# Patient Record
Sex: MALE | Race: WHITE | NOT HISPANIC OR LATINO | Employment: FULL TIME | URBAN - METROPOLITAN AREA
[De-identification: names, ages, dates, MRNs, and addresses within clinical notes are randomized per-mention and may not be internally consistent; named-entity substitution may affect disease eponyms.]

---

## 2017-05-16 ENCOUNTER — ALLSCRIPTS OFFICE VISIT (OUTPATIENT)
Dept: OTHER | Facility: OTHER | Age: 52
End: 2017-05-16

## 2017-05-16 ENCOUNTER — HOSPITAL ENCOUNTER (OUTPATIENT)
Dept: RADIOLOGY | Facility: HOSPITAL | Age: 52
Discharge: HOME | End: 2017-05-16
Attending: ORTHOPAEDIC SURGERY
Payer: COMMERCIAL

## 2017-05-16 DIAGNOSIS — M25.562 PAIN IN LEFT KNEE: ICD-10-CM

## 2017-05-16 DIAGNOSIS — M25.561 PAIN IN RIGHT KNEE: ICD-10-CM

## 2017-05-16 PROCEDURE — 73562 X-RAY EXAM OF KNEE 3: CPT

## 2017-06-27 ENCOUNTER — ALLSCRIPTS OFFICE VISIT (OUTPATIENT)
Dept: OTHER | Facility: OTHER | Age: 52
End: 2017-06-27

## 2017-07-01 ENCOUNTER — APPOINTMENT (OUTPATIENT)
Dept: LAB | Facility: CLINIC | Age: 52
End: 2017-07-01
Payer: COMMERCIAL

## 2017-07-01 ENCOUNTER — TRANSCRIBE ORDERS (OUTPATIENT)
Dept: LAB | Facility: CLINIC | Age: 52
End: 2017-07-01

## 2017-07-01 DIAGNOSIS — Z00.8 HEALTH EXAMINATION IN POPULATION SURVEY: Primary | ICD-10-CM

## 2017-07-01 DIAGNOSIS — Z00.8 HEALTH EXAMINATION IN POPULATION SURVEY: ICD-10-CM

## 2017-07-01 LAB
CHOLEST SERPL-MCNC: 212 MG/DL (ref 50–200)
EST. AVERAGE GLUCOSE BLD GHB EST-MCNC: 114 MG/DL
HBA1C MFR BLD: 5.6 % (ref 4.2–6.3)
HDLC SERPL-MCNC: 50 MG/DL (ref 40–60)
LDLC SERPL CALC-MCNC: 145 MG/DL (ref 0–100)
TRIGL SERPL-MCNC: 86 MG/DL

## 2017-07-01 PROCEDURE — 83036 HEMOGLOBIN GLYCOSYLATED A1C: CPT

## 2017-07-01 PROCEDURE — 80061 LIPID PANEL: CPT

## 2017-07-01 PROCEDURE — 36415 COLL VENOUS BLD VENIPUNCTURE: CPT

## 2017-08-28 ENCOUNTER — ALLSCRIPTS OFFICE VISIT (OUTPATIENT)
Dept: OTHER | Facility: OTHER | Age: 52
End: 2017-08-28

## 2017-08-28 DIAGNOSIS — Z12.5 ENCOUNTER FOR SCREENING FOR MALIGNANT NEOPLASM OF PROSTATE: ICD-10-CM

## 2017-08-31 ENCOUNTER — APPOINTMENT (OUTPATIENT)
Dept: LAB | Facility: CLINIC | Age: 52
End: 2017-08-31
Payer: COMMERCIAL

## 2017-08-31 ENCOUNTER — GENERIC CONVERSION - ENCOUNTER (OUTPATIENT)
Dept: OTHER | Facility: OTHER | Age: 52
End: 2017-08-31

## 2017-08-31 DIAGNOSIS — Z12.5 ENCOUNTER FOR SCREENING FOR MALIGNANT NEOPLASM OF PROSTATE: ICD-10-CM

## 2017-08-31 LAB — PSA SERPL-MCNC: 1.1 NG/ML (ref 0–4)

## 2017-08-31 PROCEDURE — 36415 COLL VENOUS BLD VENIPUNCTURE: CPT

## 2017-08-31 PROCEDURE — G0103 PSA SCREENING: HCPCS

## 2017-09-12 ENCOUNTER — ALLSCRIPTS OFFICE VISIT (OUTPATIENT)
Dept: OTHER | Facility: OTHER | Age: 52
End: 2017-09-12

## 2017-09-18 ENCOUNTER — GENERIC CONVERSION - ENCOUNTER (OUTPATIENT)
Dept: OTHER | Facility: OTHER | Age: 52
End: 2017-09-18

## 2017-09-26 ENCOUNTER — ALLSCRIPTS OFFICE VISIT (OUTPATIENT)
Dept: OTHER | Facility: OTHER | Age: 52
End: 2017-09-26

## 2017-10-24 ENCOUNTER — GENERIC CONVERSION - ENCOUNTER (OUTPATIENT)
Dept: OTHER | Facility: OTHER | Age: 52
End: 2017-10-24

## 2017-10-26 ENCOUNTER — ANESTHESIA (OUTPATIENT)
Dept: PERIOP | Facility: HOSPITAL | Age: 52
End: 2017-10-26
Payer: COMMERCIAL

## 2017-10-26 ENCOUNTER — HOSPITAL ENCOUNTER (OUTPATIENT)
Facility: HOSPITAL | Age: 52
Setting detail: OUTPATIENT SURGERY
Discharge: HOME | End: 2017-10-26
Attending: SURGERY | Admitting: SURGERY
Payer: COMMERCIAL

## 2017-10-26 ENCOUNTER — ANESTHESIA EVENT (OUTPATIENT)
Dept: PERIOP | Facility: HOSPITAL | Age: 52
End: 2017-10-26
Payer: COMMERCIAL

## 2017-10-26 VITALS
RESPIRATION RATE: 20 BRPM | TEMPERATURE: 98.3 F | BODY MASS INDEX: 28.44 KG/M2 | OXYGEN SATURATION: 98 % | SYSTOLIC BLOOD PRESSURE: 137 MMHG | WEIGHT: 210 LBS | HEIGHT: 72 IN | HEART RATE: 55 BPM | DIASTOLIC BLOOD PRESSURE: 79 MMHG

## 2017-10-26 PROCEDURE — C1781 MESH (IMPLANTABLE): HCPCS | Performed by: SURGERY

## 2017-10-26 DEVICE — BARD MESH
Type: IMPLANTABLE DEVICE | Site: INGUINAL | Status: FUNCTIONAL
Brand: BARD MESH

## 2017-10-26 RX ORDER — ONDANSETRON 2 MG/ML
4 INJECTION INTRAMUSCULAR; INTRAVENOUS ONCE AS NEEDED
Status: DISCONTINUED | OUTPATIENT
Start: 2017-10-26 | End: 2017-10-26 | Stop reason: HOSPADM

## 2017-10-26 RX ORDER — PROPOFOL 10 MG/ML
INJECTION, EMULSION INTRAVENOUS AS NEEDED
Status: DISCONTINUED | OUTPATIENT
Start: 2017-10-26 | End: 2017-10-26 | Stop reason: SURG

## 2017-10-26 RX ORDER — SODIUM CHLORIDE, SODIUM LACTATE, POTASSIUM CHLORIDE, CALCIUM CHLORIDE 600; 310; 30; 20 MG/100ML; MG/100ML; MG/100ML; MG/100ML
50 INJECTION, SOLUTION INTRAVENOUS CONTINUOUS
Status: DISCONTINUED | OUTPATIENT
Start: 2017-10-26 | End: 2017-10-26 | Stop reason: HOSPADM

## 2017-10-26 RX ORDER — FENTANYL CITRATE 50 UG/ML
INJECTION, SOLUTION INTRAMUSCULAR; INTRAVENOUS AS NEEDED
Status: DISCONTINUED | OUTPATIENT
Start: 2017-10-26 | End: 2017-10-26 | Stop reason: SURG

## 2017-10-26 RX ORDER — MIDAZOLAM HYDROCHLORIDE 1 MG/ML
INJECTION INTRAMUSCULAR; INTRAVENOUS AS NEEDED
Status: DISCONTINUED | OUTPATIENT
Start: 2017-10-26 | End: 2017-10-26 | Stop reason: SURG

## 2017-10-26 RX ORDER — ONDANSETRON 2 MG/ML
INJECTION INTRAMUSCULAR; INTRAVENOUS AS NEEDED
Status: DISCONTINUED | OUTPATIENT
Start: 2017-10-26 | End: 2017-10-26 | Stop reason: SURG

## 2017-10-26 RX ORDER — BUPIVACAINE HYDROCHLORIDE AND EPINEPHRINE 5; 5 MG/ML; UG/ML
INJECTION, SOLUTION PERINEURAL AS NEEDED
Status: DISCONTINUED | OUTPATIENT
Start: 2017-10-26 | End: 2017-10-26 | Stop reason: HOSPADM

## 2017-10-26 RX ORDER — OXYCODONE HYDROCHLORIDE AND ACETAMINOPHEN 5; 325 MG/1; MG/1
1 TABLET ORAL EVERY 4 HOURS PRN
Qty: 20 TABLET | Refills: 0
Start: 2017-10-26 | End: 2017-11-05

## 2017-10-26 RX ORDER — ONDANSETRON 2 MG/ML
4 INJECTION INTRAMUSCULAR; INTRAVENOUS EVERY 6 HOURS PRN
Status: DISCONTINUED | OUTPATIENT
Start: 2017-10-26 | End: 2017-10-26 | Stop reason: HOSPADM

## 2017-10-26 RX ORDER — LIDOCAINE HYDROCHLORIDE 10 MG/ML
INJECTION, SOLUTION INFILTRATION; PERINEURAL AS NEEDED
Status: DISCONTINUED | OUTPATIENT
Start: 2017-10-26 | End: 2017-10-26 | Stop reason: SURG

## 2017-10-26 RX ORDER — OXYCODONE HYDROCHLORIDE 5 MG/1
5 TABLET ORAL EVERY 4 HOURS PRN
Status: DISCONTINUED | OUTPATIENT
Start: 2017-10-26 | End: 2017-10-26 | Stop reason: HOSPADM

## 2017-10-26 RX ORDER — SODIUM CHLORIDE, SODIUM LACTATE, POTASSIUM CHLORIDE, CALCIUM CHLORIDE 600; 310; 30; 20 MG/100ML; MG/100ML; MG/100ML; MG/100ML
INJECTION, SOLUTION INTRAVENOUS CONTINUOUS PRN
Status: DISCONTINUED | OUTPATIENT
Start: 2017-10-26 | End: 2017-10-26 | Stop reason: SURG

## 2017-10-26 RX ORDER — ACETAMINOPHEN 325 MG/1
650 TABLET ORAL EVERY 6 HOURS PRN
Status: DISCONTINUED | OUTPATIENT
Start: 2017-10-26 | End: 2017-10-26 | Stop reason: HOSPADM

## 2017-10-26 RX ORDER — FENTANYL CITRATE/PF 50 MCG/ML
50 SYRINGE (ML) INJECTION
Status: DISCONTINUED | OUTPATIENT
Start: 2017-10-26 | End: 2017-10-26 | Stop reason: HOSPADM

## 2017-10-26 RX ADMIN — FENTANYL CITRATE 25 MCG: 50 INJECTION, SOLUTION INTRAMUSCULAR; INTRAVENOUS at 08:11

## 2017-10-26 RX ADMIN — LIDOCAINE HYDROCHLORIDE 100 MG: 10 INJECTION, SOLUTION INFILTRATION; PERINEURAL at 07:59

## 2017-10-26 RX ADMIN — OXYCODONE HYDROCHLORIDE 5 MG: 5 TABLET ORAL at 10:11

## 2017-10-26 RX ADMIN — PROPOFOL 200 MG: 10 INJECTION, EMULSION INTRAVENOUS at 07:59

## 2017-10-26 RX ADMIN — FENTANYL CITRATE 25 MCG: 50 INJECTION, SOLUTION INTRAMUSCULAR; INTRAVENOUS at 07:59

## 2017-10-26 RX ADMIN — FENTANYL CITRATE 50 MCG: 50 INJECTION INTRAMUSCULAR; INTRAVENOUS at 09:20

## 2017-10-26 RX ADMIN — FENTANYL CITRATE 50 MCG: 50 INJECTION INTRAMUSCULAR; INTRAVENOUS at 09:09

## 2017-10-26 RX ADMIN — MIDAZOLAM HYDROCHLORIDE 2 MG: 1 INJECTION, SOLUTION INTRAMUSCULAR; INTRAVENOUS at 07:57

## 2017-10-26 RX ADMIN — FENTANYL CITRATE 25 MCG: 50 INJECTION, SOLUTION INTRAMUSCULAR; INTRAVENOUS at 08:46

## 2017-10-26 RX ADMIN — ONDANSETRON 4 MG: 2 INJECTION INTRAMUSCULAR; INTRAVENOUS at 08:43

## 2017-10-26 RX ADMIN — SODIUM CHLORIDE, SODIUM LACTATE, POTASSIUM CHLORIDE, AND CALCIUM CHLORIDE: .6; .31; .03; .02 INJECTION, SOLUTION INTRAVENOUS at 07:57

## 2017-10-26 RX ADMIN — FENTANYL CITRATE 25 MCG: 50 INJECTION, SOLUTION INTRAMUSCULAR; INTRAVENOUS at 08:05

## 2017-10-26 RX ADMIN — FENTANYL CITRATE 50 MCG: 50 INJECTION INTRAMUSCULAR; INTRAVENOUS at 09:13

## 2017-10-26 NOTE — ANESTHESIA PREPROCEDURE EVALUATION
Review of Systems/Medical History  Patient summary reviewed  Chart reviewed      Cardiovascular  Exercise tolerance: good,  Hyperlipidemia,    Pulmonary       GI/Hepatic            Endo/Other     GYN       Hematology   Musculoskeletal       Neurology   Psychology           Physical Exam    Airway    Mallampati score: I  TM Distance: >3 FB  Neck ROM: full     Dental       Cardiovascular      Pulmonary      Other Findings        Anesthesia Plan  ASA Score- 1       Anesthesia Type- general with ASA Monitors  Additional Monitors:   Airway Plan: LMA  Induction- intravenous  Informed Consent- Anesthetic plan and risks discussed with patient

## 2017-10-26 NOTE — DISCHARGE INSTRUCTIONS
Inguinal Hernia Repair   WHAT YOU NEED TO KNOW:   An inguinal hernia repair may be done open or laparoscopically  Open means your healthcare provider will make 1 large incision and fix your hernia  Laparoscopically means he will make 2 to 3 small incisions and fix your hernia  DISCHARGE INSTRUCTIONS:   Call 911 for any of the following:   · You feel lightheaded, short of breath, and have chest pain  · You cough up blood  · You have trouble breathing  Seek care immediately if:   · Your arm or leg feels warm, tender, and painful  It may look swollen and red  · Blood soaks through your bandage  · Your abdomen or groin feels hard and looks bigger than usual     · Your bowel movements are black, bloody, or tarry-looking  Contact your healthcare provider if:   · You have a fever above 101°F     · You develop a skin rash, hives, or itching  · Your incision is swollen, red, or draining pus or fluid  · You have nausea, or you are vomiting  · You cannot have a bowel movement  · You have trouble urinating  · Your pain does not get better after you take pain medicine  · You have questions or concerns about your condition or care  Medicines: You may need any of the following:  · Prescription pain medicine may be given  Ask your healthcare provider how to take this medicine safely  Some prescription pain medicines contain acetaminophen  Do not take other medicines that contain acetaminophen without talking to your healthcare provider  Too much acetaminophen may cause liver damage  Prescription pain medicine may cause constipation  Ask your healthcare provider how to prevent or treat constipation  · Acetaminophen decreases pain and fever  It is available without a doctor's order  Ask how much to take and how often to take it  Follow directions   Read the labels of all other medicines you are using to see if they also contain acetaminophen, or ask your doctor or pharmacist  Acetaminophen can cause liver damage if not taken correctly  Do not use more than 4 grams (4,000 milligrams) total of acetaminophen in one day  · Take your medicine as directed  Contact your healthcare provider if you think your medicine is not helping or if you have side effects  Tell him or her if you are allergic to any medicine  Keep a list of the medicines, vitamins, and herbs you take  Include the amounts, and when and why you take them  Bring the list or the pill bottles to follow-up visits  Carry your medicine list with you in case of an emergency  Care for your wound as directed: You can shower in 48 hours  Remove your bandage before you shower  It is normal to see a small amount of blood under the bandage  Carefully wash around your wound  It is okay to let soap and water run over your wound  Do not scrub your wound  Gently pay your wound dry  If you have strips of medical tape over your incision, allow them to fall off on their own  It may take 7 to 10 days for them to fall off  Do not get in a bathtub, swimming pool, or hot tub until your healthcare provider says it is okay  Self-care:   · Eat a variety of healthy foods  Healthy foods include fruits, vegetables, whole-grain breads, low-fat dairy products, beans, lean meats, and fish  Healthy foods may help you heal faster  Ask if you need to be on a special diet  · Drink liquids as directed  Liquids may prevent constipation and straining during a bowel movement  This will help prevent pressure on your incision, and prevent another hernia  Ask how much liquid to drink each day and which liquids are best for you  · Apply ice on your incision for 15 to 20 minutes every hour or as directed  Use an ice pack, or put crushed ice in a plastic bag  Cover it with a towel  Ice helps prevent tissue damage and decreases swelling and pain  · Take deep breaths and cough 10 times each hour  This will decrease your risk for a lung infection   Take a deep breath and hold it for as long as you can  Let the air out and then cough strongly  Deep breaths help open your airway  You may be given an incentive spirometer to help you take deep breaths  Put the plastic piece in your mouth and take a slow, deep breath  Then let the air out and cough  Repeat these steps 10 times every hour  Press a pillow lightly against your incision when you cough  This may decrease pain or discomfort  · Do not smoke  Nicotine and other chemicals in cigarettes and cigars can prevent your wound from healing  It can also increase your risk for another inguinal hernia  Ask your healthcare provider for information if you currently smoke and need help to quit  E-cigarettes or smokeless tobacco still contain nicotine  Talk to your healthcare provider before you use these products  Driving: Do not drive for at least 1 week after surgery  Do not drive if you are taking prescription pain medication  Do not drive until it is comfortable to wear a seatbelt across your abdomen  Ask your healthcare provider when it is safe for you to drive  Activity: It is important to get out of bed and walk the day after your surgery  This will help prevent blood clots, move your bowels after surgery, and increase healing  Do not lift anything heavy until your healthcare provider says it is okay  This may put too much pressure on your incision and cause it to come apart  It may also increase your risk for another hernia  Do not play sports for 2 to 3 weeks  Ask your healthcare provider when you can return to work, school, and your normal activities     Follow up with your healthcare provider as directed: Write down your questions so you remember to ask them during your visits

## 2017-10-26 NOTE — OP NOTE
OPERATIVE REPORT  PATIENT NAME: Erik Abreu    :  1965  MRN: [id number]  Pt Location: [facility] OR ROOM 05    SURGERY DATE: 10/26/2017    Surgeon(s) and Role:     * Golden Forde MD - Primary     [facility] PROVIDERS [facility] Dav Poe MD - Assisting    Preop Diagnosis:  Right inguinal hernia [K40 90]    Post-Op Diagnosis Codes:     * Right inguinal hernia [K40 90]    Procedure(s) (LRB):  REPAIR HERNIA INGUINAL (Right)    Specimen(s):  * No specimens in log *    Estimated Blood Loss:   Minimal    Drains:       Anesthesia Type:   General    Operative Indications:  Right inguinal hernia [K40 90]      Operative Findings:  Right direct and indirect inguinal hernia  Walker repair    Complications:   None    Procedure and Technique:  Risks/ benefits and alternatives were again discussed with the patient in the preoperative holding area and also in the office  Informed consent was obtained by Dr Eliceo Melendrez  The patient was brought to the operating room and placed on the operating room table in the supine position  General anesthesia was induced and an LMA was placed  The right groin was prepped and draped in the usual sterile fashion using chloraprep  A time out was performed verigying the correct patient, procedure, site and equipment  A field block was produced by raising skin wheals along the proposed skin incision  An ilioinguinal nerve block was performed 1cm medial/inferior to the anterior superior iliac spine The skin incision was made with 15 blade and deepened through Prince's and Camper's fascia uwith electrocautery until the aponeurosis of the external oblique was encountered  This was cleaned and external ring was exposed  Hemostasis was achieved in the wound  The external oblique was then opened up with Bovie cautery on the cut setting  Flaps of external oblique were developed cephalad and inferiorly  The cord was identified   It was gently dissected free at the pubic tubercle and circled with a Penrose patent drain  Attention was directed to the anterior medial aspect of the cord where an indirect hernia sac was identified with the cord lipoma  The sac and the cord were carefully dissected free of the cord down to the level of the internal ring  The vas and testicular vessels were identified and protected from harm  The sac and cord lipoma were then reduced without any issue  Attention was then turned in the floor of the canal which to be grossly weakened without a well-defined defect or sac  The 1 x 4 inch polypropylene mesh was then cut to the appropriate size with an oval medial portion  Beginning at the pubic tubercle the mesh was sutured to the inguinal ligament inferiorly and the conjoint tendon superiorly using a continuous  running 2 0 Prolene suture  Care was taken to assure that the mesh was placed in a relaxed fashion to avoid excessive tension and that no neurovascular structures were caught in the repair  Laterally the tails of the mesh were crossed and the internal ring recreated allowing for the passage of the surgeon's 5th fingertip  Hemostasis was then again checked  The Penrose drain was removed  The external oblique aponeurosis was closed with a running 2 0 Vicryl taking care not to catch the ilioinguinal nerve in the suture line  Home services fascia was closed with interrupted 2 0 Vicryl  The skin was then closed with 4 O Monocryl in his sacrum  The testes were gently pulled down into its anatomic position in the scrotum  The wand was used twice prior to fascial closure and after skin closure, and reading was clear with no sponges detected    All sponge instrument and needle counts were correct at the end the procedure  Dr Leonard Weaver was present for the entire procedure  The patient was extubated and brought to the post anesthesia care unit stable condition          Patient Disposition:  PACU     SIGNATURE: Daysi Fagan MD  DATE: October 26, 2017  TIME: 8:58 AM

## 2017-11-16 ENCOUNTER — GENERIC CONVERSION - ENCOUNTER (OUTPATIENT)
Dept: OTHER | Facility: OTHER | Age: 52
End: 2017-11-16

## 2017-11-30 ENCOUNTER — GENERIC CONVERSION - ENCOUNTER (OUTPATIENT)
Dept: OTHER | Facility: OTHER | Age: 52
End: 2017-11-30

## 2017-12-12 ENCOUNTER — GENERIC CONVERSION - ENCOUNTER (OUTPATIENT)
Dept: OTHER | Facility: OTHER | Age: 52
End: 2017-12-12

## 2018-01-10 ENCOUNTER — GENERIC CONVERSION - ENCOUNTER (OUTPATIENT)
Dept: OTHER | Facility: OTHER | Age: 53
End: 2018-01-10

## 2018-01-10 NOTE — PROGRESS NOTES
Assessment    1  Acute frontal sinusitis, recurrence not specified (461 1) (J01 10)    Plan  Acute frontal sinusitis, recurrence not specified    · Amoxicillin-Pot Clavulanate 875-125 MG Oral Tablet (Augmentin); TAKE 1 TABLET  TWICE DAILY AFTER MEALS   · Medrol (Walter) 4 MG Oral Tablet (MethylPREDNISolone (Walter)); TAKE AS DIRECTED    Discussion/Summary    Patient with Dx: sinusitis treated with augmentin 875mg bid and medrol dose walter  Chief Complaint  patient presented here for cough, congestion, headache, and right ear pain for 3 days      History of Present Illness  HPI: Patient for evaluation of cough, congestion and ear pain for three days  Review of Systems    Constitutional: feeling poorly  ENT: earache and sore throat  Cardiovascular: no complaints of slow or fast heart rate, no chest pain, no palpitations, no leg claudication or lower extremity edema  Respiratory: cough  Gastrointestinal: no complaints of abdominal pain, no constipation, no nausea or vomiting, no diarrhea or bloody stools  Genitourinary: no complaints of dysuria or incontinence, no hesitancy, no nocturia, no genital lesion, no inadequacy of penile erection  Musculoskeletal: no complaints of arthralgia, no myalgia, no joint swelling or stiffness, no limb pain or swelling  Integumentary: no complaints of skin rash or lesion, no itching or dry skin, no skin wounds  Active Problems    1  Encounter for screening for malignant neoplasm of colon (V76 51) (Z12 11)   2  Erectile dysfunction of non-organic origin (302 72) (F52 21)   3  Hyperlipidemia (272 4) (E78 5)   4  Impaired fasting glucose (790 21) (R73 01)   5  Palpitations (785 1) (R00 2)   6  Special screening examination for neoplasm of prostate (V76 44) (Z12 5)    Family History    1  Family history of Diabetes Mellitus (V18 0)    2   Family history of Coronary Artery Disease (V17 49)    Social History    · Being A Social Drinker   · Current Every Day Smoker (305 1)   · Uses Safety Equipment - Seatbelts    Surgical History    1  History of Oral Surgery Tooth Extraction    Current Meds   1  Viagra 100 MG Oral Tablet; TAKE AS DIRECTED; Therapy: [id number] to 0495 72 29 09); Last Rx:[id number] Ordered    Allergies    1  No Known Drug Allergies    Vitals   Recorded: 32NVF9447 09:38AM   Temperature 97 6 F, Tympanic   Heart Rate 80   Pulse Quality Normal   Respiration 16   Respiration Quality Normal   Systolic 450, LUE, Sitting   Diastolic 82, LUE, Sitting   Height 5 ft 11 5 in   Weight 215 lb 2 oz   BMI Calculated 29 59   BSA Calculated 2 19   O2 Saturation 98     Physical Exam    Constitutional   General appearance: No acute distress, well appearing and well nourished  Eyes   Pupils and irises: Equal, round and reactive to light  Ears, Nose, Mouth, and Throat   External inspection of ears and nose: Abnormal   sinus congestion  Otoscopic examination: Abnormal   TMS retracted  Nasal mucosa, septum, and turbinates: Abnormal   turbinates swollen  Oropharynx: Abnormal   throat erythemic  Pulmonary   Auscultation of lungs: Clear to auscultation, equal breath sounds bilaterally, no wheezes, no rales, no rhonci  Cardiovascular   Auscultation of heart: Normal rate and rhythm, normal S1 and S2, without murmurs  Abdomen   Abdomen: Non-tender, no masses  Lymphatic   Palpation of lymph nodes in neck: No lymphadenopathy  Skin   Skin and subcutaneous tissue: Normal without rashes or lesions  Signatures   Electronically signed by :  RADHA Freeman ; Jan 26 2016 10:19AM EST                       (Author)

## 2018-01-11 NOTE — RESULT NOTES
Verified Results  (1) PSA (SCREEN) (Dx V76 44 Screen for Prostate Cancer) 18Vtn2718 08:37AM [place] Order Number: [id number]     Test Name Result Flag Reference   PROSTATE SPECIFIC ANTIGEN 1 1 ng/mL  0 0-4 0   American Urological Association Guidelines define biochemical recurrence of prostate cancer as a detectable or rising PSA value post-radical prostatectomy that is greater than or equal to 0 2 ng/mL with a second confirmatory level of greater than or equal to 0 2 ng/mL

## 2018-01-12 VITALS
BODY MASS INDEX: 28.17 KG/M2 | TEMPERATURE: 98 F | HEIGHT: 72 IN | DIASTOLIC BLOOD PRESSURE: 88 MMHG | SYSTOLIC BLOOD PRESSURE: 140 MMHG | WEIGHT: 208 LBS

## 2018-01-13 VITALS
DIASTOLIC BLOOD PRESSURE: 84 MMHG | HEIGHT: 72 IN | HEART RATE: 71 BPM | SYSTOLIC BLOOD PRESSURE: 143 MMHG | WEIGHT: 210.25 LBS | BODY MASS INDEX: 28.48 KG/M2

## 2018-01-13 VITALS
BODY MASS INDEX: 28.36 KG/M2 | RESPIRATION RATE: 16 BRPM | OXYGEN SATURATION: 98 % | DIASTOLIC BLOOD PRESSURE: 76 MMHG | WEIGHT: 209.38 LBS | SYSTOLIC BLOOD PRESSURE: 132 MMHG | TEMPERATURE: 97.1 F | HEART RATE: 70 BPM | HEIGHT: 72 IN

## 2018-01-14 VITALS
HEART RATE: 76 BPM | BODY MASS INDEX: 28.61 KG/M2 | HEIGHT: 72 IN | DIASTOLIC BLOOD PRESSURE: 84 MMHG | WEIGHT: 211.25 LBS | SYSTOLIC BLOOD PRESSURE: 139 MMHG

## 2018-01-15 VITALS
DIASTOLIC BLOOD PRESSURE: 83 MMHG | HEIGHT: 72 IN | WEIGHT: 215.13 LBS | BODY MASS INDEX: 29.14 KG/M2 | SYSTOLIC BLOOD PRESSURE: 144 MMHG | HEART RATE: 80 BPM

## 2018-01-18 NOTE — PROGRESS NOTES
Preliminary Nursing Report           Patient Information   Initial Encounter Entry Date:   2017 11:28 AM EST (Automated Transmission Automated Transmission)     Last Modified:   {Jeimy León}          Legal Name: [address] [place] [address] Number:      YOB: 1965      Age (years): 45      Gender: M      Body Mass Index (BMI): 29 kg/m2      Height: 71 in  Weight: 208 lbs (94 kgs)        Address:   [address] [place] [address]           Phone: [phone]   (consent to leave messages)      Email:      Ethnicity: Decline to State      Worship:      Marital Status:      Preferred Language: English      Race: Other Race              Patient Insurance Information      Primary Insurance InformationCarrier Name: {Primary  CarrierName}        Carrier Address:   {Primary  CarrierAddress}           Carrier Phone: {Primary  CarrierPhone}        Group Number: {Primary  GroupNumber}        Policy Number: {Primary  PolicyNumber}        Insured Name: {Primary  InsuredName}        Insured : {Primary  InsuredDOB}        Relationship to Insured: {Primary  RelationshiptoInsured}          Secondary Insurance InformationCarrier Name: {Secondary  CarrierName}        Carrier Address:   {Secondary  CarrierAddress}           Carrier Phone: {Secondary  CarrierPhone}        Group Number: {Secondary  GroupNumber}        Policy Number: {Secondary  PolicyNumber}        Insured Name: {Secondary  InsuredName}        Insured : {Secondary  InsuredDOB}        Relationship to Insured: {Secondary  RelationshiptoInsured}                Health Profile   Booking #:   Kaleb Harika #: [id number]           DOS: 10/26/2017    Surgery : REPAIR INGUINAL HERNIA      Add'l Procedures/Notes:      Surgery Risk: Minor       Precautions        Allergies   No Known Drug Allergies          Medications   No Reported Medications            Conditions   Encounter for screening for malignant neoplasm of colon     Erectile dysfunction of non-organic origin     Flu vaccine need     Hyperlipidemia     Need for pneumococcal vaccination     Need for Tdap vaccination     Right inguinal hernia     Special screening examination for neoplasm of prostate            Family History   None          Surgical History   None          Social History   Being A Social Drinker     Current Every Day Smoker     Former smoker     Uses Safety Equipment - Seatbelts                       Patient Instructions     Medical Procedure Risk  NPO Instructions   The day before surgery it is recommended to have a light dinner at your usual time and you are allowed a light snack  early in the evening  Do not eat anything heavy or eat a big meal after 7pm  Do not eat or drink anything after midnight prior to your surgery  If you are supposed to take any of your medications, do so with a sip of water  Failure to follow these instructions  can lead to an increased risk of lung complications and may result in a delay or cancellation of your procedure  If you have any questions, contact your institution for further instructions  No candy, no gum, no mints, no chewing tobacco        Current Every Day Smoker  Smoking Cessation   Smoking before and after surgery can lead to complications  Patients who quit smoking at least eight weeks before  surgery have complication rates almost as low as non-smokers  Smokers who can stop smoking 24 or 48 hours before surgery may also benefit from decreased amounts of nicotine and carbon monoxide in the body  For help quitting smoking, speak with your physician  or contact the [facility] Chayo Szymanski or American Lung Association  Please visit the following web address for assistance with quitting  [employer]ul[employer] be  [email or web address]  aspx  Testing Considerations     No recommendations for this classification  Consultations     No recommendations for this classification          Miscellaneous Questions      Question: Are you able to walk up a flight of stairs, walk up a hill or do heavy housework WITHOUT having chest pain or shortness of breath? Answer: YES             Allergies/Conditions/Medications Not Found      The following were not recognized by our system when generating the recommendations  Please consider if this would impact any preoperative protocols  Being A Social Drinker      Uses Safety Equipment - Seatbelts             Appointment Information      Date:    10/26/2017      Location:    [place]      Address:         Directions:                 Footnotes revision 14     Denotes a free-text entry  Legal Disclaimer: Any and all recommendations and services provided herein are designed to assist in the preoperative care of the patient  Nothing contained herein is designed to replace,  eliminate or alleviate the responsibility of the attending physician to supervise and determine the patients preoperative care and course of treatment  Failure to provide complete, accurate information may negatively impact the system s ability to recommend the proper preoperative protocol  THE ATTENDING PHYSICIAN IS RESPONSIBLE TO REVIEW THE SUGGESTED PREOPERATIVE PROTOCOLS/COURSE OF TREATMENT AND PRESCRIBE THE FINAL COURSE OF PREOPERATIVE TREATMENT IN CONSULTATION  WITH THE PATIENT  THE ePREOP SYSTEM AND ITS MATERIALS ARE PROVIDED AS IS WITHOUT WARRANTY OF ANY KIND, EXPRESS OR IMPLIED, INCLUDING, BUT NOT LIMITED TO, WARRANTIES OF PERFORMANCE OR MERCHANTABILITY OR FITNESS FOR  A PARTICULAR PURPOSE  PATIENT AND PHYSICIANS HEREBY AGREE THAT THEIR USE OF THE MATERIALS AND RESOURCES ACT AS A CONSENT TO RELEASE AND WAIVE ePREOP FROM ANY AND ALL CLAIMS OF WARRANTY, TORT OR CONTRACT LAW OF ANY KIND             Electronically signed by:Ha Lira MD  Sep 19 2017  2:47PM EST

## 2018-01-22 VITALS
BODY MASS INDEX: 28.46 KG/M2 | WEIGHT: 210.13 LBS | HEIGHT: 72 IN | SYSTOLIC BLOOD PRESSURE: 144 MMHG | HEART RATE: 66 BPM | DIASTOLIC BLOOD PRESSURE: 90 MMHG

## 2018-01-22 VITALS
BODY MASS INDEX: 28.44 KG/M2 | DIASTOLIC BLOOD PRESSURE: 84 MMHG | HEIGHT: 72 IN | WEIGHT: 210 LBS | SYSTOLIC BLOOD PRESSURE: 138 MMHG

## 2018-01-22 VITALS
DIASTOLIC BLOOD PRESSURE: 78 MMHG | SYSTOLIC BLOOD PRESSURE: 122 MMHG | WEIGHT: 211.38 LBS | BODY MASS INDEX: 28.63 KG/M2 | HEIGHT: 72 IN

## 2018-01-24 VITALS
WEIGHT: 212.13 LBS | HEIGHT: 72 IN | SYSTOLIC BLOOD PRESSURE: 133 MMHG | BODY MASS INDEX: 28.73 KG/M2 | DIASTOLIC BLOOD PRESSURE: 84 MMHG | HEART RATE: 82 BPM

## 2018-01-24 VITALS
DIASTOLIC BLOOD PRESSURE: 80 MMHG | HEART RATE: 95 BPM | WEIGHT: 211 LBS | TEMPERATURE: 98.7 F | HEIGHT: 72 IN | OXYGEN SATURATION: 97 % | RESPIRATION RATE: 16 BRPM | BODY MASS INDEX: 28.58 KG/M2 | SYSTOLIC BLOOD PRESSURE: 120 MMHG

## 2018-01-30 ENCOUNTER — OFFICE VISIT (OUTPATIENT)
Dept: OBGYN CLINIC | Facility: HOSPITAL | Age: 53
End: 2018-01-30
Payer: COMMERCIAL

## 2018-01-30 VITALS
HEART RATE: 80 BPM | BODY MASS INDEX: 29.66 KG/M2 | WEIGHT: 219 LBS | HEIGHT: 72 IN | SYSTOLIC BLOOD PRESSURE: 154 MMHG | DIASTOLIC BLOOD PRESSURE: 81 MMHG

## 2018-01-30 DIAGNOSIS — M17.12 PRIMARY OSTEOARTHRITIS OF LEFT KNEE: ICD-10-CM

## 2018-01-30 DIAGNOSIS — M17.11 PRIMARY OSTEOARTHRITIS OF RIGHT KNEE: Primary | ICD-10-CM

## 2018-01-30 PROCEDURE — 20610 DRAIN/INJ JOINT/BURSA W/O US: CPT | Performed by: ORTHOPAEDIC SURGERY

## 2018-01-30 PROCEDURE — 99213 OFFICE O/P EST LOW 20 MIN: CPT | Performed by: ORTHOPAEDIC SURGERY

## 2018-01-30 RX ORDER — BETAMETHASONE SODIUM PHOSPHATE AND BETAMETHASONE ACETATE 3; 3 MG/ML; MG/ML
12 INJECTION, SUSPENSION INTRA-ARTICULAR; INTRALESIONAL; INTRAMUSCULAR; SOFT TISSUE
Status: COMPLETED | OUTPATIENT
Start: 2018-01-30 | End: 2018-01-30

## 2018-01-30 RX ORDER — LIDOCAINE HYDROCHLORIDE 10 MG/ML
2 INJECTION, SOLUTION INFILTRATION; PERINEURAL
Status: COMPLETED | OUTPATIENT
Start: 2018-01-30 | End: 2018-01-30

## 2018-01-30 RX ORDER — BUPIVACAINE HYDROCHLORIDE 2.5 MG/ML
2 INJECTION, SOLUTION INFILTRATION; PERINEURAL
Status: COMPLETED | OUTPATIENT
Start: 2018-01-30 | End: 2018-01-30

## 2018-01-30 RX ORDER — AMOXICILLIN AND CLAVULANATE POTASSIUM 875; 125 MG/1; MG/1
1 TABLET, FILM COATED ORAL 2 TIMES DAILY
COMMUNITY
Start: 2018-01-10 | End: 2018-01-30 | Stop reason: ALTCHOICE

## 2018-01-30 RX ADMIN — BETAMETHASONE SODIUM PHOSPHATE AND BETAMETHASONE ACETATE 12 MG: 3; 3 INJECTION, SUSPENSION INTRA-ARTICULAR; INTRALESIONAL; INTRAMUSCULAR; SOFT TISSUE at 15:54

## 2018-01-30 RX ADMIN — BUPIVACAINE HYDROCHLORIDE 2 ML: 2.5 INJECTION, SOLUTION INFILTRATION; PERINEURAL at 15:54

## 2018-01-30 RX ADMIN — LIDOCAINE HYDROCHLORIDE 2 ML: 10 INJECTION, SOLUTION INFILTRATION; PERINEURAL at 15:53

## 2018-01-30 RX ADMIN — BUPIVACAINE HYDROCHLORIDE 2 ML: 2.5 INJECTION, SOLUTION INFILTRATION; PERINEURAL at 15:53

## 2018-01-30 RX ADMIN — LIDOCAINE HYDROCHLORIDE 2 ML: 10 INJECTION, SOLUTION INFILTRATION; PERINEURAL at 15:54

## 2018-01-30 RX ADMIN — BETAMETHASONE SODIUM PHOSPHATE AND BETAMETHASONE ACETATE 12 MG: 3; 3 INJECTION, SUSPENSION INTRA-ARTICULAR; INTRALESIONAL; INTRAMUSCULAR; SOFT TISSUE at 15:53

## 2018-01-30 NOTE — PROGRESS NOTES
45 y o male presents for follow-up  He continues to have pain in both knees  He has pain level both knee joints, the pain is made worse bearing weight, the pain worsens with increased activities  He desires not aggressive treatments is under alleviate pain as well as promote improved function    Review of Systems  Review of systems negative unless otherwise specified in HPI    Past Medical History  Past Medical History:   Diagnosis Date    Erectile dysfunction     Hyperlipidemia     Right inguinal hernia        Past Surgical History  Past Surgical History:   Procedure Laterality Date    KNEE ARTHROSCOPY      bilateral    MA REPAIR ING HERNIA,5+Y/O,REDUCIBL Right 10/26/2017    Procedure: REPAIR HERNIA INGUINAL;  Surgeon: Marie Soto MD;  Location: [facility];  Service: General       Current Medications  No current outpatient prescriptions on file prior to visit  No current facility-administered medications on file prior to visit  Recent Labs [facility])  @LABALLVALUEIP(HCT:3,HGB:3,PT,INR,WBC:3,ESR,CRP,GLUCOSE,HGBA1C)@      Physical exam  Examination finds gait pattern without antalgia  Breathing is not labored  Lumbar spine is nontender  Both hips move well  Knee the thighs atrophy  Both knees are in varus  There are no effusions bilaterally  There is crepitation flexion extension  There is bony enlargement medially bilaterally  There is no mid flexion valgus instability  There is crepitation flexion extension  Calf compartments are soft and supple    Toes are warm, sensate, mobile bilaterally    Imaging  X-rays of both knees were again reviewed shows modest arthritic change in both knees    Procedure  Injections of corticosteroid her indicated in performed to both knees today    Large joint arthrocentesis  Date/Time: 1/30/2018 3:53 PM  Consent given by: patient  Supporting Documentation  Indications: pain   Procedure Details  Location: knee - R knee  Preparation: Patient was prepped and draped in the usual sterile fashion  Needle size: 20 G  Approach: anterolateral  Medications administered: 2 mL bupivacaine 0 25 %; 2 mL lidocaine 1 %; 12 mg betamethasone acetate-betamethasone sodium phosphate 6 (3-3) mg/mL    Patient tolerance: patient tolerated the procedure well with no immediate complications  Dressing:  Sterile dressing applied  Large joint arthrocentesis  Date/Time: 1/30/2018 3:54 PM  Consent given by: patient  Supporting Documentation  Indications: pain   Procedure Details  Location: knee - L knee  Preparation: Patient was prepped and draped in the usual sterile fashion  Approach: anterolateral  Medications administered: 2 mL bupivacaine 0 25 %; 2 mL lidocaine 1 %; 12 mg betamethasone acetate-betamethasone sodium phosphate 6 (3-3) mg/mL    Patient tolerance: patient tolerated the procedure well with no immediate complications  Dressing:  Sterile dressing applied              Assessment/Plan:   45 y o male with bilateral knee arthritis that remained symptomatic  He wishes not aggressive treatments is under alleviate pain and promote improved function  Injections corticosteroid indicated  They are advised, except, administers outlined above    I would welcome the opportunity see this patient back in the office in 3 months time for follow-up

## 2018-05-01 ENCOUNTER — OFFICE VISIT (OUTPATIENT)
Dept: OBGYN CLINIC | Facility: HOSPITAL | Age: 53
End: 2018-05-01
Payer: COMMERCIAL

## 2018-05-01 VITALS
HEART RATE: 88 BPM | DIASTOLIC BLOOD PRESSURE: 92 MMHG | BODY MASS INDEX: 28.96 KG/M2 | SYSTOLIC BLOOD PRESSURE: 136 MMHG | HEIGHT: 72 IN | WEIGHT: 213.8 LBS

## 2018-05-01 DIAGNOSIS — G89.29 CHRONIC PAIN OF LEFT KNEE: Primary | ICD-10-CM

## 2018-05-01 DIAGNOSIS — M17.12 PRIMARY OSTEOARTHRITIS OF LEFT KNEE: ICD-10-CM

## 2018-05-01 DIAGNOSIS — G89.29 CHRONIC PAIN OF RIGHT KNEE: ICD-10-CM

## 2018-05-01 DIAGNOSIS — M17.11 PRIMARY OSTEOARTHRITIS OF RIGHT KNEE: ICD-10-CM

## 2018-05-01 DIAGNOSIS — M25.561 CHRONIC PAIN OF RIGHT KNEE: ICD-10-CM

## 2018-05-01 DIAGNOSIS — M25.562 CHRONIC PAIN OF LEFT KNEE: Primary | ICD-10-CM

## 2018-05-01 PROCEDURE — 99213 OFFICE O/P EST LOW 20 MIN: CPT | Performed by: ORTHOPAEDIC SURGERY

## 2018-05-01 PROCEDURE — 20610 DRAIN/INJ JOINT/BURSA W/O US: CPT | Performed by: ORTHOPAEDIC SURGERY

## 2018-05-01 RX ORDER — BUPIVACAINE HYDROCHLORIDE 2.5 MG/ML
2 INJECTION, SOLUTION INFILTRATION; PERINEURAL
Status: COMPLETED | OUTPATIENT
Start: 2018-05-01 | End: 2018-05-01

## 2018-05-01 RX ORDER — BETAMETHASONE SODIUM PHOSPHATE AND BETAMETHASONE ACETATE 3; 3 MG/ML; MG/ML
12 INJECTION, SUSPENSION INTRA-ARTICULAR; INTRALESIONAL; INTRAMUSCULAR; SOFT TISSUE
Status: COMPLETED | OUTPATIENT
Start: 2018-05-01 | End: 2018-05-01

## 2018-05-01 RX ORDER — LIDOCAINE HYDROCHLORIDE 10 MG/ML
2 INJECTION, SOLUTION INFILTRATION; PERINEURAL
Status: COMPLETED | OUTPATIENT
Start: 2018-05-01 | End: 2018-05-01

## 2018-05-01 RX ORDER — HYDROCODONE BITARTRATE AND ACETAMINOPHEN 5; 325 MG/1; MG/1
TABLET ORAL
COMMUNITY
Start: 2018-02-16 | End: 2018-05-01

## 2018-05-01 RX ADMIN — BETAMETHASONE SODIUM PHOSPHATE AND BETAMETHASONE ACETATE 12 MG: 3; 3 INJECTION, SUSPENSION INTRA-ARTICULAR; INTRALESIONAL; INTRAMUSCULAR; SOFT TISSUE at 11:05

## 2018-05-01 RX ADMIN — LIDOCAINE HYDROCHLORIDE 2 ML: 10 INJECTION, SOLUTION INFILTRATION; PERINEURAL at 11:04

## 2018-05-01 RX ADMIN — BUPIVACAINE HYDROCHLORIDE 2 ML: 2.5 INJECTION, SOLUTION INFILTRATION; PERINEURAL at 11:04

## 2018-05-01 RX ADMIN — BETAMETHASONE SODIUM PHOSPHATE AND BETAMETHASONE ACETATE 12 MG: 3; 3 INJECTION, SUSPENSION INTRA-ARTICULAR; INTRALESIONAL; INTRAMUSCULAR; SOFT TISSUE at 11:04

## 2018-05-01 RX ADMIN — LIDOCAINE HYDROCHLORIDE 2 ML: 10 INJECTION, SOLUTION INFILTRATION; PERINEURAL at 11:05

## 2018-05-01 RX ADMIN — BUPIVACAINE HYDROCHLORIDE 2 ML: 2.5 INJECTION, SOLUTION INFILTRATION; PERINEURAL at 11:05

## 2018-05-01 NOTE — PROGRESS NOTES
52 y o male returns today for re-evaluation of his B/L knees, known OA and pain (R>L)  He has been getting by with intermittent cortisone injections his last set was 1/30/18  He had visco previously without any appreciable relief  His knee pain is medially but has not yet impeded on his quality of life  He is contemplating TKA however would like to wait until Jan '19  Review of Systems  Review of systems negative unless otherwise specified in HPI    Past Medical History  Past Medical History:   Diagnosis Date    Erectile dysfunction     Hyperlipidemia     Right inguinal hernia        Past Surgical History  Past Surgical History:   Procedure Laterality Date    KNEE ARTHROSCOPY      bilateral    ME REPAIR ING HERNIA,5+Y/O,REDUCIBL Right 10/26/2017    Procedure: REPAIR HERNIA INGUINAL;  Surgeon: Stanford Hopson MD;  Location: [facility];  Service: General       Current Medications  No current outpatient prescriptions on file prior to visit  No current facility-administered medications on file prior to visit  Recent Labs (HCT,HGB,PT,INR,ESR,CRP,GLU,HgA1C)    0  Lab Value Date/Time   GLUCOSE 107 06/07/2016 0700   GLUCOSE 113 07/19/2014 0724   HGBA1C 5 6 07/01/2017 0724   HGBA1C 5 7 (H) 07/11/2015 0805         Physical exam  · General: Awake, Alert, Oriented  · Eyes: Pupils equal, round and reactive to light  · Heart: regular rate and rhythm  · Lungs: No audible wheezing  · Abdomen: soft    · B/L knees:  Varus alignment, bony enlargement medially  Mild swelling but no effusions, good ROM with pain at full flexion with moderate patellar crepitus  TTP medial joint line  · Mild laxity but no instability     · NVID      Imaging      Procedure  Large joint arthrocentesis  Date/Time: 5/1/2018 11:04 AM  Consent given by: patient  Site marked: site marked  Supporting Documentation  Indications: pain   Procedure Details  Location: knee - R knee  Preparation: Patient was prepped and draped in the usual sterile fashion  Needle size: 22 G  Ultrasound guidance: no  Approach: anterolateral  Medications administered: 2 mL bupivacaine 0 25 %; 2 mL lidocaine 1 %; 12 mg betamethasone acetate-betamethasone sodium phosphate 6 (3-3) mg/mL    Patient tolerance: patient tolerated the procedure well with no immediate complications  Dressing:  Sterile dressing applied  Large joint arthrocentesis  Date/Time: 5/1/2018 11:05 AM  Consent given by: patient  Site marked: site marked  Supporting Documentation  Indications: pain   Procedure Details  Location: knee - L knee  Preparation: Patient was prepped and draped in the usual sterile fashion  Needle size: 22 G  Ultrasound guidance: no  Approach: anteromedial  Medications administered: 2 mL bupivacaine 0 25 %; 2 mL lidocaine 1 %; 12 mg betamethasone acetate-betamethasone sodium phosphate 6 (3-3) mg/mL    Patient tolerance: patient tolerated the procedure well with no immediate complications  Dressing:  Sterile dressing applied          Assessment/Plan:   52 y  o male with known OA who has been getting by with intermittent cortisone injections which were performed today  ICE as indcated after today's injections  Activities as tolerated  Quality of life decision to pursue TKA surgery    WBAT  Re-check in 3 months, (injections should be performed mihaela-laterally)

## 2018-08-01 ENCOUNTER — OFFICE VISIT (OUTPATIENT)
Dept: OBGYN CLINIC | Facility: HOSPITAL | Age: 53
End: 2018-08-01
Payer: COMMERCIAL

## 2018-08-01 VITALS
SYSTOLIC BLOOD PRESSURE: 143 MMHG | BODY MASS INDEX: 28.85 KG/M2 | WEIGHT: 213 LBS | DIASTOLIC BLOOD PRESSURE: 89 MMHG | HEART RATE: 81 BPM | HEIGHT: 72 IN

## 2018-08-01 DIAGNOSIS — G89.29 CHRONIC PAIN OF LEFT KNEE: ICD-10-CM

## 2018-08-01 DIAGNOSIS — M25.562 CHRONIC PAIN OF LEFT KNEE: ICD-10-CM

## 2018-08-01 DIAGNOSIS — G89.29 CHRONIC PAIN OF RIGHT KNEE: ICD-10-CM

## 2018-08-01 DIAGNOSIS — M17.11 PRIMARY OSTEOARTHRITIS OF RIGHT KNEE: ICD-10-CM

## 2018-08-01 DIAGNOSIS — M25.561 CHRONIC PAIN OF RIGHT KNEE: ICD-10-CM

## 2018-08-01 DIAGNOSIS — M17.12 PRIMARY OSTEOARTHRITIS OF LEFT KNEE: Primary | ICD-10-CM

## 2018-08-01 PROCEDURE — 99213 OFFICE O/P EST LOW 20 MIN: CPT | Performed by: ORTHOPAEDIC SURGERY

## 2018-08-01 PROCEDURE — 20610 DRAIN/INJ JOINT/BURSA W/O US: CPT | Performed by: ORTHOPAEDIC SURGERY

## 2018-08-01 RX ORDER — LIDOCAINE HYDROCHLORIDE 10 MG/ML
2 INJECTION, SOLUTION INFILTRATION; PERINEURAL
Status: COMPLETED | OUTPATIENT
Start: 2018-08-01 | End: 2018-08-01

## 2018-08-01 RX ORDER — BUPIVACAINE HYDROCHLORIDE 2.5 MG/ML
2 INJECTION, SOLUTION INFILTRATION; PERINEURAL
Status: COMPLETED | OUTPATIENT
Start: 2018-08-01 | End: 2018-08-01

## 2018-08-01 RX ORDER — BETAMETHASONE SODIUM PHOSPHATE AND BETAMETHASONE ACETATE 3; 3 MG/ML; MG/ML
12 INJECTION, SUSPENSION INTRA-ARTICULAR; INTRALESIONAL; INTRAMUSCULAR; SOFT TISSUE
Status: COMPLETED | OUTPATIENT
Start: 2018-08-01 | End: 2018-08-01

## 2018-08-01 RX ADMIN — BUPIVACAINE HYDROCHLORIDE 2 ML: 2.5 INJECTION, SOLUTION INFILTRATION; PERINEURAL at 13:01

## 2018-08-01 RX ADMIN — LIDOCAINE HYDROCHLORIDE 2 ML: 10 INJECTION, SOLUTION INFILTRATION; PERINEURAL at 13:01

## 2018-08-01 RX ADMIN — BETAMETHASONE SODIUM PHOSPHATE AND BETAMETHASONE ACETATE 12 MG: 3; 3 INJECTION, SUSPENSION INTRA-ARTICULAR; INTRALESIONAL; INTRAMUSCULAR; SOFT TISSUE at 13:01

## 2018-08-01 NOTE — PROGRESS NOTES
Assessment:  1  Primary osteoarthritis of left knee     2  Primary osteoarthritis of right knee     3  Chronic pain of right knee     4  Chronic pain of left knee         Plan:  B/L knee OA, both knees injected with cortisone today  ICE as indicated after today's injections  WBAT  Activities as tolerated  Total knee candidate and may schedule for Everardo at his next office visit     To do next visit:  Return in about 3 months (around 11/1/2018) for re-check of both knees, repeat injections and may proceed with total knee surgery  scheduling       Scribe Attestation    I,:   Heath Serrano am acting as a scribe while in the presence of the attending physician :        I,:   Evalyn Collet, MD personally performed the services described in this documentation    as scribed in my presence :              Subjective:   Shelah Leyden is a 52 y o  male who presents for re-evaluation of his B/L knees, known OA  He has been getting by with intermittent cortisone injections, his last set being 3 months ago  He would like another set today and is contemplating B/L TKAs for Jan '18  Review of systems negative unless otherwise specified in HPI      Past Medical History:   Diagnosis Date    Erectile dysfunction     Hyperlipidemia     Right inguinal hernia        Past Surgical History:   Procedure Laterality Date    KNEE ARTHROSCOPY      bilateral    OR REPAIR ING HERNIA,5+Y/O,REDUCIBL Right 10/26/2017    Procedure: REPAIR HERNIA INGUINAL;  Surgeon: Deangelo Whitfield MD;  Location: [facility];  Service: General       Family History   Problem Relation Age of Onset    No Known Problems Mother     Diabetes Father     No Known Problems Sister     No Known Problems Brother        Social History     Occupational History    Not on file       Social History Main Topics    Smoking status: Current Some Day Smoker    Smokeless tobacco: Never Used    Alcohol use Yes      Comment: saturdays    Drug use: No    Sexual activity: Not on file       No current outpatient prescriptions on file  No Known Allergies         Vitals:    08/01/18 1249   BP: 143/89   Pulse: 81       Objective:          Physical Exam                    Right Knee Exam     Tenderness   The patient is experiencing tenderness in the medial joint line  Range of Motion   The patient has normal right knee ROM  Right knee flexion: with patellar crepitus  Tests   Deon:  Medial - negative Lateral - negative  Varus: negative  Valgus: negative    Other   Sensation: normal  Swelling: mild  Other tests: no effusion present    Comments:    Varus alignment with bony enlargement medially  NVID      Left Knee Exam     Tenderness   The patient is experiencing tenderness in the medial joint line  Range of Motion   The patient has normal left knee ROM  Left knee flexion: with patellar crepitus       Tests   Deon:  Medial - negative Lateral - negative  Varus: negative  Valgus: negative    Other   Sensation: normal  Swelling: mild  Effusion: no effusion present    Comments:    Varus alignment with bony enlargement medially  NVID            Diagnostics, reviewed and taken today if performed as documented:    None performed    Procedures, if performed today:  Large joint arthrocentesis  Date/Time: 8/1/2018 1:01 PM  Consent given by: patient  Site marked: site marked  Supporting Documentation  Indications: pain   Procedure Details  Location: knee - R knee  Preparation: Patient was prepped and draped in the usual sterile fashion  Needle size: 22 G  Ultrasound guidance: no  Medications administered: 2 mL bupivacaine 0 25 %; 2 mL lidocaine 1 %; 12 mg betamethasone acetate-betamethasone sodium phosphate 6 (3-3) mg/mL    Patient tolerance: patient tolerated the procedure well with no immediate complications  Dressing:  Sterile dressing applied  Large joint arthrocentesis  Date/Time: 8/1/2018 1:01 PM  Consent given by: patient  Site marked: site marked  Supporting Documentation  Indications: pain   Procedure Details  Location: knee - L knee  Preparation: Patient was prepped and draped in the usual sterile fashion  Needle size: 22 G  Ultrasound guidance: no  Medications administered: 2 mL bupivacaine 0 25 %; 2 mL lidocaine 1 %; 12 mg betamethasone acetate-betamethasone sodium phosphate 6 (3-3) mg/mL    Patient tolerance: patient tolerated the procedure well with no immediate complications  Dressing:  Sterile dressing applied

## 2018-08-18 ENCOUNTER — TRANSCRIBE ORDERS (OUTPATIENT)
Dept: LAB | Facility: CLINIC | Age: 53
End: 2018-08-18

## 2018-08-18 ENCOUNTER — APPOINTMENT (OUTPATIENT)
Dept: LAB | Facility: CLINIC | Age: 53
End: 2018-08-18

## 2018-08-18 DIAGNOSIS — Z00.8 HEALTH EXAMINATION IN POPULATION SURVEY: Primary | ICD-10-CM

## 2018-08-18 DIAGNOSIS — Z00.8 HEALTH EXAMINATION IN POPULATION SURVEY: ICD-10-CM

## 2018-08-18 LAB
CHOLEST SERPL-MCNC: 229 MG/DL (ref 50–200)
EST. AVERAGE GLUCOSE BLD GHB EST-MCNC: 114 MG/DL
HBA1C MFR BLD: 5.6 % (ref 4.2–6.3)
HDLC SERPL-MCNC: 58 MG/DL (ref 40–60)
LDLC SERPL CALC-MCNC: 154 MG/DL (ref 0–100)
NONHDLC SERPL-MCNC: 171 MG/DL
TRIGL SERPL-MCNC: 85 MG/DL

## 2018-08-18 PROCEDURE — 83036 HEMOGLOBIN GLYCOSYLATED A1C: CPT | Performed by: PREVENTIVE MEDICINE

## 2018-08-18 PROCEDURE — 36415 COLL VENOUS BLD VENIPUNCTURE: CPT | Performed by: PREVENTIVE MEDICINE

## 2018-08-18 PROCEDURE — 80061 LIPID PANEL: CPT

## 2018-10-02 ENCOUNTER — OFFICE VISIT (OUTPATIENT)
Dept: FAMILY MEDICINE CLINIC | Facility: CLINIC | Age: 53
End: 2018-10-02
Payer: COMMERCIAL

## 2018-10-02 VITALS
DIASTOLIC BLOOD PRESSURE: 78 MMHG | WEIGHT: 217 LBS | RESPIRATION RATE: 16 BRPM | TEMPERATURE: 98.1 F | HEART RATE: 75 BPM | OXYGEN SATURATION: 98 % | HEIGHT: 71 IN | BODY MASS INDEX: 30.38 KG/M2 | SYSTOLIC BLOOD PRESSURE: 130 MMHG

## 2018-10-02 DIAGNOSIS — B36.0 TINEA VERSICOLOR: ICD-10-CM

## 2018-10-02 DIAGNOSIS — Z12.5 SCREENING FOR PROSTATE CANCER: ICD-10-CM

## 2018-10-02 DIAGNOSIS — R53.83 FATIGUE, UNSPECIFIED TYPE: ICD-10-CM

## 2018-10-02 DIAGNOSIS — Z00.00 PHYSICAL EXAM: Primary | ICD-10-CM

## 2018-10-02 DIAGNOSIS — Z12.11 SCREEN FOR COLON CANCER: ICD-10-CM

## 2018-10-02 DIAGNOSIS — N52.9 ERECTILE DYSFUNCTION, UNSPECIFIED ERECTILE DYSFUNCTION TYPE: ICD-10-CM

## 2018-10-02 PROCEDURE — 99173 VISUAL ACUITY SCREEN: CPT | Performed by: INTERNAL MEDICINE

## 2018-10-02 PROCEDURE — 92551 PURE TONE HEARING TEST AIR: CPT | Performed by: INTERNAL MEDICINE

## 2018-10-02 PROCEDURE — 99396 PREV VISIT EST AGE 40-64: CPT | Performed by: INTERNAL MEDICINE

## 2018-10-02 RX ORDER — KETOCONAZOLE 20 MG/G
CREAM TOPICAL DAILY
Qty: 30 G | Refills: 2 | Status: SHIPPED | OUTPATIENT
Start: 2018-10-02 | End: 2018-10-02 | Stop reason: SDUPTHER

## 2018-10-02 RX ORDER — KETOCONAZOLE 20 MG/G
CREAM TOPICAL 2 TIMES DAILY
Qty: 30 G | Refills: 0 | Status: SHIPPED | OUTPATIENT
Start: 2018-10-02 | End: 2018-12-27

## 2018-10-02 RX ORDER — SILDENAFIL 100 MG/1
100 TABLET, FILM COATED ORAL DAILY PRN
Qty: 10 TABLET | Refills: 1 | Status: SHIPPED | OUTPATIENT
Start: 2018-10-02 | End: 2019-03-15

## 2018-10-15 ENCOUNTER — APPOINTMENT (OUTPATIENT)
Dept: LAB | Facility: CLINIC | Age: 53
End: 2018-10-15
Payer: COMMERCIAL

## 2018-10-15 ENCOUNTER — TRANSCRIBE ORDERS (OUTPATIENT)
Dept: LAB | Facility: CLINIC | Age: 53
End: 2018-10-15

## 2018-10-15 DIAGNOSIS — R53.83 FATIGUE, UNSPECIFIED TYPE: ICD-10-CM

## 2018-10-15 DIAGNOSIS — Z12.5 SCREENING FOR PROSTATE CANCER: ICD-10-CM

## 2018-10-15 LAB
ALBUMIN SERPL BCP-MCNC: 3.6 G/DL (ref 3.5–5)
ALP SERPL-CCNC: 129 U/L (ref 46–116)
ALT SERPL W P-5'-P-CCNC: 35 U/L (ref 12–78)
ANION GAP SERPL CALCULATED.3IONS-SCNC: 6 MMOL/L (ref 4–13)
AST SERPL W P-5'-P-CCNC: 21 U/L (ref 5–45)
BILIRUB SERPL-MCNC: 0.4 MG/DL (ref 0.2–1)
BUN SERPL-MCNC: 20 MG/DL (ref 5–25)
CALCIUM SERPL-MCNC: 8.5 MG/DL (ref 8.3–10.1)
CHLORIDE SERPL-SCNC: 107 MMOL/L (ref 100–108)
CO2 SERPL-SCNC: 26 MMOL/L (ref 21–32)
CREAT SERPL-MCNC: 1.07 MG/DL (ref 0.6–1.3)
ERYTHROCYTE [DISTWIDTH] IN BLOOD BY AUTOMATED COUNT: 12 % (ref 11.6–15.1)
GFR SERPL CREATININE-BSD FRML MDRD: 78 ML/MIN/1.73SQ M
GLUCOSE P FAST SERPL-MCNC: 109 MG/DL (ref 65–99)
HCT VFR BLD AUTO: 47.4 % (ref 36.5–49.3)
HGB BLD-MCNC: 16.3 G/DL (ref 12–17)
MCH RBC QN AUTO: 31.3 PG (ref 26.8–34.3)
MCHC RBC AUTO-ENTMCNC: 34.4 G/DL (ref 31.4–37.4)
MCV RBC AUTO: 91 FL (ref 82–98)
PLATELET # BLD AUTO: 203 THOUSANDS/UL (ref 149–390)
PMV BLD AUTO: 9.6 FL (ref 8.9–12.7)
POTASSIUM SERPL-SCNC: 4.2 MMOL/L (ref 3.5–5.3)
PROT SERPL-MCNC: 6.6 G/DL (ref 6.4–8.2)
RBC # BLD AUTO: 5.2 MILLION/UL (ref 3.88–5.62)
SODIUM SERPL-SCNC: 139 MMOL/L (ref 136–145)
WBC # BLD AUTO: 5.85 THOUSAND/UL (ref 4.31–10.16)

## 2018-10-15 PROCEDURE — 80053 COMPREHEN METABOLIC PANEL: CPT

## 2018-10-15 PROCEDURE — 85027 COMPLETE CBC AUTOMATED: CPT

## 2018-10-15 PROCEDURE — 84154 ASSAY OF PSA FREE: CPT

## 2018-10-15 PROCEDURE — 84153 ASSAY OF PSA TOTAL: CPT

## 2018-10-15 PROCEDURE — 36415 COLL VENOUS BLD VENIPUNCTURE: CPT

## 2018-10-16 LAB
PSA FREE MFR SERPL: 18.7 %
PSA FREE SERPL-MCNC: 0.28 NG/ML
PSA SERPL-MCNC: 1.5 NG/ML (ref 0–4)

## 2018-10-28 DIAGNOSIS — R79.89 ABNORMAL LFTS: Primary | ICD-10-CM

## 2018-10-29 ENCOUNTER — TELEPHONE (OUTPATIENT)
Dept: OBGYN CLINIC | Facility: CLINIC | Age: 53
End: 2018-10-29

## 2018-10-29 DIAGNOSIS — M17.11 PRIMARY OSTEOARTHRITIS OF RIGHT KNEE: ICD-10-CM

## 2018-10-29 DIAGNOSIS — M17.12 PRIMARY OSTEOARTHRITIS OF LEFT KNEE: Primary | ICD-10-CM

## 2018-10-29 NOTE — TELEPHONE ENCOUNTER
I have ordered the injections that she speaks of (Synvisc One) - hopefully we can obtained these injections quickly and the girls will contact him when we have them for an appointment

## 2018-10-29 NOTE — TELEPHONE ENCOUNTER
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

## 2018-11-02 ENCOUNTER — APPOINTMENT (OUTPATIENT)
Dept: LAB | Facility: CLINIC | Age: 53
End: 2018-11-02
Payer: COMMERCIAL

## 2018-11-02 DIAGNOSIS — R79.89 ABNORMAL LFTS: ICD-10-CM

## 2018-11-02 LAB
HBV SURFACE AB SER-ACNC: <3.1 MIU/ML
HBV SURFACE AG SER QL: NORMAL
HCV AB SER QL: NORMAL

## 2018-11-02 PROCEDURE — 86803 HEPATITIS C AB TEST: CPT

## 2018-11-02 PROCEDURE — 87340 HEPATITIS B SURFACE AG IA: CPT

## 2018-11-02 PROCEDURE — 86706 HEP B SURFACE ANTIBODY: CPT

## 2018-11-02 PROCEDURE — 36415 COLL VENOUS BLD VENIPUNCTURE: CPT

## 2018-11-05 ENCOUNTER — HOSPITAL ENCOUNTER (OUTPATIENT)
Dept: ULTRASOUND IMAGING | Facility: HOSPITAL | Age: 53
Discharge: HOME | End: 2018-11-05
Payer: COMMERCIAL

## 2018-11-05 DIAGNOSIS — R79.89 ABNORMAL LFTS: ICD-10-CM

## 2018-11-05 PROCEDURE — 76705 ECHO EXAM OF ABDOMEN: CPT

## 2018-11-13 ENCOUNTER — OFFICE VISIT (OUTPATIENT)
Dept: OBGYN CLINIC | Facility: HOSPITAL | Age: 53
End: 2018-11-13
Payer: COMMERCIAL

## 2018-11-13 VITALS
DIASTOLIC BLOOD PRESSURE: 88 MMHG | HEART RATE: 93 BPM | BODY MASS INDEX: 30.38 KG/M2 | HEIGHT: 71 IN | SYSTOLIC BLOOD PRESSURE: 146 MMHG | WEIGHT: 217 LBS

## 2018-11-13 DIAGNOSIS — Z47.1 AFTERCARE FOLLOWING BILATERAL KNEE JOINT REPLACEMENT SURGERY: ICD-10-CM

## 2018-11-13 DIAGNOSIS — M25.562 CHRONIC PAIN OF LEFT KNEE: ICD-10-CM

## 2018-11-13 DIAGNOSIS — Z96.653 AFTERCARE FOLLOWING BILATERAL KNEE JOINT REPLACEMENT SURGERY: ICD-10-CM

## 2018-11-13 DIAGNOSIS — M17.11 PRIMARY OSTEOARTHRITIS OF RIGHT KNEE: Primary | ICD-10-CM

## 2018-11-13 DIAGNOSIS — G89.29 CHRONIC PAIN OF LEFT KNEE: ICD-10-CM

## 2018-11-13 DIAGNOSIS — M25.561 CHRONIC PAIN OF RIGHT KNEE: ICD-10-CM

## 2018-11-13 DIAGNOSIS — G89.29 CHRONIC PAIN OF RIGHT KNEE: ICD-10-CM

## 2018-11-13 DIAGNOSIS — M17.12 PRIMARY OSTEOARTHRITIS OF LEFT KNEE: ICD-10-CM

## 2018-11-13 PROCEDURE — 20610 DRAIN/INJ JOINT/BURSA W/O US: CPT | Performed by: ORTHOPAEDIC SURGERY

## 2018-11-13 PROCEDURE — 99214 OFFICE O/P EST MOD 30 MIN: CPT | Performed by: ORTHOPAEDIC SURGERY

## 2018-11-13 RX ORDER — FOLIC ACID 1 MG/1
1 TABLET ORAL DAILY
Qty: 30 TABLET | Refills: 0 | Status: SHIPPED | OUTPATIENT
Start: 2018-11-13 | End: 2019-02-15

## 2018-11-13 RX ORDER — GABAPENTIN 300 MG/1
300 CAPSULE ORAL ONCE
Status: CANCELLED | OUTPATIENT
Start: 2018-11-13 | End: 2018-11-13

## 2018-11-13 RX ORDER — CHLORHEXIDINE GLUCONATE 0.12 MG/ML
15 RINSE ORAL 2 TIMES DAILY
Qty: 120 ML | Refills: 0 | Status: SHIPPED | OUTPATIENT
Start: 2018-11-13 | End: 2018-12-27

## 2018-11-13 RX ORDER — FERROUS SULFATE TAB EC 324 MG (65 MG FE EQUIVALENT) 324 (65 FE) MG
324 TABLET DELAYED RESPONSE ORAL
Qty: 60 TABLET | Refills: 0 | Status: SHIPPED | OUTPATIENT
Start: 2018-11-13 | End: 2019-02-15

## 2018-11-13 RX ORDER — ASCORBIC ACID 500 MG
500 TABLET ORAL 2 TIMES DAILY
Qty: 60 TABLET | Refills: 0 | Status: SHIPPED | OUTPATIENT
Start: 2018-11-13 | End: 2019-02-15

## 2018-11-13 RX ORDER — ACETAMINOPHEN 325 MG/1
975 TABLET ORAL ONCE
Status: CANCELLED | OUTPATIENT
Start: 2018-11-13 | End: 2018-11-13

## 2018-11-13 RX ORDER — HYDROCODONE BITARTRATE AND ACETAMINOPHEN 5; 325 MG/1; MG/1
TABLET ORAL
COMMUNITY
Start: 2018-11-08 | End: 2018-12-27

## 2018-11-13 NOTE — PROGRESS NOTES
Assessment:  1  Primary osteoarthritis of right knee  Large joint arthrocentesis    chlorhexidine (PERIDEX) 0 12 % solution    ferrous sulfate 324 (65 Fe) mg    folic acid (FOLVITE) 1 mg tablet    ascorbic acid (VITAMIN C) 500 mg tablet   2  Chronic pain of right knee  Large joint arthrocentesis    chlorhexidine (PERIDEX) 0 12 % solution    ferrous sulfate 324 (65 Fe) mg    folic acid (FOLVITE) 1 mg tablet    ascorbic acid (VITAMIN C) 500 mg tablet   3  Primary osteoarthritis of left knee  Large joint arthrocentesis    chlorhexidine (PERIDEX) 0 12 % solution    ferrous sulfate 324 (65 Fe) mg    folic acid (FOLVITE) 1 mg tablet    ascorbic acid (VITAMIN C) 500 mg tablet   4  Chronic pain of left knee  Large joint arthrocentesis    chlorhexidine (PERIDEX) 0 12 % solution    ferrous sulfate 324 (65 Fe) mg    folic acid (FOLVITE) 1 mg tablet    ascorbic acid (VITAMIN C) 500 mg tablet   5  Aftercare following bilateral knee joint replacement surgery  enoxaparin (LOVENOX) 40 mg/0 4 mL       Plan: Both knees injected with Synvisc-One visco injection today  ICE and post-injection protocol advised  Weight bearing and activities as tolerated   Patient was educated on maintaining a healthy lifestyle with proper weight management and physician recommended home exercise program/routine for regular fitness  Bilateral total knee arthroplasty procedures discussed, risks and benefits discussed, consents obtained  He will be scheduled for January 2019 for his bilateral total knee arthroplasty  To do next visit:  Return for post-op with x-rays  The above stated was discussed in layman's terms and the patient expressed understanding  All questions were answered to the patient's satisfaction         Scribe Attestation    I,:   Carline Aguilar am acting as a scribe while in the presence of the attending physician :        I,:   Jorge Hodges MD personally performed the services described in this documentation    as scribed in my presence :              Subjective:   Regulo Trevino is a 52 y o  male who presents for Synvisc-One injection to both knees for ongoing treatment of his known osteoarthritis  He had fairly decent relief of his cortisone injections performed greater than 3 months ago  He is interested in bilateral total knee arthroplasty for January  He has had various forms of non operative treatment in the line of oral medication, cortisone and viscosupplementation injections  He has maintain a physician recommended home exercise program for quadriceps strengthening hamstrings flexibility and range of motion  His knee symptoms do limit him on a daily basis and impede on his quality of life  Review of systems negative unless otherwise specified in HPI    Past Medical History:   Diagnosis Date    Bronchospasm     last assessed 6/14/2016    Depression     Disc degeneration, lumbar     last assessed 4/7/2016    Erectile dysfunction     Hyperlipidemia     IFG (impaired fasting glucose)     last assessed 7/30/2014    Palpitations     last assessed 4/4/2013    Primary osteoarthritis of knees, bilateral     last assessed 6/27/2017    Right inguinal hernia     last assessed 9/12/2017    Spondylosis of lumbar region without myelopathy or radiculopathy     last assessed 4/7/2016       Past Surgical History:   Procedure Laterality Date    KNEE ARTHROSCOPY      bilateral    FL REPAIR ING HERNIA,5+Y/O,REDUCIBL Right 10/26/2017    Procedure: REPAIR HERNIA INGUINAL;  Surgeon: Royal Jurado MD;  Location: [facility];  Service: General    TOOTH EXTRACTION         Family History   Problem Relation Age of Onset    No Known Problems Mother     Diabetes Father     No Known Problems Sister     Coronary artery disease Brother        Social History     Occupational History    Not on file       Social History Main Topics    Smoking status: Current Some Day Smoker    Smokeless tobacco: Never Used      Comment: per [employer]scripts 'former smoker - 1/2 ppd x 20 yrs quit 1 year ago'    Alcohol use Yes      Comment: saturdays    Drug use: No    Sexual activity: Not on file         Current Outpatient Prescriptions:     HYDROcodone-acetaminophen (NORCO) 5-325 mg per tablet, , Disp: , Rfl:     ketoconazole (NIZORAL) 2 % cream, Apply topically 2 (two) times a day Use 2-4 weeks, Disp: 30 g, Rfl: 0    sildenafil (VIAGRA) 100 mg tablet, Take 1 tablet (100 mg total) by mouth daily as needed for erectile dysfunction, Disp: 10 tablet, Rfl: 1    No Known Allergies         Vitals:    11/13/18 1338   BP: 146/88   Pulse: 93       Objective:          Physical Exam                    Right Knee Exam     Tenderness   The patient is experiencing tenderness in the medial joint line  Range of Motion   The patient has normal right knee ROM  Right knee flexion: with patellar crepitus  Tests   Deon:  Medial - negative Lateral - negative  Varus: negative  Valgus: negative    Other   Sensation: normal  Swelling: mild  Other tests: no effusion present    Comments:    Varus alignment with bony enlargement medially  NVID      Left Knee Exam     Tenderness   The patient is experiencing tenderness in the medial joint line  Range of Motion   The patient has normal left knee ROM  Left knee flexion: with patellar crepitus       Tests   Deon:  Medial - negative Lateral - negative  Varus: negative  Valgus: negative    Other   Sensation: normal  Swelling: mild  Effusion: no effusion present    Comments:    Varus alignment with bony enlargement medially  NVID            Diagnostics, reviewed and taken today if performed as documented:    None performed          Procedures, if performed today:    Large joint arthrocentesis  Date/Time: 11/13/2018 1:52 PM  Consent given by: patient  Site marked: site marked  Supporting Documentation  Indications: pain   Procedure Details  Location: knee - R knee  Preparation: Patient was prepped and draped in the usual sterile fashion  Needle size: 18 G  Ultrasound guidance: no  Medications administered: 48 mg hylan 48 MG/6ML    Patient tolerance: patient tolerated the procedure well with no immediate complications  Dressing:  Sterile dressing applied  Large joint arthrocentesis  Date/Time: 11/13/2018 1:52 PM  Consent given by: patient  Site marked: site marked  Supporting Documentation  Indications: pain   Procedure Details  Location: knee - L knee  Preparation: Patient was prepped and draped in the usual sterile fashion  Needle size: 22 G  Ultrasound guidance: no  Medications administered: 48 mg hylan 48 MG/6ML    Patient tolerance: patient tolerated the procedure well with no immediate complications  Dressing:  Sterile dressing applied            Portions of the record may have been created with voice recognition software   Occasional wrong word or "sound a like" substitutions may have occurred due to the inherent limitations of voice recognition software   Read the chart carefully and recognize, using context, where substitutions have occurred

## 2018-11-14 ENCOUNTER — PREP FOR PROCEDURE (OUTPATIENT)
Dept: OBGYN CLINIC | Facility: HOSPITAL | Age: 53
End: 2018-11-14

## 2018-11-14 DIAGNOSIS — M17.12 PRIMARY OSTEOARTHRITIS OF LEFT KNEE: ICD-10-CM

## 2018-11-14 DIAGNOSIS — R79.89 ABNORMAL LFTS: Primary | ICD-10-CM

## 2018-11-14 DIAGNOSIS — M17.11 PRIMARY OSTEOARTHRITIS OF RIGHT KNEE: Primary | ICD-10-CM

## 2018-12-04 ENCOUNTER — TELEPHONE (OUTPATIENT)
Dept: PREADMISSION TESTING | Facility: HOSPITAL | Age: 53
End: 2018-12-04

## 2018-12-10 ENCOUNTER — PREP FOR PROCEDURE (OUTPATIENT)
Dept: OBGYN CLINIC | Facility: HOSPITAL | Age: 53
End: 2018-12-10

## 2018-12-10 DIAGNOSIS — Z01.818 PRE-OP TESTING: Primary | ICD-10-CM

## 2018-12-17 ENCOUNTER — EVALUATION (OUTPATIENT)
Dept: PHYSICAL THERAPY | Facility: CLINIC | Age: 53
End: 2018-12-17
Payer: COMMERCIAL

## 2018-12-17 DIAGNOSIS — M17.11 PRIMARY OSTEOARTHRITIS OF RIGHT KNEE: Primary | ICD-10-CM

## 2018-12-17 DIAGNOSIS — M17.12 PRIMARY OSTEOARTHRITIS OF LEFT KNEE: ICD-10-CM

## 2018-12-17 PROCEDURE — G8981 BODY POS CURRENT STATUS: HCPCS | Performed by: PHYSICAL MEDICINE & REHABILITATION

## 2018-12-17 PROCEDURE — 97161 PT EVAL LOW COMPLEX 20 MIN: CPT | Performed by: PHYSICAL MEDICINE & REHABILITATION

## 2018-12-17 PROCEDURE — G8982 BODY POS GOAL STATUS: HCPCS | Performed by: PHYSICAL MEDICINE & REHABILITATION

## 2018-12-17 NOTE — PROGRESS NOTES
PT Evaluation     Today's date: 2018  Patient name: Nba Carlson  : 1965  MRN: [id number]  Referring provider: Ruba Christianson MD  Dx:   Encounter Diagnosis     ICD-10-CM    1  Primary osteoarthritis of right knee M17 11 Ambulatory referral to Physical Therapy   2  Primary osteoarthritis of left knee M17 12 Ambulatory referral to Physical Therapy                  Assessment  Assessment details: Patient presents with pain and functional limitations secondary to bilateral knee OA  Patient is scheduled for B TKA 19 and will return for post-op treatment to address deficits and maximize return to PLOF  Patient was provided with pre-op HEP and demonstrates good understanding of performance  Discussed home setup, typical mobility progression/expectations following therapy  Patient will f/u with any additional questions and return post-op for formal treatment  Thank you for the referral    Impairments: abnormal coordination, abnormal or restricted ROM, activity intolerance, impaired physical strength and pain with function  Understanding of Dx/Px/POC: good   Prognosis: good    Goals  ST  Patient will report 25% decrease in pain in 4 weeks  2  Patient will demonstrate 25% improvement in ROM in 4 weeks  3  Patient will demonstrate 1/2 grade improvement in strength in 4 weeks  LT  Patient will be able to perform IADLS without restriction or pain by discharge  2  Patient will be independent in HEP by discharge  3  Patient will be able to return to recreational/work duties without restriction or pain by discharge        Plan  Patient would benefit from: PT eval and skilled PT  Planned modality interventions: cryotherapy and thermotherapy: hydrocollator packs  Planned therapy interventions: IADL retraining, body mechanics training, flexibility, functional ROM exercises, home exercise program, neuromuscular re-education, manual therapy, postural training, strengthening, stretching, therapeutic activities, therapeutic exercise and joint mobilization  Frequency: 2x week  Duration in visits: 8  Duration in weeks: 4  Treatment plan discussed with: patient        Subjective Evaluation    History of Present Illness  Mechanism of injury: Patient presents for pre-op evaluation, B TKA scheduled for 1/21/18  Patient notes difficulty with all weightbearing activity, increased pain throughout the day  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  Patient works in [employer] and needs to return to climbing ladders, walking, squatting, etc  Patient also delivers oil  Patient denies extension of pain to hip or ankle  Patient notes more daily pain on R vs  L  Intermittent Advil use for pain  Previous injection with synvisc offered little relief  Long h/o injection therapy  Home setup: walk in shower with seat, 2nd floor setup, 2nd floor bathroom  Pain  Pain scale: Seated- It's not bad now"  Pain scale at highest: "it gets pretty bad"    Social Support  Steps to enter house: yes  Stairs in house: yes   Lives in: multiple-level home  Lives with: spouse    Employment status: working  Patient Goals  Patient goal: Decrease pain        Objective     Active Range of Motion   Left Knee   Flexion: 123 degrees   Extension: WFL    Right Knee   Flexion: 105 degrees   Extension: WFL    Passive Range of Motion   Left Knee   Flexion: 131 degrees     Right Knee   Flexion: 118 degrees     Strength/Myotome Testing     Additional Strength Details  R hip flexion, knee flexion 4+, otherwise 5/5 throughout without pain    General Comments     Knee Comments  R passive SLR 65, L 80 degrees   Limited secondary to HS tightness          Precautions:n/a    Daily Treatment Diary     Manual              B knee PROM                                                                     Exercise Diary              Bike             Gastroc strech HEP            HS stretch HEP            HR HEP Sit to stand             Standing march             Lateral stepping             Step ups             Step downs                                                                                                                                                                Modalities              CP prn

## 2018-12-26 ENCOUNTER — TELEPHONE (OUTPATIENT)
Dept: PREADMISSION TESTING | Facility: HOSPITAL | Age: 53
End: 2018-12-26

## 2018-12-27 ENCOUNTER — APPOINTMENT (OUTPATIENT)
Dept: LAB | Facility: HOSPITAL | Age: 53
End: 2018-12-27
Attending: ORTHOPAEDIC SURGERY
Payer: COMMERCIAL

## 2018-12-27 ENCOUNTER — HOSPITAL ENCOUNTER (OUTPATIENT)
Dept: RADIOLOGY | Facility: HOSPITAL | Age: 53
Discharge: HOME | End: 2018-12-27
Payer: COMMERCIAL

## 2018-12-27 DIAGNOSIS — M17.12 PRIMARY OSTEOARTHRITIS OF LEFT KNEE: ICD-10-CM

## 2018-12-27 DIAGNOSIS — M17.11 PRIMARY OSTEOARTHRITIS OF RIGHT KNEE: ICD-10-CM

## 2018-12-27 DIAGNOSIS — R79.89 ABNORMAL LFTS: ICD-10-CM

## 2018-12-27 DIAGNOSIS — Z01.818 PRE-OP TESTING: ICD-10-CM

## 2018-12-27 LAB
ABO GROUP BLD: NORMAL
ALBUMIN SERPL BCP-MCNC: 3.5 G/DL (ref 3.5–5)
ALP SERPL-CCNC: 133 U/L (ref 46–116)
ALT SERPL W P-5'-P-CCNC: 35 U/L (ref 12–78)
ANION GAP SERPL CALCULATED.3IONS-SCNC: 5 MMOL/L (ref 4–13)
APTT PPP: 26 SECONDS (ref 26–38)
AST SERPL W P-5'-P-CCNC: 21 U/L (ref 5–45)
BILIRUB DIRECT SERPL-MCNC: 0.1 MG/DL (ref 0–0.2)
BILIRUB SERPL-MCNC: 0.36 MG/DL (ref 0.2–1)
BLD GP AB SCN SERPL QL: NEGATIVE
BUN SERPL-MCNC: 11 MG/DL (ref 5–25)
CALCIUM SERPL-MCNC: 8.5 MG/DL (ref 8.3–10.1)
CHLORIDE SERPL-SCNC: 105 MMOL/L (ref 100–108)
CO2 SERPL-SCNC: 27 MMOL/L (ref 21–32)
CREAT SERPL-MCNC: 0.97 MG/DL (ref 0.6–1.3)
CRP SERPL QL: 10.5 MG/L
ERYTHROCYTE [DISTWIDTH] IN BLOOD BY AUTOMATED COUNT: 12.1 % (ref 11.6–15.1)
EST. AVERAGE GLUCOSE BLD GHB EST-MCNC: 117 MG/DL
FERRITIN SERPL-MCNC: 243 NG/ML (ref 8–388)
GFR SERPL CREATININE-BSD FRML MDRD: 88 ML/MIN/1.73SQ M
GLUCOSE P FAST SERPL-MCNC: 102 MG/DL (ref 65–99)
HBA1C MFR BLD: 5.7 % (ref 4.2–6.3)
HCT VFR BLD AUTO: 46.9 % (ref 36.5–49.3)
HGB BLD-MCNC: 15.7 G/DL (ref 12–17)
INR PPP: 0.9 (ref 0.86–1.17)
IRON SATN MFR SERPL: 52 %
IRON SERPL-MCNC: 138 UG/DL (ref 65–175)
MCH RBC QN AUTO: 31 PG (ref 26.8–34.3)
MCHC RBC AUTO-ENTMCNC: 33.5 G/DL (ref 31.4–37.4)
MCV RBC AUTO: 93 FL (ref 82–98)
PLATELET # BLD AUTO: 188 THOUSANDS/UL (ref 149–390)
PMV BLD AUTO: 9.6 FL (ref 8.9–12.7)
POTASSIUM SERPL-SCNC: 4.1 MMOL/L (ref 3.5–5.3)
PROT SERPL-MCNC: 7 G/DL (ref 6.4–8.2)
PROTHROMBIN TIME: 12.3 SECONDS (ref 11.8–14.2)
RBC # BLD AUTO: 5.07 MILLION/UL (ref 3.88–5.62)
RH BLD: POSITIVE
SODIUM SERPL-SCNC: 137 MMOL/L (ref 136–145)
SPECIMEN EXPIRATION DATE: NORMAL
TIBC SERPL-MCNC: 264 UG/DL (ref 250–450)
WBC # BLD AUTO: 6.15 THOUSAND/UL (ref 4.31–10.16)

## 2018-12-27 PROCEDURE — 86900 BLOOD TYPING SEROLOGIC ABO: CPT

## 2018-12-27 PROCEDURE — 86850 RBC ANTIBODY SCREEN: CPT

## 2018-12-27 PROCEDURE — 85610 PROTHROMBIN TIME: CPT

## 2018-12-27 PROCEDURE — 83550 IRON BINDING TEST: CPT

## 2018-12-27 PROCEDURE — 86901 BLOOD TYPING SEROLOGIC RH(D): CPT

## 2018-12-27 PROCEDURE — 82248 BILIRUBIN DIRECT: CPT

## 2018-12-27 PROCEDURE — 36415 COLL VENOUS BLD VENIPUNCTURE: CPT

## 2018-12-27 PROCEDURE — 71046 X-RAY EXAM CHEST 2 VIEWS: CPT

## 2018-12-27 PROCEDURE — 85027 COMPLETE CBC AUTOMATED: CPT

## 2018-12-27 PROCEDURE — 83036 HEMOGLOBIN GLYCOSYLATED A1C: CPT

## 2018-12-27 PROCEDURE — 86140 C-REACTIVE PROTEIN: CPT

## 2018-12-27 PROCEDURE — 83540 ASSAY OF IRON: CPT

## 2018-12-27 PROCEDURE — 80053 COMPREHEN METABOLIC PANEL: CPT

## 2018-12-27 PROCEDURE — 93005 ELECTROCARDIOGRAM TRACING: CPT

## 2018-12-27 PROCEDURE — 82728 ASSAY OF FERRITIN: CPT

## 2018-12-27 PROCEDURE — 85730 THROMBOPLASTIN TIME PARTIAL: CPT

## 2018-12-27 NOTE — PRE-PROCEDURE INSTRUCTIONS
Pre-Surgery Instructions:   Medication Instructions    sildenafil (VIAGRA) 100 mg tablet Instructed patient per Anesthesia Guidelines  Patient had PAT appt  Medication list reviewed & instructed  Pt stated he went to pharmacy but was unable to  folic acid, vit C, iron because 'they are all over the counter ' he stated pharmacy did not give him doses of any to p/u  I gave patient printed copy of all vitamins from [employer] with doses so he could  and begin taking  Instructed pt to take folic acid, vit C, iron from 12 28 18 until 1 20 19  As of 1 14 19 pt instructed on tylenol only  Am DOS no meds  Pt informed that lovenox script at pharmacy ready for p/u (called and confirmed)  Showering instructions and cloths given by office, reviewed at time of appt  Soap and IS given to pt and demonstrated  All instructions verbally understood by patient  No questions  Callback number given

## 2019-01-10 ENCOUNTER — OFFICE VISIT (OUTPATIENT)
Dept: FAMILY MEDICINE CLINIC | Facility: CLINIC | Age: 54
End: 2019-01-10
Payer: COMMERCIAL

## 2019-01-10 VITALS
TEMPERATURE: 97.4 F | HEART RATE: 75 BPM | SYSTOLIC BLOOD PRESSURE: 120 MMHG | RESPIRATION RATE: 16 BRPM | WEIGHT: 217 LBS | BODY MASS INDEX: 30.38 KG/M2 | DIASTOLIC BLOOD PRESSURE: 82 MMHG | HEIGHT: 71 IN | OXYGEN SATURATION: 98 %

## 2019-01-10 DIAGNOSIS — Z01.818 PRE-OP EXAM: Primary | ICD-10-CM

## 2019-01-10 PROBLEM — K40.90 RIGHT INGUINAL HERNIA: Status: RESOLVED | Noted: 2019-01-10 | Resolved: 2019-01-10

## 2019-01-10 PROCEDURE — 99242 OFF/OP CONSLTJ NEW/EST SF 20: CPT | Performed by: INTERNAL MEDICINE

## 2019-01-10 NOTE — PATIENT INSTRUCTIONS
Pt has no contraindication  Told pt do not take ginkoba, gingsing, aspirin, nsaid, vit e or fish oil 1 week before or  Reviewed his lab and cxr and okay  Await his ekg  He is cleared for surgery pending ekg    ADDENDUM;   REVIEWED EKG AND CLEARED FOR SURGERY

## 2019-01-10 NOTE — PROGRESS NOTES
Assessment/Plan:  Patient Instructions   Pt has no contraindication  Told pt do not take ginkoba, gingsing, aspirin, nsaid, vit e or fish oil 1 week before or  Reviewed his lab and cxr and okay  Await his ekg  He is cleared for surgery pending ekg         Diagnoses and all orders for this visit:    Pre-op exam  -     Cancel: POCT ECG          Subjective:      Patient ID: Melecio Child is a 52 y o  male  Denies htn, dm, arrythmia, mi within 6 months, or problem with gen anesthesia  Pt to have rt tkr - dr Genoveva Mccormick  1/21/19        The following portions of the patient's history were reviewed and updated as appropriate:   He  has a past medical history of Bronchospasm; Depression; Disc degeneration, lumbar; Erectile dysfunction; Hyperlipidemia; IFG (impaired fasting glucose); Palpitations; Primary osteoarthritis of knees, bilateral; Right inguinal hernia; and Spondylosis of lumbar region without myelopathy or radiculopathy  He   Patient Active Problem List    Diagnosis Date Noted    Chronic pain of left knee 05/01/2018    Chronic pain of right knee 05/01/2018    Primary osteoarthritis of right knee 01/30/2018    Primary osteoarthritis of left knee 01/30/2018     He  has a past surgical history that includes Knee arthroscopy; pr repair ing hernia,5+y/o,reducibl (Right, 10/26/2017); and Tooth extraction  His family history includes Coronary artery disease in his brother; Diabetes in his father; No Known Problems in his mother and sister  He  reports that he has quit smoking  He has never used smokeless tobacco  He reports that he drinks alcohol  He reports that he does not use drugs    Current Outpatient Prescriptions   Medication Sig Dispense Refill    ascorbic acid (VITAMIN C) 500 mg tablet Take 1 tablet (500 mg total) by mouth 2 (two) times a day 60 tablet 0    ferrous sulfate 324 (65 Fe) mg Take 1 tablet (324 mg total) by mouth 2 (two) times a day before meals 60 tablet 0    folic acid (FOLVITE) 1 mg tablet Take 1 tablet (1 mg total) by mouth daily 30 tablet 0    sildenafil (VIAGRA) 100 mg tablet Take 1 tablet (100 mg total) by mouth daily as needed for erectile dysfunction 10 tablet 1     No current facility-administered medications for this visit  Current Outpatient Prescriptions on File Prior to Visit   Medication Sig    ascorbic acid (VITAMIN C) 500 mg tablet Take 1 tablet (500 mg total) by mouth 2 (two) times a day    ferrous sulfate 324 (65 Fe) mg Take 1 tablet (324 mg total) by mouth 2 (two) times a day before meals    folic acid (FOLVITE) 1 mg tablet Take 1 tablet (1 mg total) by mouth daily    sildenafil (VIAGRA) 100 mg tablet Take 1 tablet (100 mg total) by mouth daily as needed for erectile dysfunction     No current facility-administered medications on file prior to visit  He has No Known Allergies       Review of Systems   Constitutional: Negative  HENT: Negative  Respiratory: Negative  Cardiovascular: Negative  Neurological: Negative for headaches  Objective:      /82 (BP Location: Right arm, Patient Position: Sitting, Cuff Size: Large)   Pulse 75   Temp (!) 97 4 °F (36 3 °C) (Tympanic)   Resp 16   Ht 5' 10 5" (1 791 m)   Wt 98 4 kg (217 lb)   SpO2 98%   BMI 30 70 kg/m²          Physical Exam   Constitutional: He appears well-developed and well-nourished  HENT:   Head: Normocephalic and atraumatic  Right Ear: External ear normal    Left Ear: External ear normal    Nose: Nose normal    Mouth/Throat: Oropharynx is clear and moist    Neck: Normal range of motion  Neck supple  Cardiovascular: Normal rate, regular rhythm and normal heart sounds  Exam reveals no gallop and no friction rub  No murmur heard  Pulmonary/Chest: Effort normal and breath sounds normal  No respiratory distress  He has no wheezes  He has no rales  He exhibits no tenderness

## 2019-01-11 LAB
ATRIAL RATE: 67 BPM
P AXIS: 52 DEGREES
PR INTERVAL: 142 MS
QRS AXIS: 20 DEGREES
QRSD INTERVAL: 88 MS
QT INTERVAL: 376 MS
QTC INTERVAL: 397 MS
T WAVE AXIS: 53 DEGREES
VENTRICULAR RATE: 67 BPM

## 2019-01-11 PROCEDURE — 93010 ELECTROCARDIOGRAM REPORT: CPT | Performed by: INTERNAL MEDICINE

## 2019-01-14 ENCOUNTER — TELEPHONE (OUTPATIENT)
Dept: OBGYN CLINIC | Facility: HOSPITAL | Age: 54
End: 2019-01-14

## 2019-01-14 NOTE — TELEPHONE ENCOUNTER
Preoperative Elective Admission Assessment       Living Situation:Pt reports living at home with wife, Zelda Mason who works day shift  Home Layout: Pt reports having 2 story home with walk in shower, seat available, denies railings                    Steps: 4 steps to enter the home and 14 to the second floor  First Floor Setup: Pt denies first floor availability    Post-op Caregiver: Pt reports henny barbour    Post-op Transport: pt reports henny barbour  Apts need to be at night for work schedule  Outpatient Physical Therapy Site: [facility]    DME: Pt denies MAK thomas, has a BSC     Patient's Current Level of Function: Pt reports independent daily ambulation and ADLS     Medication Management: Pt reports self managing medications by removing medications from the bottle                      Preferred Pharmacy: [facility]                    Blood Management Vitamins: Pt started on 22/71 taking folic acid, vitamin C and Iron                    Post-op anticoagulant: Pt reports filling them at [facility] and was picking up on 1/10    DC Plan: I educated patient on the many benefits of outpt PT(Including maintaining independence, additional resources at outpt site, better outcomes etc  )  Also educated on how home PT vs  outpt PT is determined (while inpt)  Pt verbalized understanding to plan to go home with outpatient PT after DC                      Barriers to DC identified preoperatively:     BMI: 30 70  At ortho office on 11/13     Caresense: Denied enrollment                    RAPT:9 on 1 10                    ACE/ARB Form:N/A                     HOOS/KOOS: 1/10    Patient Education: Pt educated on post op pain, early mobilization (POD0), indication/use of incentive spirometer (10x/hr while awake), and indication for/use of foot/leg pumps  pt encouraged to call me with questions, concerns or issues

## 2019-01-15 ENCOUNTER — OFFICE VISIT (OUTPATIENT)
Dept: OBGYN CLINIC | Facility: HOSPITAL | Age: 54
End: 2019-01-15
Payer: COMMERCIAL

## 2019-01-15 ENCOUNTER — PREP FOR PROCEDURE (OUTPATIENT)
Dept: OBGYN CLINIC | Facility: HOSPITAL | Age: 54
End: 2019-01-15

## 2019-01-15 VITALS
DIASTOLIC BLOOD PRESSURE: 78 MMHG | HEART RATE: 90 BPM | BODY MASS INDEX: 31.07 KG/M2 | SYSTOLIC BLOOD PRESSURE: 122 MMHG | HEIGHT: 70 IN | WEIGHT: 217 LBS

## 2019-01-15 DIAGNOSIS — G89.29 CHRONIC PAIN OF LEFT KNEE: ICD-10-CM

## 2019-01-15 DIAGNOSIS — M17.12 PRIMARY OSTEOARTHRITIS OF LEFT KNEE: ICD-10-CM

## 2019-01-15 DIAGNOSIS — M25.562 CHRONIC PAIN OF LEFT KNEE: ICD-10-CM

## 2019-01-15 DIAGNOSIS — M25.561 CHRONIC PAIN OF RIGHT KNEE: ICD-10-CM

## 2019-01-15 DIAGNOSIS — M17.11 PRIMARY OSTEOARTHRITIS OF RIGHT KNEE: Primary | ICD-10-CM

## 2019-01-15 DIAGNOSIS — G89.29 CHRONIC PAIN OF RIGHT KNEE: ICD-10-CM

## 2019-01-15 PROCEDURE — 99213 OFFICE O/P EST LOW 20 MIN: CPT | Performed by: ORTHOPAEDIC SURGERY

## 2019-01-15 NOTE — PROGRESS NOTES
Assessment:  1  Primary osteoarthritis of right knee     2  Chronic pain of right knee     3  Primary osteoarthritis of left knee     4  Chronic pain of left knee         Plan:  The patient returns for right > left knee pain  He is s/p bilateral Hyalgan injections, 11/13/18 with limited benefit  The standing increases his pain and can effect his daily activities  He finds benefit from sitting yet has stiffness upon getting up  He had bilateral varus alignment and medial joint line tenderness  He will be scheduled for a right TKA  He would like to discuss plans for left TKA in future  He has short-term disability forms which are signed today  The patient should return after surgery  To do next visit:  Return for after Right TKA  The above stated was discussed in layman's terms and the patient expressed understanding  All questions were answered to the patient's satisfaction  Scribe Attestation    I,:   Keeley Zarco am acting as a scribe while in the presence of the attending physician :        I,:   Jacqueline Dominguez MD personally performed the services described in this documentation    as scribed in my presence :              Subjective:   Shant Carrero is a 52 y o  male who presents for bilateral knee pain  He is s/p bilateral Hyalgan injections with limited benefit, 11/13/18  Today he complains of right > left generalized knee pain  He rates his bilateral knee pain at 3/10 and 10/10 at its worse  Standing and weight bearing activity aggravates  Sitting and rest alleviates  He has get up symptoms  He has had bilateral knee steroid injections 8/1/18 with some benefit        Review of systems negative unless otherwise specified in HPI    Past Medical History:   Diagnosis Date    Bronchospasm     last assessed 6/14/2016    Depression     Disc degeneration, lumbar     last assessed 4/7/2016    Erectile dysfunction     Hyperlipidemia     IFG (impaired fasting glucose)     last assessed 7/30/2014    Palpitations     last assessed 4/4/2013    Primary osteoarthritis of knees, bilateral     last assessed 6/27/2017    Right inguinal hernia     last assessed 9/12/2017    Spondylosis of lumbar region without myelopathy or radiculopathy     last assessed 4/7/2016       Past Surgical History:   Procedure Laterality Date    KNEE ARTHROSCOPY      bilateral    MA REPAIR ING HERNIA,5+Y/O,REDUCIBL Right 10/26/2017    Procedure: REPAIR HERNIA INGUINAL;  Surgeon: Brandon Camargo MD;  [facility];  Service: General    TOOTH EXTRACTION         Family History   Problem Relation Age of Onset    No Known Problems Mother     Diabetes Father     No Known Problems Sister     Coronary artery disease Brother        Social History     Occupational History    Not on file       Social History Main Topics    Smoking status: Former Smoker    Smokeless tobacco: Never Used      Comment: quit 11/2018    Alcohol use Yes      Comment: weekends     Drug use: No    Sexual activity: Not on file         Current Outpatient Prescriptions:     ascorbic acid (VITAMIN C) 500 mg tablet, Take 1 tablet (500 mg total) by mouth 2 (two) times a day, Disp: 60 tablet, Rfl: 0    enoxaparin (LOVENOX) 40 mg/0 4 mL, , Disp: , Rfl:     ferrous sulfate 324 (65 Fe) mg, Take 1 tablet (324 mg total) by mouth 2 (two) times a day before meals, Disp: 60 tablet, Rfl: 0    folic acid (FOLVITE) 1 mg tablet, Take 1 tablet (1 mg total) by mouth daily, Disp: 30 tablet, Rfl: 0    sildenafil (VIAGRA) 100 mg tablet, Take 1 tablet (100 mg total) by mouth daily as needed for erectile dysfunction, Disp: 10 tablet, Rfl: 1    No Known Allergies         Vitals:    01/15/19 0850   BP: 122/78   Pulse: 90       Objective:  Physical exam  · General: Awake, Alert, Oriented  · Eyes: Pupils equal, round and reactive to light  · Heart: regular rate and rhythm  · Lungs: No audible wheezing  · Abdomen: soft                    Ortho Exam   Right knee:    TTP medial JL  Normal ROM with crepitus  Varus alignment    Left knee:  TTP medial JL  Normal ROM with crepitus  Varus alignment    NVID    Diagnostics, reviewed and taken today if performed as documented:    None performed      The attending physician has personally reviewed the pertinent films in PACS and interpretation is as follows:      Procedures, if performed today:    Procedures    None performed      Portions of the record may have been created with voice recognition software   Occasional wrong word or "sound a like" substitutions may have occurred due to the inherent limitations of voice recognition software   Read the chart carefully and recognize, using context, where substitutions have occurred

## 2019-01-20 ENCOUNTER — ANESTHESIA EVENT (OUTPATIENT)
Dept: PERIOP | Facility: HOSPITAL | Age: 54
DRG: 470 | End: 2019-01-20
Payer: COMMERCIAL

## 2019-01-21 ENCOUNTER — HOSPITAL ENCOUNTER (INPATIENT)
Facility: HOSPITAL | Age: 54
LOS: 2 days | Discharge: HOME | DRG: 470 | End: 2019-01-23
Attending: ORTHOPAEDIC SURGERY | Admitting: ORTHOPAEDIC SURGERY
Payer: COMMERCIAL

## 2019-01-21 ENCOUNTER — ANESTHESIA (OUTPATIENT)
Dept: PERIOP | Facility: HOSPITAL | Age: 54
DRG: 470 | End: 2019-01-21
Payer: COMMERCIAL

## 2019-01-21 DIAGNOSIS — Z96.651 STATUS POST RIGHT KNEE REPLACEMENT: Primary | ICD-10-CM

## 2019-01-21 DIAGNOSIS — M17.11 PRIMARY OSTEOARTHRITIS OF RIGHT KNEE: ICD-10-CM

## 2019-01-21 LAB
ABO GROUP BLD: NORMAL
BLD GP AB SCN SERPL QL: NEGATIVE
RH BLD: POSITIVE
SPECIMEN EXPIRATION DATE: NORMAL

## 2019-01-21 PROCEDURE — C1713 ANCHOR/SCREW BN/BN,TIS/BN: HCPCS | Performed by: ORTHOPAEDIC SURGERY

## 2019-01-21 PROCEDURE — C1776 JOINT DEVICE (IMPLANTABLE): HCPCS | Performed by: ORTHOPAEDIC SURGERY

## 2019-01-21 PROCEDURE — G8978 MOBILITY CURRENT STATUS: HCPCS

## 2019-01-21 PROCEDURE — 0SRC0J9 REPLACEMENT OF RIGHT KNEE JOINT WITH SYNTHETIC SUBSTITUTE, CEMENTED, OPEN APPROACH: ICD-10-PCS | Performed by: ORTHOPAEDIC SURGERY

## 2019-01-21 PROCEDURE — 86901 BLOOD TYPING SEROLOGIC RH(D): CPT | Performed by: ORTHOPAEDIC SURGERY

## 2019-01-21 PROCEDURE — G8979 MOBILITY GOAL STATUS: HCPCS

## 2019-01-21 PROCEDURE — 3E0T3BZ INTRODUCTION OF ANESTHETIC AGENT INTO PERIPHERAL NERVES AND PLEXI, PERCUTANEOUS APPROACH: ICD-10-PCS | Performed by: ANESTHESIOLOGY

## 2019-01-21 PROCEDURE — 86850 RBC ANTIBODY SCREEN: CPT | Performed by: ORTHOPAEDIC SURGERY

## 2019-01-21 PROCEDURE — 27447 TOTAL KNEE ARTHROPLASTY: CPT | Performed by: ORTHOPAEDIC SURGERY

## 2019-01-21 PROCEDURE — 27447 TOTAL KNEE ARTHROPLASTY: CPT | Performed by: PHYSICIAN ASSISTANT

## 2019-01-21 PROCEDURE — 86900 BLOOD TYPING SEROLOGIC ABO: CPT | Performed by: ORTHOPAEDIC SURGERY

## 2019-01-21 PROCEDURE — 97163 PT EVAL HIGH COMPLEX 45 MIN: CPT

## 2019-01-21 PROCEDURE — 3E0R3BZ INTRODUCTION OF ANESTHETIC AGENT INTO SPINAL CANAL, PERCUTANEOUS APPROACH: ICD-10-PCS | Performed by: ANESTHESIOLOGY

## 2019-01-21 DEVICE — ATTUNE PATELLA MEDIALIZED DOME 41MM CEMENTED AOX
Type: IMPLANTABLE DEVICE | Site: KNEE | Status: FUNCTIONAL
Brand: ATTUNE

## 2019-01-21 DEVICE — ATTUNE KNEE SYSTEM TIBIAL BASE FIXED BEARING SIZE 8 CEMENTED
Type: IMPLANTABLE DEVICE | Site: KNEE | Status: FUNCTIONAL
Brand: ATTUNE

## 2019-01-21 DEVICE — ATTUNE KNEE SYSTEM FEMORAL POSTERIOR STABILIZED SIZE 8 RIGHT CEMENTED
Type: IMPLANTABLE DEVICE | Site: KNEE | Status: FUNCTIONAL
Brand: ATTUNE

## 2019-01-21 DEVICE — SMARTSET HV HIGH VISCOSITY BONE CEMENT 40G
Type: IMPLANTABLE DEVICE | Site: KNEE | Status: FUNCTIONAL
Brand: SMARTSET

## 2019-01-21 DEVICE — ATTUNE KNEE SYSTEM TIBIAL INSERT FIXED BEARING POSTERIOR STABILIZED 8 7MM AOX
Type: IMPLANTABLE DEVICE | Site: KNEE | Status: FUNCTIONAL
Brand: ATTUNE

## 2019-01-21 RX ORDER — HYDROMORPHONE HCL/PF 1 MG/ML
0.5 SYRINGE (ML) INJECTION
Status: DISCONTINUED | OUTPATIENT
Start: 2019-01-21 | End: 2019-01-21 | Stop reason: HOSPADM

## 2019-01-21 RX ORDER — DEXAMETHASONE SODIUM PHOSPHATE 4 MG/ML
INJECTION, SOLUTION INTRA-ARTICULAR; INTRALESIONAL; INTRAMUSCULAR; INTRAVENOUS; SOFT TISSUE AS NEEDED
Status: DISCONTINUED | OUTPATIENT
Start: 2019-01-21 | End: 2019-01-21 | Stop reason: SURG

## 2019-01-21 RX ORDER — OXYCODONE HYDROCHLORIDE 10 MG/1
10 TABLET ORAL EVERY 4 HOURS PRN
Status: DISCONTINUED | OUTPATIENT
Start: 2019-01-21 | End: 2019-01-23 | Stop reason: HOSPADM

## 2019-01-21 RX ORDER — DOCUSATE SODIUM 100 MG/1
100 CAPSULE, LIQUID FILLED ORAL 2 TIMES DAILY
Status: DISCONTINUED | OUTPATIENT
Start: 2019-01-21 | End: 2019-01-23 | Stop reason: HOSPADM

## 2019-01-21 RX ORDER — SODIUM CHLORIDE, SODIUM LACTATE, POTASSIUM CHLORIDE, CALCIUM CHLORIDE 600; 310; 30; 20 MG/100ML; MG/100ML; MG/100ML; MG/100ML
125 INJECTION, SOLUTION INTRAVENOUS CONTINUOUS
Status: DISCONTINUED | OUTPATIENT
Start: 2019-01-21 | End: 2019-01-23 | Stop reason: HOSPADM

## 2019-01-21 RX ORDER — ACETAMINOPHEN 325 MG/1
650 TABLET ORAL EVERY 4 HOURS PRN
Status: DISCONTINUED | OUTPATIENT
Start: 2019-01-21 | End: 2019-01-23 | Stop reason: HOSPADM

## 2019-01-21 RX ORDER — FENTANYL CITRATE 50 UG/ML
INJECTION, SOLUTION INTRAMUSCULAR; INTRAVENOUS AS NEEDED
Status: DISCONTINUED | OUTPATIENT
Start: 2019-01-21 | End: 2019-01-21 | Stop reason: SURG

## 2019-01-21 RX ORDER — FERROUS SULFATE 325(65) MG
325 TABLET ORAL
Status: DISCONTINUED | OUTPATIENT
Start: 2019-01-22 | End: 2019-01-23 | Stop reason: HOSPADM

## 2019-01-21 RX ORDER — GLYCOPYRROLATE 0.2 MG/ML
INJECTION INTRAMUSCULAR; INTRAVENOUS AS NEEDED
Status: DISCONTINUED | OUTPATIENT
Start: 2019-01-21 | End: 2019-01-21 | Stop reason: SURG

## 2019-01-21 RX ORDER — MAGNESIUM HYDROXIDE 1200 MG/15ML
LIQUID ORAL AS NEEDED
Status: DISCONTINUED | OUTPATIENT
Start: 2019-01-21 | End: 2019-01-21 | Stop reason: HOSPADM

## 2019-01-21 RX ORDER — CALCIUM CARBONATE 200(500)MG
1000 TABLET,CHEWABLE ORAL DAILY PRN
Status: DISCONTINUED | OUTPATIENT
Start: 2019-01-21 | End: 2019-01-23 | Stop reason: HOSPADM

## 2019-01-21 RX ORDER — CEFAZOLIN SODIUM 2 G/50ML
SOLUTION INTRAVENOUS AS NEEDED
Status: DISCONTINUED | OUTPATIENT
Start: 2019-01-21 | End: 2019-01-21 | Stop reason: SURG

## 2019-01-21 RX ORDER — TRAMADOL HYDROCHLORIDE 50 MG/1
50 TABLET ORAL EVERY 6 HOURS SCHEDULED
Status: DISCONTINUED | OUTPATIENT
Start: 2019-01-21 | End: 2019-01-23 | Stop reason: HOSPADM

## 2019-01-21 RX ORDER — HYDROMORPHONE HCL/PF 1 MG/ML
1 SYRINGE (ML) INJECTION EVERY 2 HOUR PRN
Status: DISCONTINUED | OUTPATIENT
Start: 2019-01-21 | End: 2019-01-22

## 2019-01-21 RX ORDER — ONDANSETRON 2 MG/ML
4 INJECTION INTRAMUSCULAR; INTRAVENOUS ONCE AS NEEDED
Status: DISCONTINUED | OUTPATIENT
Start: 2019-01-21 | End: 2019-01-21 | Stop reason: HOSPADM

## 2019-01-21 RX ORDER — SODIUM CHLORIDE, SODIUM LACTATE, POTASSIUM CHLORIDE, CALCIUM CHLORIDE 600; 310; 30; 20 MG/100ML; MG/100ML; MG/100ML; MG/100ML
125 INJECTION, SOLUTION INTRAVENOUS CONTINUOUS
Status: DISCONTINUED | OUTPATIENT
Start: 2019-01-21 | End: 2019-01-22 | Stop reason: SDUPTHER

## 2019-01-21 RX ORDER — CHLORHEXIDINE GLUCONATE 0.12 MG/ML
15 RINSE ORAL ONCE
Status: COMPLETED | OUTPATIENT
Start: 2019-01-21 | End: 2019-01-21

## 2019-01-21 RX ORDER — OXYCODONE HYDROCHLORIDE 5 MG/1
5 TABLET ORAL EVERY 4 HOURS PRN
Qty: 30 TABLET | Refills: 0 | Status: SHIPPED | OUTPATIENT
Start: 2019-01-21 | End: 2019-02-01 | Stop reason: SDUPTHER

## 2019-01-21 RX ORDER — FOLIC ACID 1 MG/1
1 TABLET ORAL DAILY
Status: DISCONTINUED | OUTPATIENT
Start: 2019-01-21 | End: 2019-01-23 | Stop reason: HOSPADM

## 2019-01-21 RX ORDER — PROMETHAZINE HYDROCHLORIDE 25 MG/ML
25 INJECTION, SOLUTION INTRAMUSCULAR; INTRAVENOUS ONCE AS NEEDED
Status: DISCONTINUED | OUTPATIENT
Start: 2019-01-21 | End: 2019-01-21 | Stop reason: HOSPADM

## 2019-01-21 RX ORDER — ONDANSETRON 2 MG/ML
INJECTION INTRAMUSCULAR; INTRAVENOUS AS NEEDED
Status: DISCONTINUED | OUTPATIENT
Start: 2019-01-21 | End: 2019-01-21 | Stop reason: SURG

## 2019-01-21 RX ORDER — PROPOFOL 10 MG/ML
INJECTION, EMULSION INTRAVENOUS AS NEEDED
Status: DISCONTINUED | OUTPATIENT
Start: 2019-01-21 | End: 2019-01-21 | Stop reason: SURG

## 2019-01-21 RX ORDER — ASCORBIC ACID 500 MG
500 TABLET ORAL DAILY
Status: DISCONTINUED | OUTPATIENT
Start: 2019-01-21 | End: 2019-01-23 | Stop reason: HOSPADM

## 2019-01-21 RX ORDER — BUPIVACAINE HYDROCHLORIDE 5 MG/ML
INJECTION, SOLUTION PERINEURAL AS NEEDED
Status: DISCONTINUED | OUTPATIENT
Start: 2019-01-21 | End: 2019-01-21 | Stop reason: SURG

## 2019-01-21 RX ORDER — OXYCODONE HYDROCHLORIDE 5 MG/1
5 TABLET ORAL EVERY 4 HOURS PRN
Status: DISCONTINUED | OUTPATIENT
Start: 2019-01-21 | End: 2019-01-23 | Stop reason: HOSPADM

## 2019-01-21 RX ORDER — SODIUM CHLORIDE 9 MG/ML
125 INJECTION, SOLUTION INTRAVENOUS CONTINUOUS
Status: DISCONTINUED | OUTPATIENT
Start: 2019-01-21 | End: 2019-01-22

## 2019-01-21 RX ORDER — TRAMADOL HYDROCHLORIDE 50 MG/1
50 TABLET ORAL EVERY 6 HOURS PRN
Qty: 30 TABLET | Refills: 0 | Status: SHIPPED | OUTPATIENT
Start: 2019-01-21 | End: 2019-02-15

## 2019-01-21 RX ADMIN — TRAMADOL HYDROCHLORIDE 50 MG: 50 TABLET, COATED ORAL at 18:02

## 2019-01-21 RX ADMIN — HYDROMORPHONE HYDROCHLORIDE 1 MG: 1 INJECTION, SOLUTION INTRAMUSCULAR; INTRAVENOUS; SUBCUTANEOUS at 18:08

## 2019-01-21 RX ADMIN — DOCUSATE SODIUM 100 MG: 100 CAPSULE, LIQUID FILLED ORAL at 18:02

## 2019-01-21 RX ADMIN — OXYCODONE HYDROCHLORIDE 10 MG: 10 TABLET ORAL at 16:41

## 2019-01-21 RX ADMIN — GLYCOPYRROLATE 0.1 MG: 0.2 INJECTION, SOLUTION INTRAMUSCULAR; INTRAVENOUS at 10:02

## 2019-01-21 RX ADMIN — FENTANYL CITRATE 50 MCG: 50 INJECTION, SOLUTION INTRAMUSCULAR; INTRAVENOUS at 10:36

## 2019-01-21 RX ADMIN — BUPIVACAINE HYDROCHLORIDE 2.8 ML: 5 INJECTION, SOLUTION PERINEURAL at 09:20

## 2019-01-21 RX ADMIN — CEFAZOLIN SODIUM 2000 MG: 2 SOLUTION INTRAVENOUS at 09:30

## 2019-01-21 RX ADMIN — OXYCODONE HYDROCHLORIDE 10 MG: 10 TABLET ORAL at 21:09

## 2019-01-21 RX ADMIN — SODIUM CHLORIDE, SODIUM LACTATE, POTASSIUM CHLORIDE, AND CALCIUM CHLORIDE: .6; .31; .03; .02 INJECTION, SOLUTION INTRAVENOUS at 09:15

## 2019-01-21 RX ADMIN — DEXAMETHASONE SODIUM PHOSPHATE 4 MG: 4 INJECTION, SOLUTION INTRAMUSCULAR; INTRAVENOUS at 09:38

## 2019-01-21 RX ADMIN — TRAMADOL HYDROCHLORIDE 50 MG: 50 TABLET, COATED ORAL at 14:11

## 2019-01-21 RX ADMIN — ONDANSETRON 4 MG: 2 INJECTION INTRAMUSCULAR; INTRAVENOUS at 10:15

## 2019-01-21 RX ADMIN — CEFAZOLIN SODIUM 1000 MG: 10 INJECTION, POWDER, FOR SOLUTION INTRAVENOUS at 18:17

## 2019-01-21 RX ADMIN — SODIUM CHLORIDE, SODIUM LACTATE, POTASSIUM CHLORIDE, AND CALCIUM CHLORIDE 125 ML/HR: .6; .31; .03; .02 INJECTION, SOLUTION INTRAVENOUS at 08:00

## 2019-01-21 RX ADMIN — SODIUM CHLORIDE, SODIUM LACTATE, POTASSIUM CHLORIDE, AND CALCIUM CHLORIDE 125 ML/HR: .6; .31; .03; .02 INJECTION, SOLUTION INTRAVENOUS at 13:26

## 2019-01-21 RX ADMIN — HYDROMORPHONE HYDROCHLORIDE 1 MG: 1 INJECTION, SOLUTION INTRAMUSCULAR; INTRAVENOUS; SUBCUTANEOUS at 22:10

## 2019-01-21 RX ADMIN — FENTANYL CITRATE 15 MCG: 50 INJECTION, SOLUTION INTRAMUSCULAR; INTRAVENOUS at 09:20

## 2019-01-21 RX ADMIN — PROPOFOL 200 MG: 10 INJECTION, EMULSION INTRAVENOUS at 09:38

## 2019-01-21 RX ADMIN — FENTANYL CITRATE 35 MCG: 50 INJECTION, SOLUTION INTRAMUSCULAR; INTRAVENOUS at 09:38

## 2019-01-21 RX ADMIN — LIDOCAINE HYDROCHLORIDE 100 MG: 20 INJECTION, SOLUTION INTRAVENOUS at 09:38

## 2019-01-21 RX ADMIN — CHLORHEXIDINE GLUCONATE 0.12% ORAL RINSE 15 ML: 1.2 LIQUID ORAL at 08:00

## 2019-01-21 NOTE — PHYSICAL THERAPY NOTE
Physical Therapy Evaluation     Patient's Name: Nba Carlson    Admitting Diagnosis  Primary osteoarthritis of right knee [M17 11]  Primary osteoarthritis of left knee [M17 12]    Problem List  Patient Active Problem List   Diagnosis    Primary osteoarthritis of right knee    Primary osteoarthritis of left knee    Chronic pain of left knee    Chronic pain of right knee       Past Medical History  Past Medical History:   Diagnosis Date    Bronchospasm     last assessed 6/14/2016    Depression     Disc degeneration, lumbar     last assessed 4/7/2016    Erectile dysfunction     Hyperlipidemia     IFG (impaired fasting glucose)     last assessed 7/30/2014    Palpitations     last assessed 4/4/2013    Primary osteoarthritis of knees, bilateral     last assessed 6/27/2017    Right inguinal hernia     last assessed 9/12/2017    Spondylosis of lumbar region without myelopathy or radiculopathy     last assessed 4/7/2016       Past Surgical History  Past Surgical History:   Procedure Laterality Date    KNEE ARTHROSCOPY      bilateral    OK REPAIR ING HERNIA,5+Y/O,REDUCIBL Right 10/26/2017    Procedure: REPAIR HERNIA INGUINAL;  Surgeon: Mj Farris MD;  [facility];  Service: General    TOOTH EXTRACTION        01/21/19 1510   Note Type   Note type Eval only   Pain Assessment   Pain Assessment No/denies pain   Pain Score No Pain   Home Living   Type of Home House  (4 TEMI)   Home Layout Two level;Bed/bath upstairs;Stairs to enter with rails   Home Equipment Walker;Cane  (Pt reports independence without AD PTA)   Prior Function   Level of [place] Independent with ADLs and functional mobility   Lives With Spouse   Receives Help From Family   ADL Assistance Independent   IADLs Independent   Falls in the last 6 months 0   Restrictions/Precautions   Weight Bearing Precautions Per Order Yes   RLE Weight Bearing Per Order WBAT   General   Family/Caregiver Present Yes   Cognition   Overall Cognitive Status WFL   Arousal/Participation Alert   Orientation Level Oriented X4   Memory Within functional limits   Following Commands Follows all commands and directions without difficulty   RLE Assessment   RLE Assessment (Grossly 2/5, limited by pain and impaired sensation)   LLE Assessment   LLE Assessment WFL   Light Touch   RLE Light Touch Impaired   LLE Light Touch Grossly intact   Bed Mobility   Supine to Sit 4  Minimal assistance   Additional items Assist x 1; Increased time required;Verbal cues   Transfers   Sit to Stand 3  Moderate assistance   Additional items Assist x 2; Increased time required;Verbal cues   Stand to Sit 3  Moderate assistance   Additional items Assist x 2; Increased time required;Verbal cues   Ambulation/Elevation   Gait pattern R Knee Lexx; Improper Weight shift; Antalgic;Narrow EDDIE; Decreased foot clearance; Short stride; Step to;Excessively slow   Gait Assistance 4  Minimal assist   Additional items Assist x 2;Verbal cues; Tactile cues   Assistive Device Rolling walker   Distance x5 steps to bedside chair   Balance   Static Sitting Fair +   Dynamic Sitting Fair +   Static Standing Fair   Dynamic Standing Fair -   Ambulatory Poor +   Endurance Deficit   Endurance Deficit Yes   Endurance Deficit Description Impaired sensation   Activity Tolerance   Activity Tolerance Patient limited by fatigue;Treatment limited secondary to medical complications (Comment)   Nurse Made Aware Yes Anselmo Alvarez   Assessment   Prognosis Good   Problem List Decreased strength;Decreased endurance;Decreased range of motion; Impaired balance;Decreased mobility; Decreased coordination;Orthopedic restrictions   Assessment Pt is a 51 yo male s/p R TKA on 1/21/19  PMH is significant for: OA B/L knees  Pt is supine at start of session and agreeable to therapy  Pt reports numbness in B/L LE  Pt transferred supine to sit with Bob  Pt reports lightheadedness EOB, BP stable   Pt transferred sit <> stand with modA x2 and RW, required cues for safety with RW  Pt took x5 steps to bedside chair with Bob x2 and RW  Pt is limited by impaired sensation in RLE, presenting with R knee buckle and requiring verbal and tactile cues for motor sequencing and appropriate weight shifting  Pt remained seated in bedside chair with all needs within reach at end of session, chair alarm in place  PT to recommend home with family support and OPPT pending increased ambulation and stair trial  PT to follow up   Barriers to Discharge Inaccessible home environment  (4 TEMI, 15 steps to bed/bath)   Goals   Patient Goals To go home   STG Expiration Date 01/31/19   Short Term Goal #1 In 10 sessions pt will: 1  Transfer supine <> sit independently  2  Transfer sit <> stand with modified independence and LRAD  3  Ambulate >40 ft with modified independence and LRAD  4  Perform LE ther-ex program  5  Navigate x15 stairs with supervision and LRAD  Treatment Day 0   Plan   Treatment/Interventions Functional transfer training;LE strengthening/ROM; Therapeutic exercise; Endurance training;Bed mobility;Gait training;Spoke to nursing;Family   PT Frequency 7x/wk; Twice a day   Recommendation   Recommendation Outpatient PT; Home with family support   Equipment Recommended (Pt has RW)   PT - OK to Discharge No  (Pending stair trial, increased ambulation)   Modified [place] Scale   Modified [place]da Scale 4   Barthel Index   Feeding 10   Bathing 0   Grooming Score 0   Dressing Score 5   Bladder Score 0   Bowels Score 10   Toilet Use Score 5   Transfers (Bed/Chair) Score 5   Mobility (Level Surface) Score 0   Stairs Score 0   Barthel Index Score 35         Iban Madrid, PT, DPT

## 2019-01-21 NOTE — PERIOPERATIVE NURSING NOTE
Spoke with Dr Joey Bill regarding the pts spinal  Pt able to move knees and is regaining sensation to the medial thigh  He stated that the patient can go to the floor

## 2019-01-21 NOTE — ANESTHESIA PROCEDURE NOTES
Peripheral Block    Patient location during procedure: pre-op  Start time: 1/21/2019 9:04 AM  Reason for block: at surgeon's request and post-op pain management  Staffing  Anesthesiologist: Silvio Loo  Performed: anesthesiologist   Preanesthetic Checklist  Completed: patient identified, site marked, surgical consent, pre-op evaluation, timeout performed, IV checked, risks and benefits discussed and monitors and equipment checked  Peripheral Block  Patient position: supine  Prep: ChloraPrep  Patient monitoring: cardiac monitor and frequent blood pressure checks  Block type: interscalene  Laterality: right  Injection technique: single-shot  Procedures: ultrasound guided  Ultrasound permanent image saved  Local infiltration: bupivacaine  Infiltration strength: 0 5 %  Dose: 17 mL  Needle  Needle type: pencil-tip   Needle gauge: 22 G  Needle length: 10 cm  Needle localization: ultrasound guidance  Needle insertion depth: 4 cm  Assessment  Injection assessment: incremental injection, local visualized surrounding nerve on ultrasound, negative aspiration for heme and no paresthesia on injection  Paresthesia pain: none  Heart rate change: no  Slow fractionated injection: yes  patient tolerated the procedure well with no immediate complications

## 2019-01-21 NOTE — RESTORATIVE TECHNICIAN NOTE
Restorative Specialist Mobility Note       Activity: Bedrest, Dangle, Stand at bedside, Turn, Ambulate in room, Chair     Assistive Device: Front wheel walker     Ambulation Response: Tolerated fairly well  Repositioned: Sitting, Up in chair              Anti-Embolism Device On: Bilateral, Foot pump             Assisted therapy during session  For all/additional information please see therapy note(s)        Keya Moody Restorative Technician, BS

## 2019-01-21 NOTE — PLAN OF CARE
Problem: PHYSICAL THERAPY ADULT  Goal: Performs mobility at highest level of function for planned discharge setting  See evaluation for individualized goals  Treatment/Interventions: Functional transfer training, LE strengthening/ROM, Therapeutic exercise, Endurance training, Bed mobility, Gait training, Spoke to nursing, Family  Equipment Recommended:  (Pt has RW)       See flowsheet documentation for full assessment, interventions and recommendations  Prognosis: Good  Problem List: Decreased strength, Decreased endurance, Decreased range of motion, Impaired balance, Decreased mobility, Decreased coordination, Orthopedic restrictions  Assessment: Pt is a 53 yo male s/p R TKA on 1/21/19  PMH is significant for: OA B/L knees  Pt is supine at start of session and agreeable to therapy  Pt reports numbness in B/L LE  Pt transferred supine to sit with oBb  Pt reports lightheadedness EOB, BP stable  Pt transferred sit <> stand with modA x2 and RW, required cues for safety with RW  Pt took x5 steps to bedside chair with Bob x2 and RW  Pt is limited by impaired sensation in RLE, presenting with R knee buckle and requiring verbal and tactile cues for motor sequencing and appropriate weight shifting  Pt remained seated in bedside chair with all needs within reach at end of session, chair alarm in place  PT to recommend home with family support and OPPT pending increased ambulation and stair trial  PT to follow up  Barriers to Discharge: Inaccessible home environment (4 TEMI, 15 steps to bed/bath)     Recommendation: Outpatient PT, Home with family support     PT - OK to Discharge: No (Pending stair trial, increased ambulation)    See flowsheet documentation for full assessment         Comments: Hannah Haro, PT, DPT

## 2019-01-21 NOTE — ANESTHESIA PROCEDURE NOTES
Spinal Block    Patient location during procedure: OR  Start time: 1/21/2019 9:20 AM  Reason for block: procedure for pain, at surgeon's request, post-op pain management and primary anesthetic  Staffing  Performed: resident/CRNA   Preanesthetic Checklist  Completed: patient identified, site marked, surgical consent, pre-op evaluation, timeout performed, IV checked, risks and benefits discussed and monitors and equipment checked  Spinal Block  Patient position: sitting  Prep: Betadine  Patient monitoring: heart rate, cardiac monitor, continuous pulse ox and frequent blood pressure checks  Approach: midline  Location: L4-5  Injection technique: single-shot  Needle  Needle type: pencil-tip   Needle gauge: 25 G  Needle length: 10 cm  Assessment  Sensory level: T10  Events: failed spinal  Injection Assessment:  negative aspiration for heme, no paresthesia on injection and positive aspiration for clear CSF    Post-procedure:  site cleaned

## 2019-01-21 NOTE — INTERVAL H&P NOTE
H&P reviewed  After examining the patient I find no changes in the patients condition since the H&P had been written      Preop for right total knee arthroplasty

## 2019-01-21 NOTE — UTILIZATION REVIEW
[address]  Utilization Review Department  Phone: [phone]; Fax [phone]  [email or web address]  ATTENTION: Please call with any questions or concerns to [phone]  and carefully listen to the prompts so that you are directed to the right person  Send all requests for admission clinical reviews, approved or denied determinations and any other requests to fax [phone]  All voicemails are confidential     Initial Clinical Review   ELECTIVE INPATIENT SURGERY  IP [id number] Javi ANN @ [address] VTT#[id number] 1/18/19 9:34AM    Age/Sex: 52 y o  male  Surgery Date: 1/21/19  Procedure: R TKA  CPT 83191  Anesthesia: epidural  Admission Orders: Date/Time/Statement: INPATIENT 1/21/19 @ 1054   Orders Placed This Encounter   Procedures    Inpatient Admission     Standing Status:   Standing     Number of Occurrences:   1     Order Specific Question:   Admitting Physician     Answer:   Alexander Murillo [[id number]]     Order Specific Question:   Level of Care     Answer:   Med Surg [[id number]]     Order Specific Question:   Estimated length of stay     Answer:   More than 2 Midnights     Order Specific Question:   Certification     Answer:   I certify that inpatient services are medically necessary for this patient for a duration of greater than two midnights  See H&P and MD Progress Notes for additional information about the patient's course of treatment  Vital Signs: /89   Pulse 76   Temp 97 7 °F (36 5 °C)   Resp 12   Ht 6' (1 829 m)   Wt 98 9 kg (218 lb)   SpO2 95%   BMI 29 57 kg/m²   Diet:        Diet Orders            Start     Ordered    01/21/19 1046  Diet Regular; Regular House  Diet effective now     Question Answer Comment   Diet Type Regular    Regular Regular House    RD to adjust diet per protocol?  Yes        01/21/19 1054        Mobility: ambulate q shift w/walker, RLE wt bearing restrictions, turn q2h    DVT Prophylaxis: scd's, ambulate, SQ lovenox daily    Pain Control:   Pain Medications             oxyCODONE (ROXICODONE) 5 mg immediate release tablet Take 1 tablet (5 mg total) by mouth every 4 (four) hours as needed for moderate pain for up to 30 doses Max Daily Amount: 30 mg    traMADol (ULTRAM) 50 mg tablet Take 1 tablet (50 mg total) by mouth every 6 (six) hours as needed for moderate pain for up to 60 doses        IV dilaudid prn

## 2019-01-21 NOTE — ANESTHESIA PREPROCEDURE EVALUATION
Review of Systems/Medical History          Cardiovascular  Hyperlipidemia,    Pulmonary       GI/Hepatic            Endo/Other     GYN       Hematology   Musculoskeletal       Neurology   Psychology           Physical Exam    Airway    Mallampati score: II         Dental   No notable dental hx     Cardiovascular      Pulmonary      Other Findings        Anesthesia Plan  ASA Score- 2     Anesthesia Type- spinal with ASA Monitors  Additional Monitors:   Airway Plan:     Comment: I, Dr Francisco Holguin, the attending physician, have personally seen and evaluated the patient prior to anesthetic care  I have reviewed the pre-anesthetic record, and other medical records if appropriate to the anesthetic care  If a CRNA is involved in the case, I have reviewed the CRNA assessment, if present, and agree  The patient is in a suitable condition to proceed with my formulated anesthetic plan        Plan Factors-    Induction-     Postoperative Plan-     Informed Consent- Anesthetic plan and risks discussed with patient  I personally reviewed this patient with the CRNA  Discussed and agreed on the Anesthesia Plan with the RENATE Carter

## 2019-01-21 NOTE — DISCHARGE INSTRUCTIONS
Discharge Instructions - Orthopedics  Jayleen Nicholas 52 y o  male MRN: [id number]  Unit/Bed#: PACU 08    Weight Bearing Status:                                           Weight Bearing as tolerated to the right lower extremity  DVT prophylaxis:  Complete course of Lovenox as directed    Pain:  Continue analgesics as directed    Showering Instructions:   Do not shower until 3 days    Dressing Instructions:   Original dressing stays intact until follow up appointment in the office  If dressing become wet, reapply a dry dressing  Driving Instructions:  No driving until cleared by Orthopaedic Surgery  PT/OT:  Continue PT/OT on outpatient basis as directed    Appt Instructions: If you do not have your appointment, please call the clinic at [phone]  Otherwise followup as scheduled below:  1 week follow up    Contact the office sooner if you experience any increased numbness/tingling in the extremities        Miscellaneous:  None

## 2019-01-21 NOTE — OP NOTE
OPERATIVE REPORT  PATIENT NAME: Breezy Cardoso    :  1965  MRN: [id number]  Pt Location: [facility] OR ROOM 15    SURGERY DATE: 2019    Surgeon(s) and Role:     * Paul Carranza MD - Primary     * Lore Burnett PA-C - Assisting    Preop Diagnosis:  Primary osteoarthritis of right knee [M17 11]  Primary osteoarthritis of left knee [M17 12]    Post-Op Diagnosis Codes:     * Primary osteoarthritis of right knee [M17 11]     * Primary osteoarthritis of left knee [M17 12]    Procedure(s) (LRB):  ARTHROPLASTY KNEE TOTAL (Right)    Specimen(s):  * No specimens in log *    Estimated Blood Loss:   100 mL    Drains:  Closed/Suction Drain Right;Lateral Knee Accordion 10 Fr  (Active)   Number of days: 0       Urethral Catheter Latex 16 Fr  (Active)   Number of days: 0       Anesthesia Type:   Epidural    Operative Indications:  Primary osteoarthritis of right knee [M17 11]  Primary osteoarthritis of left knee [M17 12]      Operative Findings:  depuy attune   Femur-8   Poly-7   Tibia-8  [id number]    Complications:   None    Procedure and Technique:  FOLLOWING THE INDUCTION OF ADEQUATE LEVEL OF GENERAL anesthesia, Sands catheters and sterilely introduced into this patient's bladder  Antibiotics were administered     The right thigh was then fitted with a thigh-high tourniquet  The right lower extremity then prepped draped sterilely  The right lower extremity was exsanguinated gravity, the tourniquet inflated to 300 mmHg  A midline knee incision was created the knee in flexion  Full-thickness flaps raised in order to access the extensor mechanism  A medial arthrotomy was created open the knee joint  Bony soft tissue releases were performed where necessary  The distal femoral cut was made 6° valgus  This is made of a long intramedullary faisal  The proximal tibia cut was made next  As this was a varus knee, 2 mm reference medially    Care was taken in order to protect the integrity medial collateral, lateral collateral, and posterior structures during these maneuvers  The distal femoral sizing guide was used, the appropriate form 1 cutting blocks impacted  The anterior, posterior, chamfer cuts then made  The box cut was then made for the posterior stabilized unit  With the trial components in, the knee was taken through range of motion, found to be capable full extension, good flexion, no mid flexion valgus instability  The patella was then resurfaced will utilize the manufacture's equipment, was found to be a size 41 mm button  The trial components removed, the knee was prepared for insertion of cemented components  The cemented tibia, cemented femur, trial poly, and cemented patella placed  Excess cement was removed, the knee was brought into extension  The cement was allowed to cure  The trial poly was taken out, the knee was packed off  The tourniquet was deflated, hemostasis was secured  The insert polyethylene was then snapped into position  The knee was taken through a final range of motion, found to be capable full extension, good flexion, no mid flexion valgus instability, excellent patellar tracking  Satisfied with the extent of surgery, the wounds then flushed with saline and closed  A Betadine soak was initiated  A drain was placed deep brought out via separate lateral stab incision  The arthrotomy was closed number Vicryl suture  The subcu tissue closed 2 Vicryl suture  The skin was closed staples  The drains constituted, sterile dressings applied  He was awakened from general anesthesia, taken recovery room stable condition with plans to include physical therapy weight-bearing to tolerance, he will require DVT prophylaxis with Lovenox  Please note, that as no qualified with the resident was available to assist, the assistance of Catalina Cornejo was instrumental in the safe execution of this patient's surgery    Started the patient position, patient prep drape, intraoperative assistance, wound closure, dressing application, patient transfer, all these were performed under my direct supervision   I was present for the entire procedure and A qualified resident physician was not available    Patient Disposition:  PACU     SIGNATURE: Tadeo Ribera MD  DATE: January 21, 2019  TIME: 10:31 AM

## 2019-01-21 NOTE — H&P (VIEW-ONLY)
Assessment:  1  Primary osteoarthritis of right knee     2  Chronic pain of right knee     3  Primary osteoarthritis of left knee     4  Chronic pain of left knee         Plan:  The patient returns for right > left knee pain  He is s/p bilateral Hyalgan injections, 11/13/18 with limited benefit  The standing increases his pain and can effect his daily activities  He finds benefit from sitting yet has stiffness upon getting up  He had bilateral varus alignment and medial joint line tenderness  He will be scheduled for a right TKA  He would like to discuss plans for left TKA in future  He has short-term disability forms which are signed today  The patient should return after surgery  To do next visit:  Return for after Right TKA  The above stated was discussed in layman's terms and the patient expressed understanding  All questions were answered to the patient's satisfaction  Scribe Attestation    I,:   Virgilio Lee am acting as a scribe while in the presence of the attending physician :        I,:   Vibha Velazco MD personally performed the services described in this documentation    as scribed in my presence :              Subjective:   Melecio Child is a 52 y o  male who presents for bilateral knee pain  He is s/p bilateral Hyalgan injections with limited benefit, 11/13/18  Today he complains of right > left generalized knee pain  He rates his bilateral knee pain at 3/10 and 10/10 at its worse  Standing and weight bearing activity aggravates  Sitting and rest alleviates  He has get up symptoms  He has had bilateral knee steroid injections 8/1/18 with some benefit        Review of systems negative unless otherwise specified in HPI    Past Medical History:   Diagnosis Date    Bronchospasm     last assessed 6/14/2016    Depression     Disc degeneration, lumbar     last assessed 4/7/2016    Erectile dysfunction     Hyperlipidemia     IFG (impaired fasting glucose)     last assessed 7/30/2014    Palpitations     last assessed 4/4/2013    Primary osteoarthritis of knees, bilateral     last assessed 6/27/2017    Right inguinal hernia     last assessed 9/12/2017    Spondylosis of lumbar region without myelopathy or radiculopathy     last assessed 4/7/2016       Past Surgical History:   Procedure Laterality Date    KNEE ARTHROSCOPY      bilateral    [place] REPAIR ING HERNIA,5+Y/O,REDUCIBL Right 10/26/2017    Procedure: REPAIR HERNIA INGUINAL;  Surgeon: Lorrie Decker MD;  [facility];  Service: General    TOOTH EXTRACTION         Family History   Problem Relation Age of Onset    No Known Problems Mother     Diabetes Father     No Known Problems Sister     Coronary artery disease Brother        Social History     Occupational History    Not on file       Social History Main Topics    Smoking status: Former Smoker    Smokeless tobacco: Never Used      Comment: quit 11/2018    Alcohol use Yes      Comment: weekends     Drug use: No    Sexual activity: Not on file         Current Outpatient Prescriptions:     ascorbic acid (VITAMIN C) 500 mg tablet, Take 1 tablet (500 mg total) by mouth 2 (two) times a day, Disp: 60 tablet, Rfl: 0    enoxaparin (LOVENOX) 40 mg/0 4 mL, , Disp: , Rfl:     ferrous sulfate 324 (65 Fe) mg, Take 1 tablet (324 mg total) by mouth 2 (two) times a day before meals, Disp: 60 tablet, Rfl: 0    folic acid (FOLVITE) 1 mg tablet, Take 1 tablet (1 mg total) by mouth daily, Disp: 30 tablet, Rfl: 0    sildenafil (VIAGRA) 100 mg tablet, Take 1 tablet (100 mg total) by mouth daily as needed for erectile dysfunction, Disp: 10 tablet, Rfl: 1    No Known Allergies         Vitals:    01/15/19 0850   BP: 122/78   Pulse: 90       Objective:  Physical exam  · General: Awake, Alert, Oriented  · Eyes: Pupils equal, round and reactive to light  · Heart: regular rate and rhythm  · Lungs: No audible wheezing  · Abdomen: soft                    Ortho Exam   Right knee:    TTP medial JL  Normal ROM with crepitus  Varus alignment    Left knee:  TTP medial JL  Normal ROM with crepitus  Varus alignment    NVID    Diagnostics, reviewed and taken today if performed as documented:    None performed      The attending physician has personally reviewed the pertinent films in PACS and interpretation is as follows:      Procedures, if performed today:    Procedures    None performed      Portions of the record may have been created with voice recognition software   Occasional wrong word or "sound a like" substitutions may have occurred due to the inherent limitations of voice recognition software   Read the chart carefully and recognize, using context, where substitutions have occurred

## 2019-01-22 LAB
ANION GAP SERPL CALCULATED.3IONS-SCNC: 7 MMOL/L (ref 4–13)
BUN SERPL-MCNC: 14 MG/DL (ref 5–25)
CALCIUM SERPL-MCNC: 7.5 MG/DL (ref 8.3–10.1)
CHLORIDE SERPL-SCNC: 105 MMOL/L (ref 100–108)
CO2 SERPL-SCNC: 25 MMOL/L (ref 21–32)
CREAT SERPL-MCNC: 0.93 MG/DL (ref 0.6–1.3)
ERYTHROCYTE [DISTWIDTH] IN BLOOD BY AUTOMATED COUNT: 12.3 % (ref 11.6–15.1)
GFR SERPL CREATININE-BSD FRML MDRD: 92 ML/MIN/1.73SQ M
GLUCOSE SERPL-MCNC: 134 MG/DL (ref 65–140)
HCT VFR BLD AUTO: 34.8 % (ref 36.5–49.3)
HGB BLD-MCNC: 11.5 G/DL (ref 12–17)
MCH RBC QN AUTO: 30.7 PG (ref 26.8–34.3)
MCHC RBC AUTO-ENTMCNC: 33 G/DL (ref 31.4–37.4)
MCV RBC AUTO: 93 FL (ref 82–98)
PLATELET # BLD AUTO: 190 THOUSANDS/UL (ref 149–390)
PMV BLD AUTO: 9.8 FL (ref 8.9–12.7)
POTASSIUM SERPL-SCNC: 3.8 MMOL/L (ref 3.5–5.3)
RBC # BLD AUTO: 3.75 MILLION/UL (ref 3.88–5.62)
SODIUM SERPL-SCNC: 137 MMOL/L (ref 136–145)
WBC # BLD AUTO: 9.43 THOUSAND/UL (ref 4.31–10.16)

## 2019-01-22 PROCEDURE — 85027 COMPLETE CBC AUTOMATED: CPT | Performed by: PHYSICIAN ASSISTANT

## 2019-01-22 PROCEDURE — G8989 SELF CARE D/C STATUS: HCPCS

## 2019-01-22 PROCEDURE — 97116 GAIT TRAINING THERAPY: CPT

## 2019-01-22 PROCEDURE — 80048 BASIC METABOLIC PNL TOTAL CA: CPT | Performed by: PHYSICIAN ASSISTANT

## 2019-01-22 PROCEDURE — G8988 SELF CARE GOAL STATUS: HCPCS

## 2019-01-22 PROCEDURE — G8987 SELF CARE CURRENT STATUS: HCPCS

## 2019-01-22 PROCEDURE — 97530 THERAPEUTIC ACTIVITIES: CPT

## 2019-01-22 PROCEDURE — 97166 OT EVAL MOD COMPLEX 45 MIN: CPT

## 2019-01-22 RX ADMIN — SODIUM CHLORIDE, SODIUM LACTATE, POTASSIUM CHLORIDE, AND CALCIUM CHLORIDE 125 ML/HR: .6; .31; .03; .02 INJECTION, SOLUTION INTRAVENOUS at 01:19

## 2019-01-22 RX ADMIN — FOLIC ACID 1 MG: 1 TABLET ORAL at 09:11

## 2019-01-22 RX ADMIN — OXYCODONE HYDROCHLORIDE 10 MG: 10 TABLET ORAL at 06:59

## 2019-01-22 RX ADMIN — OXYCODONE HYDROCHLORIDE 10 MG: 10 TABLET ORAL at 16:16

## 2019-01-22 RX ADMIN — DOCUSATE SODIUM 100 MG: 100 CAPSULE, LIQUID FILLED ORAL at 18:07

## 2019-01-22 RX ADMIN — MORPHINE SULFATE 2 MG: 2 INJECTION, SOLUTION INTRAMUSCULAR; INTRAVENOUS at 18:55

## 2019-01-22 RX ADMIN — HYDROMORPHONE HYDROCHLORIDE 1 MG: 1 INJECTION, SOLUTION INTRAMUSCULAR; INTRAVENOUS; SUBCUTANEOUS at 02:31

## 2019-01-22 RX ADMIN — TRAMADOL HYDROCHLORIDE 50 MG: 50 TABLET, COATED ORAL at 01:00

## 2019-01-22 RX ADMIN — OXYCODONE HYDROCHLORIDE 10 MG: 10 TABLET ORAL at 11:00

## 2019-01-22 RX ADMIN — DOCUSATE SODIUM 100 MG: 100 CAPSULE, LIQUID FILLED ORAL at 09:11

## 2019-01-22 RX ADMIN — TRAMADOL HYDROCHLORIDE 50 MG: 50 TABLET, COATED ORAL at 05:28

## 2019-01-22 RX ADMIN — ENOXAPARIN SODIUM 40 MG: 40 INJECTION SUBCUTANEOUS at 09:29

## 2019-01-22 RX ADMIN — ACETAMINOPHEN 650 MG: 325 TABLET ORAL at 15:08

## 2019-01-22 RX ADMIN — SODIUM CHLORIDE, SODIUM LACTATE, POTASSIUM CHLORIDE, AND CALCIUM CHLORIDE 500 ML: .6; .31; .03; .02 INJECTION, SOLUTION INTRAVENOUS at 09:07

## 2019-01-22 RX ADMIN — SODIUM CHLORIDE, SODIUM LACTATE, POTASSIUM CHLORIDE, AND CALCIUM CHLORIDE 125 ML/HR: .6; .31; .03; .02 INJECTION, SOLUTION INTRAVENOUS at 21:29

## 2019-01-22 RX ADMIN — SODIUM CHLORIDE, SODIUM LACTATE, POTASSIUM CHLORIDE, AND CALCIUM CHLORIDE 125 ML/HR: .6; .31; .03; .02 INJECTION, SOLUTION INTRAVENOUS at 12:14

## 2019-01-22 RX ADMIN — ACETAMINOPHEN 650 MG: 325 TABLET ORAL at 23:49

## 2019-01-22 RX ADMIN — OXYCODONE HYDROCHLORIDE 10 MG: 10 TABLET ORAL at 01:13

## 2019-01-22 RX ADMIN — OXYCODONE HYDROCHLORIDE AND ACETAMINOPHEN 500 MG: 500 TABLET ORAL at 09:11

## 2019-01-22 RX ADMIN — FERROUS SULFATE TAB 325 MG (65 MG ELEMENTAL FE) 325 MG: 325 (65 FE) TAB at 09:11

## 2019-01-22 RX ADMIN — CEFAZOLIN SODIUM 1000 MG: 10 INJECTION, POWDER, FOR SOLUTION INTRAVENOUS at 01:18

## 2019-01-22 RX ADMIN — TRAMADOL HYDROCHLORIDE 50 MG: 50 TABLET, COATED ORAL at 18:07

## 2019-01-22 RX ADMIN — TRAMADOL HYDROCHLORIDE 50 MG: 50 TABLET, COATED ORAL at 23:47

## 2019-01-22 NOTE — SOCIAL WORK
CM met with patient to complete a general SW assessment and discuss discharge needs  CM introduced herself, provided extension, and explained her role in the discharge planning  Patient resides in a two level house with his wife Lorena Maria [phone])  Patient identifies his wife and family as their primary support system  Patient is independent with ADLs and functional mobility  Patient reports having a RW, BSC, and crutches at home  Patient also reports to have a walk-in shower with a seat installed  Patient drives; reports his wife is available to transport at discharge  Patient uses [facility] in [place]; CM informed patient of their access to [address] at discharge  Patient prefers to use [facility] at discharge  Lovenox is filled  Patient denied history with VNA services; declined the need for services at discharge  PCP identified as Dr Octavia Wright  Patient does not have a POA and declined further information  No history of Mental health/ D&A reported  No additional needs reported at this time  CM to continue following  Patient/caregiver received discharge checklist   Content reviewed  Patient/caregiver encouraged to participate in discharge plan of care prior to discharge home  CM reviewed d/c planning process including the following: identifying help at home, patient preference for d/c planning needs, Discharge Lounge, [facility] Meds to Bed program, availability of treatment team to discuss questions or concerns patient and/or family may have regarding understanding medications and recognizing signs and symptoms once discharged  CM also encouraged patient to follow up with all recommended appointments after discharge  Patient advised of importance for patient and family to participate in managing patients medical well being

## 2019-01-22 NOTE — OCCUPATIONAL THERAPY NOTE
[address] Evaluation     Patient Name: Ameena Lofton  RLHDG'E Date: 1/22/2019  Problem List  Patient Active Problem List   Diagnosis    Primary osteoarthritis of right knee    Primary osteoarthritis of left knee    Chronic pain of left knee    Chronic pain of right knee     Past Medical History  Past Medical History:   Diagnosis Date    Bronchospasm     last assessed 6/14/2016    Depression     Disc degeneration, lumbar     last assessed 4/7/2016    Erectile dysfunction     Hyperlipidemia     IFG (impaired fasting glucose)     last assessed 7/30/2014    Palpitations     last assessed 4/4/2013    Primary osteoarthritis of knees, bilateral     last assessed 6/27/2017    Right inguinal hernia     last assessed 9/12/2017    Spondylosis of lumbar region without myelopathy or radiculopathy     last assessed 4/7/2016     Past Surgical History  Past Surgical History:   Procedure Laterality Date    KNEE ARTHROSCOPY      bilateral    [place] REPAIR ING HERNIA,5+Y/O,REDUCIBL Right 10/26/2017    Procedure: REPAIR HERNIA INGUINAL;  Surgeon: Reinier Aguirre MD;  [facility];  Service: General    [place] TOTAL KNEE ARTHROPLASTY Right 1/21/2019    Procedure: ARTHROPLASTY KNEE TOTAL;  Surgeon: Dinh Stanton MD;  [facility];  Service: Orthopedics    TOOTH EXTRACTION           01/22/19 1130   Note Type   Note type Eval only   Restrictions/Precautions   Weight Bearing Precautions Per Order Yes   RLE Weight Bearing Per Order WBAT   Other Precautions Fall Risk;Pain   Pain Assessment   Pain Assessment 0-10   Pain Score Worst Possible Pain   Pain Type Acute pain;Surgical pain   Pain Location Knee   Pain Orientation Right   Patient's Stated Pain Goal No pain   Hospital Pain Intervention(s) Repositioned;Cold applied; Ambulation/increased activity; Distraction; Emotional support   Response to Interventions IMPROVED    Home Living   Type of [address] [place] [address] Two level;Bed/bath upstairs   Bathroom Shower/Tub Tub/shower unit   Bathroom Toilet Standard   Bathroom Equipment Built-in shower seat;Commode   Home Equipment Walker;Cane   Additional Comments NO USE OF DME AT BASELINE    Prior Function   Level of [place] Independent with ADLs and functional mobility   Lives With [place] Help From Family   ADL Assistance Independent   IADLs Independent   Falls in the last 6 months 0   Vocational Full time employment   Lifestyle   [facility] PT REPORTS BEING I WITH ADLS/IADLS/DRIVING PTA  Reciprocal Relationships LIVES WITH SUPPORTIVE SPOUSE WHO IS CURRENTLY ON FAMILY MEDICAL LEAVE FOR HER FATHER WHO IS ON HOSPICE  SPOUSE CAN BE HOME TO ASSIST    Service to Others WORKS FULL TIME IN [facility] [employer] [facility]    Intrinsic Gratification ENJOYS WORKING    Psychosocial   Psychosocial (WDL) WDL   ADL   Eating Assistance 7  Independent   Grooming Assistance 7  Independent   UB Bathing Assistance 6  Modified Independent   LB Bathing Assistance 4  Minimal Assistance   UB Dressing Assistance 6  Modified independent   LB Dressing Assistance 4  Minimal Assistance   Toileting Assistance  5  Supervision/Setup   Functional Assistance 5  Supervision/Setup   Bed Mobility   Supine to Sit 5  Supervision   Additional items Assist x 1; Increased time required   Sit to Supine Unable to assess  (PT LEFT WITH PHYSICAL THERAPY )   Transfers   Sit to Stand 5  Supervision   Additional items Assist x 1; Increased time required;Verbal cues   Stand to Sit 5  Supervision   Additional items Assist x 1; Increased time required;Verbal cues   Functional Mobility   Functional Mobility 5  Supervision   Additional items Rolling walker   Balance   Static Sitting Good   Static Standing Fair +   Ambulatory Fair   Activity Tolerance   Activity Tolerance Patient tolerated treatment well   Medical Staff Made Aware SPOKE TO CM REGARDING D/C PLAN    Nurse Made Aware APPROPRIATE TO SEE    RUE Assessment   RUE Assessment WFL   LUE Assessment   LUE Assessment WFL   Hand Function   Gross Motor Coordination Functional   Fine Motor Coordination Functional   Sensation   Light Touch Partial deficits in the RLE   Cognition   Overall Cognitive Status [facility]   Arousal/Participation Alert; Cooperative   Attention Within functional limits   Orientation Level Oriented X4   Memory Within functional limits   Following Commands Follows all commands and directions without difficulty   Comments PT IS PLEASANT AND COOPERATIVE  MILDLY IMPUSLIVE AT TIMES, HOWEVER THIS IS BELIEVED TO BE 2' BEING FULLY INDEPENDENT AT BASELINE    Assessment   Assessment 53 YO Male SEEN FOR INITIAL OCCUPATIONAL THERAPY EVALUATION FOLLOWING ELECTIVE R TKA  PT IS WBAT ON RLE  PROBLEMS LIST INCLUDES  CHRONIC PAIN AND OA OF B/L KNEES WITH PLAN FOR FUTURE L TKA PER PT  PT IS FROM HOME WITH SPOUSE WHERE HE REPORTS BEING INDEPENDENT WITH ADLS/IADLS/DRIVING PTA  PT CURRENTLY REQUIRES OVERALL SUPERVISION-MIN A WITH ADLS AND SUPERVISION WITH TRANSFERS AND FUNCTIONAL MOBILITY WITH USE OF RW  PT IS EXPERIENCING EXPECTED LIMITATIONS 2' PAIN, FATIGUE, IMPAIRED BALANCE, MILDLY IMPULSIVE, ORTHOPEDIC RESTRICTIONS, DIZZINESS WITH CHANGE OF POSITIONING  and OVERALL LIMITED ACTIVITY TOLERANCE  PT EDUCATED ON SLOWING OF PACE, INCREASED FAMILY SUPPORT and CONTINUE PARTICIPATION IN SELF-CARE/MOBILITY  FROM AN OCCUPATIONAL THERAPY PERSPECTIVE, PT CAN RETURN HOME WITH INCREASED FAMILY SUPPORT WHEN MEDICALLY CLEARED  AGREE WITH PHYSICAL THERAPY RECOMMENDATION TO F/U WITH OUTPT PT UPON D/C  RECOMMEND USE OF SC AS NEEDED AND BSC/URINAL USE FOR 1ST FLOOR SET-UP AS NEEDED  ALL CURRENT QUESTIONS/CONCERNS ADDRESSED  NO ADDITIONAL ACUTE CARE OT NEEDS  D/C OT      Goals   Patient Goals TO RETURN HOME TODAY    Recommendation   OT Discharge Recommendation Home with family support  (+ F/U WITH OUTPT PT )   Equipment Recommended (REC USE OF SC, BSC VS URINAL FOR 1ST FLOOR AND RW )   OT - OK to Discharge Yes   Barthel Index   Feeding 10   Bathing 0   Grooming Score 5   Dressing Score 5   Bladder Score 10   Bowels Score 10   Toilet Use Score 10   Transfers (Bed/Chair) Score 10   Mobility (Level Surface) Score 10   Stairs Score 5   Barthel Index Score 75   Modified Fannin Scale   Modified Fannin Scale 3       Documentation completed by Parish Haro, MARTHA, OTR/L

## 2019-01-22 NOTE — PLAN OF CARE
Problem: PHYSICAL THERAPY ADULT  Goal: Performs mobility at highest level of function for planned discharge setting  See evaluation for individualized goals  Treatment/Interventions: Functional transfer training, LE strengthening/ROM, Therapeutic exercise, Endurance training, Bed mobility, Gait training, Spoke to nursing, Family  Equipment Recommended:  (Pt has RW)       See flowsheet documentation for full assessment, interventions and recommendations  Outcome: Progressing  Prognosis: Good  Problem List: Decreased strength, Decreased range of motion, Decreased endurance, Impaired balance, Decreased mobility, Pain, Orthopedic restrictions  Assessment: Pt seen for physical therapy session focused on gait training and therapeutic activity  Pt is supine at start of session and agreeable to therapy  Pt transferred supine to sit with supervision and use bed rails  Pt transferred sit <> stand with supervision and RW  Pt ambulated 50 ft x2 with supervision and RW, required x1 sitting rest break  Pt presents with overall slowed, antalgic gait with narrow EDDIE  Pt additionally presents with decreased knee extension requiring frequent cues for proper heel strike  Pt navigated x6 stairs with SPC on the right, Bob and verbal cues for safety  Pt remained seated in bedside chair with all needs within reach, reporting new onset of dizziness  BP assessed, stable, RN made aware  PT to recommend an additional session of therapy this PM for improved ambulation endurance and improved balance with stair negotiation  Pt is safe to Dpending x1 additional therapy session this PM  /C home with OPPT services   Barriers to Discharge: Inaccessible home environment (4 TEMI, 15 steps to bed/bath)     Recommendation: Outpatient PT     PT - OK to Discharge: No (improved ambulation endurance, additional stair trial)    See flowsheet documentation for full assessment         Comments: Iman Quesada, PT, DPT

## 2019-01-22 NOTE — PLAN OF CARE
Problem: DISCHARGE PLANNING - CARE MANAGEMENT  Goal: Discharge to post-acute care or home with appropriate resources  INTERVENTIONS:  - Conduct assessment to determine patient/family and health care team treatment goals, and need for post-acute services based on payer coverage, community resources, and patient preferences, and barriers to discharge  - Address psychosocial, clinical, and financial barriers to discharge as identified in assessment in conjunction with the patient/family and health care team  - Arrange appropriate level of post-acute services according to patients   needs and preference and payer coverage in collaboration with the physician and health care team  - Communicate with and update the patient/family, physician, and health care team regarding progress on the discharge plan  - Arrange appropriate transportation to post-acute venues  Outcome: Completed Date Met: 01/22/19  Patient to be discharged with appropriate services when medically cleared by MD MERCADO to follow as needed

## 2019-01-22 NOTE — PROGRESS NOTES
Patients blood pressure manually was 99/59 right arm and 100/60 in the left arm per hypotension protocol looked as though order has been completed so I called and spoke to Monroe Jose with ortho and per him I am to give a 500 bolus of LR now and recheck vitals  Also patient complaining of pain 10/10 and is due for IV breakthrough pain medication, per Monroe Jose do not give at this time with pressures low  Will call back for further direction will continue to monitor

## 2019-01-22 NOTE — PLAN OF CARE
Problem: PHYSICAL THERAPY ADULT  Goal: Performs mobility at highest level of function for planned discharge setting  See evaluation for individualized goals  Treatment/Interventions: Functional transfer training, LE strengthening/ROM, Therapeutic exercise, Endurance training, Bed mobility, Gait training, Spoke to nursing, Family  Equipment Recommended:  (Pt has RW)       See flowsheet documentation for full assessment, interventions and recommendations  Outcome: Progressing  Prognosis: Good  Problem List: Decreased strength, Decreased range of motion, Decreased endurance, Impaired balance, Decreased mobility, Pain, Orthopedic restrictions, Decreased safety awareness  Assessment: Pt demonstrated improved functional mobiilty this session, performing all transfers with decreased assist and ambulating slightly farther distances without assist or complaints of excessively increased pain  Pt required increased instructions for maintaining safe pace during ambulation and on steps to improve safety awareness  Pt verbalized & demonstrated understanding with good carryover, but will benefit from continued education in future sessions  Pt has demonstrated sufficient progress to ensure safe return home with family support and outpatient PT to further progress mobility, strength, balance, and activity tolerance  Barriers to Discharge: None     Recommendation: Home with family support, Outpatient PT     PT - OK to Discharge: Yes    See flowsheet documentation for full assessment

## 2019-01-22 NOTE — PHYSICAL THERAPY NOTE
Physical Therapy Progress Note     01/22/19 1721   Pain Assessment   Pain Assessment 0-10   Pain Score 8   Pain Location Knee   Pain Orientation Left   Patient's Stated Pain Goal No pain   Hospital Pain Intervention(s) Medication (See MAR); Repositioned;Cold applied; Ambulation/increased activity; Elevated; Rest   Restrictions/Precautions   RLE Weight Bearing Per Order WBAT   Other Precautions Pain; Fall Risk;Multiple lines;Telemetry;WBS   Subjective   Subjective Pt encountered supine in bed  Initially reported pain 10/10 while nursing administering meds, but reported 8/10 pain at end of session  Also reports "fuzziness" due to medication & slight dizziness with standing, receding quickly  Transfers   Sit to Stand 5  Supervision   Additional items Assist x 1; Impulsive; Increased time required   Stand to Sit 5  Supervision   Additional items Assist x 1; Armrests; Increased time required;Verbal cues   Ambulation/Elevation   Gait pattern Excessively slow; Step to;Short stride; Improper Weight shift; Antalgic;Decreased foot clearance;Decreased R stance   Gait Assistance 4  Minimal assist   Additional items Assist x 1   Assistive Device Rolling walker   Distance 110', 70'   Stair Management Assistance 5  Supervision   Additional items Assist x 1   Stair Management Technique One rail L;Step to pattern; Foreward; With cane   Number of Stairs 5  (trainers)   Balance   Static Sitting Good   Static Standing Fair +   Ambulatory Fair   Endurance Deficit   Endurance Deficit Yes   Endurance Deficit Description pain, fatigue   Activity Tolerance   Activity Tolerance Patient tolerated treatment well;Patient limited by pain   Assessment   Prognosis Good   Problem List Decreased strength;Decreased range of motion;Decreased endurance; Impaired balance;Decreased mobility;Pain;Orthopedic restrictions;Decreased safety awareness   Assessment Pt demonstrated improved functional mobiilty this session, performing all transfers with decreased assist and ambulating slightly farther distances without assist or complaints of excessively increased pain  Pt required increased instructions for maintaining safe pace during ambulation and on steps to improve safety awareness  Pt verbalized & demonstrated understanding with good carryover, but will benefit from continued education in future sessions  Pt has demonstrated sufficient progress to ensure safe return home with family support and outpatient PT to further progress mobility, strength, balance, and activity tolerance  Barriers to Discharge None   Goals   Patient Goals to go home tomorrow   STG Expiration Date 01/31/19   Short Term Goal #1 as per PT assessment; In 10 sessions pt will: 1  Transfer supine <> sit independently  2  Transfer sit <> stand with modified independence and LRAD  3  Ambulate >40 ft with modified independence and LRAD  4  Perform LE ther-ex program  5  Navigate x15 stairs with supervision and LRAD  Treatment Day 2   Plan   Treatment/Interventions Functional transfer training;LE strengthening/ROM; Elevations; Therapeutic exercise; Endurance training;Patient/family training;Equipment eval/education; Bed mobility;Gait training   Progress Progressing toward goals   PT Frequency 7x/wk; Twice a day   Recommendation   Recommendation Home with family support; Outpatient PT   Equipment Recommended Walker;Cane  (pt sized for & given SPC during session, has RW at home)   PT - OK to Discharge Yes     Katalina Menezes, PTA

## 2019-01-22 NOTE — PHYSICAL THERAPY NOTE
PHYSICAL THERAPY NOTE          Patient Name: Luis Murillo  SUQAD'R Date: 1/22/2019 01/22/19 1149   Pain Assessment   Pain Assessment 0-10   Pain Score Worst Possible Pain   Pain Type Surgical pain   Pain Location Knee   Pain Orientation Right   Patient's Stated Pain Goal No pain   Hospital Pain Intervention(s) Repositioned; Ambulation/increased activity   Response to Interventions Tolerated session   Restrictions/Precautions   Weight Bearing Precautions Per Order Yes   RLE Weight Bearing Per Order WBAT   Other Precautions Telemetry;Multiple lines; Fall Risk;Pain   General   Chart Reviewed Yes   Response to Previous Treatment Patient with no complaints from previous session  Family/Caregiver Present Yes   Cognition   Overall Cognitive Status WFL   Arousal/Participation Alert   Attention Within functional limits   Orientation Level Oriented X4   Memory Within functional limits   Subjective   Subjective "I am ready to go home"   Bed Mobility   Supine to Sit 5  Supervision   Additional items Bedrails   Transfers   Sit to Stand 5  Supervision   Additional items Verbal cues; Assist x 1   Stand to Sit 5  Supervision   Additional items Verbal cues; Assist x 1   Ambulation/Elevation   Gait pattern R Knee Lexx; Improper Weight shift;Decreased R stance;Narrow EDDIE; Antalgic; Forward Flexion; Short stride; Step to;Excessively slow   Gait Assistance 5  Supervision   Additional items Assist x 1   Assistive Device Rolling walker   Distance 50 ft x2  (x1 standing rest break)   Stair Management Assistance 4  Minimal assist   Additional items Assist x 1;Verbal cues; Tactile cues   Stair Management Technique With cane; One rail L  ([facility] on the right)   Number of Stairs 6   Balance   Static Sitting Good   Dynamic Sitting Fair +   Static Standing Fair +   Dynamic Standing Fair   Ambulatory Fair   Endurance Deficit   Endurance Deficit Yes   Endurance Deficit Description Pain   Activity Tolerance   Activity Tolerance Patient limited by pain Nurse Made Aware Yes   Assessment   Prognosis Good   Problem List Decreased strength;Decreased range of motion;Decreased endurance; Impaired balance;Decreased mobility;Pain;Orthopedic restrictions   Assessment Pt seen for physical therapy session focused on gait training and therapeutic activity  Pt is supine at start of session and agreeable to therapy  Pt transferred supine to sit with supervision and use bed rails  Pt transferred sit <> stand with supervision and RW  Pt ambulated 50 ft x2 with supervision and RW, required x1 sitting rest break  Pt presents with overall slowed, antalgic gait with narrow EDDIE  Pt additionally presents with decreased knee extension requiring frequent cues for proper heel strike  Pt navigated x6 stairs with SPC on the right, Bob and verbal cues for safety  Pt remained seated in bedside chair with all needs within reach, reporting new onset of dizziness  BP assessed, stable, RN made aware  PT to recommend an additional session of therapy this PM for improved ambulation endurance and improved balance with stair negotiation  Pt is safe to [address] x1 additional therapy session this PM  /C home with OPPT services    Goals   Patient Goals To go home   Treatment Day 1   Plan   Treatment/Interventions Functional transfer training;LE strengthening/ROM; Therapeutic exercise; Endurance training;Bed mobility;Gait training;Spoke to nursing;OT   Progress Progressing toward goals   PT Frequency 7x/wk; Twice a day   Recommendation   Recommendation Outpatient PT   Equipment Recommended Cane   PT - OK to Discharge No  (improved ambulation endurance, additional stair trial)     Jessica Carlson, PT, DPT

## 2019-01-22 NOTE — PROGRESS NOTES
Subjective: No acute events overnight  No acute distress  Pain well controlled  Expressed a desire to go home today if possible  Right lower extremity  Dressing c/d/i  Motor & sensation grossly intact  Limb warm and well perfused     Assessment: Post op from right total knee arthroplasty, POD1    Plan:  WBAT RLE  Pain control  DVT ppx  PT  Patient noted to have acute blood loss anemia postoperatively greater than 2 0 mg/dL from baseline  Patient will be resuscitated with IV fluids as needed    Dispo: Orthopaedically stable for DC when cleared by PT    Objective:  Lab Results   Component Value Date/Time    WBC 9 43 01/22/2019 05:47 AM    HGB 11 5 (L) 01/22/2019 05:47 AM       Vitals:    01/22/19 0355   BP:    Pulse: 85   Resp:    Temp:    SpO2: 95%

## 2019-01-23 VITALS
DIASTOLIC BLOOD PRESSURE: 82 MMHG | HEART RATE: 97 BPM | RESPIRATION RATE: 18 BRPM | WEIGHT: 218 LBS | HEIGHT: 72 IN | TEMPERATURE: 98.4 F | BODY MASS INDEX: 29.53 KG/M2 | OXYGEN SATURATION: 92 % | SYSTOLIC BLOOD PRESSURE: 140 MMHG

## 2019-01-23 PROBLEM — Z96.651 STATUS POST RIGHT KNEE REPLACEMENT: Status: ACTIVE | Noted: 2019-01-23

## 2019-01-23 LAB
ANION GAP SERPL CALCULATED.3IONS-SCNC: 8 MMOL/L (ref 4–13)
BUN SERPL-MCNC: 11 MG/DL (ref 5–25)
CALCIUM SERPL-MCNC: 7.8 MG/DL (ref 8.3–10.1)
CHLORIDE SERPL-SCNC: 104 MMOL/L (ref 100–108)
CO2 SERPL-SCNC: 25 MMOL/L (ref 21–32)
CREAT SERPL-MCNC: 0.76 MG/DL (ref 0.6–1.3)
GFR SERPL CREATININE-BSD FRML MDRD: 103 ML/MIN/1.73SQ M
GLUCOSE SERPL-MCNC: 102 MG/DL (ref 65–140)
POTASSIUM SERPL-SCNC: 3.6 MMOL/L (ref 3.5–5.3)
SODIUM SERPL-SCNC: 137 MMOL/L (ref 136–145)

## 2019-01-23 PROCEDURE — 80048 BASIC METABOLIC PNL TOTAL CA: CPT | Performed by: PHYSICIAN ASSISTANT

## 2019-01-23 PROCEDURE — 97110 THERAPEUTIC EXERCISES: CPT

## 2019-01-23 PROCEDURE — 97116 GAIT TRAINING THERAPY: CPT

## 2019-01-23 RX ORDER — ACETAMINOPHEN 325 MG/1
TABLET ORAL
Qty: 30 TABLET | Refills: 0 | Status: SHIPPED | OUTPATIENT
Start: 2019-01-23 | End: 2019-03-15

## 2019-01-23 RX ORDER — DOCUSATE SODIUM 100 MG/1
100 CAPSULE, LIQUID FILLED ORAL 2 TIMES DAILY
Qty: 10 CAPSULE | Refills: 0 | Status: SHIPPED | OUTPATIENT
Start: 2019-01-23 | End: 2019-02-15

## 2019-01-23 RX ADMIN — ENOXAPARIN SODIUM 40 MG: 40 INJECTION SUBCUTANEOUS at 08:21

## 2019-01-23 RX ADMIN — SODIUM CHLORIDE, SODIUM LACTATE, POTASSIUM CHLORIDE, AND CALCIUM CHLORIDE 125 ML/HR: .6; .31; .03; .02 INJECTION, SOLUTION INTRAVENOUS at 04:53

## 2019-01-23 RX ADMIN — OXYCODONE HYDROCHLORIDE AND ACETAMINOPHEN 500 MG: 500 TABLET ORAL at 08:21

## 2019-01-23 RX ADMIN — FOLIC ACID 1 MG: 1 TABLET ORAL at 08:21

## 2019-01-23 RX ADMIN — FERROUS SULFATE TAB 325 MG (65 MG ELEMENTAL FE) 325 MG: 325 (65 FE) TAB at 08:21

## 2019-01-23 RX ADMIN — TRAMADOL HYDROCHLORIDE 50 MG: 50 TABLET, COATED ORAL at 05:45

## 2019-01-23 RX ADMIN — OXYCODONE HYDROCHLORIDE 5 MG: 5 TABLET ORAL at 05:50

## 2019-01-23 RX ADMIN — DOCUSATE SODIUM 100 MG: 100 CAPSULE, LIQUID FILLED ORAL at 08:21

## 2019-01-23 RX ADMIN — TRAMADOL HYDROCHLORIDE 50 MG: 50 TABLET, COATED ORAL at 11:19

## 2019-01-23 NOTE — PROGRESS NOTES
Progress Note  Radha Quale 52 y o  male MRN: [id number]  Unit/Bed#: CW3 346-01          ASSESSMENT: 53M pod2 R TKA      PLAN:   -WBAT RLE  -DVTppx  -Pain Mgmt  -PT/OT  -Dispo: Ok for discharge from ortho perspective  -Patient noted to have acute blood loss anemia due to a drop in Hbg of > 2 0g from preop levels, will monitor vital signs and resuscitate with IV fluids as needed      PE:   RLE:  Dressings C/D/I, SILT s/sp/dp/s, toes x5 warm and pink, +FHL/EHL, +AnkleP-Dflexion       SUBJECTIVE: No acute events, no complaints    Data:     _*_*_*_*_*_*_*_*_*_*_*_*_*_*_*_*_*_*_*_*_*_*_*_*_*_*_*_*_*_*_*_*_*_*_*_*_*_*_*_*_*    Vitals:    01/23/19 0309   BP: 139/81   Pulse: 101   Resp: 18   Temp: 99 °F (37 2 °C)   SpO2: 92%         0  Lab Value Date/Time   HCT 34 8 (L) 01/22/2019 0547   HGB 11 5 (L) 01/22/2019 0547   INR 0 90 12/27/2018 0948   WBC 9 43 01/22/2019 0547   CRP 10 5 (H) 12/27/2018 0948         Current Facility-Administered Medications:  acetaminophen 650 mg Oral Q4H PRN Joe Bruce, PA-C    ascorbic acid 500 mg Oral Daily Joe Bruce, PA-C    calcium carbonate 1,000 mg Oral Daily PRN Joe Bruce, PA-C    docusate sodium 100 mg Oral BID Joe Bruce PA-C    enoxaparin 40 mg Subcutaneous Q24H [address] ARMEN Barrett-C    ferrous sulfate 325 mg Oral Daily With Breakfast Joe Bruce, PA-C    folic acid 1 mg Oral Daily Joe Bruce, PA-C    lactated ringers 125 mL/hr Intravenous Continuous ARMEN Barrett-C Last Rate: 125 mL/hr (01/23/19 0453)   morphine injection 2 mg Intravenous Q3H PRN Rosa Signs, MAURI    oxyCODONE 10 mg Oral Q4H PRN Joe Bruce PA-C    oxyCODONE 5 mg Oral Q4H PRN Joe Bruce PA-C    traMADol 50 mg Oral Q6H [address] MAURI Barrett MD

## 2019-01-23 NOTE — PROGRESS NOTES
Subjective: No acute events overnight  No acute distress  Pain well controlled    Right lower extremity  Dressing changed, incision c/d/i  Motor & sensation grossly intact  Limb warm and well-perfused    Assessment: Post op from right total knee arthroplasty, pod 2    Plan:  Weightbearing as tolerated right lower extremity  Follow-up a m  labs  Pain control  DVT ppx  PT  Patient noted to have acute blood loss anemia postoperatively greater than 2 0 mg/dL from baseline  Patient will be resuscitated with IV fluids as needed    Dispo:  Orthopedically stable for discharge once cleared by Physical therapy    Objective:  Lab Results   Component Value Date/Time    WBC 9 43 01/22/2019 05:47 AM    HGB 11 5 (L) 01/22/2019 05:47 AM       Vitals:    01/23/19 0309   BP: 139/81   Pulse: 101   Resp: 18   Temp: 99 °F (37 2 °C)   SpO2: 92%

## 2019-01-23 NOTE — PROGRESS NOTES
Patient lying in bed states he is always in pain and right now it is a 10/10  Patient given ordered scheduled dose of tramadol and tylenol for temp of 100 0  Ice packs applied to right knee  Ace wrap dry and intact, b/l foot pumps on , LR infusing at 125mL/hr  Patient satisfied with pain medicine given at this time  States he does not want to take the oxycodone anymore, he believes it makes him feel "fuzzy" in the head  Will continue to monitor closely

## 2019-01-23 NOTE — DISCHARGE SUMMARY
ORTHOPEDICS DISCHARGE SUMMARY  Sinan Tucker 52 y o  male MRN: [id number]  Unit/Bed#: CW3 346-01    Attending Physician: Gonzalo Neville    Admitting diagnosis: Primary osteoarthritis of right knee [M17 11]  Primary osteoarthritis of left knee [M17 12]    Discharge diagnosis: Primary osteoarthritis of right knee [M17 11]  Primary osteoarthritis of left knee [M17 12]    Date of admission: 1/21/2019    Date of discharge: 01/23/19         Procedure: R TKA    HPI:  This is a 52y o  year old male that presented to the office with signs and symptoms of right knee osteoarthritis  They tried and failed conservative treatment measures and wished to proceed with surgical intervention  The risks, benefits, and complications of the procedure were discussed with the patient and informed consent was obtained  Hospital Course: The patient was admitted to the hospital on 1/21/2019 and underwent an uncomplicated right total knee arthroplasty  They were transferred to the floor after a brief stay in the post-anesthesia care unit  Their pain was well managed with IV and oral pain medications  They began therapy on post operative day #1  Lovenox was also started for DVT prophylaxis post operative day #1  Hemovac drain was removed on POD1  His post op course was uneventful  On discharge date pt was cleared by PT and the medicine team and determined to be safe for discharge  Daily discussion was had with the patient, nursing staff, orthopaedic team, and family members if present  All questions were answered to the patients satisifaction  0  Lab Value Date/Time   HGB 11 5 (L) 01/22/2019 0547   HGB 15 7 12/27/2018 0948   HGB 16 3 10/15/2018 0828       Greater than 2 gram drop which qualifies for diagnosis of acute blood loss anemia  Vital signs remained stable and pt was resuscitated with IVF as needed     Discharge Instructions: The patient was discharged weight bearing as tolerated to the right lower extremity   Lovenox will be continued for 28 days  Continue PT/OT  Take pain medications as instructed  Discharge Medications: For the complete list of discharge medications, please refer to the patient's medication reconciliation

## 2019-01-23 NOTE — PHYSICAL THERAPY NOTE
Physical Therapy Tx Session:     01/23/19 9282   Pain Assessment   Pain Assessment 0-10   Pain Score 8   Pain Type Acute pain;Surgical pain   Pain Location Leg;Knee   Pain Orientation Right   Hospital Pain Intervention(s) Cold applied;Repositioned; Ambulation/increased activity; Elevated; Emotional support; Rest   Restrictions/Precautions   Weight Bearing Precautions Per Order Yes   RLE Weight Bearing Per Order WBAT   Other Precautions Pain;Telemetry;Multiple lines;WBS; Impulsive   General   Chart Reviewed Yes   Family/Caregiver Present No   Cognition   Overall Cognitive Status WFL   Arousal/Participation Cooperative;Arousable;Responsive; Alert   Attention Attends with cues to redirect   Orientation Level Oriented X4   Following Commands Follows one step commands with increased time or repetition  (2* impulsive at times,inc pain)   Subjective   Subjective pt supine in bed resting comfortably;pt willing and agreeable to work with PT and to participate in therapy intervention;"I will try, I'm really tired"   Bed Mobility   Supine to Sit 5  Supervision   Additional items Assist x 1;Bedrails;Verbal cues; Impulsive   Transfers   Sit to Stand 5  Supervision   Additional items Assist x 1; Impulsive;Verbal cues; Bedrails   Stand to Sit 4  Minimal assistance   Additional items Assist x 1; Armrests; Bedrails; Impulsive;Verbal cues  (for education,safety)   Ambulation/Elevation   Gait pattern Improper Weight shift; Antalgic;Narrow EDDIE; Forward Flexion;Decreased R stance; Inconsistent neva; Foward flexed; Short stride; Ataxia; Step to;Excessively slow  (dec WB RLE,inc R knee flexion during stance phase )   Gait Assistance 5  Supervision   Additional items Assist x 1;Verbal cues; Tactile cues   Assistive Device Rolling walker   Distance 100 feet with use of RW on tile and hardwood elana;verbal instruction for gait sequence and use of RW;inc R knee flexion during stance phase of gait and dec heel toe movement of RLE    Stair Management Assistance 3  Moderate assist   Additional items Assist x 1;Verbal cues  (3 steps use of SPC and HHA modAx1 for TEMI)   Stair Management Technique One rail L;Step to pattern; Foreward;Nonreciprocal  (15 steps with use of rail minAx1)   Number of Stairs (15 steps with use of L rail and 3 steps use of SPC and HHA)   Balance   Static Sitting Good  (at EOB)   Dynamic Sitting Poor +  (stand to sit for education and safety minAx1)   Static Standing Fair   Dynamic Standing Fair   Ambulatory (poor for stair training and fair for gait)   Endurance Deficit   Endurance Deficit Yes   Endurance Deficit Description inc pain,recent surgical procedure and fatigue   Activity Tolerance   Activity Tolerance Patient limited by fatigue;Patient limited by pain  (good)   Nurse Made Aware yes   Exercises   Quad Sets Supine;15 reps;AROM; Right  (hold for 3 seconds each rep)   Heelslides Supine;15 reps;AAROM; Right   Glute Sets Supine;15 reps;AROM; Bilateral  (hold for 3 seconds each rep)   Hip Flexion Supine;15 reps;AAROM; Right   Hip Abduction Supine;15 reps;AAROM; Right   Hip Adduction Supine;15 reps;AAROM; Right   Knee AROM Short Arc Quad Supine;15 reps;AAROM; Right   Ankle Pumps Supine;20 reps;Bilateral;AROM   Equipment Use   Comments verbal and physical instruction given to pt for stair training with Ax1 from family for TEMI   Assessment   Prognosis Good   Problem List Decreased strength;Decreased range of motion;Decreased endurance; Impaired balance;Decreased mobility; Decreased safety awareness;Decreased skin integrity;Orthopedic restrictions;Pain  (WBAT RLE)   Assessment Pt able to ambulate 100 feet with use of RW on tile and hardwood elana needing S level of A  During upright mobility,inc R knee flexion during stance phase of gait,needing minAx1 for verbal instruction for gait sequence and use of RW,dec heel toe movement of RLE during gait  Pt able to perform sit to stand transfers minAx1 and BM S level of A   Pt able to perform and complete BLE ther ex HEP supine in bed AAROM->AROM  pt fatigues quickly and reports "being tired"  pt able to perform 3 steps with use of SPC and HHA modAx1 and 15 steps with use of L rail minAx1  Verbal and physical instruction given to pt concerning A for TEMI from family and use of SPC, pt agreed and nods head "yes" in agreement  pt would cont to benefit from skilled inpt PT services to maximize functional independence  Barriers to Discharge Inaccessible home environment  (TEMI and 2 SH)   Goals   Patient Goals to go home and to get some rest   STG Expiration Date 01/31/19   Treatment Day 3   Plan   Treatment/Interventions Functional transfer training;LE strengthening/ROM; Elevations; Therapeutic exercise; Endurance training;Patient/family training;Equipment eval/education; Bed mobility;Gait training;Spoke to nursing   Progress Progressing toward goals   PT Frequency Twice a day;7x/wk   Recommendation   Recommendation Outpatient PT; Home with family support   Equipment Recommended Walker  (cont use of RW for mobility)

## 2019-01-24 ENCOUNTER — OFFICE VISIT (OUTPATIENT)
Dept: PHYSICAL THERAPY | Facility: CLINIC | Age: 54
End: 2019-01-24
Payer: COMMERCIAL

## 2019-01-24 ENCOUNTER — TRANSITIONAL CARE MANAGEMENT (OUTPATIENT)
Dept: FAMILY MEDICINE CLINIC | Facility: CLINIC | Age: 54
End: 2019-01-24

## 2019-01-24 ENCOUNTER — TELEPHONE (OUTPATIENT)
Dept: EMERGENCY DEPT | Facility: HOSPITAL | Age: 54
End: 2019-01-24

## 2019-01-24 ENCOUNTER — TELEPHONE (OUTPATIENT)
Dept: OBGYN CLINIC | Facility: HOSPITAL | Age: 54
End: 2019-01-24

## 2019-01-24 DIAGNOSIS — Z96.651 STATUS POST TOTAL RIGHT KNEE REPLACEMENT: Primary | ICD-10-CM

## 2019-01-24 DIAGNOSIS — M17.11 PRIMARY OSTEOARTHRITIS OF RIGHT KNEE: ICD-10-CM

## 2019-01-24 PROCEDURE — 97110 THERAPEUTIC EXERCISES: CPT

## 2019-01-24 PROCEDURE — 97164 PT RE-EVAL EST PLAN CARE: CPT

## 2019-01-24 PROCEDURE — 97140 MANUAL THERAPY 1/> REGIONS: CPT

## 2019-01-24 NOTE — PROGRESS NOTES
PT Re-Evaluation     Today's date: 2019  Patient name: Sujatha Fritz  : 1965  MRN: [id number]  Referring provider: Lizzie Horton MD  Dx:   Encounter Diagnosis     ICD-10-CM    1  Status post total right knee replacement Z96 651    2  Primary osteoarthritis of right knee M17 11        Start Time: 1630  Stop Time: 1715  Total time in clinic (min): 45 minutes    Assessment  Assessment details: Patient is a 51 yo male s/p R TKA on 19 2* knee OA  Patient presents with decreased R knee flexion and extension ROM, decreased strength, gait deviations, swelling and nausea symptoms  Patient amb with a RW with the following gait deviations: R knee flexion, decreased heel strike and decreased step length  Patient requires assistance for all ADLs and ascends/descends the stairs with a SPC  Patient is hypersensitive to movement of the knee  PT will address the noted impairments by performing strengthening, stretching, functional activities, balance, gait and stair training to allow the patient to return to his PLOF  PT recommended 3x/week for 6-8 weeks c a guarded prognosis 2* ROM post op  Patient presents with a low grade fever of 99 7 degrees and PT educated the patient to call the surgeon if the fever increases  Impairments: abnormal coordination, abnormal gait, abnormal or restricted ROM, abnormal movement, activity intolerance, impaired balance, impaired physical strength, lacks appropriate home exercise program, pain with function, safety issue, weight-bearing intolerance and poor posture     Symptom irritability: highUnderstanding of Dx/Px/POC: good   Prognosis: fair    Goals  ST  Patient will report 25% decrease in pain in 4 weeks  2  Patient will demonstrate 25% improvement in ROM in 4 weeks  3  Patient will demonstrate 1/2 grade improvement in strength in 4 weeks  LT  Patient will be able to perform IADLS without restriction or pain by discharge    2  Patient will be independent in HEP by discharge  3  Patient will be able to return to recreational/work duties without restriction or pain by discharge  Plan  Patient would benefit from: PT eval and skilled PT  Referral necessary: No  Planned modality interventions: cryotherapy and thermotherapy: hydrocollator packs  Planned therapy interventions: IADL retraining, body mechanics training, flexibility, functional ROM exercises, home exercise program, neuromuscular re-education, manual therapy, postural training, strengthening, stretching, therapeutic activities, therapeutic exercise and joint mobilization  Frequency: 2x week  Duration in visits: 16  Duration in weeks: 8  Plan of Care beginning date: 2019  Plan of Care expiration date: 3/21/2019  Treatment plan discussed with: patient, family and PTA        Subjective Evaluation    History of Present Illness  Date of surgery: 2019  Mechanism of injury: surgery  Mechanism of injury: Patient is s/p R TKA on 19 2* knee OA and pain levels  Patient notes no complications from surgery  Patient noted moderate nausea from the pain medications  Patient also noted he required assistance for all ADLs 2* motions and pain levels  Patient currently amb with a RW with moderate R knee flexion  Patient wants to return to work in March      Not a recurrent problem   Quality of life: good    Pain  Current pain ratin (Last pain medication: 3pm)  At best pain ratin  At worst pain rating: 10  Location: anterior R knee  Quality: dull ache (no numbness or tingling in the R LE)  Relieving factors: ice, rest and medications  Aggravating factors: standing, walking, stair climbing, sitting and lifting  Progression: no change    Social Support  Steps to enter house: yes (4 TEMI)  Stairs in house: yes (full flight)   Lives in: multiple-level home  Lives with: spouse    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[employer])  Hand dominance: right    Treatments  Discharged from (in last 30 days): inpatient hospitalization  Patient Goals  Patient goals for therapy: decreased pain, decreased edema, improved balance, increased motion, increased strength, independence with ADLs/IADLs, return to sport/leisure activities and return to work  Patient goal: "get back to work "        Objective     Observations     Right Knee   Positive for incision  Additional Observation Details  Patient's incision has minimal redness on anterior medial aspect  Tenderness     Additional Tenderness Details  Patient presents without tenderness to the R calf       Neurological Testing     Sensation     Knee   Left Knee   Intact: light touch    Right Knee   Intact: light touch     Active Range of Motion   Left Knee   Flexion: 123 degrees   Extension: WFL    Right Knee   Flexion: 65 degrees with pain  Extension: 15 (plus) degrees with pain    Passive Range of Motion   Left Knee   Flexion: 131 degrees     Right Knee   Flexion: 79 degrees with pain  Extension: 15 degrees with pain    Strength/Myotome Testing     Left Knee   Flexion: 4+  Extension: 4+  Quadriceps contraction: good    Right Knee   Flexion: 2-  Extension: 1  Quadriceps contraction: poor    Swelling     Left Knee Girth Measurement (cm)   Joint line: 39 cm  10 cm above joint line: 39 cm  10 cm below joint line: 35 5 cm    Right Knee Girth Measurement (cm)   Joint line: 44 cm  10 cm above joint line: 44 5 cm  10 cm below joint line: 38 5 cm  Left Ankle/Foot   Figure 8: 58 cm    Right Ankle/Foot   Figure 8: 62 cm    General Comments     Knee Comments  Temp: 99 7 degrees        Flowsheet Rows      Most Recent Value   PT/OT G-Codes   Current Score  18   Projected Score  60   FOTO information reviewed  Yes   Assessment Type  Re-evaluation   G code set  Mobility: Walking & Moving Around          Precautions:n/a    Daily Treatment Diary     Manual  1/24            R knee PROM MW            Patellar mobilizations MW                                                       Exercise Diary 1/24            Bike nv            Gastroc strech n            HS stretch nv            HR nv            Sit to stand nv            Standing march nv            Lateral stepping nv            Step ups nv            Step downs nv            Heel slides (supine and seated) x20 ea            Quad set X20, 5" hold            glute set X20, 5" hold                                                                                                                        Modalities  1/24            CP prn np

## 2019-01-24 NOTE — TELEPHONE ENCOUNTER
Call placed for post-op follow up, spoke with pt  Pt reports he is currently on his way to his outpatient PT appt  Pt rating his pain 5/10, reports he is alternating either tramadol or oxycodone taking one tablet of one medication every 6 hours  Pt confirms he is self administering his daily lovenox without issues  Pt denies bleeding from any source  Pt confirms he is ambulating with his walker and denies any falls  Pt denies any incisional redness/draiange/bleeding  LBM 1/23, pt reports he is taking one docusate caplet daily  Pt denies any concerning signs and symptoms to him  He denies any nausea/vomiting/CP/SOB/dizziness/calf pain/fevers  Pt denies having any questions, concerns, or issues at this time  Pt encouraged to call me with any questions, concerns or issues

## 2019-01-25 ENCOUNTER — TELEPHONE (OUTPATIENT)
Dept: PAIN MEDICINE | Facility: MEDICAL CENTER | Age: 54
End: 2019-01-25

## 2019-01-25 ENCOUNTER — OFFICE VISIT (OUTPATIENT)
Dept: PHYSICAL THERAPY | Facility: CLINIC | Age: 54
End: 2019-01-25
Payer: COMMERCIAL

## 2019-01-25 ENCOUNTER — OFFICE VISIT (OUTPATIENT)
Dept: OBGYN CLINIC | Facility: MEDICAL CENTER | Age: 54
End: 2019-01-25

## 2019-01-25 VITALS
BODY MASS INDEX: 29.53 KG/M2 | SYSTOLIC BLOOD PRESSURE: 131 MMHG | WEIGHT: 218 LBS | DIASTOLIC BLOOD PRESSURE: 86 MMHG | HEART RATE: 112 BPM | HEIGHT: 72 IN | TEMPERATURE: 99.9 F

## 2019-01-25 DIAGNOSIS — Z96.651 STATUS POST TOTAL RIGHT KNEE REPLACEMENT: ICD-10-CM

## 2019-01-25 DIAGNOSIS — M17.12 PRIMARY OSTEOARTHRITIS OF LEFT KNEE: ICD-10-CM

## 2019-01-25 DIAGNOSIS — Z96.651 STATUS POST RIGHT KNEE REPLACEMENT: Primary | ICD-10-CM

## 2019-01-25 DIAGNOSIS — M17.11 PRIMARY OSTEOARTHRITIS OF RIGHT KNEE: Primary | ICD-10-CM

## 2019-01-25 PROCEDURE — 97110 THERAPEUTIC EXERCISES: CPT

## 2019-01-25 PROCEDURE — 97140 MANUAL THERAPY 1/> REGIONS: CPT

## 2019-01-25 PROCEDURE — 97112 NEUROMUSCULAR REEDUCATION: CPT

## 2019-01-25 PROCEDURE — 99024 POSTOP FOLLOW-UP VISIT: CPT | Performed by: ORTHOPAEDIC SURGERY

## 2019-01-25 NOTE — PROGRESS NOTES
Assessment:  1  Status post right knee replacement  Ambulatory Referral to Home Health    CANCELED: Ambulatory Referral to [address] [place] [address] St:  4 days s/p right total knee arthroplasty  The amount of swelling he has is an appropriate amount and may also have a hemarthrosis contributing to some of his swelling as well  No gross signs of infection or DVT  Continue with the lovenox, can discontinue the iron  Attend formal therapy today with the plan to get home therapy next week  Script provided for neuromuscular stimulator to improve quadriceps activation within the home therapy request         To do next visit:  Return for re-check as scheduled 1/29/19, with post-op x-rays   The above stated was discussed in layman's terms and the patient expressed understanding  All questions were answered to the patient's satisfaction  Scribe Attestation    I,:   Bud Mccurdy am acting as a scribe while in the presence of the attending physician :        I,:   Virgilio Mercedes MD personally performed the services described in this documentation    as scribed in my presence :              Subjective:   Jazmín Arevalo is a 52 y o  male who presents 4 days status post right total knee arthroplasty and presents today with concerns of possible drainage from his incision  Presents using a rolling walker for ambulatory assistance  He attended physical therapy yesterday knee outpatient fashion  He has been administering his Lovenox injections as instructed  He has been alternating tramadol and oxycodone for pain relief purposes  His family also mentioned low grade fevers, today he had a 99 9  Denies a productive cough         Review of systems negative unless otherwise specified in HPI    Past Medical History:   Diagnosis Date    Bronchospasm     last assessed 6/14/2016    Depression     Disc degeneration, lumbar     last assessed 4/7/2016    Erectile dysfunction     Hyperlipidemia     IFG (impaired fasting glucose)     last assessed 7/30/2014    Palpitations     last assessed 4/4/2013    Primary osteoarthritis of knees, bilateral     last assessed 6/27/2017    Right inguinal hernia     last assessed 9/12/2017    Spondylosis of lumbar region without myelopathy or radiculopathy     last assessed 4/7/2016       Past Surgical History:   Procedure Laterality Date    KNEE ARTHROSCOPY      bilateral    ID REPAIR ING HERNIA,5+Y/O,REDUCIBL Right 10/26/2017    Procedure: REPAIR HERNIA INGUINAL;  Surgeon: Lauren Branch MD;  [facility]: [facility];  Service: General    ID TOTAL KNEE ARTHROPLASTY Right 1/21/2019    Procedure: ARTHROPLASTY KNEE TOTAL;  Surgeon: Isi Weiss MD;  [facility];  Service: Orthopedics    TOOTH EXTRACTION         Family History   Problem Relation Age of Onset    No Known Problems Mother     Diabetes Father     No Known Problems Sister     Coronary artery disease Brother        Social History     Occupational History    Not on file       Social History Main Topics    Smoking status: Former Smoker    Smokeless tobacco: Never Used      Comment: quit 11/2018    Alcohol use Yes      Comment: weekends     Drug use: No    Sexual activity: Not on file         Current Outpatient Prescriptions:     acetaminophen (TYLENOL) 325 mg tablet, Please take 650 mg Q6hrs PRN for pain, Disp: 30 tablet, Rfl: 0    ascorbic acid (VITAMIN C) 500 mg tablet, Take 1 tablet (500 mg total) by mouth 2 (two) times a day, Disp: 60 tablet, Rfl: 0    docusate sodium (COLACE) 100 mg capsule, Take 1 capsule (100 mg total) by mouth 2 (two) times a day, Disp: 10 capsule, Rfl: 0    enoxaparin (LOVENOX) 40 mg/0 4 mL, , Disp: , Rfl:     ferrous sulfate 324 (65 Fe) mg, Take 1 tablet (324 mg total) by mouth 2 (two) times a day before meals, Disp: 60 tablet, Rfl: 0    folic acid (FOLVITE) 1 mg tablet, Take 1 tablet (1 mg total) by mouth daily, Disp: 30 tablet, Rfl: 0    oxyCODONE (ROXICODONE) 5 mg immediate release tablet, Take 1 tablet (5 mg total) by mouth every 4 (four) hours as needed for moderate pain for up to 30 doses Max Daily Amount: 30 mg, Disp: 30 tablet, Rfl: 0    sildenafil (VIAGRA) 100 mg tablet, Take 1 tablet (100 mg total) by mouth daily as needed for erectile dysfunction, Disp: 10 tablet, Rfl: 1    traMADol (ULTRAM) 50 mg tablet, Take 1 tablet (50 mg total) by mouth every 6 (six) hours as needed for moderate pain for up to 60 doses, Disp: 30 tablet, Rfl: 0    No Known Allergies         Vitals:    01/25/19 1253   BP: 131/86   Pulse: (!) 112   Temp: 99 9 °F (37 7 °C)       Objective:                    Right Knee Exam     Other   Swelling: moderate  Other tests: effusion present    Comments:    Healing anterior incision with staples in place  Small area of erythema medially to the proximal portion of his incision surrounding a small blister/scab which is grayish in color  No discharge or drainage  Calf and thigh are soft and non-tender  Inability to perform a straight leg raise  Very minimal quadriceps activation  Moderate diffuse swelling distally into his foot  Diagnostics, reviewed and taken today if performed as documented:    None performed        Procedures, if performed today:    Procedures    None performed      Portions of the record may have been created with voice recognition software   Occasional wrong word or "sound a like" substitutions may have occurred due to the inherent limitations of voice recognition software   Read the chart carefully and recognize, using context, where substitutions have occurred

## 2019-01-25 NOTE — PROGRESS NOTES
Daily Note     Today's date: 2019  Patient name: Jazmín Arevalo  : 1965  MRN: [id number]  Referring provider: Caitlin Tsai MD  Dx:   Encounter Diagnosis     ICD-10-CM    1  Primary osteoarthritis of right knee M17 11    2  Status post total right knee replacement Z96 651    3  Primary osteoarthritis of left knee M17 12                   Subjective: Patient reports pain levels as "8/10"  He reports that he had an MD appointment today  Objective: See treatment diary below      Assessment: Tolerated treatment fair  Patient would benefit from continued PT  Patient's daughter accompanied patient to session today  Patient's daughter reports that they went to the ortho office today for a recheck of the knee for swelling, redness and drainage  Patient's daughter also reports pushing for home therapy, due to father needing assistance with getting in and out of the house  She noted that patient almost fell twice on the stairs, with no railing noted on the steps to the front door  Upon inspection of knee, patient's incision was red with a gray spot along the proximal line, with yellow pus along staples  PT spoke with patient's daughter about care and home therapy  Patient's daughter is pushing for home therapy at this time and reports that he will resume outpatient therapy after  Patient needed extensive cuing and encouragement throughout the session to perform exercises  He was limited in both flexion and extension, minimal improvement of range with manuals  Patient demonstrates significant knee flexion with ambulation, focused on heel strike  Patient noted pain in his right glute with quad sets  Plan: Continue per plan of care  Progress treatment as tolerated        Manual             R knee PROM MW RO, PTA            Patellar mobilizations MW                                                       Exercise Diary             Bike nv            Gastroc strech n 3x30 sec            HS stretch nv seated: 3x15 sec           HR nv 2x10            Sit to stand nv            Standing march nv            Lateral stepping nv            Step ups nv            Step downs nv            Heel slides (supine and seated) x20 ea 15x supine            Quad set X20, 5" hold 15x            glute set X20, 5" hold 10x            Gait training   Heel- toe with RO                                                                             Patient education   ROM, ambulation, sleeping without prop                            Modalities  1/24            CP prn np

## 2019-01-25 NOTE — TELEPHONE ENCOUNTER
Pt daughter is calling on behalf of pt stating that patient skin around his staples is grey and has puss coming out  She also stated that pt has a low grade fever and his joint is red and warm to touch  Pt had surgery on 1/21 with Dr Jesse Lucia         C/b# [phone]

## 2019-01-25 NOTE — TELEPHONE ENCOUNTER
Appt made for today at 1pm with Dr Porfirio Boone  Pictures sent to Yajaira Herrera for evaluation ahead of appt

## 2019-01-28 ENCOUNTER — OFFICE VISIT (OUTPATIENT)
Dept: PHYSICAL THERAPY | Facility: CLINIC | Age: 54
End: 2019-01-28
Payer: COMMERCIAL

## 2019-01-28 DIAGNOSIS — Z96.651 STATUS POST TOTAL RIGHT KNEE REPLACEMENT: ICD-10-CM

## 2019-01-28 DIAGNOSIS — M17.11 PRIMARY OSTEOARTHRITIS OF RIGHT KNEE: Primary | ICD-10-CM

## 2019-01-28 PROCEDURE — 97112 NEUROMUSCULAR REEDUCATION: CPT | Performed by: PHYSICAL THERAPIST

## 2019-01-28 PROCEDURE — 97140 MANUAL THERAPY 1/> REGIONS: CPT | Performed by: PHYSICAL THERAPIST

## 2019-01-28 PROCEDURE — 97110 THERAPEUTIC EXERCISES: CPT | Performed by: PHYSICAL THERAPIST

## 2019-01-29 ENCOUNTER — HOSPITAL ENCOUNTER (OUTPATIENT)
Dept: RADIOLOGY | Facility: HOSPITAL | Age: 54
Discharge: HOME | End: 2019-01-29
Attending: ORTHOPAEDIC SURGERY
Payer: COMMERCIAL

## 2019-01-29 ENCOUNTER — OFFICE VISIT (OUTPATIENT)
Dept: OBGYN CLINIC | Facility: HOSPITAL | Age: 54
End: 2019-01-29

## 2019-01-29 VITALS
DIASTOLIC BLOOD PRESSURE: 68 MMHG | BODY MASS INDEX: 29.53 KG/M2 | WEIGHT: 218.03 LBS | SYSTOLIC BLOOD PRESSURE: 107 MMHG | HEART RATE: 114 BPM | HEIGHT: 72 IN

## 2019-01-29 DIAGNOSIS — Z96.651 AFTERCARE FOLLOWING RIGHT KNEE JOINT REPLACEMENT SURGERY: Primary | ICD-10-CM

## 2019-01-29 DIAGNOSIS — Z47.1 AFTERCARE FOLLOWING RIGHT KNEE JOINT REPLACEMENT SURGERY: Primary | ICD-10-CM

## 2019-01-29 DIAGNOSIS — Z47.1 AFTERCARE FOLLOWING RIGHT KNEE JOINT REPLACEMENT SURGERY: ICD-10-CM

## 2019-01-29 DIAGNOSIS — Z96.651 AFTERCARE FOLLOWING RIGHT KNEE JOINT REPLACEMENT SURGERY: ICD-10-CM

## 2019-01-29 PROCEDURE — 73560 X-RAY EXAM OF KNEE 1 OR 2: CPT

## 2019-01-29 PROCEDURE — 99024 POSTOP FOLLOW-UP VISIT: CPT | Performed by: ORTHOPAEDIC SURGERY

## 2019-01-29 RX ORDER — NABUMETONE 750 MG/1
750 TABLET, FILM COATED ORAL 2 TIMES DAILY
Qty: 40 TABLET | Refills: 0 | Status: SHIPPED | OUTPATIENT
Start: 2019-01-29 | End: 2019-03-15

## 2019-01-29 NOTE — PROGRESS NOTES
Assessment:  1  Aftercare following right knee joint replacement surgery  XR knee 1 or 2 vw right    nabumetone (RELAFEN) 750 mg tablet     Patient Active Problem List   Diagnosis    Primary osteoarthritis of right knee    Primary osteoarthritis of left knee    Chronic pain of left knee    Chronic pain of right knee    Status post right knee replacement           Plan      Two proximal staples were removed  Hays Anger was instructed on signs and symptoms of infection  A prescription will be called in for Relafen 750 milligrams b i d  with food for added pain control  Continued physical therapy  Follow-up in 1 week for staple removal     Patient was seen, examined and plan formulated by Dr Jarrett Azul:     Patient ID:    Chief Complaint:Ralph Cordero Liner 52 y o  male      HPI    78-year-old male who is here for recheck his right knee  He is 1 week status post right TKA  Presents today with some incisional irritation  He is progressing in physical therapy  He is using tramadol, oxycodone, and Tylenol for pain  The following portions of the patient's history were reviewed and updated as appropriate: allergies, current medications, past family history, past social history, past surgical history and problem list     All organ systems normal    Social History     Social History    Marital status: /Civil Union     Spouse name: N/A    Number of children: N/A    Years of education: N/A     Occupational History    Not on file       Social History Main Topics    Smoking status: Former Smoker    Smokeless tobacco: Never Used      Comment: quit 11/2018    Alcohol use Yes      Comment: weekends     Drug use: No    Sexual activity: Not on file     Other Topics Concern    Not on file     Social History Narrative    Uses safety equipment - seatbelts         Past Medical History:   Diagnosis Date    Bronchospasm     last assessed 6/14/2016    Depression     Disc degeneration, lumbar last assessed 4/7/2016    Erectile dysfunction     Hyperlipidemia     IFG (impaired fasting glucose)     last assessed 7/30/2014    Palpitations     last assessed 4/4/2013    Primary osteoarthritis of knees, bilateral     last assessed 6/27/2017    Right inguinal hernia     last assessed 9/12/2017    Spondylosis of lumbar region without myelopathy or radiculopathy     last assessed 4/7/2016     Past Surgical History:   Procedure Laterality Date    KNEE ARTHROSCOPY      bilateral    CA REPAIR ING HERNIA,5+Y/O,REDUCIBL Right 10/26/2017    Procedure: REPAIR HERNIA INGUINAL;  Surgeon: Raheem Landon MD;  [facility];  Service: General    CA TOTAL KNEE ARTHROPLASTY Right 1/21/2019    Procedure: ARTHROPLASTY KNEE TOTAL;  Surgeon: Bong Solo MD;  [facility];  Service: Orthopedics    TOOTH EXTRACTION       No Known Allergies  Current Outpatient Prescriptions on File Prior to Visit   Medication Sig Dispense Refill    acetaminophen (TYLENOL) 325 mg tablet Please take 650 mg Q6hrs PRN for pain 30 tablet 0    ascorbic acid (VITAMIN C) 500 mg tablet Take 1 tablet (500 mg total) by mouth 2 (two) times a day 60 tablet 0    docusate sodium (COLACE) 100 mg capsule Take 1 capsule (100 mg total) by mouth 2 (two) times a day 10 capsule 0    enoxaparin (LOVENOX) 40 mg/0 4 mL       ferrous sulfate 324 (65 Fe) mg Take 1 tablet (324 mg total) by mouth 2 (two) times a day before meals 60 tablet 0    folic acid (FOLVITE) 1 mg tablet Take 1 tablet (1 mg total) by mouth daily 30 tablet 0    oxyCODONE (ROXICODONE) 5 mg immediate release tablet Take 1 tablet (5 mg total) by mouth every 4 (four) hours as needed for moderate pain for up to 30 doses Max Daily Amount: 30 mg 30 tablet 0    sildenafil (VIAGRA) 100 mg tablet Take 1 tablet (100 mg total) by mouth daily as needed for erectile dysfunction 10 tablet 1    traMADol (ULTRAM) 50 mg tablet Take 1 tablet (50 mg total) by mouth every 6 (six) hours as needed for moderate pain for up to 60 doses 30 tablet 0     No current facility-administered medications on file prior to visit  Objective:        Right Knee Exam     Comments: All expected postoperative swelling surrounding the right knee  The right calf is soft and not swollen  Incision is well healing  There is a small area of stable irritation to the proximal aspect of the incision  There is no drainage or erythema present  I have personally reviewed pertinent films in PACS and my interpretation is Right unstable total knee arthroplasty       Portions of the record may have been created with voice recognition software   Occasional wrong word or "sound a like" substitutions may have occurred due to the inherent limitations of voice recognition software   Read the chart carefully and recognize, using context, where substitutions have occurred

## 2019-01-29 NOTE — PROGRESS NOTES
Daily Note     Today's date: 2019  Patient name: Sinan Tucker  : 1965  MRN: [id number]  Referring provider: Noel Hammer MD  Dx:   Encounter Diagnosis     ICD-10-CM    1  Primary osteoarthritis of right knee M17 11    2  Status post total right knee replacement Z96 651        Start Time:   Stop Time:   Total time in clinic (min): 43 minutes    Subjective:  "I think the swelling has gone down a bit  I'm using my crutches to go up and down the steps  I stretch it all the time at home"      Objective: See treatment diary below      Assessment: Tolerated treatment well  Patient would benefit from continued PT  Patient educated on need for stretching and movement at home  Advised patient not to use a pillow underneath of his knee when he is resting or sleeping to not have a flexion contracture  Patient needs continued PROM  Plan: Progress treatment as tolerated            Manual            R knee PROM MW RO, PTA  RT, PT - extensive EXT and Flex          Patellar mobilizations MW                                                       Exercise Diary        Bike nv        Gastroc strech n 3x30 sec        HS stretch nv seated: 3x15 sec       HR nv 2x10        Sit to stand nv        Standing march nv        Lateral stepping nv        Step ups nv        Step downs nv        Heel slides (supine and seated) x20 ea 15x supine  x20 supine      TKE         Quad set X20, 5" hold 15x  15x      glute set X20, 5" hold 10x        TKE   [place] 10# x10      Gait training   Heel- toe with RO                                                     Patient education   ROM, ambulation, sleeping without prop                    Modalities              CP prn np

## 2019-01-30 ENCOUNTER — APPOINTMENT (OUTPATIENT)
Dept: PHYSICAL THERAPY | Facility: CLINIC | Age: 54
End: 2019-01-30
Payer: COMMERCIAL

## 2019-01-31 ENCOUNTER — OFFICE VISIT (OUTPATIENT)
Dept: PHYSICAL THERAPY | Facility: CLINIC | Age: 54
End: 2019-01-31
Payer: COMMERCIAL

## 2019-01-31 DIAGNOSIS — Z96.651 STATUS POST TOTAL RIGHT KNEE REPLACEMENT: ICD-10-CM

## 2019-01-31 DIAGNOSIS — M17.11 PRIMARY OSTEOARTHRITIS OF RIGHT KNEE: Primary | ICD-10-CM

## 2019-01-31 PROCEDURE — 97140 MANUAL THERAPY 1/> REGIONS: CPT | Performed by: PHYSICAL THERAPIST

## 2019-01-31 PROCEDURE — 97110 THERAPEUTIC EXERCISES: CPT | Performed by: PHYSICAL THERAPIST

## 2019-01-31 NOTE — PROGRESS NOTES
Daily Note     Today's date: 2019  Patient name: Kristen Walker  : 1965  MRN: [id number]  Referring provider: Lolita Tatum MD  Dx:   Encounter Diagnosis     ICD-10-CM    1  Primary osteoarthritis of right knee M17 11    2  Status post total right knee replacement Z96 651        Start Time: 1705  Stop Time: 1750  Total time in clinic (min): 45 minutes    Subjective:  "I feel OK  I think I'm getting better  I see the doctor again tomorrow"      Objective: See treatment diary below      Assessment: Tolerated treatment fair  Patient would benefit from continued PT  Patient seems to have the ability for more PROM but stops due to pain  Patient needs encouragement for more PROM  Patient with grey area at proximal site of incision  Patient with some complaints of lower leg and calf pain on the right, but no redness or warmth noted at treatment today  Continue to push patient as much as able  Plan: Progress treatment as tolerated        Manual           R knee PROM MW RO, PTA  RT, PT - extensive EXT and Flex RT, PT          Patellar mobilizations MW                                                       Exercise Diary       Bike nv        Gastroc strech n 3x30 sec        HS stretch nv seated: 3x15 sec       HR nv 2x10        Sit to stand nv        Standing march nv        Lateral stepping nv        Step ups nv        Step downs nv        Heel slides (supine and seated) x20 ea 15x supine  x20 supine x10 supine     Extensionator    1' x 3     TKE         Quad set X20, 5" hold 15x  15x 10x     glute set X20, 5" hold 10x        TKE   [place] 10# x10      Gait training   Heel- toe with RO                          ROM    Flex: 103  Ext:  -10                       Patient education   ROM, ambulation, sleeping without prop                    Modalities              CP prn np

## 2019-02-01 ENCOUNTER — TELEPHONE (OUTPATIENT)
Dept: OBGYN CLINIC | Facility: HOSPITAL | Age: 54
End: 2019-02-01

## 2019-02-01 ENCOUNTER — OFFICE VISIT (OUTPATIENT)
Dept: OBGYN CLINIC | Facility: MEDICAL CENTER | Age: 54
End: 2019-02-01

## 2019-02-01 ENCOUNTER — APPOINTMENT (OUTPATIENT)
Dept: PHYSICAL THERAPY | Facility: CLINIC | Age: 54
End: 2019-02-01
Payer: COMMERCIAL

## 2019-02-01 VITALS
HEART RATE: 109 BPM | DIASTOLIC BLOOD PRESSURE: 77 MMHG | HEIGHT: 72 IN | BODY MASS INDEX: 29.53 KG/M2 | WEIGHT: 218 LBS | SYSTOLIC BLOOD PRESSURE: 123 MMHG

## 2019-02-01 DIAGNOSIS — M17.11 PRIMARY OSTEOARTHRITIS OF RIGHT KNEE: ICD-10-CM

## 2019-02-01 DIAGNOSIS — Z96.651 STATUS POST RIGHT KNEE REPLACEMENT: ICD-10-CM

## 2019-02-01 DIAGNOSIS — Z96.651 AFTERCARE FOLLOWING RIGHT KNEE JOINT REPLACEMENT SURGERY: Primary | ICD-10-CM

## 2019-02-01 DIAGNOSIS — Z47.1 AFTERCARE FOLLOWING RIGHT KNEE JOINT REPLACEMENT SURGERY: Primary | ICD-10-CM

## 2019-02-01 PROCEDURE — 99024 POSTOP FOLLOW-UP VISIT: CPT | Performed by: ORTHOPAEDIC SURGERY

## 2019-02-01 RX ORDER — OXYCODONE HYDROCHLORIDE 5 MG/1
5 TABLET ORAL EVERY 4 HOURS PRN
Qty: 30 TABLET | Refills: 0 | Status: SHIPPED | OUTPATIENT
Start: 2019-02-01 | End: 2019-02-15

## 2019-02-01 NOTE — TELEPHONE ENCOUNTER
Dr Veronica Amaya - patient R knee Replacement 1/21    Wife Alli Allison sent pictures and email intraoffice to me  I have forwarded them to you both in your email  Patient has appt for today at 3pm   Let me know if I can help with something further

## 2019-02-01 NOTE — PROGRESS NOTES
52 y o male I did note 2 weeks following right total knee replacement, this patient presents for follow-up  He describes pain in the evening that requires oxycodone administration, during the day he takes Motrin    He continues to make gains in physical therapy for restore motion strength to his knee    Review of Systems  Review of systems negative unless otherwise specified in HPI    Past Medical History  Past Medical History:   Diagnosis Date    Bronchospasm     last assessed 6/14/2016    Depression     Disc degeneration, lumbar     last assessed 4/7/2016    Erectile dysfunction     Hyperlipidemia     IFG (impaired fasting glucose)     last assessed 7/30/2014    Palpitations     last assessed 4/4/2013    Primary osteoarthritis of knees, bilateral     last assessed 6/27/2017    Right inguinal hernia     last assessed 9/12/2017    Spondylosis of lumbar region without myelopathy or radiculopathy     last assessed 4/7/2016       Past Surgical History  Past Surgical History:   Procedure Laterality Date    KNEE ARTHROSCOPY      bilateral    MO REPAIR ING HERNIA,5+Y/O,REDUCIBL Right 10/26/2017    Procedure: REPAIR HERNIA INGUINAL;  Surgeon: Gigi Morataya MD;  [facility]: [facility];  Service: General    MO TOTAL KNEE ARTHROPLASTY Right 1/21/2019    Procedure: ARTHROPLASTY KNEE TOTAL;  Surgeon: Eris Campos MD;  [facility];  Service: Orthopedics    TOOTH EXTRACTION         Current Medications  Current Outpatient Prescriptions on File Prior to Visit   Medication Sig Dispense Refill    acetaminophen (TYLENOL) 325 mg tablet Please take 650 mg Q6hrs PRN for pain 30 tablet 0    ascorbic acid (VITAMIN C) 500 mg tablet Take 1 tablet (500 mg total) by mouth 2 (two) times a day 60 tablet 0    docusate sodium (COLACE) 100 mg capsule Take 1 capsule (100 mg total) by mouth 2 (two) times a day 10 capsule 0    enoxaparin (LOVENOX) 40 mg/0 4 mL       ferrous sulfate 324 (65 Fe) mg Take 1 tablet (324 mg total) by mouth 2 (two) times a day before meals 60 tablet 0    folic acid (FOLVITE) 1 mg tablet Take 1 tablet (1 mg total) by mouth daily 30 tablet 0    nabumetone (RELAFEN) 750 mg tablet Take 1 tablet (750 mg total) by mouth 2 (two) times a day 40 tablet 0    oxyCODONE (ROXICODONE) 5 mg immediate release tablet Take 1 tablet (5 mg total) by mouth every 4 (four) hours as needed for moderate pain for up to 30 doses Max Daily Amount: 30 mg 30 tablet 0    sildenafil (VIAGRA) 100 mg tablet Take 1 tablet (100 mg total) by mouth daily as needed for erectile dysfunction 10 tablet 1    traMADol (ULTRAM) 50 mg tablet Take 1 tablet (50 mg total) by mouth every 6 (six) hours as needed for moderate pain for up to 60 doses 30 tablet 0     No current facility-administered medications on file prior to visit  Recent Labs [facility])    0  Lab Value Date/Time   HCT 34 8 (L) 01/22/2019 0547   HGB 11 5 (L) 01/22/2019 0547   WBC 9 43 01/22/2019 0547   INR 0 90 12/27/2018 0948   CRP 10 5 (H) 12/27/2018 0948   GLUCOSE 113 07/19/2014 0724   HGBA1C 5 7 12/27/2018 0948   HGBA1C 5 7 (H) 07/11/2015 0805         Physical exam  · General: Awake, Alert, Oriented  · Eyes: Pupils equal, round and reactive to light  · Heart: regular rate and rhythm  · Lungs: No audible wheezing  · Abdomen: soft  Examination finds a healed anterior right knee incision without surrounding erythema  There is a small superomedial skin sore, without drainage  There is no tenderness the right calf  This patient's knee can flex to 90° and beyond    Imaging  No new x-rays accompany him    Procedure  Staples removed, and Steri-Strips were applied today    Assessment/Plan:   52 y  o male 2 weeks following right total knee replacement who looks well  We will keep a watch on the skin lesion superomedially  He is given a prescription oxycodone for pain control    I would welcome the opportunity see back in 2 weeks time plan on repeat physical exam primarily for his skin wound

## 2019-02-01 NOTE — PROGRESS NOTES
ASSESSMENT/PLAN:    Assessment:   Status post right total knee arthroplasty on January 21st 2019    Plan:   Continue weight-bearing to tolerance and physical therapy for improved range of motion and strengthening  Stable removed today, Steri-Strips applied  Follow-up in 2 weeks for repeat wound check      _____________________________________________________  CHIEF COMPLAINT:  Chief Complaint   Patient presents with    Right Knee - Post-op         SUBJECTIVE:  Sravani Winchester is a 52y o  year old male who presents for follow up status post right total knee arthroplasty  His prior visit he was noted to have a little bit of blistering and skin irritation to the proximal aspect of the wound  He has been progressing well with physical therapy as achieved flexion to approximately 100° per therapist   Today presents with a well-healing incision, denies any major drainage, denies any systemic symptoms of feels well otherwise      PAST MEDICAL HISTORY:  Past Medical History:   Diagnosis Date    Bronchospasm     last assessed 6/14/2016    Depression     Disc degeneration, lumbar     last assessed 4/7/2016    Erectile dysfunction     Hyperlipidemia     IFG (impaired fasting glucose)     last assessed 7/30/2014    Palpitations     last assessed 4/4/2013    Primary osteoarthritis of knees, bilateral     last assessed 6/27/2017    Right inguinal hernia     last assessed 9/12/2017    Spondylosis of lumbar region without myelopathy or radiculopathy     last assessed 4/7/2016       PAST SURGICAL HISTORY:  Past Surgical History:   Procedure Laterality Date    KNEE ARTHROSCOPY      bilateral    AL REPAIR ING HERNIA,5+Y/O,REDUCIBL Right 10/26/2017    Procedure: REPAIR HERNIA INGUINAL;  Surgeon: Geoff Lopez MD;  [facility];  Service: General    AL TOTAL KNEE ARTHROPLASTY Right 1/21/2019    Procedure: ARTHROPLASTY KNEE TOTAL;  Surgeon: Jorge Hodges MD;  [facility];  Service: Orthopedics    TOOTH EXTRACTION         FAMILY HISTORY:  Family History   Problem Relation Age of Onset    No Known Problems Mother     Diabetes Father     No Known Problems Sister     Coronary artery disease Brother        SOCIAL HISTORY:  Social History   Substance Use Topics    Smoking status: Former Smoker    Smokeless tobacco: Never Used      Comment: quit 11/2018    Alcohol use Yes      Comment: weekends        MEDICATIONS:    Current Outpatient Prescriptions:     acetaminophen (TYLENOL) 325 mg tablet, Please take 650 mg Q6hrs PRN for pain, Disp: 30 tablet, Rfl: 0    ascorbic acid (VITAMIN C) 500 mg tablet, Take 1 tablet (500 mg total) by mouth 2 (two) times a day, Disp: 60 tablet, Rfl: 0    docusate sodium (COLACE) 100 mg capsule, Take 1 capsule (100 mg total) by mouth 2 (two) times a day, Disp: 10 capsule, Rfl: 0    enoxaparin (LOVENOX) 40 mg/0 4 mL, , Disp: , Rfl:     ferrous sulfate 324 (65 Fe) mg, Take 1 tablet (324 mg total) by mouth 2 (two) times a day before meals, Disp: 60 tablet, Rfl: 0    folic acid (FOLVITE) 1 mg tablet, Take 1 tablet (1 mg total) by mouth daily, Disp: 30 tablet, Rfl: 0    nabumetone (RELAFEN) 750 mg tablet, Take 1 tablet (750 mg total) by mouth 2 (two) times a day, Disp: 40 tablet, Rfl: 0    oxyCODONE (ROXICODONE) 5 mg immediate release tablet, Take 1 tablet (5 mg total) by mouth every 4 (four) hours as needed for moderate pain for up to 30 doses Max Daily Amount: 30 mg, Disp: 30 tablet, Rfl: 0    sildenafil (VIAGRA) 100 mg tablet, Take 1 tablet (100 mg total) by mouth daily as needed for erectile dysfunction, Disp: 10 tablet, Rfl: 1    traMADol (ULTRAM) 50 mg tablet, Take 1 tablet (50 mg total) by mouth every 6 (six) hours as needed for moderate pain for up to 60 doses, Disp: 30 tablet, Rfl: 0    ALLERGIES:  No Known Allergies    REVIEW OF SYSTEMS:  Pertinent items are noted in HPI  A comprehensive review of systems was negative      LABS:  HgA1c:   Lab Results   Component Value Date HGBA1C 5 7 12/27/2018     BMP:   Lab Results   Component Value Date    GLUCOSE 113 07/19/2014    CALCIUM 7 8 (L) 01/23/2019     07/19/2014    K 3 6 01/23/2019    CO2 25 01/23/2019     01/23/2019    BUN 11 01/23/2019    CREATININE 0 76 01/23/2019           _____________________________________________________  PHYSICAL EXAMINATION:  General: well developed and well nourished, alert, oriented times 3 and appears comfortable  Psychiatric: Normal  HEENT: Trachea Midline, No torticollis  Cardiovascular: No discernable arrhythmia  Pulmonary: No wheezing or stridor  Skin:  See musculoskeletal exam    MUSCULOSKELETAL EXAMINATION:  Right knee:  -well-healing incision, skin edges approximated with staples in place  -no evidence of any drainage, no evidence of erythema or major should irritation  -area of staple removed from prior visit with tiny area of eschar, no fluctuance or expressible drainage  -flexion to approximately 90° on exam both passive and active  -stable to varus and valgus stress  -limb warm well perfused  _____________________________________________________  STUDIES REVIEWED:  No Studies to review      PROCEDURES PERFORMED:  Procedures

## 2019-02-01 NOTE — TELEPHONE ENCOUNTER
Dr Darrius Rosa will address all concerns at today's appt  Wife given this message and appreciates Dr Perla Vela pictures ahead

## 2019-02-04 ENCOUNTER — OFFICE VISIT (OUTPATIENT)
Dept: PHYSICAL THERAPY | Facility: CLINIC | Age: 54
End: 2019-02-04
Payer: COMMERCIAL

## 2019-02-04 DIAGNOSIS — Z96.651 STATUS POST TOTAL RIGHT KNEE REPLACEMENT: ICD-10-CM

## 2019-02-04 DIAGNOSIS — M17.11 PRIMARY OSTEOARTHRITIS OF RIGHT KNEE: Primary | ICD-10-CM

## 2019-02-04 DIAGNOSIS — M17.12 PRIMARY OSTEOARTHRITIS OF LEFT KNEE: ICD-10-CM

## 2019-02-04 PROCEDURE — 97112 NEUROMUSCULAR REEDUCATION: CPT

## 2019-02-04 PROCEDURE — 97140 MANUAL THERAPY 1/> REGIONS: CPT

## 2019-02-04 PROCEDURE — 97110 THERAPEUTIC EXERCISES: CPT

## 2019-02-04 NOTE — PROGRESS NOTES
Daily Note     Today's date: 2019  Patient name: Bonnie Boeck  : 1965  MRN: [id number]  Referring provider: Roxana Rangel MD  Dx:   Encounter Diagnosis     ICD-10-CM    1  Primary osteoarthritis of right knee M17 11    2  Status post total right knee replacement Z96 651    3  Primary osteoarthritis of left knee M17 12                   Subjective: Patient reports, "I feel better  They took my staples out and it feels better  I can take a couple steps without the crutches too  The pain at my calf is gone too"  Pain rated as "3/10"  Objective: See treatment diary below      Assessment: Tolerated treatment well  Patient exhibited good technique with therapeutic exercises  Patient was able to perform all given exercises this visit  He demonstrates improved knee ROM this visit, but is limited by pain  Emphasized proper gait mechanics and patient was able to ambulate with use of 1 crutch  Patient continues to have gray spot at proximal incision  Continue to progress as able  Plan: Continue per plan of care         Diagnosis: Right TKR    Precautions:    Manual Therapy     R knee PROM  RO, PTA  RT, PT - extensive EXT and Flex RT, PT  RO, PTA    Patellar mobilizations                                Exercise Diary         Bike         Gastroc Stretch   3x30 sec      HS Stretch   seated: 3x15 sec      Heel Raises  2x10       Heel Slides  15x supine  20x supine 10x supine  15x    Extensionater     1 min x3    TKE   [place] 10# x10      Quad set   15x  15x  10x  15x    Glut Set   10x       Gait training   Heel Toe with RO , PTA    Heel toe, 1 crutch   Weight shifts     Lateral: 20x   Fwd/bkwd: 20x                                                            ROM     Flex: 103  Ext: -10 PROM:   Flex: 106 deg   Ext: lack 6 deg     AROM:   Flex: 100 deg   Ext: lack 8 deg    Patient education  ROM, ambulation, sleeping without prop      Modalities        CP PRN

## 2019-02-06 ENCOUNTER — OFFICE VISIT (OUTPATIENT)
Dept: PHYSICAL THERAPY | Facility: CLINIC | Age: 54
End: 2019-02-06
Payer: COMMERCIAL

## 2019-02-06 DIAGNOSIS — Z96.651 STATUS POST TOTAL RIGHT KNEE REPLACEMENT: ICD-10-CM

## 2019-02-06 DIAGNOSIS — M17.11 PRIMARY OSTEOARTHRITIS OF RIGHT KNEE: Primary | ICD-10-CM

## 2019-02-06 PROCEDURE — 97110 THERAPEUTIC EXERCISES: CPT | Performed by: PHYSICAL THERAPIST

## 2019-02-06 PROCEDURE — 97140 MANUAL THERAPY 1/> REGIONS: CPT | Performed by: PHYSICAL THERAPIST

## 2019-02-06 PROCEDURE — 97112 NEUROMUSCULAR REEDUCATION: CPT | Performed by: PHYSICAL THERAPIST

## 2019-02-06 NOTE — PROGRESS NOTES
Daily Note     Today's date: 2019  Patient name: Ameena Lofton  : 1965  MRN: [id number]  Referring provider: Karyle Lemon, MD  Dx:   Encounter Diagnosis     ICD-10-CM    1  Primary osteoarthritis of right knee M17 11    2  Status post total right knee replacement Z96 651        Start Time: 1630  Stop Time: 171  Total time in clinic (min): 47 minutes    Subjective:  "I didn't take pain medicine last night but I had to take it in the morning  Sometimes I feel like I have pain, but other times I think about it and I don't"      Objective: See treatment diary below      Assessment: Tolerated treatment fair  Patient would benefit from continued PT  Patient does not tolerate pain well  Patient readily wants to discontinue stretching once pain begins to increase  Patient educated on need to sustain stretches to allow for further PROM for eventual full AROM  Need to continue to progress PROM as able  Plan: Progress treatment as tolerated        Diagnosis: Right TKR    Precautions:    Manual Therapy    R knee PROM RT, PT  RT, PT - extensive EXT and Flex RT, PT  RO, PTA    Patellar mobilizations                                Exercise Diary         Bike         Gastroc Stretch  10" x 15       HS Stretch  10" x 10       Heel Raises        Heel Slides 10x  20x supine 10x supine  15x    Extensionater     1 min x3    TKE         Quad set  10x  15x  10x  15x    Glut Set         Gait training      Heel toe, 1 crutch   Weight shifts     Lateral: 20x   Fwd/bkwd: 20x                                                            ROM  PROM:    Flex:  110 deg  EXT:  Lack 4-5 deg   Flex: 103  Ext: -10 PROM:   Flex: 106 deg   Ext: lack 6 deg     AROM:   Flex: 100 deg   Ext: lack 8 deg    Patient education        Modalities        CP PRN

## 2019-02-08 ENCOUNTER — OFFICE VISIT (OUTPATIENT)
Dept: PHYSICAL THERAPY | Facility: CLINIC | Age: 54
End: 2019-02-08
Payer: COMMERCIAL

## 2019-02-08 DIAGNOSIS — Z96.651 STATUS POST TOTAL RIGHT KNEE REPLACEMENT: ICD-10-CM

## 2019-02-08 DIAGNOSIS — M17.12 PRIMARY OSTEOARTHRITIS OF LEFT KNEE: ICD-10-CM

## 2019-02-08 DIAGNOSIS — M17.11 PRIMARY OSTEOARTHRITIS OF RIGHT KNEE: Primary | ICD-10-CM

## 2019-02-08 PROCEDURE — 97140 MANUAL THERAPY 1/> REGIONS: CPT | Performed by: PHYSICAL THERAPIST

## 2019-02-08 PROCEDURE — 97110 THERAPEUTIC EXERCISES: CPT | Performed by: PHYSICAL THERAPIST

## 2019-02-08 NOTE — PROGRESS NOTES
Daily Note     Today's date: 2019  Patient name: Abby Ricci  : 1965  MRN: [id number]  Referring provider: Nasrin Terrell MD  Dx:   Encounter Diagnosis     ICD-10-CM    1  Primary osteoarthritis of right knee M17 11    2  Status post total right knee replacement Z96 651    3  Primary osteoarthritis of left knee M17 12        Start Time: 1525  Stop Time: 1602  Total time in clinic (min): 37 minutes    Subjective:  "I feel like my knee clicks when I kick it out, but that's been happening since the beginning  I couldn't sleep last night I had a lot of pain in the back of my knee"      Objective: See treatment diary below      Assessment: Tolerated treatment fair  Patient would benefit from continued PT  Patient still with difficulties with knee extension PROM as pain is not handled well  Emphasis continues to be on knee PROM especially with extension  Patient educated on need for continued extension stretching at home to avoid contracture  Plan: Progress treatment as tolerated        Diagnosis: Right TKR    Precautions:    Manual Therapy    R knee PROM RT, PT RT, PT  RT, PT  RO, PTA    Patellar mobilizations                                Exercise Diary         Bike   5'      Gastroc Stretch  10" x 15       HS Stretch  10" x 10       Heel Raises        Heel Slides 10x 10x  10x supine  15x    Extensionater     1 min x3    TKE         Quad set  10x 10x  10x  15x    Glut Set         Gait training      Heel toe, 1 crutch   Weight shifts     Lateral: 20x   Fwd/bkwd: 20x                                                            ROM  PROM:    Flex:  110 deg  EXT:  Lack 4-5 deg PROM:    Flex:  111 deg  EXT: Lack 5-6 degrees  Flex: 103  Ext: -10 PROM:   Flex: 106 deg   Ext: lack 6 deg     AROM:   Flex: 100 deg   Ext: lack 8 deg    Patient education        Modalities        CP PRN

## 2019-02-11 ENCOUNTER — OFFICE VISIT (OUTPATIENT)
Dept: PHYSICAL THERAPY | Facility: CLINIC | Age: 54
End: 2019-02-11
Payer: COMMERCIAL

## 2019-02-11 DIAGNOSIS — M17.11 PRIMARY OSTEOARTHRITIS OF RIGHT KNEE: Primary | ICD-10-CM

## 2019-02-11 DIAGNOSIS — Z96.651 STATUS POST TOTAL RIGHT KNEE REPLACEMENT: ICD-10-CM

## 2019-02-11 PROCEDURE — 97140 MANUAL THERAPY 1/> REGIONS: CPT | Performed by: PHYSICAL THERAPIST

## 2019-02-11 NOTE — PROGRESS NOTES
Daily Note     Today's date: 2019  Patient name: Abby Ricci  : 1965  MRN: [id number]  Referring provider: Nasrin Terrell MD  Dx:   Encounter Diagnosis     ICD-10-CM    1  Primary osteoarthritis of right knee M17 11    2  Status post total right knee replacement Z96 651        Start Time: 1645  Stop Time: 1726  Total time in clinic (min): 41 minutes    Subjective:  "I'm walking with only one crutch now and I've really been walking in the house without anything"      Objective: See treatment diary below      Assessment: Tolerated treatment well  Patient would benefit from continued PT  Patient is weaning off of crutches  Still lacking knee extension PROM, flexion progressing with each visit  Initiated increased strengthening today with emphasis on quad control  Continue to progress  Plan: Progress treatment as tolerated          Diagnosis: Right TKR    Precautions:    Manual Therapy    R knee PROM RT, PT RT, PT RT, PT RT, PT  RO, PTA    Patellar mobilizations                                Exercise Diary         Bike   5' 5'     Gastroc Stretch  10" x 15       HS Stretch  10" x 10       Heel Raises        Heel Slides 10x 10x 10x 10x supine  15x    Extensionater     1 min x3    TKE         Quad set  10x 10x  10x  15x    Glut Set         Gait training      Heel toe, 1 crutch   Weight shifts     Lateral: 20x   Fwd/bkwd: 20x    Leg Press   70# 4x10     Step Ups   8" up and overs x 15                                             ROM  PROM:    Flex:  110 deg  EXT:  Lack 4-5 deg PROM:    Flex:  111 deg  EXT: Lack 5-6 degrees PROM:    Flex:  112  EXT:  Lack 6 degrees Flex: 103  Ext: -10 PROM:   Flex: 106 deg   Ext: lack 6 deg     AROM:   Flex: 100 deg   Ext: lack 8 deg    Patient education        Modalities        CP PRN

## 2019-02-13 ENCOUNTER — OFFICE VISIT (OUTPATIENT)
Dept: PHYSICAL THERAPY | Facility: CLINIC | Age: 54
End: 2019-02-13
Payer: COMMERCIAL

## 2019-02-13 DIAGNOSIS — Z96.651 STATUS POST TOTAL RIGHT KNEE REPLACEMENT: ICD-10-CM

## 2019-02-13 DIAGNOSIS — M17.11 PRIMARY OSTEOARTHRITIS OF RIGHT KNEE: Primary | ICD-10-CM

## 2019-02-13 DIAGNOSIS — M17.12 PRIMARY OSTEOARTHRITIS OF LEFT KNEE: ICD-10-CM

## 2019-02-13 PROCEDURE — 97110 THERAPEUTIC EXERCISES: CPT

## 2019-02-13 PROCEDURE — 97112 NEUROMUSCULAR REEDUCATION: CPT

## 2019-02-13 PROCEDURE — 97140 MANUAL THERAPY 1/> REGIONS: CPT

## 2019-02-13 NOTE — PROGRESS NOTES
Daily Note     Today's date: 2019  Patient name: Zaid Vivar  : 1965  MRN: [id number]  Referring provider: Lynda Ledesma MD  Dx:   Encounter Diagnosis     ICD-10-CM    1  Primary osteoarthritis of right knee M17 11    2  Status post total right knee replacement Z96 651    3  Primary osteoarthritis of left knee M17 12                   Subjective: Patient reports that he was sore following his last session  He reports that he continues to have difficulty with sleeping at night  He notes, " I vacuumed today and pushed a little snow around and it was okay"  Objective: See treatment diary below      Assessment: Tolerated treatment well  Patient would benefit from continued PT  Patient exhibits improved range of motion this visit, but continues to be limited by pain  He was able to progress with exercises this visit; muscle fatigue noted at the end of the session  Patient needed cuing for correct form with TE and encouragement to progress through program  Continue to progress and challenge as able  Plan: Continue per plan of care       Diagnosis: Right TKR    Precautions:    Manual Therapy     R knee PROM RT, PT RT, PT RT, PT RO, PTA     Patellar mobilizations                                Exercise Diary         Bike   5' 5' 5'     Gastroc Stretch  10" x 15       HS Stretch  10" x 10       Heel Raises        Heel Slides 10x 10x 10x 15x     Extensionater         TKE     [place]: 10#, 20x     Quad set  10x 10x      Mini squats     2x10     Lateral step downs      4in, 8x discomfort     Weight shifts        Leg Press   70# 4x10 70# 6x10    Step Ups   8" up and overs x 15                                             ROM  PROM:    Flex:  110 deg  EXT:  Lack 4-5 deg PROM:    Flex:  111 deg  EXT: Lack 5-6 degrees PROM:    Flex:  112  EXT:  Lack 6 degrees PROM:    Flex:  115  EXT:  Lack 3 degrees    Patient education        Modalities        CP PRN

## 2019-02-14 ENCOUNTER — EVALUATION (OUTPATIENT)
Dept: PHYSICAL THERAPY | Facility: CLINIC | Age: 54
End: 2019-02-14
Payer: COMMERCIAL

## 2019-02-14 DIAGNOSIS — M17.11 PRIMARY OSTEOARTHRITIS OF RIGHT KNEE: Primary | ICD-10-CM

## 2019-02-14 DIAGNOSIS — Z96.651 STATUS POST TOTAL RIGHT KNEE REPLACEMENT: ICD-10-CM

## 2019-02-14 PROCEDURE — 97110 THERAPEUTIC EXERCISES: CPT | Performed by: PHYSICAL THERAPIST

## 2019-02-14 PROCEDURE — 97140 MANUAL THERAPY 1/> REGIONS: CPT | Performed by: PHYSICAL THERAPIST

## 2019-02-14 PROCEDURE — 97112 NEUROMUSCULAR REEDUCATION: CPT | Performed by: PHYSICAL THERAPIST

## 2019-02-14 NOTE — PROGRESS NOTES
PT Re-Evaluation     Today's date: 2019  Patient name: Mac Barrera  : 1965  MRN: [id number]  Referring provider: Sabina Diaz MD  Dx:   Encounter Diagnosis     ICD-10-CM    1  Primary osteoarthritis of right knee M17 11    2  Status post total right knee replacement Z96 651        Start Time: 1447  Stop Time: 1530  Total time in clinic (min): 43 minutes    Assessment  Assessment details: Patient is a 53 yo male who has been consistent with attending and participating in skilled physical therapy  He has seen decreases in his pain as well as increases in his ambulaiton ability  The patient is gradually improving his flexion ROM  Patient is still limited with knee extension ROM  This deficit is limiting his overall gait and mobility  The patient must be consistent and motivated to further increase his extension ROM  The patient will continue to progress with further skilled physical therapy sessions  Impairments: abnormal coordination, abnormal gait, abnormal or restricted ROM, abnormal movement, activity intolerance, impaired balance, impaired physical strength, lacks appropriate home exercise program, pain with function, safety issue, weight-bearing intolerance and poor posture     Symptom irritability: highUnderstanding of Dx/Px/POC: good   Prognosis: fair    Goals  ST  Patient will report 25% decrease in pain in 4 weeks  - MET  2  Patient will demonstrate 25% improvement in ROM in 4 weeks  - PARTIALLY MET   3  Patient will demonstrate 1/2 grade improvement in strength in 4 weeks - PARTIALLY MET    LT  Patient will be able to perform IADLS without restriction or pain by discharge - PARTIALLY MET   2  Patient will be independent in HEP by discharge - PARTIALLY MET   3   Patient will be able to return to recreational/work duties without restriction or pain by discharge - PARTIALLY MET       Plan  Patient would benefit from: PT eval and skilled PT  Referral necessary: No  Planned modality interventions: cryotherapy and thermotherapy: hydrocollator packs  Planned therapy interventions: IADL retraining, body mechanics training, flexibility, functional ROM exercises, home exercise program, neuromuscular re-education, manual therapy, postural training, strengthening, stretching, therapeutic activities, therapeutic exercise and joint mobilization  Frequency: 2x week  Duration in weeks: 6  Treatment plan discussed with: patient, family and PTA        Subjective Evaluation    History of Present Illness  Date of surgery: 2019  Mechanism of injury: surgery  Mechanism of injury: Patient reports he has improved with his "walking, steps, and just getting around"  He still notes difficulty with range of motion and still not being "back to normal with walking"  He reports he has improved "50%" since starting physical therapy              Not a recurrent problem   Quality of life: good    Pain  Current pain ratin  At best pain ratin  At worst pain ratin  Location: anterior R knee  Quality: sharp ("it's a pain that's there and doesn't let go")  Aggravating factors: standing, walking, stair climbing, sitting and lifting  Progression: no change    Social Support  Steps to enter house: yes (4 TEMI)  Stairs in house: yes (full flight)   Lives in: multiple-level home  Lives with: spouse    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[employer])  Hand dominance: right    Treatments  Discharged from (in last 30 days): inpatient hospitalization  Patient Goals  Patient goals for therapy: decreased pain, decreased edema, improved balance, increased motion, increased strength, independence with ADLs/IADLs, return to sport/leisure activities and return to work  Patient goal: "get back to work "        Objective     Neurological Testing     Sensation     Knee   Left Knee   Intact: light touch    Right Knee   Intact: light touch     Active Range of Motion   Left Knee   Flexion: 123 degrees   Extension: WFL    Right Knee Flexion: 110 degrees with pain  Extension: 8 degrees with pain    Passive Range of Motion   Left Knee   Flexion: 131 degrees     Right Knee   Flexion: 112 degrees with pain  Extension: 8 degrees with pain    Additional Passive Range of Motion Details  Patient has been able to tolerate PROM flexion to 115 and Extension to 4  Patient limited by pain and needs encouragement to maintain low load long duration stretches for flexion and especially extension        Strength/Myotome Testing     Left Knee   Flexion: 4+  Extension: 4+  Quadriceps contraction: good    Right Knee   Flexion: 3+  Extension: 3+  Quadriceps contraction: fair      Flowsheet Rows      Most Recent Value   PT/OT G-Codes   Current Score  34   Projected Score  60   Assessment Type  Re-evaluation            Diagnosis: Right TKR    Precautions:    Manual Therapy 2/6 2/8 2/11 2/13 2/14   R knee PROM RT, PT RT, PT RT, PT RO, PTA  RT, PT   Patellar mobilizations        PROM     Hamstring / Gastroc S                   Exercise Diary         Bike   5' 5' 5'  5'   Gastroc Stretch  10" x 15       HS Stretch  10" x 10       Heel Raises        Heel Slides 10x 10x 10x 15x  15x   Extensionater         TKE     On [place]: 10#, 20x     Quad set  10x 10x      Mini squats     2x10     Lateral step downs      4in, 8x discomfort     Weight shifts        Leg Press   70# 4x10 70# 6x10    Step Ups   8" up and overs x 15             Review of FOTO / Re-Evaluation     RT, PT                           ROM  PROM:    Flex:  110 deg  EXT:  Lack 4-5 deg PROM:    Flex:  111 deg  EXT: Lack 5-6 degrees PROM:    Flex:  112  EXT:  Lack 6 degrees PROM:    Flex:  115  EXT:  Lack 3 degrees    Patient education        Modalities        CP PRN

## 2019-02-15 ENCOUNTER — OFFICE VISIT (OUTPATIENT)
Dept: OBGYN CLINIC | Facility: MEDICAL CENTER | Age: 54
End: 2019-02-15

## 2019-02-15 ENCOUNTER — APPOINTMENT (OUTPATIENT)
Dept: PHYSICAL THERAPY | Facility: CLINIC | Age: 54
End: 2019-02-15
Payer: COMMERCIAL

## 2019-02-15 VITALS
HEART RATE: 96 BPM | DIASTOLIC BLOOD PRESSURE: 83 MMHG | HEIGHT: 72 IN | SYSTOLIC BLOOD PRESSURE: 127 MMHG | WEIGHT: 218 LBS | BODY MASS INDEX: 29.53 KG/M2

## 2019-02-15 DIAGNOSIS — Z96.651 AFTERCARE FOLLOWING RIGHT KNEE JOINT REPLACEMENT SURGERY: Primary | ICD-10-CM

## 2019-02-15 DIAGNOSIS — Z47.1 AFTERCARE FOLLOWING RIGHT KNEE JOINT REPLACEMENT SURGERY: Primary | ICD-10-CM

## 2019-02-15 PROCEDURE — 99024 POSTOP FOLLOW-UP VISIT: CPT | Performed by: ORTHOPAEDIC SURGERY

## 2019-02-15 NOTE — PROGRESS NOTES
Assessment:  1  Aftercare following right knee joint replacement surgery         Plan:  - 3 5 weeks s/p right TKA, doing well  - We were watching a skin lesion on the knee superiormedially which is now well healed at this visit  - Continue with physical therapy  - Continue course of DVT prophylaxis to completion  - Tylenol and ibuprofen for pain control  - Follow up in 4 weeks     To do next visit:  Return in about 1 month (around 3/15/2019)  The above stated was discussed in layman's terms and the patient expressed understanding  All questions were answered to the patient's satisfaction  Subjective:   Vadim Chávez is a 52 y o  male who presents to clinic for post-op follow up 3 5 weeks s/p right TKA  He is doing well and progressing with therapy  Patient reports 2 days ago they were doing strengthening band exercises at PT which caused increased knee swelling  He is doing well today and states the swelling is improving and pain is well controlled with tylenol and ibuprofen  He denies any incisional drainage, redness, or fevers/chills           Review of systems negative unless otherwise specified in HPI    Past Medical History:   Diagnosis Date    Bronchospasm     last assessed 6/14/2016    Depression     Disc degeneration, lumbar     last assessed 4/7/2016    Erectile dysfunction     Hyperlipidemia     IFG (impaired fasting glucose)     last assessed 7/30/2014    Palpitations     last assessed 4/4/2013    Primary osteoarthritis of knees, bilateral     last assessed 6/27/2017    Right inguinal hernia     last assessed 9/12/2017    Spondylosis of lumbar region without myelopathy or radiculopathy     last assessed 4/7/2016       Past Surgical History:   Procedure Laterality Date    KNEE ARTHROSCOPY      bilateral    OK REPAIR ING HERNIA,5+Y/O,REDUCIBL Right 10/26/2017    Procedure: REPAIR HERNIA INGUINAL;  Surgeon: Lauren Branch MD;  Location: [facility];  Service: General    OK TOTAL KNEE ARTHROPLASTY Right 1/21/2019    Procedure: ARTHROPLASTY KNEE TOTAL;  Surgeon: Elizabeth Leos MD;  Location: [facility];  Service: Orthopedics    TOOTH EXTRACTION         Family History   Problem Relation Age of Onset    No Known Problems Mother     Diabetes Father     No Known Problems Sister     Coronary artery disease Brother        Social History     Occupational History    Not on file   Tobacco Use    Smoking status: Former Smoker    Smokeless tobacco: Never Used    Tobacco comment: quit 11/2018   Substance and Sexual Activity    Alcohol use: Yes     Comment: weekends     Drug use: No    Sexual activity: Not on file         Current Outpatient Medications:     acetaminophen (TYLENOL) 325 mg tablet, Please take 650 mg Q6hrs PRN for pain, Disp: 30 tablet, Rfl: 0    enoxaparin (LOVENOX) 40 mg/0 4 mL, , Disp: , Rfl:     nabumetone (RELAFEN) 750 mg tablet, Take 1 tablet (750 mg total) by mouth 2 (two) times a day, Disp: 40 tablet, Rfl: 0    sildenafil (VIAGRA) 100 mg tablet, Take 1 tablet (100 mg total) by mouth daily as needed for erectile dysfunction, Disp: 10 tablet, Rfl: 1    No Known Allergies         Vitals:    02/15/19 1540   BP: 127/83   Pulse: 96       Objective:                    Right Knee Exam     Range of Motion   Extension: 5   Right knee flexion: 115  Other   Right knee swelling: moderate joint effusion noted  Comments:  Minimal redness and warmth about the knee within expected limits at this point post-operatively  Incision is well healed without any drainage or surrounding erythema                Diagnostics, reviewed and taken today if performed as documented:    None performed        Procedures, if performed today:      None performed      Portions of the record may have been created with voice recognition software   Occasional wrong word or "sound a like" substitutions may have occurred due to the inherent limitations of voice recognition software   Read the chart carefully and recognize, using context, where substitutions have occurred

## 2019-02-18 ENCOUNTER — OFFICE VISIT (OUTPATIENT)
Dept: PHYSICAL THERAPY | Facility: CLINIC | Age: 54
End: 2019-02-18
Payer: COMMERCIAL

## 2019-02-18 DIAGNOSIS — Z96.651 STATUS POST TOTAL RIGHT KNEE REPLACEMENT: ICD-10-CM

## 2019-02-18 DIAGNOSIS — M17.11 PRIMARY OSTEOARTHRITIS OF RIGHT KNEE: Primary | ICD-10-CM

## 2019-02-18 PROCEDURE — 97110 THERAPEUTIC EXERCISES: CPT | Performed by: PHYSICAL THERAPIST

## 2019-02-18 PROCEDURE — 97140 MANUAL THERAPY 1/> REGIONS: CPT | Performed by: PHYSICAL THERAPIST

## 2019-02-18 PROCEDURE — 97112 NEUROMUSCULAR REEDUCATION: CPT | Performed by: PHYSICAL THERAPIST

## 2019-02-18 NOTE — PROGRESS NOTES
Daily Note     Today's date: 2019  Patient name: Paulie Irving  : 1965  MRN: [id number]  Referring provider: Kristi Crenshaw MD  Dx:   Encounter Diagnosis     ICD-10-CM    1  Primary osteoarthritis of right knee M17 11    2  Status post total right knee replacement Z96 651        Start Time:   Stop Time:   Total time in clinic (min): 44 minutes    Subjective:  "The doctor said it looks good  I don't have to see him for another month  I got cleared to drive too"      Objective: See treatment diary below      Assessment: Tolerated treatment well  Patient would benefit from continued PT  Patient continues to see increases with his flexion PROM, but is still limited with extension  Patient still not tolerating extension PROM stretching pain well  Increased PREs today with continued focus on further return to function  Continue to progress as able  Plan: Progress treatment as tolerated          Diagnosis: Right TKR    Precautions:    Manual Therapy    R knee PROM RT, PT RT, PT RT, PT RO, PTA  RT, PT   Patellar mobilizations        PROM     Hamstring / Gastroc S                   Exercise Diary         Bike   5' 5' 5'  5'   Gastroc Stretch         HS Stretch         Heel Raises        Heel Slides 10x 10x 10x 15x  15x   Extensionater         TKE     On [place]: 10#, 20x     Quad set  10x 10x      Mini squats     2x10     Lateral step downs      4in, 8x discomfort     Weight shifts        Leg Press   70# 4x10 70# 6x10    Step Ups   8" up and overs x 15     SLR 2 5# 2x10       SL Hip ABD 2 5# 2x10                       Review of FOTO / Re-Evaluation     RT, PT                           ROM  PROM:    Flex:  120 deg  EXT:  Lack 5 degrees PROM:    Flex:  111 deg  EXT: Lack 5-6 degrees PROM:    Flex:  112  EXT:  Lack 6 degrees PROM:    Flex:  115  EXT:  Lack 3 degrees    Patient education        Modalities        CP PRN

## 2019-02-20 ENCOUNTER — OFFICE VISIT (OUTPATIENT)
Dept: PHYSICAL THERAPY | Facility: CLINIC | Age: 54
End: 2019-02-20
Payer: COMMERCIAL

## 2019-02-20 DIAGNOSIS — Z96.651 STATUS POST TOTAL RIGHT KNEE REPLACEMENT: ICD-10-CM

## 2019-02-20 DIAGNOSIS — M17.11 PRIMARY OSTEOARTHRITIS OF RIGHT KNEE: Primary | ICD-10-CM

## 2019-02-20 PROCEDURE — 97140 MANUAL THERAPY 1/> REGIONS: CPT | Performed by: PHYSICAL THERAPIST

## 2019-02-20 PROCEDURE — 97112 NEUROMUSCULAR REEDUCATION: CPT | Performed by: PHYSICAL THERAPIST

## 2019-02-20 PROCEDURE — 97110 THERAPEUTIC EXERCISES: CPT | Performed by: PHYSICAL THERAPIST

## 2019-02-20 NOTE — PROGRESS NOTES
Daily Note     Today's date: 2019  Patient name: Gabriele Esposito  : 1965  MRN: [id number]  Referring provider: Todd Roman MD  Dx:   Encounter Diagnosis     ICD-10-CM    1  Primary osteoarthritis of right knee M17 11    2  Status post total right knee replacement Z96 651        Start Time: 844  Stop Time: 933  Total time in clinic (min): 49 minutes    Subjective:  "I couldn't sleep last night I had a lot of pain  The knee doesn't click when I'm walking on it"      Objective: See treatment diary below      Assessment: Tolerated treatment well  Patient would benefit from continued PT  Patient continues to see increase in knee flexion PROM  Extension is still stagnant at this point  We have continued to increase PREs as able  Continue to focus on knee extension and further increase in function  Plan: Progress treatment as tolerated          Diagnosis: Right TKR    Precautions:    Manual Therapy    R knee PROM RT, PT RT, PT  RO, PTA  RT, PT   Patellar mobilizations        PROM     Hamstring / Gastroc S                   Exercise Diary         Bike   5'  5'  5'   Gastroc Stretch         HS Stretch         Heel Raises        Heel Slides 10x   15x  15x   Extensionater         TKE     On [place]: 10#, 20x     Quad set  10x       Mini squats     2x10     Lateral step downs      4in, 8x discomfort     Weight shifts        Leg Press  110# 2x10 w/ eccentric lowering  70# 6x10    Step Ups        SLR 2 5# 2x10 3# 3x10      SL Hip ABD 2 5# 2x10 3# 3x10      ClamShells  GTB 3x10              Review of FOTO / Re-Evaluation     RT, PT                           ROM  PROM:    Flex:  120 deg  EXT:  Lack 5 degrees PROM   Flex:  123 degrees  PROM:    Flex:  115  EXT:  Lack 3 degrees    Patient education        Modalities        CP PRN

## 2019-02-21 ENCOUNTER — APPOINTMENT (OUTPATIENT)
Dept: PHYSICAL THERAPY | Facility: CLINIC | Age: 54
End: 2019-02-21
Payer: COMMERCIAL

## 2019-02-22 ENCOUNTER — OFFICE VISIT (OUTPATIENT)
Dept: PHYSICAL THERAPY | Facility: CLINIC | Age: 54
End: 2019-02-22
Payer: COMMERCIAL

## 2019-02-22 DIAGNOSIS — M17.11 PRIMARY OSTEOARTHRITIS OF RIGHT KNEE: Primary | ICD-10-CM

## 2019-02-22 DIAGNOSIS — Z96.651 STATUS POST TOTAL RIGHT KNEE REPLACEMENT: ICD-10-CM

## 2019-02-22 DIAGNOSIS — M17.12 PRIMARY OSTEOARTHRITIS OF LEFT KNEE: ICD-10-CM

## 2019-02-22 PROCEDURE — 97140 MANUAL THERAPY 1/> REGIONS: CPT | Performed by: PHYSICAL MEDICINE & REHABILITATION

## 2019-02-22 PROCEDURE — 97110 THERAPEUTIC EXERCISES: CPT | Performed by: PHYSICAL MEDICINE & REHABILITATION

## 2019-02-22 PROCEDURE — 97112 NEUROMUSCULAR REEDUCATION: CPT | Performed by: PHYSICAL MEDICINE & REHABILITATION

## 2019-02-22 NOTE — PROGRESS NOTES
Daily Note     Today's date: 2019  Patient name: Vadim Chávez  : 1965  MRN: [id number]  Referring provider: David Bliss MD  Dx:   Encounter Diagnosis     ICD-10-CM    1  Primary osteoarthritis of right knee M17 11    2  Status post total right knee replacement Z96 651    3  Primary osteoarthritis of left knee M17 12                   Subjective: Yakov Aragon " reported "I am still having a lot of pain at night "      Objective: See treatment diary below  Diagnosis: Right TKR    Precautions:    Manual Therapy 2019   R knee PROM RT, PT RT, PT JR   RT, PT   Patellar mobilizations   JR     PROM     Hamstring / Gastroc S                   Exercise Diary         Bike   5' 5'  5'   Gastroc Stretch         HS Stretch         Heel Raises        Heel Slides 10x  15x   15x   Extensionater         TKE          Quad set  10x       Mini squats          Lateral step downs           Weight shifts        Leg Press  110# 2x10 w/ eccentric lowering 120# 3x10 with eccentric lowering     Step Ups        SLR 2 5# 2x10 3# 3x10 4# 3x10     SL Hip ABD 2 5# 2x10 3# 3x10 4# 3x10     ClamShells  GTB 3x10 GTB 3x10             Review of FOTO / Re-Evaluation     RT, PT   TKE   GRN 3x10 5"                     ROM  PROM:    Flex:  120 deg  EXT:  Lack 5 degrees PROM   Flex:  123 degrees PROM   Flex: 121   Ext: 3 PROM:    Flex:  115  EXT:  Lack 3 degrees    Patient education        Modalities        CP PRN                             Assessment: Tolerated treatment well  Patient would benefit from continued PT  Jillian Henao was able to add TKE to his therapy program which shows progress towards his goals  He continues to report notable pain with extension based exercises  However, during today's session he did exhibit an increase in extension ROM  Plan: Continue per plan of care

## 2019-02-25 ENCOUNTER — OFFICE VISIT (OUTPATIENT)
Dept: PHYSICAL THERAPY | Facility: CLINIC | Age: 54
End: 2019-02-25
Payer: COMMERCIAL

## 2019-02-25 DIAGNOSIS — M17.12 PRIMARY OSTEOARTHRITIS OF LEFT KNEE: ICD-10-CM

## 2019-02-25 DIAGNOSIS — Z96.651 STATUS POST TOTAL RIGHT KNEE REPLACEMENT: ICD-10-CM

## 2019-02-25 DIAGNOSIS — M17.11 PRIMARY OSTEOARTHRITIS OF RIGHT KNEE: Primary | ICD-10-CM

## 2019-02-25 PROCEDURE — 97110 THERAPEUTIC EXERCISES: CPT

## 2019-02-25 PROCEDURE — 97112 NEUROMUSCULAR REEDUCATION: CPT

## 2019-02-25 PROCEDURE — 97140 MANUAL THERAPY 1/> REGIONS: CPT

## 2019-02-25 NOTE — PROGRESS NOTES
Daily Note     Today's date: 2019  Patient name: Paulie Irving  : 1965  MRN: [id number]  Referring provider: Kristi Crenshaw MD  Dx:   Encounter Diagnosis     ICD-10-CM    1  Primary osteoarthritis of right knee M17 11    2  Status post total right knee replacement Z96 651    3  Primary osteoarthritis of left knee M17 12        Start Time: 1805  Stop Time: 1850  Total time in clinic (min): 45 minutes    Subjective: Patient reports that he continues to have "4/10" pain which occasionally wakes him at night  Patient has been ambulating without a cane  Objective: See treatment diary below  Diagnosis: Right TKR    Precautions:    Manual Therapy    R knee PROM RT, PT RT, PT JR  JM RT, PT   Patellar mobilizations   JR     PROM     Hamstring / Gastroc S                   Exercise Diary         Bike   5' 5' 5' 5'   Gastroc Stretch         HS Stretch         Heel Raises        Heel Slides 10x  15x   15x   Extensionater         TKE          Quad set  10x       Mini squats          Lateral step downs           Weight shifts        Leg Press  110# 2x10 w/ eccentric lowering 120# 3x10 with eccentric lowering 120# 3x10 with eccentric lowering    Step Ups        SLR 2 5# 2x10 3# 3x10 4# 3x10 4# 3x10    SL Hip ABD 2 5# 2x10 3# 3x10 4# 3x10 3# 3x10    ClamShells  GTB 3x10 GTB 3x10 GTB 3x10            Review of FOTO / Re-Evaluation     RT, PT   TKE   GRN 3x10 5" GRN 3x10 5"                    ROM  PROM:    Flex:  120 deg  EXT:  Lack 5 degrees PROM   Flex:  123 degrees PROM   Flex: 121   Ext: 3 PROM  Flex ~125°    Patient education        Modalities        CP PRN                           Assessment: Tolerated treatment well  Patient would benefit from continued PT  Patient demonstrated pain limiting flex and ext noted during PROM  Decreased S/L abd weight secondary to patient discomfort  Plan: Continue per plan of care

## 2019-02-27 ENCOUNTER — OFFICE VISIT (OUTPATIENT)
Dept: PHYSICAL THERAPY | Facility: CLINIC | Age: 54
End: 2019-02-27
Payer: COMMERCIAL

## 2019-02-27 DIAGNOSIS — M17.11 PRIMARY OSTEOARTHRITIS OF RIGHT KNEE: Primary | ICD-10-CM

## 2019-02-27 DIAGNOSIS — Z96.651 STATUS POST TOTAL RIGHT KNEE REPLACEMENT: ICD-10-CM

## 2019-02-27 DIAGNOSIS — M17.12 PRIMARY OSTEOARTHRITIS OF LEFT KNEE: ICD-10-CM

## 2019-02-27 PROCEDURE — 97112 NEUROMUSCULAR REEDUCATION: CPT | Performed by: PHYSICAL THERAPIST

## 2019-02-27 PROCEDURE — 97110 THERAPEUTIC EXERCISES: CPT | Performed by: PHYSICAL THERAPIST

## 2019-02-27 PROCEDURE — 97140 MANUAL THERAPY 1/> REGIONS: CPT | Performed by: PHYSICAL THERAPIST

## 2019-02-27 NOTE — PROGRESS NOTES
Daily Note     Today's date: 2019  Patient name: Jazmín Arevalo  : 1965  MRN: [id number]  Referring provider: Caitlin Tsai MD  Dx:   Encounter Diagnosis     ICD-10-CM    1  Primary osteoarthritis of right knee M17 11    2  Status post total right knee replacement Z96 651    3  Primary osteoarthritis of left knee M17 12        Start Time: 1626  Stop Time: 1715  Total time in clinic (min): 49 minutes    Subjective: "I worked a half day today  I want ot be able to walk out on the town, but my knee starts to hurt after a while"      Objective: See treatment diary below      Assessment: Tolerated treatment well  Patient would benefit from continued PT  Patient continues to do well with physical therapy  The patient has good knee flexion ROM but is still lacking several degrees of extension  Continue to progress strength and gait ability as able  Plan: Progress treatment as tolerated          Diagnosis: Right TKR    Precautions:    Manual Therapy     R knee PROM  RT, PT JR  JM RT, PT   Patellar mobilizations   JR     PROM                        Exercise Diary         Bike   5' 5' 5' 5'   Gastroc Stretch         HS Stretch         Heel Raises        Heel Slides   15x   20x   Extensionater         TKE          Quad set         Mini squats          Lateral step downs           Weight shifts        Leg Press  110# 2x10 w/ eccentric lowering 120# 3x10 with eccentric lowering 120# 3x10 with eccentric lowering 125# 3x10 with eccentric lowering   Step Ups        SLR  3# 3x10 4# 3x10 4# 3x10 4# 3x10   SL Hip ABD  3# 3x10 4# 3x10 3# 3x10 4# 3x10   ClamShells  GTB 3x10 GTB 3x10 GTB 3x10 BTB 3x10   Mini Squats     x10   Review of FOTO / Re-Evaluation        TKE   GRN 3x10 5" GRN 3x10 5"                    ROM   PROM   Flex:  123 degrees PROM   Flex: 121   Ext: 3 PROM  Flex ~125° PROM   Flex:  126  Ext:  4   Patient education        Modalities        CP PRN

## 2019-03-01 ENCOUNTER — OFFICE VISIT (OUTPATIENT)
Dept: PHYSICAL THERAPY | Facility: CLINIC | Age: 54
End: 2019-03-01
Payer: COMMERCIAL

## 2019-03-01 DIAGNOSIS — M17.11 PRIMARY OSTEOARTHRITIS OF RIGHT KNEE: Primary | ICD-10-CM

## 2019-03-01 DIAGNOSIS — Z96.651 STATUS POST TOTAL RIGHT KNEE REPLACEMENT: ICD-10-CM

## 2019-03-01 DIAGNOSIS — M17.12 PRIMARY OSTEOARTHRITIS OF LEFT KNEE: ICD-10-CM

## 2019-03-01 PROCEDURE — 97110 THERAPEUTIC EXERCISES: CPT

## 2019-03-01 PROCEDURE — 97112 NEUROMUSCULAR REEDUCATION: CPT

## 2019-03-01 PROCEDURE — 97140 MANUAL THERAPY 1/> REGIONS: CPT

## 2019-03-01 NOTE — PROGRESS NOTES
Daily Note     Today's date: 3/1/2019  Patient name: Luis Murillo  : 1965  MRN: [id number]  Referring provider: Brianna Emanuel MD  Dx:   Encounter Diagnosis     ICD-10-CM    1  Primary osteoarthritis of right knee M17 11    2  Status post total right knee replacement Z96 651    3  Primary osteoarthritis of left knee M17 12                   Subjective: Patient reports that, "I feel great today  Last night was not so great  I couldn't sleep and woke up every hour  I don't know what I did " Patient reports that he will be returning to work part time next week  Objective: See treatment diary below      Assessment: Tolerated treatment well  Patient exhibited good technique with therapeutic exercises and would benefit from continued PT  Patient performed all exercises without increased pain  He experienced increased "achiness" with manual extension PROM; but exhibited improved extension motion  Plan: Continue per plan of care       Diagnosis: Right TKR    Precautions:    Manual Therapy 3/1  2/22 2/25 2/27   R knee PROM RO, PTA   JR  JM RT, PT   Patellar mobilizations   JR     PROM                        Exercise Diary         Bike  5'  5' 5' 5'   Gastroc Stretch         HS Stretch         Heel Raises        Heel Slides 20x   15x   20x   Extensionater         TKE          Quad set         Mini squats          Lateral step downs           Weight shifts        Leg Press 140# 3x10 with eccentric lowering   120# 3x10 with eccentric lowering 120# 3x10 with eccentric lowering 125# 3x10 with eccentric lowering   Step Ups        SLR 4# 3x10  4# 3x10 4# 3x10 4# 3x10   SL Hip ABD 4# 3x10  4# 3x10 3# 3x10 4# 3x10   ClamShells BTB, 3x10   GTB 3x10 GTB 3x10 BTB 3x10   Mini Squats 10x     x10   Review of FOTO / Re-Evaluation        TKE   GRN 3x10 5" GRN 3x10 5"                    ROM  PROM:   Flex: 121 deg   Ext: 1 deg   PROM   Flex: 121   Ext: 3 PROM  Flex ~125° PROM   Flex:  126  Ext:  4   Patient education Modalities        CP PRN

## 2019-03-04 ENCOUNTER — OFFICE VISIT (OUTPATIENT)
Dept: PHYSICAL THERAPY | Facility: CLINIC | Age: 54
End: 2019-03-04
Payer: COMMERCIAL

## 2019-03-04 DIAGNOSIS — M17.11 PRIMARY OSTEOARTHRITIS OF RIGHT KNEE: Primary | ICD-10-CM

## 2019-03-04 DIAGNOSIS — Z96.651 STATUS POST TOTAL RIGHT KNEE REPLACEMENT: ICD-10-CM

## 2019-03-04 DIAGNOSIS — M17.12 PRIMARY OSTEOARTHRITIS OF LEFT KNEE: ICD-10-CM

## 2019-03-04 PROCEDURE — 97112 NEUROMUSCULAR REEDUCATION: CPT

## 2019-03-04 PROCEDURE — 97110 THERAPEUTIC EXERCISES: CPT

## 2019-03-04 PROCEDURE — 97140 MANUAL THERAPY 1/> REGIONS: CPT

## 2019-03-04 NOTE — PROGRESS NOTES
Daily Note     Today's date: 3/4/2019  Patient name: Kristen Walker  : 1965  MRN: [id number]  Referring provider: Lolita Tatum MD  Dx:   Encounter Diagnosis     ICD-10-CM    1  Primary osteoarthritis of right knee M17 11    2  Status post total right knee replacement Z96 651    3  Primary osteoarthritis of left knee M17 12                   Subjective: Patient reports that he had increased pain over the weekend, and that "it was constant"  He reports that he worked today, but did not have pain with work  Objective: See treatment diary below      Assessment: Tolerated treatment well  Patient exhibited good technique with therapeutic exercises  Decreased resistance with SLR secondary to increased soreness  Added functional tasks, minimal cuing needed for correct form  Patient continues to lack extension range of motion  Will continue to progress as able  Plan: Continue per plan of care       Diagnosis: Right TKR    Precautions:    Manual Therapy 3/1 3/4  2/25 2/27   R knee PROM RO, PTA  RT, PT    JM RT, PT   Patellar mobilizations        PROM                        Exercise Diary         Bike  5' 5 mins   5' 5'   Gastroc Stretch         HS Stretch         Heel Raises        Heel Slides 20x  20x     20x   Extensionater         TKE          [place] taps   5x       Lateral step downs    6 in, 20x        Weight shifts        Leg Press 140# 3x10 with eccentric lowering    120# 3x10 with eccentric lowering 125# 3x10 with eccentric lowering   Step Up and Overs   6 in, 10x       SLR 4# 3x10 3#, 3x10   4# 3x10 4# 3x10   SL Hip ABD 4# 3x10 3#, 3x10   3# 3x10 4# 3x10   ClamShells BTB, 3x10    GTB 3x10 BTB 3x10   Mini Squats 10x  10x    x10   Review of FOTO / Re-Evaluation        TKE    GRN 3x10 5"                    ROM  PROM:   Flex: 121 deg   Ext: 1 deg  PROM:   Flex: 121 deg   Ext: 4 deg   PROM  Flex ~125° PROM   Flex:  126  Ext:  4   Patient education        Modalities        CP PRN

## 2019-03-07 ENCOUNTER — OFFICE VISIT (OUTPATIENT)
Dept: PHYSICAL THERAPY | Facility: CLINIC | Age: 54
End: 2019-03-07
Payer: COMMERCIAL

## 2019-03-07 DIAGNOSIS — M17.12 PRIMARY OSTEOARTHRITIS OF LEFT KNEE: ICD-10-CM

## 2019-03-07 DIAGNOSIS — Z96.651 STATUS POST TOTAL RIGHT KNEE REPLACEMENT: ICD-10-CM

## 2019-03-07 DIAGNOSIS — M17.11 PRIMARY OSTEOARTHRITIS OF RIGHT KNEE: Primary | ICD-10-CM

## 2019-03-07 PROCEDURE — 97110 THERAPEUTIC EXERCISES: CPT

## 2019-03-07 PROCEDURE — 97140 MANUAL THERAPY 1/> REGIONS: CPT

## 2019-03-07 NOTE — PROGRESS NOTES
Daily Note     Today's date: 3/7/2019  Patient name: Aziza Barrera  : 1965  MRN: [id number]  Referring provider: Raiza Fox MD  Dx:   Encounter Diagnosis     ICD-10-CM    1  Primary osteoarthritis of right knee M17 11    2  Status post total right knee replacement Z96 651    3  Primary osteoarthritis of left knee M17 12                   Subjective: Patient reports that his knee pain is a "3/10'  He reports that he has been able to descend stairs reciprocally at home  Objective: See treatment diary below      Assessment: Tolerated treatment well  Patient exhibited good technique with therapeutic exercises  He demonstrates improved knee extension range of motion this visit  Held sidelying abduction as patient believes that this aggravates his pain  Will continue to challenge as able  Plan: Continue per plan of care         Diagnosis: Right TKR    Precautions:    Manual Therapy 3/1 3/4 3/7  2/27   R knee PROM RO, PTA  RT, PT  RO, PTA   RT, PT   Patellar mobilizations        PROM                        Exercise Diary         Bike  5' 5 mins  5 mins   5'   Gastroc Stretch         HS Stretch         Heel Raises        Heel Slides 20x  20x  20x    20x   Extensionater         TKE          Cone taps   5x  10x      Lateral step downs    6 in, 20x        Weight shifts        Leg Press 140# 3x10 with eccentric lowering   140#, 3x10   125# 3x10 with eccentric lowering   Step Up and Overs   6 in, 10x       SLR 4# 3x10 3#, 3x10  3#, 3x10   4# 3x10   SL Hip ABD 4# 3x10 3#, 3x10  Held   4# 3x10   ClamShells BTB, 3x10     BTB 3x10   Mini Squats 10x  10x  15x   x10   Review of FOTO / Re-Evaluation        TKE                        ROM  PROM:   Flex: 121 deg   Ext: 1 deg  PROM:   Flex: 121 deg   Ext: 4 deg  PROM:   Flex: 124 deg   Ext: 0 deg   PROM   Flex:  126  Ext:  4   Patient education        Modalities        CP PRN

## 2019-03-11 ENCOUNTER — OFFICE VISIT (OUTPATIENT)
Dept: PHYSICAL THERAPY | Facility: CLINIC | Age: 54
End: 2019-03-11
Payer: COMMERCIAL

## 2019-03-11 DIAGNOSIS — M17.12 PRIMARY OSTEOARTHRITIS OF LEFT KNEE: ICD-10-CM

## 2019-03-11 DIAGNOSIS — M17.11 PRIMARY OSTEOARTHRITIS OF RIGHT KNEE: Primary | ICD-10-CM

## 2019-03-11 DIAGNOSIS — Z96.651 STATUS POST TOTAL RIGHT KNEE REPLACEMENT: ICD-10-CM

## 2019-03-11 PROCEDURE — 97112 NEUROMUSCULAR REEDUCATION: CPT

## 2019-03-11 PROCEDURE — 97110 THERAPEUTIC EXERCISES: CPT

## 2019-03-11 NOTE — PROGRESS NOTES
Daily Note     Today's date: 3/11/2019  Patient name: Allison Laurent  : 1965  MRN: [id number]  Referring provider: Flores Laura MD  Dx:   Encounter Diagnosis     ICD-10-CM    1  Primary osteoarthritis of right knee M17 11    2  Status post total right knee replacement Z96 651    3  Primary osteoarthritis of left knee M17 12                   Subjective: Patient reports, "my knee hurts all the time, but its getting better"  Objective: See treatment diary below      Assessment: Tolerated treatment well  Patient exhibited good technique with therapeutic exercises and would benefit from continued PT  Patient was able to complete all given exercises today, despite knee discomfort  He exhibits improved knee extension motion today  Added lunges and functional work tasks with minimal discomfort  Will continue to progress and challenge patient as able  Plan: Continue per plan of care       Diagnosis: Right TKR    Precautions:    Manual Therapy 3/1 3/4 3/7 3/11    R knee PROM RO, PTA  RT, PT  RO, PTA   held     Patellar mobilizations        PROM                        Exercise Diary         Bike  5' 5 mins  5 mins  5 mins     Gastroc Stretch         HS Stretch         Heel Raises        Heel Slides 20x  20x  20x   20x     Extensionater         Lunges     10x ea    Cone taps   5x  10x  10x     Lateral step downs    6 in, 20x    defer     Weight shifts        Leg Press 140# 3x10 with eccentric lowering   140#, 3x10  145#, 6x10     Step Up and Overs   6 in, 10x   Defer     SLR 4# 3x10 3#, 3x10  3#, 3x10  3#, 3x10     SL Hip ABD 4# 3x10 3#, 3x10  Held      ClamShells BTB, 3x10        Mini Squats 10x  10x  15x  10x     Functional work tasks    Simulated hose pull with [place] 30-40#, 20x                     Review of FOTO / Re-Evaluation        ROM  PROM:   Flex: 121 deg   Ext: 1 deg  PROM:   Flex: 121 deg   Ext: 4 deg  PROM:   Flex: 124 deg   Ext: 0 deg  ROM:   AAROM  Flex: 124     Ext:   AROM: 2 deg  PROM: 0 deg       Patient education        Modalities        CP PRN

## 2019-03-14 ENCOUNTER — EVALUATION (OUTPATIENT)
Dept: PHYSICAL THERAPY | Facility: CLINIC | Age: 54
End: 2019-03-14
Payer: COMMERCIAL

## 2019-03-14 DIAGNOSIS — M17.11 PRIMARY OSTEOARTHRITIS OF RIGHT KNEE: Primary | ICD-10-CM

## 2019-03-14 DIAGNOSIS — Z96.651 STATUS POST TOTAL RIGHT KNEE REPLACEMENT: ICD-10-CM

## 2019-03-14 PROCEDURE — 97110 THERAPEUTIC EXERCISES: CPT | Performed by: PHYSICAL THERAPIST

## 2019-03-14 PROCEDURE — 97112 NEUROMUSCULAR REEDUCATION: CPT | Performed by: PHYSICAL THERAPIST

## 2019-03-14 NOTE — PROGRESS NOTES
PT Re-Evaluation     Today's date: 3/14/2019  Patient name: Jazmín Arevalo  : 1965  MRN: [id number]  Referring provider: Caitlin Tsai MD  Dx:   Encounter Diagnosis     ICD-10-CM    1  Primary osteoarthritis of right knee M17 11    2  Status post total right knee replacement Z96 651        Start Time: 1530  Stop Time: 1608  Total time in clinic (min): 38 minutes    Assessment  Assessment details: Patient is a 51 yo male who has been consistent with attending and participating in skilled physical therapy  The patient has seen decreases in his pain as well as significant increases in his knee PROM  The patient has been able to get back to work on a half day basis recently  He feels he is ready to get back to work full-time  The patient is able to achieve 0 degrees knee extension PROM  The patient is to see his ortho on 3/15/19  The patient will benefit from further skilled physical therapy sessions to further address his strength and to finalize his HEP  Impairments: abnormal gait, impaired physical strength, lacks appropriate home exercise program, pain with function and poor posture     Symptom irritability: highUnderstanding of Dx/Px/POC: good   Prognosis: fair    Goals  ST  Patient will report 25% decrease in pain in 4 weeks  - MET  2  Patient will demonstrate 25% improvement in ROM in 4 weeks  - MET   3  Patient will demonstrate 1/2 grade improvement in strength in 4 weeks - MET    LT  Patient will be able to perform IADLS without restriction or pain by discharge - PARTIALLY MET   2  Patient will be independent in HEP by discharge - PARTIALLY MET   3   Patient will be able to return to recreational/work duties without restriction or pain by discharge - PARTIALLY MET       Plan  Patient would benefit from: PT eval and skilled PT  Referral necessary: No  Planned modality interventions: cryotherapy and thermotherapy: hydrocollator packs  Planned therapy interventions: IADL retraining, body mechanics training, flexibility, functional ROM exercises, home exercise program, neuromuscular re-education, manual therapy, postural training, strengthening, stretching, therapeutic activities, therapeutic exercise and joint mobilization  Frequency: 2x week  Duration in weeks: 2  Treatment plan discussed with: patient        Subjective Evaluation    History of Present Illness  Date of surgery: 2019  Mechanism of injury: surgery  Mechanism of injury: Patient reports he has improved "100%" since starting physical therapy  He notes he has improved with walking, stairs, sitting down, standing, up and "everything"  He notes his most difficulty with prolonged standing over 5+ minutes              Not a recurrent problem   Quality of life: good    Pain  Current pain ratin  At best pain ratin  At worst pain ratin  Quality: sharp ("it's a pain that's there and grinding")  Aggravating factors: standing  Progression: no change    Social Support  Steps to enter house: yes (4 TEMI)  Stairs in house: yes (full flight)   Lives in: multiple-level home  Lives with: spouse    Employment status: working ([employer])  Hand dominance: right    Patient Goals  Patient goals for therapy: decreased pain, decreased edema, improved balance, increased motion, increased strength, independence with ADLs/IADLs, return to sport/leisure activities and return to work  Patient goal: "get back to work "        Objective     Neurological Testing     Sensation     Knee   Left Knee   Intact: light touch    Right Knee   Intact: light touch     Active Range of Motion     Right Knee   Flexion: 123 degrees   Extension: -1 degrees with pain    Passive Range of Motion   Left Knee   Flexion: 131 degrees     Right Knee   Flexion: 126 degrees   Extension: 8 degrees     Strength/Myotome Testing     Left Knee   Flexion: 4+  Extension: 4+  Quadriceps contraction: good    Right Knee   Flexion: 3+  Extension: 3+  Quadriceps contraction: fair      Flowsheet Rows      Most Recent Value   PT/OT G-Codes   Current Score  69   Projected Score  60   Assessment Type  Re-evaluation            Diagnosis: Right TKR    Precautions:    Manual Therapy 3/1 3/4 3/7 3/11 3/14   R knee PROM RO, PTA  RT, PT  RO, PTA   held     Patellar mobilizations        PROM                        Exercise Diary         Bike  5' 5 mins  5 mins  5 mins  5 mins   Gastroc Stretch         HS Stretch         Heel Raises        Heel Slides 20x  20x  20x   20x  20x   Extensionater         Lunges     10x ea    Cone taps   5x  10x  10x     Lateral step downs    6 in, 20x    defer     Weight shifts        Leg Press 140# 3x10 with eccentric lowering   140#, 3x10  145#, 6x10     Step Up and Overs   6 in, 10x   Defer     SLR 4# 3x10 3#, 3x10  3#, 3x10  3#, 3x10     SL Hip ABD 4# 3x10 3#, 3x10  Held      ClamShells BTB, 3x10        Mini Squats 10x  10x  15x  10x     Functional work tasks    Simulated hose pull with [place] 30-40#, 20x                     Review of FOTO / Re-Evaluation     RT, PT   ROM  PROM:   Flex: 121 deg   Ext: 1 deg  PROM:   Flex: 121 deg   Ext: 4 deg  PROM:   Flex: 124 deg   Ext: 0 deg  ROM:   AAROM  Flex: 124     Ext:   AROM: 2 deg  PROM: 0 deg    Flexion AROM 123 degrees    Extension AROM 1 degree  PROM 0 degrees   Patient education        Modalities        CP PRN

## 2019-03-15 ENCOUNTER — OFFICE VISIT (OUTPATIENT)
Dept: OBGYN CLINIC | Facility: MEDICAL CENTER | Age: 54
End: 2019-03-15
Payer: COMMERCIAL

## 2019-03-15 VITALS
SYSTOLIC BLOOD PRESSURE: 153 MMHG | BODY MASS INDEX: 29.53 KG/M2 | DIASTOLIC BLOOD PRESSURE: 82 MMHG | WEIGHT: 218 LBS | HEIGHT: 72 IN | HEART RATE: 94 BPM

## 2019-03-15 DIAGNOSIS — M17.11 PRIMARY OSTEOARTHRITIS OF RIGHT KNEE: ICD-10-CM

## 2019-03-15 DIAGNOSIS — G89.29 CHRONIC PAIN OF RIGHT KNEE: ICD-10-CM

## 2019-03-15 DIAGNOSIS — M17.12 PRIMARY OSTEOARTHRITIS OF LEFT KNEE: ICD-10-CM

## 2019-03-15 DIAGNOSIS — G89.29 CHRONIC PAIN OF LEFT KNEE: ICD-10-CM

## 2019-03-15 DIAGNOSIS — M25.562 CHRONIC PAIN OF LEFT KNEE: ICD-10-CM

## 2019-03-15 DIAGNOSIS — M25.561 CHRONIC PAIN OF RIGHT KNEE: ICD-10-CM

## 2019-03-15 DIAGNOSIS — Z96.651 STATUS POST RIGHT KNEE REPLACEMENT: Primary | ICD-10-CM

## 2019-03-15 PROCEDURE — 20610 DRAIN/INJ JOINT/BURSA W/O US: CPT | Performed by: ORTHOPAEDIC SURGERY

## 2019-03-15 PROCEDURE — 99212 OFFICE O/P EST SF 10 MIN: CPT | Performed by: ORTHOPAEDIC SURGERY

## 2019-03-15 RX ORDER — CEPHALEXIN 500 MG/1
500 CAPSULE ORAL
Qty: 40 CAPSULE | Refills: 0 | Status: SHIPPED | OUTPATIENT
Start: 2019-03-15 | End: 2019-03-16

## 2019-03-15 RX ORDER — LIDOCAINE HYDROCHLORIDE 10 MG/ML
2 INJECTION, SOLUTION INFILTRATION; PERINEURAL
Status: COMPLETED | OUTPATIENT
Start: 2019-03-15 | End: 2019-03-15

## 2019-03-15 RX ORDER — BETAMETHASONE SODIUM PHOSPHATE AND BETAMETHASONE ACETATE 3; 3 MG/ML; MG/ML
12 INJECTION, SUSPENSION INTRA-ARTICULAR; INTRALESIONAL; INTRAMUSCULAR; SOFT TISSUE
Status: COMPLETED | OUTPATIENT
Start: 2019-03-15 | End: 2019-03-15

## 2019-03-15 RX ADMIN — BETAMETHASONE SODIUM PHOSPHATE AND BETAMETHASONE ACETATE 12 MG: 3; 3 INJECTION, SUSPENSION INTRA-ARTICULAR; INTRALESIONAL; INTRAMUSCULAR; SOFT TISSUE at 14:38

## 2019-03-15 RX ADMIN — LIDOCAINE HYDROCHLORIDE 2 ML: 10 INJECTION, SOLUTION INFILTRATION; PERINEURAL at 14:38

## 2019-03-15 NOTE — LETTER
March 15, 2019     Patient: Basilio Hwang   YOB: 1965   Date of Visit: 3/15/2019       To Whom it May Concern:    Lizzy Lloydin is under my professional care  He was seen in my office on 3/15/2019  The patient should return to work, 3/18/19, with restrictions of no field work  If you have any questions or concerns, please don't hesitate to call           Sincerely,          Gayla Subramanian MD        CC: No Recipients

## 2019-03-15 NOTE — PROGRESS NOTES
Assessment:  No diagnosis found  Plan:  The patient presents 8 weeks status post right TKA  Today he has moderate right knee pain and left knee pain  His right knee has good range of potion with mild effusion  The left knee has varus alignment with medial joint line tenderness  He is provided with a left knee steroid injection  He is provided with antibiotics for prior to dentist procedures  He should continue physical therapy for the right knee  The patient can return to work with restrictions of no field work  He can return in 4 weeks  To do next visit:  No follow-ups on file  The above stated was discussed in layman's terms and the patient expressed understanding  All questions were answered to the patient's satisfaction  Scribe Attestation    I,:   Sharon Mcdowell am acting as a scribe while in the presence of the attending physician :        I,:   Dora Sarabia MD personally performed the services described in this documentation    as scribed in my presence :              Subjective:   Zaid Vivar is a 52 y o  male who presents 8 weeks status post right TKA, 1/21/19  Today he complains of constant generalized right knee pain  He rates his pain at 2/10 and 5/10 at its worse  Certain sleeping positions aggravate  Change of position alleviates  He will use advil at night with some benefit  He is currently in physical therapy with good ROM  He denies fever or chills  The patient also complains of generalized left knee pain  Most activity aggravates  Sitting alleviates          Review of systems negative unless otherwise specified in HPI    Past Medical History:   Diagnosis Date    Bronchospasm     last assessed 6/14/2016    Depression     Disc degeneration, lumbar     last assessed 4/7/2016    Erectile dysfunction     Hyperlipidemia     IFG (impaired fasting glucose)     last assessed 7/30/2014    Palpitations     last assessed 4/4/2013    Primary osteoarthritis of knees, bilateral     last assessed 6/27/2017    Right inguinal hernia     last assessed 9/12/2017    Spondylosis of lumbar region without myelopathy or radiculopathy     last assessed 4/7/2016       Past Surgical History:   Procedure Laterality Date    KNEE ARTHROSCOPY      bilateral    [place] REPAIR ING HERNIA,5+Y/O,REDUCIBL Right 10/26/2017    Procedure: REPAIR HERNIA INGUINAL;  Surgeon: Estephania Limon MD;  Location: [facility];  Service: General    [place] TOTAL KNEE ARTHROPLASTY Right 1/21/2019    Procedure: ARTHROPLASTY KNEE TOTAL;  Surgeon: Erik Al MD;  Location: [facility];  Service: Orthopedics    TOOTH EXTRACTION         Family History   Problem Relation Age of Onset    No Known Problems Mother     Diabetes Father     No Known Problems Sister     Coronary artery disease Brother        Social History     Occupational History    Not on file   Tobacco Use    Smoking status: Former Smoker    Smokeless tobacco: Never Used    Tobacco comment: quit 11/2018   Substance and Sexual Activity    Alcohol use: Yes     Comment: weekends     Drug use: No    Sexual activity: Not on file       No current outpatient medications on file      No Known Allergies         Vitals:    03/15/19 1400   BP: 153/82   Pulse: 94       Objective:  Physical exam  · General: Awake, Alert, Oriented  · Eyes: Pupils equal, round and reactive to light  · Heart: regular rate and rhythm  · Lungs: No audible wheezing  · Abdomen: soft                    Ortho Exam   Right knee:  Well healed anterior scar  ROM 0-120  Mild joint effusion  Appropriate warmth and redness  Extensor mechanism intact  Calf soft and supple    Left knee:  Tenderness over medial joint line  Normal ROM with crepitus  Varus alignment  Calf soft and supple    NVID      Diagnostics, reviewed and taken today if performed as documented:    None performed      The attending physician has personally reviewed the pertinent films in PACS and interpretation is as follows:      Procedures, if performed today:    Large joint arthrocentesis: L knee  Date/Time: 3/15/2019 2:38 PM  Consent given by: patient  Site marked: site marked  Supporting Documentation  Indications: pain   Procedure Details  Location: knee - L knee  Preparation: Patient was prepped and draped in the usual sterile fashion  Needle size: 22 G  Ultrasound guidance: no  Approach: anterolateral  Medications administered: 12 mg betamethasone acetate-betamethasone sodium phosphate 6 (3-3) mg/mL; 2 mL lidocaine 1 % (2 ml bupivacaine 0 5% injection)    Patient tolerance: patient tolerated the procedure well with no immediate complications  Dressing:  Sterile dressing applied            Portions of the record may have been created with voice recognition software   Occasional wrong word or "sound a like" substitutions may have occurred due to the inherent limitations of voice recognition software   Read the chart carefully and recognize, using context, where substitutions have occurred

## 2019-03-21 ENCOUNTER — OFFICE VISIT (OUTPATIENT)
Dept: PHYSICAL THERAPY | Facility: CLINIC | Age: 54
End: 2019-03-21
Payer: COMMERCIAL

## 2019-03-21 DIAGNOSIS — M17.12 PRIMARY OSTEOARTHRITIS OF LEFT KNEE: ICD-10-CM

## 2019-03-21 DIAGNOSIS — Z96.651 STATUS POST TOTAL RIGHT KNEE REPLACEMENT: ICD-10-CM

## 2019-03-21 DIAGNOSIS — M17.11 PRIMARY OSTEOARTHRITIS OF RIGHT KNEE: Primary | ICD-10-CM

## 2019-03-21 PROCEDURE — 97140 MANUAL THERAPY 1/> REGIONS: CPT

## 2019-03-21 PROCEDURE — 97112 NEUROMUSCULAR REEDUCATION: CPT

## 2019-03-21 PROCEDURE — 97110 THERAPEUTIC EXERCISES: CPT

## 2019-03-21 NOTE — PROGRESS NOTES
Daily Note     Today's date: 3/21/2019  Patient name: Jose Nicole  : 1965  MRN: [id number]  Referring provider: Kimberly Olea MD  Dx:   Encounter Diagnosis     ICD-10-CM    1  Primary osteoarthritis of right knee M17 11    2  Status post total right knee replacement Z96 651    3  Primary osteoarthritis of left knee M17 12               3:45 - 4:30 1:1 PTA     Subjective: Pt states he has no pain today  Pt states he started back to work and is feeling pretty good  Pt states he saw his surgeon  Friday and he is pleased with what he has been doing  Objective: See treatment diary below  Diagnosis: Right TKR    Precautions:    Manual Therapy 3/21 3/4 3/7 3/11 3/14   R knee PROM CF RT, PT  RO, PTA   held     Patellar mobilizations        PROM                        Exercise Diary         Bike  5' 5 mins  5 mins  5 mins  5 mins   Gastroc Stretch         HS Stretch         Heel Raises        Heel Slides -- 20x  20x   20x  20x   Extensionater         Lunges     10x ea    Cone taps   5x  10x  10x     Lateral step downs    6 in, 20x    defer     Weight shifts        Leg Press 145#  5 mins   140#, 3x10  145#, 6x10     Step Up and Overs   6 in, 10x   Defer     SLR 4# 3 x10  3#, 3x10  3#, 3x10  3#, 3x10     SL Hip ABD 4# 3 x10  3#, 3x10  Held      ClamShells        Mini Squats iso hold with 10 rows x 10 reps 10x  15x  10x     Functional work tasks    Simulated hose pull with [place] 30-40#, 20x     Star drill                 Review of FOTO / Re-Evaluation     RT, PT   ROM   PROM:   Flex: 121 deg   Ext: 4 deg  PROM:   Flex: 124 deg   Ext: 0 deg  ROM:   AAROM  Flex: 124     Ext:   AROM: 2 deg  PROM: 0 deg    Flexion AROM 123 degrees    Extension AROM 1 degree  PROM 0 degrees   Patient education        Modalities        CP PRN                             Assessment: Tolerated treatment well with no increase in pain    Pt  Was challenged with iso hold squats   Pt demonstrates good passive ROM but continues to be limited with active extension  Pt demonstrate 5% quad lag with SLR d/t quad weakness  Patient demonstrated fatigue post treatment and would benefit from continued PT      Plan: Continue per plan of care

## 2019-03-25 ENCOUNTER — OFFICE VISIT (OUTPATIENT)
Dept: PHYSICAL THERAPY | Facility: CLINIC | Age: 54
End: 2019-03-25
Payer: COMMERCIAL

## 2019-03-25 DIAGNOSIS — M17.12 PRIMARY OSTEOARTHRITIS OF LEFT KNEE: ICD-10-CM

## 2019-03-25 DIAGNOSIS — M17.11 PRIMARY OSTEOARTHRITIS OF RIGHT KNEE: Primary | ICD-10-CM

## 2019-03-25 DIAGNOSIS — Z96.651 STATUS POST TOTAL RIGHT KNEE REPLACEMENT: ICD-10-CM

## 2019-03-25 PROCEDURE — 97140 MANUAL THERAPY 1/> REGIONS: CPT

## 2019-03-25 PROCEDURE — 97110 THERAPEUTIC EXERCISES: CPT

## 2019-03-25 PROCEDURE — 97112 NEUROMUSCULAR REEDUCATION: CPT

## 2019-03-25 NOTE — PROGRESS NOTES
Daily Note     Today's date: 3/25/2019  Patient name: Luis Murillo  : 1965  MRN: [id number]  Referring provider: Brianna Emanuel MD  Dx:   Encounter Diagnosis     ICD-10-CM    1  Primary osteoarthritis of right knee M17 11    2  Status post total right knee replacement Z96 651    3  Primary osteoarthritis of left knee M17 12            5:45 - 6:23  1:1 PTA        Subjective: Pt states he was sore from the iso squats but not in his knee just in his legs, like he worked hard  Objective: See treatment diary below  Diagnosis: Right TKR    Precautions:    Manual Therapy 3/21 3/25 3/7 3/11 3/14   R knee PROM CF CF RO, PTA   held     Patellar mobilizations        PROM                        Exercise Diary         Bike  5' 5 mins  5 mins  5 mins  5 mins   Gastroc Stretch         HS Stretch         Heel Raises        Heel Slides -- -- 20x   20x  20x   Extensionater         Lunges     10x ea    Cone taps    10x  10x     Lateral step downs       defer     Weight shifts        Leg Press 145#  5 mins  145# 5 mins  140#, 3x10  145#, 6x10     Step Up and Overs     Defer     SLR 4# 3 x10  4# 3 x10  3#, 3x10  3#, 3x10     SL Hip ABD 4# 3 x10  4# 3 x10  Held      ClamShells        Mini Squats iso hold with 10 rows x 10 reps iso hold with 10 rows x 10 reps 15# 15x  10x     Functional work tasks    Simulated hose pull with [place] 30-40#, 20x     Star drill   5 x               Review of FOTO / Re-Evaluation     RT, PT   ROM     PROM:   Flex: 124 deg   Ext: 0 deg  ROM:   AAROM  Flex: 124     Ext:   AROM: 2 deg  PROM: 0 deg    Flexion AROM 123 degrees    Extension AROM 1 degree  PROM 0 degrees   Patient education        Modalities        CP PRN                             Assessment: Pt progressed through exercises well with no increase in pain  Pt demonstrates good star drill form as she was able to isolate quad control and stabilizing valgus/varus control  Pt demonstrates good iso squat form as he is gaining strength  Patient demonstrated fatigue post treatment and would benefit from continued PT      Plan: Continue per plan of care

## 2019-03-28 ENCOUNTER — OFFICE VISIT (OUTPATIENT)
Dept: PHYSICAL THERAPY | Facility: CLINIC | Age: 54
End: 2019-03-28
Payer: COMMERCIAL

## 2019-03-28 DIAGNOSIS — Z96.651 STATUS POST TOTAL RIGHT KNEE REPLACEMENT: ICD-10-CM

## 2019-03-28 DIAGNOSIS — M17.11 PRIMARY OSTEOARTHRITIS OF RIGHT KNEE: Primary | ICD-10-CM

## 2019-03-28 PROCEDURE — 97112 NEUROMUSCULAR REEDUCATION: CPT

## 2019-03-28 PROCEDURE — 97110 THERAPEUTIC EXERCISES: CPT

## 2019-03-28 NOTE — PROGRESS NOTES
Daily Note     Today's date: 3/28/2019  Patient name: Melecio Child  : 1965  MRN: [id number]  Referring provider: Cyn Morgan MD  Dx:   Encounter Diagnosis     ICD-10-CM    1  Primary osteoarthritis of right knee M17 11    2  Status post total right knee replacement Z96 651                   Subjective: Patient reports that he is feeling pretty good today  He denies pain and reports that he has returned to work  Objective: See treatment diary below      Assessment: Tolerated treatment well  Patient exhibited good technique with therapeutic exercises  Held manuals today secondary to gauging for discharge  Progressed patient with strengthening; muscle fatigue noted at end of session  He demonstrates improved knee range of motion  Continue to progress as able  Plan: Continue per plan of care  Potential discharge next visit      Diagnosis: Right TKR    Precautions:    Manual Therapy 3/21 3/25 3/28   3/14   R knee PROM CF CF Held      Patellar mobilizations        PROM                        Exercise Diary         Bike  5' 5 mins  5 mins   5 mins   Gastroc Stretch         HS Stretch         Heel Raises        Heel Slides -- --   20x   Extensionater         Lunges         Cone taps         Lateral step downs          Weight shifts        Leg Press 145#  5 mins  145# 5 mins  150#, 3x20   170#, 20x  200#, 20x   240#, 20x   300#, 10x       Step Up and Overs         SLR 4# 3 x10  4# 3 x10  4#, 3x10      SL Hip ABD 4# 3 x10  4# 3 x10  4#, 3x10      ClamShells        Mini Squats iso hold with 10 rows x 10 reps iso hold with 10 rows x 10 reps 15# Iso hold with 10 rows, 10x 15#     Functional work tasks        Star drill   5 x  5x              Review of FOTO / Re-Evaluation     RT, PT   ROM     Knee ROM: 0-126 deg  Flexion AROM 123 degrees    Extension AROM 1 degree  PROM 0 degrees   Patient education        Modalities        CP PRN

## 2019-04-04 ENCOUNTER — EVALUATION (OUTPATIENT)
Dept: PHYSICAL THERAPY | Facility: CLINIC | Age: 54
End: 2019-04-04
Payer: COMMERCIAL

## 2019-04-04 DIAGNOSIS — M17.11 PRIMARY OSTEOARTHRITIS OF RIGHT KNEE: Primary | ICD-10-CM

## 2019-04-04 DIAGNOSIS — Z96.651 STATUS POST TOTAL RIGHT KNEE REPLACEMENT: ICD-10-CM

## 2019-04-04 DIAGNOSIS — M17.12 PRIMARY OSTEOARTHRITIS OF LEFT KNEE: ICD-10-CM

## 2019-04-04 PROCEDURE — 97112 NEUROMUSCULAR REEDUCATION: CPT | Performed by: PHYSICAL THERAPIST

## 2019-04-04 PROCEDURE — 97110 THERAPEUTIC EXERCISES: CPT | Performed by: PHYSICAL THERAPIST

## 2019-04-07 ENCOUNTER — OFFICE VISIT (OUTPATIENT)
Dept: URGENT CARE | Facility: CLINIC | Age: 54
End: 2019-04-07
Payer: COMMERCIAL

## 2019-04-07 VITALS
SYSTOLIC BLOOD PRESSURE: 125 MMHG | DIASTOLIC BLOOD PRESSURE: 84 MMHG | HEART RATE: 104 BPM | WEIGHT: 217.2 LBS | HEIGHT: 72 IN | RESPIRATION RATE: 16 BRPM | OXYGEN SATURATION: 92 % | BODY MASS INDEX: 29.42 KG/M2 | TEMPERATURE: 98.1 F

## 2019-04-07 DIAGNOSIS — Z00.00 WELL ADULT HEALTH CHECK: Primary | ICD-10-CM

## 2019-04-07 PROCEDURE — S9088 SERVICES PROVIDED IN URGENT: HCPCS | Performed by: PHYSICIAN ASSISTANT

## 2019-04-07 PROCEDURE — 99213 OFFICE O/P EST LOW 20 MIN: CPT | Performed by: PHYSICIAN ASSISTANT

## 2019-04-07 RX ORDER — OSELTAMIVIR PHOSPHATE 75 MG/1
75 CAPSULE ORAL EVERY 12 HOURS SCHEDULED
Qty: 10 CAPSULE | Refills: 0 | Status: SHIPPED | OUTPATIENT
Start: 2019-04-07 | End: 2019-04-07

## 2019-04-07 RX ORDER — OSELTAMIVIR PHOSPHATE 75 MG/1
75 CAPSULE ORAL EVERY 12 HOURS SCHEDULED
Qty: 10 CAPSULE | Refills: 0 | Status: SHIPPED | OUTPATIENT
Start: 2019-04-07 | End: 2019-04-12

## 2019-04-19 ENCOUNTER — OFFICE VISIT (OUTPATIENT)
Dept: OBGYN CLINIC | Facility: MEDICAL CENTER | Age: 54
End: 2019-04-19

## 2019-04-19 ENCOUNTER — APPOINTMENT (OUTPATIENT)
Dept: RADIOLOGY | Facility: MEDICAL CENTER | Age: 54
End: 2019-04-19
Payer: COMMERCIAL

## 2019-04-19 VITALS
BODY MASS INDEX: 29.39 KG/M2 | SYSTOLIC BLOOD PRESSURE: 147 MMHG | HEIGHT: 72 IN | DIASTOLIC BLOOD PRESSURE: 100 MMHG | WEIGHT: 217 LBS | HEART RATE: 94 BPM

## 2019-04-19 DIAGNOSIS — Z96.651 STATUS POST RIGHT KNEE REPLACEMENT: Primary | ICD-10-CM

## 2019-04-19 DIAGNOSIS — Z96.651 STATUS POST RIGHT KNEE REPLACEMENT: ICD-10-CM

## 2019-04-19 PROCEDURE — 99024 POSTOP FOLLOW-UP VISIT: CPT | Performed by: ORTHOPAEDIC SURGERY

## 2019-04-19 PROCEDURE — 73560 X-RAY EXAM OF KNEE 1 OR 2: CPT

## 2019-05-28 ENCOUNTER — VBI (OUTPATIENT)
Dept: ADMINISTRATIVE | Facility: OTHER | Age: 54
End: 2019-05-28

## 2019-06-06 DIAGNOSIS — Z12.11 SCREEN FOR COLON CANCER: Primary | ICD-10-CM

## 2019-07-19 ENCOUNTER — OFFICE VISIT (OUTPATIENT)
Dept: OBGYN CLINIC | Facility: MEDICAL CENTER | Age: 54
End: 2019-07-19
Payer: COMMERCIAL

## 2019-07-19 ENCOUNTER — APPOINTMENT (OUTPATIENT)
Dept: RADIOLOGY | Facility: MEDICAL CENTER | Age: 54
End: 2019-07-19
Payer: COMMERCIAL

## 2019-07-19 VITALS
HEIGHT: 72 IN | RESPIRATION RATE: 18 BRPM | HEART RATE: 89 BPM | WEIGHT: 218.2 LBS | DIASTOLIC BLOOD PRESSURE: 76 MMHG | BODY MASS INDEX: 29.55 KG/M2 | SYSTOLIC BLOOD PRESSURE: 131 MMHG

## 2019-07-19 DIAGNOSIS — Z96.651 STATUS POST RIGHT KNEE REPLACEMENT: ICD-10-CM

## 2019-07-19 DIAGNOSIS — M25.562 CHRONIC PAIN OF LEFT KNEE: ICD-10-CM

## 2019-07-19 DIAGNOSIS — M25.561 CHRONIC PAIN OF RIGHT KNEE: ICD-10-CM

## 2019-07-19 DIAGNOSIS — Z96.651 STATUS POST RIGHT KNEE REPLACEMENT: Primary | ICD-10-CM

## 2019-07-19 DIAGNOSIS — G89.29 CHRONIC PAIN OF RIGHT KNEE: ICD-10-CM

## 2019-07-19 DIAGNOSIS — G89.29 CHRONIC PAIN OF LEFT KNEE: ICD-10-CM

## 2019-07-19 PROCEDURE — 73560 X-RAY EXAM OF KNEE 1 OR 2: CPT

## 2019-07-19 PROCEDURE — 99213 OFFICE O/P EST LOW 20 MIN: CPT | Performed by: ORTHOPAEDIC SURGERY

## 2019-07-19 NOTE — PROGRESS NOTES
Assessment:  1  Status post right knee replacement  XR knee 1 or 2 vw right       Plan:  The patient is doing well  The patient is counseled on prophylactic antibiotic use prior to dental visits  He should follow up in 6 months  He was given work restrictions of no kneeling or crawling  To do next visit:  No follow-ups on file  The above stated was discussed in layman's terms and the patient expressed understanding  All questions were answered to the patient's satisfaction  Scribe Attestation    I,:   Hazel Kelly am acting as a scribe while in the presence of the attending physician :        I,:   Brian Dias MD personally performed the services described in this documentation    as scribed in my presence :              Subjective:   Gui Winchesetr is a 62 y o  male who presents 6 months s/p right TKA, 1/21/19  He is pleased the outcome of his surgery  Today he has no pain complaints  He is active without repercussion          Review of systems negative unless otherwise specified in HPI    Past Medical History:   Diagnosis Date    Bronchospasm     last assessed 6/14/2016    Depression     Disc degeneration, lumbar     last assessed 4/7/2016    Erectile dysfunction     Hyperlipidemia     IFG (impaired fasting glucose)     last assessed 7/30/2014    Palpitations     last assessed 4/4/2013    Primary osteoarthritis of knees, bilateral     last assessed 6/27/2017    Right inguinal hernia     last assessed 9/12/2017    Spondylosis of lumbar region without myelopathy or radiculopathy     last assessed 4/7/2016       Past Surgical History:   Procedure Laterality Date    KNEE ARTHROSCOPY      bilateral    ND REPAIR ING HERNIA,5+Y/O,REDUCIBL Right 10/26/2017    Procedure: REPAIR HERNIA INGUINAL;  Surgeon: Roxi Alston MD;  [facility]: [facility];  Service: General    ND TOTAL KNEE ARTHROPLASTY Right 1/21/2019    Procedure: ARTHROPLASTY KNEE TOTAL;  Surgeon: Brian Dias MD; Location:  MAIN OR;  Service: Orthopedics    TOOTH EXTRACTION         Family History   Problem Relation Age of Onset    No Known Problems Mother     Diabetes Father     No Known Problems Sister     Coronary artery disease Brother        Social History     Occupational History    Not on file   Tobacco Use    Smoking status: Former Smoker    Smokeless tobacco: Never Used    Tobacco comment: quit 11/2018   Substance and Sexual Activity    Alcohol use: Yes     Frequency: 2-3 times a week     Drinks per session: 1 or 2     Comment: weekends     Drug use: No    Sexual activity: Not on file       No current outpatient medications on file  No Known Allergies         Vitals:    07/19/19 1434   BP: 131/76   Pulse: 89   Resp: 18       Objective:  Physical exam  · General: Awake, Alert, Oriented  · Eyes: Pupils equal, round and reactive to light  · Heart: regular rate and rhythm  · Lungs: No audible wheezing  · Abdomen: soft                    Ortho Exam   Right knee:Incision clean dry and intact  Staples well approximated  No erythema and no ecchymosis  Appropriate swelling of lower limb  Extensor mechanism intact  Extension full  Flexion 110-115  Stable to varus and valgus stress  Calf compartments soft and supple  Sensation intact  Toes are warm sensate and mobile    Diagnostics, reviewed and taken today if performed as documented: The attending physician has personally reviewed the pertinent films in PACS and interpretation is as follows:  Right knee x-ray:  Well aligned prosthesis  Procedures, if performed today:    Procedures    None performed      Portions of the record may have been created with voice recognition software  Occasional wrong word or "sound a like" substitutions may have occurred due to the inherent limitations of voice recognition software  Read the chart carefully and recognize, using context, where substitutions have occurred

## 2019-07-25 ENCOUNTER — OFFICE VISIT (OUTPATIENT)
Dept: URGENT CARE | Facility: CLINIC | Age: 54
End: 2019-07-25
Payer: COMMERCIAL

## 2019-07-25 VITALS
RESPIRATION RATE: 16 BRPM | SYSTOLIC BLOOD PRESSURE: 153 MMHG | TEMPERATURE: 98.2 F | BODY MASS INDEX: 29.57 KG/M2 | HEART RATE: 82 BPM | DIASTOLIC BLOOD PRESSURE: 80 MMHG | OXYGEN SATURATION: 97 % | WEIGHT: 218 LBS

## 2019-07-25 DIAGNOSIS — H60.92 OTITIS EXTERNA OF LEFT EAR, UNSPECIFIED CHRONICITY, UNSPECIFIED TYPE: Primary | ICD-10-CM

## 2019-07-25 PROCEDURE — S9088 SERVICES PROVIDED IN URGENT: HCPCS | Performed by: PHYSICIAN ASSISTANT

## 2019-07-25 PROCEDURE — 99213 OFFICE O/P EST LOW 20 MIN: CPT | Performed by: PHYSICIAN ASSISTANT

## 2019-07-25 NOTE — PATIENT INSTRUCTIONS
Use Ciprodex drops as prescribed  Do not swim until use of drops is complete  Keep ear dry    Otitis Externa   WHAT YOU NEED TO KNOW:   Otitis externa, or swimmer's ear, is an infection in the outer ear canal  This canal goes from the outside of the ear to the eardrum  DISCHARGE INSTRUCTIONS:   Return to the emergency department if:   · You have severe ear pain  · You are suddenly unable to hear at all  · You have new swelling in your face, behind your ears, or in your neck  · You suddenly cannot move part of your face  · Your face suddenly feels numb  Contact your healthcare provider if:   · You have a fever  · Your signs and symptoms do not get better after 2 days of treatment  · Your signs and symptoms go away for a time, but then come back  · You have questions or concerns about your condition or care  Medicines:   · NSAIDs , such as ibuprofen, help decrease swelling, pain, and fever  This medicine is available with or without a doctor's order  NSAIDs can cause stomach bleeding or kidney problems in certain people  If you take blood thinner medicine, always ask if NSAIDs are safe for you  Always read the medicine label and follow directions  Do not give these medicines to children under 10months of age without direction from your child's healthcare provider  · Acetaminophen  decreases pain and fever  It is available without a doctor's order  Ask how much to take and how often to take it  Follow directions  Acetaminophen can cause liver damage if not taken correctly  · Ear drops  that contain an antibiotic may be given  The antibiotic helps treat a bacterial infection  You may also be given steroid medicine  The steroid helps decrease redness, swelling, and pain  · Take your medicine as directed  Contact your healthcare provider if you think your medicine is not helping or if you have side effects  Tell him or her if you are allergic to any medicine   Keep a list of the medicines, vitamins, and herbs you take  Include the amounts, and when and why you take them  Bring the list or the pill bottles to follow-up visits  Carry your medicine list with you in case of an emergency  Follow up with your healthcare provider as directed:  Write down your questions so you remember to ask them during your visits  How to use eardrops:   · Lie down on your side with your infected ear facing up  · Carefully drip the correct number of eardrops into your ear  Have another person help you if possible  · Gently move the outside part of your ear back and forth to help the medicine reach your ear canal      · Stay lying down in the same position (with your ear facing up) for 3 to 5 minutes  Prevent otitis externa:   · Do not put cotton swabs or foreign objects in your ears  · Wrap a clean moist washcloth around your finger, and use it to clean your outer ear and remove extra ear wax  · Use ear plugs when you swim  Dry your outer ears completely after you swim or bathe  © 2017 [facility] Information is for End User's use only and may not be sold, redistributed or otherwise used for commercial purposes  All illustrations and images included in CareNotes® are the copyrighted property of A D A M , Inc  or Augustin Salazar  The above information is an  only  It is not intended as medical advice for individual conditions or treatments  Talk to your doctor, nurse or pharmacist before following any medical regimen to see if it is safe and effective for you

## 2019-07-25 NOTE — PROGRESS NOTES
[facility]        NAME: Harjinder Ramirez is a 62 y o  male  : 1965    MRN: [id number]  DATE: 2019  TIME: 2:58 PM    Assessment and Plan   Otitis externa of left ear, unspecified chronicity, unspecified type [H60 92]  1  Otitis externa of left ear, unspecified chronicity, unspecified type           Patient Instructions   Use Ciprodex drops as prescribed  Do not swim until use of drops is complete  Keep ear dry    Follow up with PCP in 3-5 days  Proceed to  ER if symptoms worsen  Chief Complaint     Chief Complaint   Patient presents with   Woody [employer] has clogged left ear since          History of Present Illness        Patient is a 52-year-old male who complains of left ear pain / feeling of ear being clogged for about 3-4 days  He denies dizziness or vertigo  He denies any injury to the ear  He has used OTC swimmer's ear drops of little relief  Patient does report increased swimming activity  Review of Systems   Review of Systems   Constitutional: Negative for fever  HENT: Positive for ear pain and hearing loss  Negative for congestion, sinus pressure and sore throat  Respiratory: Negative for shortness of breath  Cardiovascular: Negative for chest pain  Neurological: Negative for dizziness  Current Medications     No current outpatient medications on file      Current Allergies     Allergies as of 2019    (No Known Allergies)            The following portions of the patient's history were reviewed and updated as appropriate: allergies, current medications, past family history, past medical history, past social history, past surgical history and problem list      Past Medical History:   Diagnosis Date    Bronchospasm     last assessed 2016    Depression     Disc degeneration, lumbar     last assessed 2016    Erectile dysfunction     Hyperlipidemia     IFG (impaired fasting glucose)     last assessed 2014    Palpitations last assessed 4/4/2013    Primary osteoarthritis of knees, bilateral     last assessed 6/27/2017    Right inguinal hernia     last assessed 9/12/2017    Spondylosis of lumbar region without myelopathy or radiculopathy     last assessed 4/7/2016       Past Surgical History:   Procedure Laterality Date    KNEE ARTHROSCOPY      bilateral    TN REPAIR ING HERNIA,5+Y/O,REDUCIBL Right 10/26/2017    Procedure: REPAIR HERNIA INGUINAL;  Surgeon: Ulysess Ormond, MD;  [facility]: [facility];  Service: General    TN TOTAL KNEE ARTHROPLASTY Right 1/21/2019    Procedure: ARTHROPLASTY KNEE TOTAL;  Surgeon: Patrice Mantilla MD;  [facility];  Service: Orthopedics    TOOTH EXTRACTION         Family History   Problem Relation Age of Onset    No Known Problems Mother     Diabetes Father     No Known Problems Sister     Coronary artery disease Brother          Medications have been verified  Objective   /80   Pulse 82   Temp 98 2 °F (36 8 °C)   Resp 16   Wt 98 9 kg (218 lb)   SpO2 97%   BMI 29 57 kg/m²        Physical Exam     Physical Exam   Constitutional: He appears well-developed and well-nourished  HENT:   Head: Normocephalic and atraumatic  Right Ear: Hearing and external ear normal  There is swelling and tenderness  Tympanic membrane is injected  Tympanic membrane is not perforated  Left Ear: Hearing, tympanic membrane, external ear and ear canal normal    Mouth/Throat: Oropharynx is clear and moist    Eyes: Pupils are equal, round, and reactive to light  Conjunctivae are normal    Cardiovascular: Normal rate and regular rhythm     Pulmonary/Chest: Effort normal and breath sounds normal

## 2019-08-07 ENCOUNTER — ANESTHESIA EVENT (OUTPATIENT)
Dept: GASTROENTEROLOGY | Facility: AMBULARY SURGERY CENTER | Age: 54
End: 2019-08-07

## 2019-08-21 ENCOUNTER — HOSPITAL ENCOUNTER (OUTPATIENT)
Dept: GASTROENTEROLOGY | Facility: AMBULARY SURGERY CENTER | Age: 54
Setting detail: OUTPATIENT SURGERY
Discharge: HOME | End: 2019-08-21
Attending: COLON & RECTAL SURGERY
Payer: COMMERCIAL

## 2019-08-21 ENCOUNTER — ANESTHESIA (OUTPATIENT)
Dept: GASTROENTEROLOGY | Facility: AMBULARY SURGERY CENTER | Age: 54
End: 2019-08-21

## 2019-08-21 VITALS
BODY MASS INDEX: 28.99 KG/M2 | WEIGHT: 214 LBS | SYSTOLIC BLOOD PRESSURE: 144 MMHG | DIASTOLIC BLOOD PRESSURE: 89 MMHG | TEMPERATURE: 97.2 F | HEART RATE: 84 BPM | RESPIRATION RATE: 18 BRPM | OXYGEN SATURATION: 97 % | HEIGHT: 72 IN

## 2019-08-21 DIAGNOSIS — Z12.11 SCREENING FOR COLON CANCER: ICD-10-CM

## 2019-08-21 PROCEDURE — 88305 TISSUE EXAM BY PATHOLOGIST: CPT | Performed by: PATHOLOGY

## 2019-08-21 PROCEDURE — 45385 COLONOSCOPY W/LESION REMOVAL: CPT | Performed by: COLON & RECTAL SURGERY

## 2019-08-21 PROCEDURE — 99243 OFF/OP CNSLTJ NEW/EST LOW 30: CPT | Performed by: COLON & RECTAL SURGERY

## 2019-08-21 RX ORDER — PROPOFOL 10 MG/ML
INJECTION, EMULSION INTRAVENOUS AS NEEDED
Status: DISCONTINUED | OUTPATIENT
Start: 2019-08-21 | End: 2019-08-21 | Stop reason: SURG

## 2019-08-21 RX ORDER — SODIUM CHLORIDE, SODIUM LACTATE, POTASSIUM CHLORIDE, CALCIUM CHLORIDE 600; 310; 30; 20 MG/100ML; MG/100ML; MG/100ML; MG/100ML
INJECTION, SOLUTION INTRAVENOUS CONTINUOUS PRN
Status: DISCONTINUED | OUTPATIENT
Start: 2019-08-21 | End: 2019-08-21 | Stop reason: SURG

## 2019-08-21 RX ORDER — LIDOCAINE HYDROCHLORIDE 10 MG/ML
INJECTION, SOLUTION INFILTRATION; PERINEURAL AS NEEDED
Status: DISCONTINUED | OUTPATIENT
Start: 2019-08-21 | End: 2019-08-21 | Stop reason: SURG

## 2019-08-21 RX ADMIN — LIDOCAINE HYDROCHLORIDE 50 MG: 10 INJECTION, SOLUTION INFILTRATION; PERINEURAL at 07:34

## 2019-08-21 RX ADMIN — SODIUM CHLORIDE, SODIUM LACTATE, POTASSIUM CHLORIDE, AND CALCIUM CHLORIDE: .6; .31; .03; .02 INJECTION, SOLUTION INTRAVENOUS at 07:18

## 2019-08-21 RX ADMIN — PROPOFOL 30 MG: 10 INJECTION, EMULSION INTRAVENOUS at 07:45

## 2019-08-21 RX ADMIN — PROPOFOL 110 MG: 10 INJECTION, EMULSION INTRAVENOUS at 07:34

## 2019-08-21 RX ADMIN — PROPOFOL 30 MG: 10 INJECTION, EMULSION INTRAVENOUS at 07:42

## 2019-08-21 RX ADMIN — PROPOFOL 40 MG: 10 INJECTION, EMULSION INTRAVENOUS at 07:37

## 2019-08-21 RX ADMIN — SODIUM CHLORIDE, SODIUM LACTATE, POTASSIUM CHLORIDE, AND CALCIUM CHLORIDE: .6; .31; .03; .02 INJECTION, SOLUTION INTRAVENOUS at 07:23

## 2019-08-21 RX ADMIN — PROPOFOL 50 MG: 10 INJECTION, EMULSION INTRAVENOUS at 07:40

## 2019-08-21 NOTE — ANESTHESIA POSTPROCEDURE EVALUATION
Post-Op Assessment Note    CV Status:  Stable    Pain management: adequate     Mental Status:  Alert and awake   Hydration Status:  Euvolemic   PONV Controlled:  Controlled   Airway Patency:  Patent   Post Op Vitals Reviewed: Yes      Staff: CRNA           /81 (08/21/19 0750)    Temp (!) 97 2 °F (36 2 °C) (08/21/19 0750)    Pulse 81 (08/21/19 0750)   Resp 18 (08/21/19 0750)    SpO2 97 % (08/21/19 0750)

## 2019-08-21 NOTE — ANESTHESIA PREPROCEDURE EVALUATION
Review of Systems/Medical History      No history of anesthetic complications     Cardiovascular  Hyperlipidemia,    Pulmonary  Smoker ex-smoker  ,        GI/Hepatic  Negative GI/hepatic ROS          Negative  ROS        Endo/Other  Negative endo/other ROS      GYN  Negative gynecology ROS          Hematology  Negative hematology ROS      Musculoskeletal    Comment: S/p knee replacement Arthritis     Neurology   Psychology                Anesthesia Plan  ASA Score- 2     Anesthesia Type- IV sedation with anesthesia with ASA Monitors  Additional Monitors:   Airway Plan:     Comment: IV sedation,  standard ASA monitors  Risks and benefits discussed with patient; patient consented and agrees to proceed  I saw and evaluated the patient  If seen with CRNA, we have discussed the anesthetic plan and I am in agreement that the plan is appropriate for the patient        Plan Factors-    Induction- intravenous  Postoperative Plan-     Informed Consent- Anesthetic plan and risks discussed with patient  I personally reviewed this patient with the CRNA  Discussed and agreed on the Anesthesia Plan with the CRNA  Nafisa Milan

## 2019-08-21 NOTE — H&P
History and Physical   Colon and Rectal Surgery   Ren Rice 62 y o  male MRN: [id number]  Unit/Bed#:  Encounter: [id number]  08/21/19   @[facility]    No chief complaint on file  History of Present Illness   HPI:  Ren Rice is a 62 y o  male who presents for colorectal cancer screening  He denies overt symptoms  No family history  No prior exams      Historical Information   Past Medical History:   Diagnosis Date    Bronchospasm     last assessed 6/14/2016    Depression     Disc degeneration, lumbar     last assessed 4/7/2016    Erectile dysfunction     Hyperlipidemia     IFG (impaired fasting glucose)     last assessed 7/30/2014    Palpitations     last assessed 4/4/2013    Primary osteoarthritis of knees, bilateral     last assessed 6/27/2017    Right inguinal hernia     last assessed 9/12/2017    Spondylosis of lumbar region without myelopathy or radiculopathy     last assessed 4/7/2016     Past Surgical History:   Procedure Laterality Date    KNEE ARTHROSCOPY      Right    IA REPAIR ING HERNIA,5+Y/O,REDUCIBL Right 10/26/2017    Procedure: REPAIR HERNIA INGUINAL;  Surgeon: Nicole Clay MD;  [facility];  Service: General    IA TOTAL KNEE ARTHROPLASTY Right 1/21/2019    Procedure: ARTHROPLASTY KNEE TOTAL;  Surgeon: Thalia Vasquez MD;  [facility];  Service: Orthopedics    TOOTH EXTRACTION         Meds/Allergies       (Not in a hospital admission)    No current outpatient medications on file      No Known Allergies      Social History   Social History     Substance and Sexual Activity   Alcohol Use Yes    Frequency: 2-3 times a week    Drinks per session: 1 or 2    Comment: weekends      Social History     Substance and Sexual Activity   Drug Use No     Social History     Tobacco Use   Smoking Status Former Smoker   Smokeless Tobacco Never Used   Tobacco Comment    quit 11/2018         Family History:   Family History   Problem Relation Age of Onset    No Known Problems Mother     Diabetes Father     No Known Problems Sister     Coronary artery disease Brother          Objective     Current Vitals:   Blood Pressure: 145/75 (08/21/19 0646)  Pulse: 77(NSR) (08/21/19 0646)  Temperature: (!) 97 3 °F (36 3 °C) (08/21/19 0646)  Temp Source: Temporal (08/21/19 0646)  Respirations: 17 (08/21/19 0646)  Height: 6' (182 9 cm) (08/21/19 0646)  Weight - Scale: 97 1 kg (214 lb) (08/21/19 0646)  SpO2: 96 % (08/21/19 0646)  No intake or output data in the 24 hours ending 08/21/19 0730    Physical Exam:  General:  Resting comfortably in bed   Eyes:Sclera anicteric  ENT: Trachea midline  Pulm:  Symmetric chest raise  No respiratory Distress  CV:  Regular on monitor  Abdomen:  Soft NT ND  Extremities:  No clubbing/ cyanosis/ edema    Lab Results: I have personally reviewed pertinent lab results  Imaging: I have personally reviewed pertinent reports  ASSESSMENT:  Jimi Burden is a 62 y o  male who presents for outpatient colonoscopy  PLAN:  For colonoscopy    Risks/ Benefits reviewed to include but not limited to anesthesia, bleeding, missed lesions, and colonoscopic perforation requiring surgery

## 2019-11-06 ENCOUNTER — OFFICE VISIT (OUTPATIENT)
Dept: FAMILY MEDICINE CLINIC | Facility: CLINIC | Age: 54
End: 2019-11-06
Payer: COMMERCIAL

## 2019-11-06 VITALS
HEART RATE: 91 BPM | OXYGEN SATURATION: 98 % | DIASTOLIC BLOOD PRESSURE: 76 MMHG | HEIGHT: 72 IN | BODY MASS INDEX: 30.88 KG/M2 | TEMPERATURE: 98.5 F | WEIGHT: 228 LBS | SYSTOLIC BLOOD PRESSURE: 134 MMHG | RESPIRATION RATE: 16 BRPM

## 2019-11-06 DIAGNOSIS — N52.9 ERECTILE DYSFUNCTION, UNSPECIFIED ERECTILE DYSFUNCTION TYPE: ICD-10-CM

## 2019-11-06 DIAGNOSIS — R53.83 FATIGUE, UNSPECIFIED TYPE: ICD-10-CM

## 2019-11-06 DIAGNOSIS — G44.229 CHRONIC TENSION-TYPE HEADACHE, NOT INTRACTABLE: Primary | ICD-10-CM

## 2019-11-06 DIAGNOSIS — E66.9 CLASS 1 OBESITY WITH BODY MASS INDEX (BMI) OF 30.0 TO 30.9 IN ADULT, UNSPECIFIED OBESITY TYPE, UNSPECIFIED WHETHER SERIOUS COMORBIDITY PRESENT: ICD-10-CM

## 2019-11-06 PROCEDURE — 99214 OFFICE O/P EST MOD 30 MIN: CPT | Performed by: INTERNAL MEDICINE

## 2019-11-06 RX ORDER — SILDENAFIL 50 MG/1
50 TABLET, FILM COATED ORAL AS NEEDED
Qty: 10 TABLET | Refills: 3 | Status: SHIPPED | OUTPATIENT
Start: 2019-11-06 | End: 2020-07-30 | Stop reason: SDUPTHER

## 2019-11-06 RX ORDER — ESCITALOPRAM OXALATE 10 MG/1
10 TABLET ORAL DAILY
Qty: 30 TABLET | Refills: 1 | Status: SHIPPED | OUTPATIENT
Start: 2019-11-06 | End: 2019-12-06

## 2019-11-06 RX ORDER — MELOXICAM 15 MG/1
15 TABLET ORAL DAILY
Qty: 30 TABLET | Refills: 1 | Status: SHIPPED | OUTPATIENT
Start: 2019-11-06 | End: 2019-12-06

## 2019-11-06 NOTE — PROGRESS NOTES
BMI Counseling: Body mass index is 30 92 kg/m²  The BMI is above normal  Nutrition recommendations include decreasing portion sizes, encouraging healthy choices of fruits and vegetables and limiting drinks that contain sugar  Exercise recommendations include exercising 3-5 times per week  No pharmacotherapy was ordered  Patient referred to PCP due to patient being overweight  Assessment/Plan:         Diagnoses and all orders for this visit:    Chronic tension-type headache, not intractable  Comments:  add prophylaxisix/  also 1-3 weeks mobic  Orders:  -     escitalopram (LEXAPRO) 10 mg tablet; Take 1 tablet (10 mg total) by mouth daily  -     CT head wo contrast; Future  -     meloxicam (MOBIC) 15 mg tablet; Take 1 tablet (15 mg total) by mouth daily  -     Sedimentation rate, automated; Future    Fatigue, unspecified type  -     CBC; Future  -     Comprehensive metabolic panel; Future  -     TSH, 3rd generation with Free T4 reflex; Future    Class 1 obesity with body mass index (BMI) of 30 0 to 30 9 in adult, unspecified obesity type, unspecified whether serious comorbidity present  -     Lipid panel; Future    Erectile dysfunction, unspecified erectile dysfunction type  -     sildenafil (VIAGRA) 50 MG tablet; Take 1 tablet (50 mg total) by mouth as needed for erectile dysfunction Take one hour before desired affect  Subjective:      Patient ID: Naomi Akhtar is a 62 y o  male  Patient states he has been getting headaches for about a year patient states he got 1 or 2 months initially  Then he went to a weekly headache  He eventually he was getting them every 3 days and now he states gets a headache daily  The headaches are located on his left parietal area and his forehead  He uses Tylenol or Advil  He states he used to help him but in no longer does  Denies visual changes denies focal findings  Sometimes the h/a statrts back of neck          The following portions of the patient's history were reviewed and updated as appropriate: He  has a past medical history of Bronchospasm, Depression, Disc degeneration, lumbar, Erectile dysfunction, Hyperlipidemia, IFG (impaired fasting glucose), Palpitations, Primary osteoarthritis of knees, bilateral, Right inguinal hernia, and Spondylosis of lumbar region without myelopathy or radiculopathy  He   Patient Active Problem List    Diagnosis Date Noted    Aftercare following right knee joint replacement surgery 01/29/2019    Status post right knee replacement 01/23/2019    Chronic pain of left knee 05/01/2018    Chronic pain of right knee 05/01/2018    Primary osteoarthritis of right knee 01/30/2018    Primary osteoarthritis of left knee 01/30/2018     He  has a past surgical history that includes pr repair ing hernia,5+y/o,reducibl (Right, 10/26/2017); Tooth extraction; pr total knee arthroplasty (Right, 1/21/2019); and Knee arthroscopy  His family history includes Coronary artery disease in his brother; Diabetes in his father; No Known Problems in his mother and sister  He  reports that he has quit smoking  He has never used smokeless tobacco  He reports that he drank alcohol  He reports that he does not use drugs  Current Outpatient Medications   Medication Sig Dispense Refill    escitalopram (LEXAPRO) 10 mg tablet Take 1 tablet (10 mg total) by mouth daily 30 tablet 1    meloxicam (MOBIC) 15 mg tablet Take 1 tablet (15 mg total) by mouth daily 30 tablet 1    sildenafil (VIAGRA) 50 MG tablet Take 1 tablet (50 mg total) by mouth as needed for erectile dysfunction Take one hour before desired affect  10 tablet 3     No current facility-administered medications for this visit  No current outpatient medications on file prior to visit  No current facility-administered medications on file prior to visit  He has No Known Allergies       Review of Systems   Constitutional: Negative  Negative for chills and fatigue     HENT: Negative for congestion, ear pain, postnasal drip, rhinorrhea and sore throat  Eyes: Negative for pain and visual disturbance  Respiratory: Negative for cough  Cardiovascular: Negative  Musculoskeletal: Positive for neck pain  Negative for neck stiffness  Neurological: Positive for headaches  Negative for light-headedness and numbness  Objective:      /76 (BP Location: Right arm, Patient Position: Sitting, Cuff Size: Large)   Pulse 91   Temp 98 5 °F (36 9 °C) (Tympanic)   Resp 16   Ht 6' (1 829 m)   Wt 103 kg (228 lb)   SpO2 98%   BMI 30 92 kg/m²          Physical Exam   Constitutional: He is oriented to person, place, and time  He appears well-developed and well-nourished  No distress  HENT:   Head: Normocephalic and atraumatic  Right Ear: External ear normal    Left Ear: External ear normal    Nose: Nose normal    Mouth/Throat: Oropharynx is clear and moist  No oropharyngeal exudate  Neck: Normal range of motion  Neck supple  No thyromegaly present  Cardiovascular: Normal rate, regular rhythm and normal heart sounds  Exam reveals no gallop and no friction rub  No murmur heard  Pulmonary/Chest: Effort normal and breath sounds normal  No respiratory distress  He has no wheezes  He has no rales  Lymphadenopathy:     He has no cervical adenopathy  Neurological: He is alert and oriented to person, place, and time  He displays normal reflexes  No cranial nerve deficit or sensory deficit  He exhibits normal muscle tone  Coordination normal    Rt patella reflex decreased 2nd tkr   Skin: He is not diaphoretic

## 2019-11-06 NOTE — PATIENT INSTRUCTIONS
rto 1 month  ? Need to increase lexapro    Told him may use tylenol as well  rec gi protection from nsaid

## 2019-11-07 ENCOUNTER — TRANSCRIBE ORDERS (OUTPATIENT)
Dept: LAB | Facility: CLINIC | Age: 54
End: 2019-11-07

## 2019-11-07 ENCOUNTER — APPOINTMENT (OUTPATIENT)
Dept: LAB | Facility: CLINIC | Age: 54
End: 2019-11-07
Payer: COMMERCIAL

## 2019-11-07 DIAGNOSIS — R53.83 FATIGUE, UNSPECIFIED TYPE: ICD-10-CM

## 2019-11-07 DIAGNOSIS — G44.229 CHRONIC TENSION-TYPE HEADACHE, NOT INTRACTABLE: ICD-10-CM

## 2019-11-07 DIAGNOSIS — E66.9 CLASS 1 OBESITY WITH BODY MASS INDEX (BMI) OF 30.0 TO 30.9 IN ADULT, UNSPECIFIED OBESITY TYPE, UNSPECIFIED WHETHER SERIOUS COMORBIDITY PRESENT: ICD-10-CM

## 2019-11-07 LAB
ALBUMIN SERPL BCP-MCNC: 3.5 G/DL (ref 3.5–5)
ALP SERPL-CCNC: 136 U/L (ref 46–116)
ALT SERPL W P-5'-P-CCNC: 38 U/L (ref 12–78)
ANION GAP SERPL CALCULATED.3IONS-SCNC: 9 MMOL/L (ref 4–13)
AST SERPL W P-5'-P-CCNC: 25 U/L (ref 5–45)
BILIRUB SERPL-MCNC: 0.4 MG/DL (ref 0.2–1)
BUN SERPL-MCNC: 14 MG/DL (ref 5–25)
CALCIUM SERPL-MCNC: 8.3 MG/DL (ref 8.3–10.1)
CHLORIDE SERPL-SCNC: 106 MMOL/L (ref 100–108)
CHOLEST SERPL-MCNC: 230 MG/DL (ref 50–200)
CO2 SERPL-SCNC: 24 MMOL/L (ref 21–32)
CREAT SERPL-MCNC: 1.1 MG/DL (ref 0.6–1.3)
ERYTHROCYTE [DISTWIDTH] IN BLOOD BY AUTOMATED COUNT: 12.1 % (ref 11.6–15.1)
ERYTHROCYTE [SEDIMENTATION RATE] IN BLOOD: 2 MM/HOUR (ref 0–10)
GFR SERPL CREATININE-BSD FRML MDRD: 75 ML/MIN/1.73SQ M
GLUCOSE P FAST SERPL-MCNC: 112 MG/DL (ref 65–99)
HCT VFR BLD AUTO: 47.8 % (ref 36.5–49.3)
HDLC SERPL-MCNC: 55 MG/DL
HGB BLD-MCNC: 16.2 G/DL (ref 12–17)
LDLC SERPL CALC-MCNC: 156 MG/DL (ref 0–100)
MCH RBC QN AUTO: 31 PG (ref 26.8–34.3)
MCHC RBC AUTO-ENTMCNC: 33.9 G/DL (ref 31.4–37.4)
MCV RBC AUTO: 91 FL (ref 82–98)
NONHDLC SERPL-MCNC: 175 MG/DL
PLATELET # BLD AUTO: 196 THOUSANDS/UL (ref 149–390)
PMV BLD AUTO: 9.3 FL (ref 8.9–12.7)
POTASSIUM SERPL-SCNC: 3.9 MMOL/L (ref 3.5–5.3)
PROT SERPL-MCNC: 6.6 G/DL (ref 6.4–8.2)
RBC # BLD AUTO: 5.23 MILLION/UL (ref 3.88–5.62)
SODIUM SERPL-SCNC: 139 MMOL/L (ref 136–145)
TRIGL SERPL-MCNC: 95 MG/DL
TSH SERPL DL<=0.05 MIU/L-ACNC: 1.31 UIU/ML (ref 0.36–3.74)
WBC # BLD AUTO: 5.58 THOUSAND/UL (ref 4.31–10.16)

## 2019-11-07 PROCEDURE — 80061 LIPID PANEL: CPT

## 2019-11-07 PROCEDURE — 36415 COLL VENOUS BLD VENIPUNCTURE: CPT

## 2019-11-07 PROCEDURE — 80053 COMPREHEN METABOLIC PANEL: CPT

## 2019-11-07 PROCEDURE — 85027 COMPLETE CBC AUTOMATED: CPT

## 2019-11-07 PROCEDURE — 84443 ASSAY THYROID STIM HORMONE: CPT

## 2019-11-07 PROCEDURE — 85652 RBC SED RATE AUTOMATED: CPT

## 2019-11-11 ENCOUNTER — HOSPITAL ENCOUNTER (OUTPATIENT)
Dept: RADIOLOGY | Facility: HOSPITAL | Age: 54
Discharge: HOME | End: 2019-11-11
Payer: COMMERCIAL

## 2019-11-11 DIAGNOSIS — G44.229 CHRONIC TENSION-TYPE HEADACHE, NOT INTRACTABLE: ICD-10-CM

## 2019-11-11 PROCEDURE — 70450 CT HEAD/BRAIN W/O DYE: CPT

## 2019-12-06 ENCOUNTER — OFFICE VISIT (OUTPATIENT)
Dept: FAMILY MEDICINE CLINIC | Facility: CLINIC | Age: 54
End: 2019-12-06
Payer: COMMERCIAL

## 2019-12-06 VITALS
HEART RATE: 98 BPM | WEIGHT: 228 LBS | SYSTOLIC BLOOD PRESSURE: 148 MMHG | BODY MASS INDEX: 30.88 KG/M2 | HEIGHT: 72 IN | TEMPERATURE: 98.6 F | RESPIRATION RATE: 16 BRPM | OXYGEN SATURATION: 98 % | DIASTOLIC BLOOD PRESSURE: 72 MMHG

## 2019-12-06 DIAGNOSIS — I10 ESSENTIAL HYPERTENSION: ICD-10-CM

## 2019-12-06 DIAGNOSIS — Z12.5 SCREENING FOR PROSTATE CANCER: ICD-10-CM

## 2019-12-06 DIAGNOSIS — G44.229 CHRONIC TENSION-TYPE HEADACHE, NOT INTRACTABLE: Primary | ICD-10-CM

## 2019-12-06 PROCEDURE — 3008F BODY MASS INDEX DOCD: CPT | Performed by: INTERNAL MEDICINE

## 2019-12-06 PROCEDURE — 99214 OFFICE O/P EST MOD 30 MIN: CPT | Performed by: INTERNAL MEDICINE

## 2019-12-06 RX ORDER — ESCITALOPRAM OXALATE 10 MG/1
10 TABLET ORAL DAILY
Qty: 30 TABLET | Refills: 1 | Status: SHIPPED | OUTPATIENT
Start: 2019-12-06 | End: 2020-01-06 | Stop reason: SDUPTHER

## 2019-12-06 RX ORDER — METOPROLOL SUCCINATE 25 MG/1
25 TABLET, EXTENDED RELEASE ORAL DAILY
Qty: 30 TABLET | Refills: 1 | Status: SHIPPED | OUTPATIENT
Start: 2019-12-06 | End: 2020-01-06 | Stop reason: SDUPTHER

## 2019-12-06 NOTE — PROGRESS NOTES
Assessment/Plan:         Diagnoses and all orders for this visit:    Screening for prostate cancer  -     PSA, total and free; Future    Chronic tension-type headache, not intractable  Comments:  add prophylaxisix/  also 1-3 weeks mobic          Subjective:      Patient ID: Min Hernandez is a 62 y o  male  Pt states he thought the lexapro was starting to help but he felt spacy  He stopped it after 3 days  He will restart at 5mg  Go back to 10mg after 2 weeks  rto 1 month  Ct brain negative  Told him no relef will send to neuro      The following portions of the patient's history were reviewed and updated as appropriate: He  has a past medical history of Bronchospasm, Depression, Disc degeneration, lumbar, Erectile dysfunction, Hyperlipidemia, IFG (impaired fasting glucose), Palpitations, Primary osteoarthritis of knees, bilateral, Right inguinal hernia, and Spondylosis of lumbar region without myelopathy or radiculopathy  He   Patient Active Problem List    Diagnosis Date Noted    Aftercare following right knee joint replacement surgery 01/29/2019    Status post right knee replacement 01/23/2019    Chronic pain of left knee 05/01/2018    Chronic pain of right knee 05/01/2018    Primary osteoarthritis of right knee 01/30/2018    Primary osteoarthritis of left knee 01/30/2018     He  has a past surgical history that includes pr repair ing hernia,5+y/o,reducibl (Right, 10/26/2017); Tooth extraction; pr total knee arthroplasty (Right, 1/21/2019); and Knee arthroscopy  His family history includes Coronary artery disease in his brother; Diabetes in his father; No Known Problems in his mother and sister  He  reports that he has quit smoking  He has never used smokeless tobacco  He reports that he drinks alcohol  He reports that he does not use drugs    Current Outpatient Medications   Medication Sig Dispense Refill    escitalopram (LEXAPRO) 10 mg tablet Take 1 tablet (10 mg total) by mouth daily (Patient not taking: Reported on 12/6/2019) 30 tablet 1    meloxicam (MOBIC) 15 mg tablet Take 1 tablet (15 mg total) by mouth daily (Patient not taking: Reported on 12/6/2019) 30 tablet 1    sildenafil (VIAGRA) 50 MG tablet Take 1 tablet (50 mg total) by mouth as needed for erectile dysfunction Take one hour before desired affect  (Patient not taking: Reported on 12/6/2019) 10 tablet 3     No current facility-administered medications for this visit  Current Outpatient Medications on File Prior to Visit   Medication Sig    escitalopram (LEXAPRO) 10 mg tablet Take 1 tablet (10 mg total) by mouth daily (Patient not taking: Reported on 12/6/2019)    meloxicam (MOBIC) 15 mg tablet Take 1 tablet (15 mg total) by mouth daily (Patient not taking: Reported on 12/6/2019)    sildenafil (VIAGRA) 50 MG tablet Take 1 tablet (50 mg total) by mouth as needed for erectile dysfunction Take one hour before desired affect  (Patient not taking: Reported on 12/6/2019)     No current facility-administered medications on file prior to visit  He has No Known Allergies       Review of Systems   Constitutional: Negative  HENT: Negative  Respiratory: Negative  Cardiovascular: Negative  Neurological: Positive for headaches  Objective:      /72 (BP Location: Right arm, Patient Position: Sitting, Cuff Size: Large)   Pulse 98   Temp 98 6 °F (37 °C) (Temporal)   Resp 16   Ht 6' (1 829 m)   Wt 103 kg (228 lb)   SpO2 98%   BMI 30 92 kg/m²          Physical Exam   Constitutional: He is oriented to person, place, and time  He appears well-developed and well-nourished  No distress  HENT:   Head: Normocephalic and atraumatic  Right Ear: External ear normal    Left Ear: External ear normal    Nose: Nose normal    Mouth/Throat: Oropharynx is clear and moist  No oropharyngeal exudate  Neck: Normal range of motion  Neck supple  No tracheal deviation present  No thyromegaly present     Cardiovascular: Normal rate, regular rhythm, normal heart sounds and intact distal pulses  Exam reveals no gallop and no friction rub  No murmur heard  Pulmonary/Chest: Effort normal and breath sounds normal  No respiratory distress  He has no wheezes  Lymphadenopathy:     He has no cervical adenopathy  Neurological: He is alert and oriented to person, place, and time  He displays normal reflexes  No cranial nerve deficit  He exhibits normal muscle tone  Coordination normal    Skin: He is not diaphoretic

## 2019-12-31 ENCOUNTER — TRANSCRIBE ORDERS (OUTPATIENT)
Dept: LAB | Facility: CLINIC | Age: 54
End: 2019-12-31

## 2019-12-31 ENCOUNTER — APPOINTMENT (OUTPATIENT)
Dept: LAB | Facility: CLINIC | Age: 54
End: 2019-12-31
Payer: COMMERCIAL

## 2019-12-31 DIAGNOSIS — Z12.5 SCREENING FOR PROSTATE CANCER: ICD-10-CM

## 2019-12-31 PROCEDURE — 36415 COLL VENOUS BLD VENIPUNCTURE: CPT

## 2019-12-31 PROCEDURE — 84153 ASSAY OF PSA TOTAL: CPT

## 2019-12-31 PROCEDURE — 84154 ASSAY OF PSA FREE: CPT

## 2020-01-02 LAB
PSA FREE MFR SERPL: 20 %
PSA FREE SERPL-MCNC: 0.3 NG/ML
PSA SERPL-MCNC: 1.5 NG/ML (ref 0–4)

## 2020-01-06 ENCOUNTER — OFFICE VISIT (OUTPATIENT)
Dept: FAMILY MEDICINE CLINIC | Facility: CLINIC | Age: 55
End: 2020-01-06
Payer: COMMERCIAL

## 2020-01-06 ENCOUNTER — TELEPHONE (OUTPATIENT)
Dept: FAMILY MEDICINE CLINIC | Facility: CLINIC | Age: 55
End: 2020-01-06

## 2020-01-06 VITALS
BODY MASS INDEX: 30.61 KG/M2 | RESPIRATION RATE: 16 BRPM | WEIGHT: 226 LBS | HEIGHT: 72 IN | SYSTOLIC BLOOD PRESSURE: 122 MMHG | HEART RATE: 72 BPM | TEMPERATURE: 98 F | DIASTOLIC BLOOD PRESSURE: 84 MMHG | OXYGEN SATURATION: 97 %

## 2020-01-06 DIAGNOSIS — Z12.5 SCREENING FOR PROSTATE CANCER: ICD-10-CM

## 2020-01-06 DIAGNOSIS — I10 ESSENTIAL HYPERTENSION: ICD-10-CM

## 2020-01-06 DIAGNOSIS — G44.229 CHRONIC TENSION-TYPE HEADACHE, NOT INTRACTABLE: Primary | ICD-10-CM

## 2020-01-06 PROCEDURE — 3008F BODY MASS INDEX DOCD: CPT | Performed by: INTERNAL MEDICINE

## 2020-01-06 PROCEDURE — 99214 OFFICE O/P EST MOD 30 MIN: CPT | Performed by: INTERNAL MEDICINE

## 2020-01-06 PROCEDURE — 3074F SYST BP LT 130 MM HG: CPT | Performed by: INTERNAL MEDICINE

## 2020-01-06 PROCEDURE — 3079F DIAST BP 80-89 MM HG: CPT | Performed by: INTERNAL MEDICINE

## 2020-01-06 RX ORDER — ESCITALOPRAM OXALATE 10 MG/1
10 TABLET ORAL DAILY
Qty: 90 TABLET | Refills: 1 | Status: SHIPPED | OUTPATIENT
Start: 2020-01-06 | End: 2020-01-08 | Stop reason: SDUPTHER

## 2020-01-06 RX ORDER — METOPROLOL SUCCINATE 25 MG/1
25 TABLET, EXTENDED RELEASE ORAL DAILY
Qty: 90 TABLET | Refills: 1 | Status: SHIPPED | OUTPATIENT
Start: 2020-01-06 | End: 2020-01-08 | Stop reason: SDUPTHER

## 2020-01-06 NOTE — PROGRESS NOTES
BMI Counseling: Body mass index is 30 65 kg/m²  The BMI is above normal  Nutrition recommendations include decreasing portion sizes and limiting drinks that contain sugar  Exercise recommendations include exercising 3-5 times per week  No pharmacotherapy was ordered  Patient referred to PCP due to patient being overweight  Assessment/Plan:         Diagnoses and all orders for this visit:    Chronic tension-type headache, not intractable  Comments:  will try to increase lexapro to 10mg  it has been effectual for h/a proph  Orders:  -     metoprolol succinate (TOPROL-XL) 25 mg 24 hr tablet; Take 1 tablet (25 mg total) by mouth daily  -     escitalopram (LEXAPRO) 10 mg tablet; Take 1 tablet (10 mg total) by mouth daily He will take 5mg daily x 2 weeks then go to 10mg daily    Screening for prostate cancer  -     metoprolol succinate (TOPROL-XL) 25 mg 24 hr tablet; Take 1 tablet (25 mg total) by mouth daily    Essential hypertension  Comments:  continue metoprolol          Subjective:      Patient ID: Dee Padilla is a 62 y o  male  Pt is still on 5mg lexapro and feels his h/a are stable  States he has less than 3 h/a month  He only complains of fatgiue  Discussed to take metoprolol at pm   Denies sob/visual change or cp      The following portions of the patient's history were reviewed and updated as appropriate: He  has a past medical history of Bronchospasm, Depression, Disc degeneration, lumbar, Erectile dysfunction, Hyperlipidemia, IFG (impaired fasting glucose), Palpitations, Primary osteoarthritis of knees, bilateral, Right inguinal hernia, and Spondylosis of lumbar region without myelopathy or radiculopathy    He   Patient Active Problem List    Diagnosis Date Noted    Essential hypertension 01/06/2020    Screening for prostate cancer 01/06/2020    Aftercare following right knee joint replacement surgery 01/29/2019    Status post right knee replacement 01/23/2019    Chronic pain of left knee 05/01/2018    Chronic pain of right knee 05/01/2018    Primary osteoarthritis of right knee 01/30/2018    Primary osteoarthritis of left knee 01/30/2018     He  has a past surgical history that includes pr repair ing hernia,5+y/o,reducibl (Right, 10/26/2017); Tooth extraction; pr total knee arthroplasty (Right, 1/21/2019); and Knee arthroscopy  His family history includes Coronary artery disease in his brother; Diabetes in his father; No Known Problems in his mother and sister  He  reports that he has quit smoking  He has never used smokeless tobacco  He reports that he drinks alcohol  He reports that he does not use drugs  Current Outpatient Medications   Medication Sig Dispense Refill    escitalopram (LEXAPRO) 10 mg tablet Take 1 tablet (10 mg total) by mouth daily He will take 5mg daily x 2 weeks then go to 10mg daily 90 tablet 1    metoprolol succinate (TOPROL-XL) 25 mg 24 hr tablet Take 1 tablet (25 mg total) by mouth daily 90 tablet 1    sildenafil (VIAGRA) 50 MG tablet Take 1 tablet (50 mg total) by mouth as needed for erectile dysfunction Take one hour before desired affect  10 tablet 3     No current facility-administered medications for this visit  Current Outpatient Medications on File Prior to Visit   Medication Sig    sildenafil (VIAGRA) 50 MG tablet Take 1 tablet (50 mg total) by mouth as needed for erectile dysfunction Take one hour before desired affect  No current facility-administered medications on file prior to visit  He has No Known Allergies       Review of Systems   Constitutional: Positive for fatigue  HENT: Negative  Eyes: Negative for visual disturbance  Respiratory: Negative  Negative for shortness of breath  Cardiovascular: Negative  Negative for chest pain     Neurological:        Decreased h/a         Objective:      /84 (BP Location: Right arm, Patient Position: Sitting, Cuff Size: Large)   Pulse 72   Temp 98 °F (36 7 °C) (Temporal)   Resp 16 Ht 6' (1 829 m)   Wt 103 kg (226 lb)   SpO2 97%   BMI 30 65 kg/m²          Physical Exam   Constitutional: He appears well-developed and well-nourished  No distress  HENT:   Head: Normocephalic and atraumatic  Right Ear: External ear normal    Left Ear: External ear normal    Nose: Nose normal    Mouth/Throat: Oropharynx is clear and moist  No oropharyngeal exudate  Neck: Normal range of motion  Neck supple  No thyromegaly present  Cardiovascular: Normal rate, regular rhythm, normal heart sounds and intact distal pulses  Exam reveals no gallop and no friction rub  No murmur heard  Pulmonary/Chest: Effort normal and breath sounds normal  No respiratory distress  He has no wheezes  Lymphadenopathy:     He has no cervical adenopathy  Skin: He is not diaphoretic

## 2020-01-06 NOTE — TELEPHONE ENCOUNTER
Called in the Metoprolol and Escitalapram to [facility] in [place] and cancelled the rx's at [address]   [facility], per pt

## 2020-01-08 DIAGNOSIS — Z12.5 SCREENING FOR PROSTATE CANCER: ICD-10-CM

## 2020-01-08 DIAGNOSIS — G44.229 CHRONIC TENSION-TYPE HEADACHE, NOT INTRACTABLE: ICD-10-CM

## 2020-01-09 RX ORDER — METOPROLOL SUCCINATE 25 MG/1
25 TABLET, EXTENDED RELEASE ORAL DAILY
Qty: 90 TABLET | Refills: 1 | Status: SHIPPED | OUTPATIENT
Start: 2020-01-09 | End: 2021-04-02

## 2020-01-09 RX ORDER — ESCITALOPRAM OXALATE 10 MG/1
10 TABLET ORAL DAILY
Qty: 90 TABLET | Refills: 1 | Status: SHIPPED | OUTPATIENT
Start: 2020-01-09 | End: 2020-07-30 | Stop reason: SDUPTHER

## 2020-01-15 ENCOUNTER — TELEPHONE (OUTPATIENT)
Dept: OBGYN CLINIC | Facility: HOSPITAL | Age: 55
End: 2020-01-15

## 2020-01-15 NOTE — TELEPHONE ENCOUNTER
Alessandra Santos is calling from the patients dentist office and wanting to get a note stating that the patient has to premedication for 2 years status post op      Fax- [phone]

## 2020-01-24 ENCOUNTER — OFFICE VISIT (OUTPATIENT)
Dept: OBGYN CLINIC | Facility: MEDICAL CENTER | Age: 55
End: 2020-01-24
Payer: COMMERCIAL

## 2020-01-24 ENCOUNTER — APPOINTMENT (OUTPATIENT)
Dept: RADIOLOGY | Facility: MEDICAL CENTER | Age: 55
End: 2020-01-24
Payer: COMMERCIAL

## 2020-01-24 VITALS
HEIGHT: 72 IN | SYSTOLIC BLOOD PRESSURE: 143 MMHG | BODY MASS INDEX: 31.07 KG/M2 | DIASTOLIC BLOOD PRESSURE: 78 MMHG | WEIGHT: 229.4 LBS | HEART RATE: 89 BPM

## 2020-01-24 DIAGNOSIS — Z96.651 STATUS POST RIGHT KNEE REPLACEMENT: ICD-10-CM

## 2020-01-24 DIAGNOSIS — G89.29 CHRONIC PAIN OF RIGHT KNEE: ICD-10-CM

## 2020-01-24 DIAGNOSIS — Z96.651 STATUS POST RIGHT KNEE REPLACEMENT: Primary | ICD-10-CM

## 2020-01-24 DIAGNOSIS — M25.561 CHRONIC PAIN OF RIGHT KNEE: ICD-10-CM

## 2020-01-24 PROCEDURE — 73560 X-RAY EXAM OF KNEE 1 OR 2: CPT

## 2020-01-24 PROCEDURE — 99213 OFFICE O/P EST LOW 20 MIN: CPT | Performed by: ORTHOPAEDIC SURGERY

## 2020-01-24 NOTE — PROGRESS NOTES
62 y o male presents 1 year following right total knee replacement and describes very little pain if any in his right knee  When compared to pre-surgical, he describes significant reduction of his right knee pain, and a corresponding improvement in his performance  Review of Systems  Review of systems negative unless otherwise specified in HPI    Past Medical History  Past Medical History:   Diagnosis Date    Bronchospasm     last assessed 6/14/2016    Depression     Disc degeneration, lumbar     last assessed 4/7/2016    Erectile dysfunction     Hyperlipidemia     IFG (impaired fasting glucose)     last assessed 7/30/2014    Palpitations     last assessed 4/4/2013    Primary osteoarthritis of knees, bilateral     last assessed 6/27/2017    Right inguinal hernia     last assessed 9/12/2017    Spondylosis of lumbar region without myelopathy or radiculopathy     last assessed 4/7/2016       Past Surgical History  Past Surgical History:   Procedure Laterality Date    KNEE ARTHROSCOPY      Right    OH REPAIR ING HERNIA,5+Y/O,REDUCIBL Right 10/26/2017    Procedure: REPAIR HERNIA INGUINAL;  Surgeon: Patricia Ewing MD;  Location: [facility];  Service: General    OH TOTAL KNEE ARTHROPLASTY Right 1/21/2019    Procedure: ARTHROPLASTY KNEE TOTAL;  Surgeon: Danielle Roman MD;  Location: [facility];  Service: Orthopedics    TOOTH EXTRACTION         Current Medications  Current Outpatient Medications on File Prior to Visit   Medication Sig Dispense Refill    escitalopram (LEXAPRO) 10 mg tablet Take 1 tablet (10 mg total) by mouth daily He will take 5mg daily x 2 weeks then go to 10mg daily 90 tablet 1    metoprolol succinate (TOPROL-XL) 25 mg 24 hr tablet Take 1 tablet (25 mg total) by mouth daily 90 tablet 1    sildenafil (VIAGRA) 50 MG tablet Take 1 tablet (50 mg total) by mouth as needed for erectile dysfunction Take one hour before desired affect   10 tablet 3     No current facility-administered medications on file prior to visit  Recent Labs [facility])  0   Lab Value Date/Time    HCT 47 8 11/07/2019 0650    HGB 16 2 11/07/2019 0650    WBC 5 58 11/07/2019 0650    INR 0 90 12/27/2018 0948    ESR 2 11/07/2019 0650    CRP 10 5 (H) 12/27/2018 0948    GLUCOSE 113 07/19/2014 0724    HGBA1C 5 7 12/27/2018 0948    HGBA1C 5 7 (H) 07/11/2015 0805         Physical exam  · General: Awake, Alert, Oriented  · Eyes: Pupils equal, round and reactive to light  · Heart: regular rate and rhythm  · Lungs: No audible wheezing  · Abdomen: soft  Examination finds gait pattern with strong confident strides  Right hip is good mobility her right thigh has no atrophy  Right knee has a healed anterior scar  There is a very small effusion occurring without warmth  Knee extension is excellent, flexion is excellent  There is no mid flexion valgus instability  There is very little patellofemoral chatter  Calf compartments on the right are soft and supple  Toes on the right foot are warm, sensate, mobile    Imaging  I personally reviewed x-rays of the right knee my interpretation is as follows: Two views right knee show total knee implant in satisfactory position    Procedure  None indicated or performed today    Assessment/Plan:   62 y o male 1 year following right total knee replacement with well clinical and radiographic exam today  He understands antibiotic prophylaxis is indicated prior invasive procedures for the next 1 year    I would welcome the opportunity see back in the office should problems arise

## 2020-07-30 ENCOUNTER — OFFICE VISIT (OUTPATIENT)
Dept: FAMILY MEDICINE CLINIC | Facility: CLINIC | Age: 55
End: 2020-07-30
Payer: COMMERCIAL

## 2020-07-30 VITALS
HEART RATE: 86 BPM | OXYGEN SATURATION: 98 % | DIASTOLIC BLOOD PRESSURE: 78 MMHG | TEMPERATURE: 97.2 F | HEIGHT: 72 IN | BODY MASS INDEX: 30.96 KG/M2 | SYSTOLIC BLOOD PRESSURE: 144 MMHG | WEIGHT: 228.6 LBS

## 2020-07-30 DIAGNOSIS — I10 ESSENTIAL HYPERTENSION: Primary | ICD-10-CM

## 2020-07-30 DIAGNOSIS — G44.229 CHRONIC TENSION-TYPE HEADACHE, NOT INTRACTABLE: ICD-10-CM

## 2020-07-30 DIAGNOSIS — N52.9 ERECTILE DYSFUNCTION, UNSPECIFIED ERECTILE DYSFUNCTION TYPE: ICD-10-CM

## 2020-07-30 DIAGNOSIS — Z12.5 SCREENING FOR PROSTATE CANCER: ICD-10-CM

## 2020-07-30 PROCEDURE — 3008F BODY MASS INDEX DOCD: CPT | Performed by: INTERNAL MEDICINE

## 2020-07-30 PROCEDURE — 3078F DIAST BP <80 MM HG: CPT | Performed by: INTERNAL MEDICINE

## 2020-07-30 PROCEDURE — 3077F SYST BP >= 140 MM HG: CPT | Performed by: INTERNAL MEDICINE

## 2020-07-30 PROCEDURE — 1036F TOBACCO NON-USER: CPT | Performed by: INTERNAL MEDICINE

## 2020-07-30 PROCEDURE — 99214 OFFICE O/P EST MOD 30 MIN: CPT | Performed by: INTERNAL MEDICINE

## 2020-07-30 RX ORDER — METOPROLOL SUCCINATE 50 MG/1
50 TABLET, EXTENDED RELEASE ORAL DAILY
Qty: 90 TABLET | Refills: 1 | Status: SHIPPED | OUTPATIENT
Start: 2020-07-30 | End: 2021-04-02 | Stop reason: SDUPTHER

## 2020-07-30 RX ORDER — ESCITALOPRAM OXALATE 10 MG/1
15 TABLET ORAL DAILY
Qty: 135 TABLET | Refills: 1 | Status: SHIPPED | OUTPATIENT
Start: 2020-07-30 | End: 2021-04-02 | Stop reason: SDUPTHER

## 2020-07-30 RX ORDER — SILDENAFIL 100 MG/1
100 TABLET, FILM COATED ORAL AS NEEDED
Qty: 10 TABLET | Refills: 1 | Status: SHIPPED | OUTPATIENT
Start: 2020-07-30 | End: 2021-04-02 | Stop reason: SDUPTHER

## 2020-07-30 NOTE — PROGRESS NOTES
Pt states feels better  Getting 4 h/a month agrees to go to 15mg/day  Assessment/Plan:         Diagnoses and all orders for this visit:    Essential hypertension  Comments:  increase metoprolol  Orders:  -     metoprolol succinate (TOPROL-XL) 50 mg 24 hr tablet; Take 1 tablet (50 mg total) by mouth daily    Chronic tension-type headache, not intractable  Comments:  will try to increase lexapro to 15mg  it has been effectual for h/a proph  Orders:  -     escitalopram (LEXAPRO) 10 mg tablet; Take 1 5 tablets (15 mg total) by mouth daily He will take 5mg daily x 2 weeks then go to 10mg daily    Screening for prostate cancer    Erectile dysfunction, unspecified erectile dysfunction type  Comments:  prn viagra  Orders:  -     sildenafil (VIAGRA) 100 mg tablet; Take 1 tablet (100 mg total) by mouth as needed for erectile dysfunction Take one hour before desired affect  Subjective:      Patient ID: Carmen Rausch is a 60 y o  male  Pt states getting 4 h/a/month  He agrees to go to 15mg/day of lexapro  Also bp 144 agrees to up his metoprolol      The following portions of the patient's history were reviewed and updated as appropriate: He  has a past medical history of Bronchospasm, Depression, Disc degeneration, lumbar, Erectile dysfunction, Hyperlipidemia, IFG (impaired fasting glucose), Palpitations, Primary osteoarthritis of knees, bilateral, Right inguinal hernia, and Spondylosis of lumbar region without myelopathy or radiculopathy    He   Patient Active Problem List    Diagnosis Date Noted    Essential hypertension 01/06/2020    Screening for prostate cancer 01/06/2020    Aftercare following right knee joint replacement surgery 01/29/2019    Status post right knee replacement 01/23/2019    Chronic pain of left knee 05/01/2018    Chronic pain of right knee 05/01/2018    Primary osteoarthritis of right knee 01/30/2018    Primary osteoarthritis of left knee 01/30/2018     He  has a past surgical history that includes pr repair ing hernia,5+y/o,reducibl (Right, 10/26/2017); Tooth extraction; pr total knee arthroplasty (Right, 1/21/2019); and Knee arthroscopy  His family history includes Coronary artery disease in his brother; Diabetes in his father; No Known Problems in his mother and sister  He  reports that he has quit smoking  He has never used smokeless tobacco  He reports that he drinks alcohol  He reports that he does not use drugs  Current Outpatient Medications   Medication Sig Dispense Refill    escitalopram (LEXAPRO) 10 mg tablet Take 1 5 tablets (15 mg total) by mouth daily He will take 5mg daily x 2 weeks then go to 10mg daily 135 tablet 1    metoprolol succinate (TOPROL-XL) 25 mg 24 hr tablet Take 1 tablet (25 mg total) by mouth daily 90 tablet 1    sildenafil (VIAGRA) 100 mg tablet Take 1 tablet (100 mg total) by mouth as needed for erectile dysfunction Take one hour before desired affect  10 tablet 1    metoprolol succinate (TOPROL-XL) 50 mg 24 hr tablet Take 1 tablet (50 mg total) by mouth daily 90 tablet 1     No current facility-administered medications for this visit  Current Outpatient Medications on File Prior to Visit   Medication Sig    metoprolol succinate (TOPROL-XL) 25 mg 24 hr tablet Take 1 tablet (25 mg total) by mouth daily     No current facility-administered medications on file prior to visit  He has No Known Allergies       Review of Systems   Constitutional: Negative  HENT: Negative  Respiratory: Negative  Cardiovascular: Negative  Objective:      /78 (BP Location: Right arm, Patient Position: Sitting, Cuff Size: Standard)   Pulse 86   Temp (!) 97 2 °F (36 2 °C)   Ht 6' (1 829 m)   Wt 104 kg (228 lb 9 6 oz)   SpO2 98%   BMI 31 00 kg/m²          Physical Exam   Constitutional: He appears well-developed and well-nourished  No distress  HENT:   Head: Normocephalic and atraumatic     Right Ear: External ear normal    Left Ear: External ear normal    Nose: Nose normal    Mouth/Throat: Oropharynx is clear and moist  No oropharyngeal exudate  Neck: Normal range of motion  Neck supple  No thyromegaly present  Cardiovascular: Normal rate, regular rhythm, normal heart sounds and intact distal pulses  Exam reveals no gallop and no friction rub  No murmur heard  Pulmonary/Chest: Effort normal and breath sounds normal  No respiratory distress  He has no wheezes  He has no rales  Lymphadenopathy:     He has no cervical adenopathy  Skin: He is not diaphoretic

## 2020-09-30 ENCOUNTER — OFFICE VISIT (OUTPATIENT)
Dept: FAMILY MEDICINE CLINIC | Facility: CLINIC | Age: 55
End: 2020-09-30
Payer: COMMERCIAL

## 2020-09-30 VITALS
BODY MASS INDEX: 29.93 KG/M2 | SYSTOLIC BLOOD PRESSURE: 142 MMHG | HEIGHT: 72 IN | HEART RATE: 61 BPM | WEIGHT: 221 LBS | DIASTOLIC BLOOD PRESSURE: 80 MMHG | OXYGEN SATURATION: 98 % | TEMPERATURE: 98.1 F | RESPIRATION RATE: 16 BRPM

## 2020-09-30 DIAGNOSIS — I10 ESSENTIAL HYPERTENSION: Primary | ICD-10-CM

## 2020-09-30 DIAGNOSIS — R51.9 NONINTRACTABLE HEADACHE, UNSPECIFIED CHRONICITY PATTERN, UNSPECIFIED HEADACHE TYPE: ICD-10-CM

## 2020-09-30 PROCEDURE — 99213 OFFICE O/P EST LOW 20 MIN: CPT | Performed by: INTERNAL MEDICINE

## 2020-09-30 NOTE — PROGRESS NOTES
Assessment/Plan:         Diagnoses and all orders for this visit:    Essential hypertension  Comments:  stable    Nonintractable headache, unspecified chronicity pattern, unspecified headache type  Comments:  improved/stable          Subjective:      Patient ID: Ameena Lofton is a 60 y o  male  Pt states all his h/a resolved with 15mg lexapro  bpt better with increase b-blocker  Denies cp/sob      The following portions of the patient's history were reviewed and updated as appropriate: He  has a past medical history of Bronchospasm, Depression, Disc degeneration, lumbar, Erectile dysfunction, Hyperlipidemia, IFG (impaired fasting glucose), Palpitations, Primary osteoarthritis of knees, bilateral, Right inguinal hernia, and Spondylosis of lumbar region without myelopathy or radiculopathy  He   Patient Active Problem List    Diagnosis Date Noted    Essential hypertension 01/06/2020    Screening for prostate cancer 01/06/2020    Aftercare following right knee joint replacement surgery 01/29/2019    Status post right knee replacement 01/23/2019    Chronic pain of left knee 05/01/2018    Chronic pain of right knee 05/01/2018    Primary osteoarthritis of right knee 01/30/2018    Primary osteoarthritis of left knee 01/30/2018     He  has a past surgical history that includes pr repair ing hernia,5+y/o,reducibl (Right, 10/26/2017); Tooth extraction; pr total knee arthroplasty (Right, 1/21/2019); and Knee arthroscopy  His family history includes Coronary artery disease in his brother; Diabetes in his father; No Known Problems in his mother and sister  He  reports that he has quit smoking  He has never used smokeless tobacco  He reports current alcohol use  He reports that he does not use drugs    Current Outpatient Medications   Medication Sig Dispense Refill    escitalopram (LEXAPRO) 10 mg tablet Take 1 5 tablets (15 mg total) by mouth daily He will take 5mg daily x 2 weeks then go to 10mg daily 135 tablet 1    metoprolol succinate (TOPROL-XL) 25 mg 24 hr tablet Take 1 tablet (25 mg total) by mouth daily 90 tablet 1    metoprolol succinate (TOPROL-XL) 50 mg 24 hr tablet Take 1 tablet (50 mg total) by mouth daily 90 tablet 1    sildenafil (VIAGRA) 100 mg tablet Take 1 tablet (100 mg total) by mouth as needed for erectile dysfunction Take one hour before desired affect  10 tablet 1    meloxicam (MOBIC) 15 mg tablet Take 1 tablet (15 mg total) by mouth daily 30 tablet 3     No current facility-administered medications for this visit  Current Outpatient Medications on File Prior to Visit   Medication Sig    escitalopram (LEXAPRO) 10 mg tablet Take 1 5 tablets (15 mg total) by mouth daily He will take 5mg daily x 2 weeks then go to 10mg daily    metoprolol succinate (TOPROL-XL) 25 mg 24 hr tablet Take 1 tablet (25 mg total) by mouth daily    metoprolol succinate (TOPROL-XL) 50 mg 24 hr tablet Take 1 tablet (50 mg total) by mouth daily    sildenafil (VIAGRA) 100 mg tablet Take 1 tablet (100 mg total) by mouth as needed for erectile dysfunction Take one hour before desired affect  No current facility-administered medications on file prior to visit  He has No Known Allergies       Review of Systems   Constitutional: Negative  Negative for chills and fever  HENT: Negative  Respiratory: Negative  Cardiovascular: Negative  Neurological: Negative for headaches  Objective:      /80 (BP Location: Right arm, Patient Position: Sitting, Cuff Size: Large)   Pulse 61   Temp 98 1 °F (36 7 °C) (Temporal)   Resp 16   Ht 6' (1 829 m)   Wt 100 kg (221 lb)   SpO2 98%   BMI 29 97 kg/m²          Physical Exam  Constitutional:       Appearance: Normal appearance  HENT:      Head: Normocephalic and atraumatic  Nose: Nose normal    Neck:      Musculoskeletal: Neck supple  Cardiovascular:      Rate and Rhythm: Normal rate and regular rhythm     Pulmonary:      Effort: Pulmonary effort is normal       Breath sounds: Normal breath sounds  Lymphadenopathy:      Cervical: No cervical adenopathy  Neurological:      Mental Status: He is alert

## 2020-10-08 DIAGNOSIS — M17.12 PRIMARY OSTEOARTHRITIS OF LEFT KNEE: Primary | ICD-10-CM

## 2020-10-08 DIAGNOSIS — G89.29 CHRONIC PAIN OF LEFT KNEE: ICD-10-CM

## 2020-10-08 DIAGNOSIS — M25.561 CHRONIC PAIN OF RIGHT KNEE: ICD-10-CM

## 2020-10-08 DIAGNOSIS — M17.11 PRIMARY OSTEOARTHRITIS OF RIGHT KNEE: ICD-10-CM

## 2020-10-08 DIAGNOSIS — M25.562 CHRONIC PAIN OF LEFT KNEE: ICD-10-CM

## 2020-10-08 DIAGNOSIS — G89.29 CHRONIC PAIN OF RIGHT KNEE: ICD-10-CM

## 2020-10-09 RX ORDER — MELOXICAM 15 MG/1
15 TABLET ORAL DAILY
Qty: 30 TABLET | Refills: 3 | Status: SHIPPED | OUTPATIENT
Start: 2020-10-09 | End: 2021-04-02 | Stop reason: SDUPTHER

## 2020-12-28 ENCOUNTER — HOSPITAL ENCOUNTER (EMERGENCY)
Facility: HOSPITAL | Age: 55
Discharge: HOME | End: 2020-12-28
Attending: EMERGENCY MEDICINE
Payer: OTHER MISCELLANEOUS

## 2020-12-28 VITALS
TEMPERATURE: 97.6 F | HEART RATE: 87 BPM | BODY MASS INDEX: 31.45 KG/M2 | SYSTOLIC BLOOD PRESSURE: 185 MMHG | DIASTOLIC BLOOD PRESSURE: 92 MMHG | OXYGEN SATURATION: 92 % | WEIGHT: 231.92 LBS | RESPIRATION RATE: 20 BRPM

## 2020-12-28 DIAGNOSIS — S05.00XA CORNEAL ABRASION: Primary | ICD-10-CM

## 2020-12-28 DIAGNOSIS — H11.422 CHEMOSIS OF LEFT CONJUNCTIVA: ICD-10-CM

## 2020-12-28 PROCEDURE — 99283 EMERGENCY DEPT VISIT LOW MDM: CPT

## 2020-12-28 PROCEDURE — 99284 EMERGENCY DEPT VISIT MOD MDM: CPT | Performed by: PHYSICIAN ASSISTANT

## 2020-12-28 RX ORDER — ERYTHROMYCIN 5 MG/G
OINTMENT OPHTHALMIC
Qty: 3.5 G | Refills: 0 | Status: SHIPPED | OUTPATIENT
Start: 2020-12-28 | End: 2021-04-02

## 2020-12-28 RX ORDER — KETOROLAC TROMETHAMINE 5 MG/ML
1 SOLUTION OPHTHALMIC EVERY 6 HOURS
Qty: 0.6 ML | Refills: 0 | Status: SHIPPED | OUTPATIENT
Start: 2020-12-28 | End: 2021-04-02

## 2020-12-28 RX ORDER — TETRACAINE HYDROCHLORIDE 5 MG/ML
2 SOLUTION OPHTHALMIC ONCE
Status: COMPLETED | OUTPATIENT
Start: 2020-12-28 | End: 2020-12-28

## 2020-12-28 RX ORDER — ERYTHROMYCIN 5 MG/G
0.5 OINTMENT OPHTHALMIC ONCE
Status: COMPLETED | OUTPATIENT
Start: 2020-12-28 | End: 2020-12-28

## 2020-12-28 RX ADMIN — FLUORESCEIN SODIUM 1 STRIP: 1 STRIP OPHTHALMIC at 14:52

## 2020-12-28 RX ADMIN — TETRACAINE HYDROCHLORIDE 2 DROP: 5 SOLUTION OPHTHALMIC at 14:52

## 2020-12-28 RX ADMIN — ERYTHROMYCIN 0.5 INCH: 5 OINTMENT OPHTHALMIC at 15:26

## 2020-12-28 RX ADMIN — FLUORESCEIN SODIUM 1 STRIP: 1 STRIP OPHTHALMIC at 15:26

## 2020-12-28 NOTE — Clinical Note
Maliha Dowling was seen and treated in our emergency department on 12/28/2020  Diagnosis:     Alyse De La Cruz    He may return on this date: May return to work after being cleared by ophthalmology      If you have any questions or concerns, please don't hesitate to call        Arlet Hdz PA-C    ______________________________           _______________          _______________  Hospital Representative                              Date                                Time

## 2020-12-28 NOTE — DISCHARGE INSTRUCTIONS
Please place 1 ribbon of antibiotic ointment inside both lower eyelids every 4 hours while awake for 7 days

## 2020-12-28 NOTE — ED PROVIDER NOTES
History  Chief Complaint   Patient presents with    Foreign Body in Eye     Was cleaning furnace and sutt get it to his L eye, unable to open his eye  Washed it out as much as he can       59 y/o male presenting with bilateral eye foreign bodies  States he was cleaning a furnace and got suttt in to both eyes however more so the left eye  Has had some light sensitivity and feels as though something is in his eye despite washing his eyes thoroughly  He is not experiencing any changes in vision or headaches  States that the sutt was warm and not hot  Does not feel as though he has any facial burns  Prior to Admission Medications   Prescriptions Last Dose Informant Patient Reported? Taking?   escitalopram (LEXAPRO) 10 mg tablet  Self No No   Sig: Take 1 5 tablets (15 mg total) by mouth daily He will take 5mg daily x 2 weeks then go to 10mg daily   meloxicam (MOBIC) 15 mg tablet   No No   Sig: Take 1 tablet (15 mg total) by mouth daily   metoprolol succinate (TOPROL-XL) 25 mg 24 hr tablet  Self No No   Sig: Take 1 tablet (25 mg total) by mouth daily   metoprolol succinate (TOPROL-XL) 50 mg 24 hr tablet  Self No No   Sig: Take 1 tablet (50 mg total) by mouth daily   sildenafil (VIAGRA) 100 mg tablet  Self No No   Sig: Take 1 tablet (100 mg total) by mouth as needed for erectile dysfunction Take one hour before desired affect        Facility-Administered Medications: None       Past Medical History:   Diagnosis Date    Bronchospasm     last assessed 6/14/2016    Depression     Disc degeneration, lumbar     last assessed 4/7/2016    Erectile dysfunction     Hyperlipidemia     IFG (impaired fasting glucose)     last assessed 7/30/2014    Palpitations     last assessed 4/4/2013    Primary osteoarthritis of knees, bilateral     last assessed 6/27/2017    Right inguinal hernia     last assessed 9/12/2017    Spondylosis of lumbar region without myelopathy or radiculopathy     last assessed 4/7/2016 Past Surgical History:   Procedure Laterality Date    KNEE ARTHROSCOPY      Right    CO REPAIR ING HERNIA,5+Y/O,REDUCIBL Right 10/26/2017    Procedure: REPAIR HERNIA INGUINAL;  Surgeon: Kevin Diaz MD;  [facility]: [facility];  Service: General    CO TOTAL KNEE ARTHROPLASTY Right 1/21/2019    Procedure: ARTHROPLASTY KNEE TOTAL;  Surgeon: Rashaun Coronel MD;  Location[facility];  Service: Orthopedics    TOOTH EXTRACTION         Family History   Problem Relation Age of Onset    No Known Problems Mother     Diabetes Father     No Known Problems Sister     Coronary artery disease Brother      I have reviewed and agree with the history as documented  E-Cigarette/Vaping     E-Cigarette/Vaping Substances     Social History     Tobacco Use    Smoking status: Current Some Day Smoker    Smokeless tobacco: Never Used    Tobacco comment: quit 11/2018   Substance Use Topics    Alcohol use: Yes     Frequency: 2-3 times a week     Drinks per session: 1 or 2     Comment:  occasional    Drug use: No       Review of Systems   Constitutional: Negative  HENT: Negative  Eyes: Positive for photophobia, pain and redness  Negative for discharge, itching and visual disturbance  Respiratory: Negative  Cardiovascular: Negative  Gastrointestinal: Negative  Genitourinary: Negative  Musculoskeletal: Negative  Skin: Negative  Neurological: Negative  All other systems reviewed and are negative  Physical Exam  Physical Exam  Vitals signs and nursing note reviewed  Constitutional:       Appearance: Normal appearance  He is normal weight  HENT:      Head: Normocephalic and atraumatic  Right Ear: External ear normal       Left Ear: External ear normal       Nose: Nose normal       Mouth/Throat:      Mouth: Mucous membranes are moist       Pharynx: Oropharynx is clear  Eyes:      General: Lids are normal  Vision grossly intact  Gaze aligned appropriately        Conjunctiva/sclera: Right eye: Right conjunctiva is injected  No chemosis, exudate or hemorrhage  Left eye: Left conjunctiva is injected  Chemosis present  No exudate or hemorrhage  Neck:      Musculoskeletal: Normal range of motion  Cardiovascular:      Rate and Rhythm: Normal rate  Pulses: Normal pulses  Pulmonary:      Effort: Pulmonary effort is normal       Comments: S PO2 is 92%   Skin:     General: Skin is warm  Capillary Refill: Capillary refill takes less than 2 seconds  Neurological:      General: No focal deficit present  Mental Status: He is alert  Mental status is at baseline           Vital Signs  ED Triage Vitals [12/28/20 1444]   Temperature Pulse Respirations Blood Pressure SpO2   97 6 °F (36 4 °C) 87 20 (!) 185/92 92 %      Temp Source Heart Rate Source Patient Position - Orthostatic VS BP Location FiO2 (%)   Tympanic Monitor Sitting Left arm --      Pain Score       5           Vitals:    12/28/20 1444   BP: (!) 185/92   Pulse: 87   Patient Position - Orthostatic VS: Sitting         Visual Acuity      ED Medications  Medications   erythromycin (ILOTYCIN) 0 5 % ophthalmic ointment 0 5 inch (has no administration in time range)   fluorescein sodium sterile ophthalmic strip 1 strip (has no administration in time range)   tetracaine 0 5 % ophthalmic solution 2 drop (2 drops Left Eye Given 12/28/20 1452)   fluorescein sodium sterile ophthalmic strip 1 strip (1 strip Left Eye Given 12/28/20 1452)       Diagnostic Studies  Results Reviewed     None                 No orders to display              Procedures  Foreign Body - Ocular    Date/Time: 12/28/2020 3:22 PM  Performed by: Kim Meredith PA-C  Authorized by: Kim Meredith PA-C     Patient location:  ED  Consent:     Consent obtained:  Verbal    Consent given by:  Patient    Risks discussed:  Corneal damage, bleeding, damage to surrounding structures, incomplete removal, infection, globe perforation, pain, visual impairment and worsening of condition    Alternatives discussed:  No treatment  Universal protocol:     Procedure explained and questions answered to patient or proxy's satisfaction: yes      Relevant documents present and verified: yes      Test results available and properly labeled: yes      Radiology Images displayed and confirmed  If images not available, report reviewed : yes      Required blood products, implants, devices, and special equipment available: yes      Site/side marked: yes      Immediately prior to procedure, a time out was called: yes      Patient identity confirmed:  Verbally with patient  Location:     Location:  R conjunctival (left and right conjunctiva)  Pre-procedure details:     Imaging:  None    Fluorescein exam: yes      Fluorescein uptake: yes      Gama test: negative      Corneal abrasion location: bilateral and central   Anesthesia (see MAR for exact dosages):     Local anesthetic:  Tetracaine drops  Procedure details:     Localization method:  Direct visualization    Removal mechanism:  Irrigation and moist cotton swab    Foreign bodies recovered:  5 or more    Description:  Large amounts of sutt bilaterally     Intact foreign body removal: yes    Post-procedure details:     Confirmation:  No additional foreign bodies on visualization    Dressing:  Antibiotic ointment    Patient tolerance of procedure: Tolerated well, no immediate complications             ED Course                             SBIRT 22yo+      Most Recent Value   SBIRT (22 yo +)   In order to provide better care to our patients, we are screening all of our patients for alcohol and drug use  Would it be okay to ask you these screening questions? No Filed at: 12/28/2020 1452                    MDM  Number of Diagnoses or Management Options  Diagnosis management comments: Will treat for corneal abrasions and left-sided chemosis    Patient understands the significant importance of following up with Ophthalmology as soon as possible for re-evaluation of his symptoms  Patient informed not to drive until re-evaluated by Ophthalmology  Strict return precautions for any worsening  Patient verbalizes understanding and agrees with the above assessment plan  Amount and/or Complexity of Data Reviewed  Review and summarize past medical records: yes  Discuss the patient with other providers: yes (Discussed case and management with Dr Carley Dickerson)  Independent visualization of images, tracings, or specimens: yes        Disposition  Final diagnoses:   Corneal abrasion   Chemosis of left conjunctiva     Time reflects when diagnosis was documented in both MDM as applicable and the Disposition within this note     Time User Action Codes Description Comment    12/28/2020  3:24 PM Neela Familia Add [S05 00XA] Corneal abrasion     12/28/2020  3:24 PM Neela Familia Add [G97 184] Chemosis of left conjunctiva       ED Disposition     ED Disposition Condition Date/Time Comment    Discharge Stable Mon Dec 28, 2020  3:24 PM Angelica Zepeda discharge to home/self care  Follow-up Information     Follow up With Specialties Details Why Contact Info Additional Information    [address] [facility] Emergency Department Emergency Medicine Go to  If symptoms worsen, otherwise please follow up with your eye doctor as soon as possible [address] [facility] [address] [facility], [facility], [address]    Aziza Jones MD Ophthalmology Schedule an appointment as soon as possible for a visit in 1 day  [address]  [phone]             Patient's Medications   Discharge Prescriptions    ERYTHROMYCIN (ILOTYCIN) OPHTHALMIC OINTMENT    Place a 1/2 inch ribbon of ointment into both lower eyelids every 4 hours while awake x 7 days         Start Date: 12/28/2020End Date: --       Order Dose: --       Quantity: 3 5 g    Refills: 0    KETOROLAC (ACULAR) 0 5 % OPHTHALMIC SOLUTION    Administer 1 drop to both eyes every 6 (six) hours for 3 days       Start Date: 12/28/2020End Date: 12/31/2020       Order Dose: 1 drop       Quantity: 0 6 mL    Refills: 0     No discharge procedures on file      PDMP Review     None          ED Provider  Electronically Signed by           Anna Alarcon PA-C  12/28/20 1524

## 2021-02-26 ENCOUNTER — TRANSCRIBE ORDERS (OUTPATIENT)
Dept: INTERNAL MEDICINE CLINIC | Facility: CLINIC | Age: 56
End: 2021-02-26

## 2021-02-26 DIAGNOSIS — Z20.828 CONTACT WITH AND (SUSPECTED) EXPOSURE TO OTHER VIRAL COMMUNICABLE DISEASES: ICD-10-CM

## 2021-02-26 DIAGNOSIS — Z20.828 CONTACT WITH AND (SUSPECTED) EXPOSURE TO OTHER VIRAL COMMUNICABLE DISEASES: Primary | ICD-10-CM

## 2021-02-26 PROCEDURE — U0005 INFEC AGEN DETEC AMPLI PROBE: HCPCS | Performed by: INTERNAL MEDICINE

## 2021-02-26 PROCEDURE — U0003 INFECTIOUS AGENT DETECTION BY NUCLEIC ACID (DNA OR RNA); SEVERE ACUTE RESPIRATORY SYNDROME CORONAVIRUS 2 (SARS-COV-2) (CORONAVIRUS DISEASE [COVID-19]), AMPLIFIED PROBE TECHNIQUE, MAKING USE OF HIGH THROUGHPUT TECHNOLOGIES AS DESCRIBED BY CMS-2020-01-R: HCPCS | Performed by: INTERNAL MEDICINE

## 2021-02-27 LAB — SARS-COV-2 RNA RESP QL NAA+PROBE: NEGATIVE

## 2021-03-10 DIAGNOSIS — Z23 ENCOUNTER FOR IMMUNIZATION: ICD-10-CM

## 2021-03-28 ENCOUNTER — IMMUNIZATIONS (OUTPATIENT)
Dept: FAMILY MEDICINE CLINIC | Facility: HOSPITAL | Age: 56
End: 2021-03-28

## 2021-03-28 DIAGNOSIS — Z23 ENCOUNTER FOR IMMUNIZATION: Primary | ICD-10-CM

## 2021-03-28 PROCEDURE — 91300 SARS-COV-2 / COVID-19 MRNA VACCINE (PFIZER-BIONTECH) 30 MCG: CPT

## 2021-03-28 PROCEDURE — 0001A SARS-COV-2 / COVID-19 MRNA VACCINE (PFIZER-BIONTECH) 30 MCG: CPT

## 2021-04-02 ENCOUNTER — OFFICE VISIT (OUTPATIENT)
Dept: FAMILY MEDICINE CLINIC | Facility: CLINIC | Age: 56
End: 2021-04-02
Payer: COMMERCIAL

## 2021-04-02 VITALS
TEMPERATURE: 98.2 F | WEIGHT: 230 LBS | SYSTOLIC BLOOD PRESSURE: 130 MMHG | HEIGHT: 72 IN | RESPIRATION RATE: 16 BRPM | DIASTOLIC BLOOD PRESSURE: 76 MMHG | BODY MASS INDEX: 31.15 KG/M2 | OXYGEN SATURATION: 98 % | HEART RATE: 68 BPM

## 2021-04-02 DIAGNOSIS — Z13.0 SCREENING, ANEMIA, DEFICIENCY, IRON: ICD-10-CM

## 2021-04-02 DIAGNOSIS — M25.561 CHRONIC PAIN OF RIGHT KNEE: ICD-10-CM

## 2021-04-02 DIAGNOSIS — M17.12 PRIMARY OSTEOARTHRITIS OF LEFT KNEE: ICD-10-CM

## 2021-04-02 DIAGNOSIS — G89.29 CHRONIC PAIN OF RIGHT KNEE: ICD-10-CM

## 2021-04-02 DIAGNOSIS — I10 ESSENTIAL HYPERTENSION: Primary | ICD-10-CM

## 2021-04-02 DIAGNOSIS — G89.29 CHRONIC PAIN OF LEFT KNEE: ICD-10-CM

## 2021-04-02 DIAGNOSIS — M25.562 CHRONIC PAIN OF LEFT KNEE: ICD-10-CM

## 2021-04-02 DIAGNOSIS — M17.11 PRIMARY OSTEOARTHRITIS OF RIGHT KNEE: ICD-10-CM

## 2021-04-02 DIAGNOSIS — E66.9 CLASS 1 OBESITY WITH BODY MASS INDEX (BMI) OF 31.0 TO 31.9 IN ADULT, UNSPECIFIED OBESITY TYPE, UNSPECIFIED WHETHER SERIOUS COMORBIDITY PRESENT: ICD-10-CM

## 2021-04-02 DIAGNOSIS — N52.9 ERECTILE DYSFUNCTION, UNSPECIFIED ERECTILE DYSFUNCTION TYPE: ICD-10-CM

## 2021-04-02 DIAGNOSIS — G44.229 CHRONIC TENSION-TYPE HEADACHE, NOT INTRACTABLE: ICD-10-CM

## 2021-04-02 PROCEDURE — 99214 OFFICE O/P EST MOD 30 MIN: CPT | Performed by: INTERNAL MEDICINE

## 2021-04-02 RX ORDER — SILDENAFIL 100 MG/1
100 TABLET, FILM COATED ORAL AS NEEDED
Qty: 10 TABLET | Refills: 1 | Status: SHIPPED | OUTPATIENT
Start: 2021-04-02 | End: 2021-11-15 | Stop reason: SDUPTHER

## 2021-04-02 RX ORDER — METOPROLOL SUCCINATE 50 MG/1
50 TABLET, EXTENDED RELEASE ORAL DAILY
Qty: 90 TABLET | Refills: 1 | Status: SHIPPED | OUTPATIENT
Start: 2021-04-02 | End: 2021-09-27 | Stop reason: SDUPTHER

## 2021-04-02 RX ORDER — ESCITALOPRAM OXALATE 10 MG/1
15 TABLET ORAL DAILY
Qty: 135 TABLET | Refills: 1 | Status: SHIPPED | OUTPATIENT
Start: 2021-04-02 | End: 2021-11-15 | Stop reason: SDUPTHER

## 2021-04-02 RX ORDER — MELOXICAM 15 MG/1
15 TABLET ORAL DAILY
Qty: 30 TABLET | Refills: 3 | Status: SHIPPED | OUTPATIENT
Start: 2021-04-02 | End: 2021-08-16 | Stop reason: SDUPTHER

## 2021-04-02 NOTE — PROGRESS NOTES
BMI Counseling: Body mass index is 31 19 kg/m²  The BMI is above normal  Nutrition recommendations include decreasing portion sizes and limiting drinks that contain sugar  Exercise recommendations include exercising 3-5 times per week  No pharmacotherapy was ordered  Patient referred to PCP due to patient being overweight  Tobacco Cessation Counseling: Tobacco cessation counseling was not provided  The patient is sincerely urged to quit consumption of tobacco  He is ready to quit tobacco  Pt quit 2021    Assessment/Plan:         Diagnoses and all orders for this visit:    Essential hypertension  Comments:  improved  Orders:  -     metoprolol succinate (TOPROL-XL) 50 mg 24 hr tablet; Take 1 tablet (50 mg total) by mouth daily  -     TSH, 3rd generation with Free T4 reflex; Future    Chronic tension-type headache, not intractable  Comments:  will try to increase lexapro to 15mg  it has been effectual for h/a proph  requests neuro  Orders:  -     escitalopram (LEXAPRO) 10 mg tablet; Take 1 5 tablets (15 mg total) by mouth daily He will take 5mg daily x 2 weeks then go to 10mg daily    Primary osteoarthritis of left knee  -     meloxicam (MOBIC) 15 mg tablet; Take 1 tablet (15 mg total) by mouth daily    Primary osteoarthritis of right knee  -     meloxicam (MOBIC) 15 mg tablet; Take 1 tablet (15 mg total) by mouth daily    Chronic pain of left knee  -     meloxicam (MOBIC) 15 mg tablet; Take 1 tablet (15 mg total) by mouth daily    Chronic pain of right knee  -     meloxicam (MOBIC) 15 mg tablet; Take 1 tablet (15 mg total) by mouth daily    Erectile dysfunction, unspecified erectile dysfunction type  Comments:  prn viagra  Orders:  -     sildenafil (VIAGRA) 100 mg tablet; Take 1 tablet (100 mg total) by mouth as needed for erectile dysfunction Take one hour before desired affect      Class 1 obesity with body mass index (BMI) of 31 0 to 31 9 in adult, unspecified obesity type, unspecified whether serious comorbidity present  Comments:  rec reduce  Orders:  -     Comprehensive metabolic panel; Future  -     Lipid panel; Future  -     TSH, 3rd generation with Free T4 reflex; Future    Screening, anemia, deficiency, iron  -     CBC; Future          Subjective:      Patient ID: Aaron Palmer is a 60 y o  male  Pt needs rx  His bp elevated when off meds  +h/a when off meds  He wants to try to back down with lexapro  Also wants to see neuro for h/a  Denies cp/sob/h/a  His h/a are better with lexapro  Denies cp/sob/h/a   +occ knee pain  Discussed otc nsaid prn with gi protection      The following portions of the patient's history were reviewed and updated as appropriate: He  has a past medical history of Bronchospasm, Depression, Disc degeneration, lumbar, Erectile dysfunction, Hyperlipidemia, IFG (impaired fasting glucose), Palpitations, Primary osteoarthritis of knees, bilateral, Right inguinal hernia, and Spondylosis of lumbar region without myelopathy or radiculopathy  He   Patient Active Problem List    Diagnosis Date Noted    Essential hypertension 01/06/2020    Screening for prostate cancer 01/06/2020    Aftercare following right knee joint replacement surgery 01/29/2019    Status post right knee replacement 01/23/2019    Chronic pain of left knee 05/01/2018    Chronic pain of right knee 05/01/2018    Primary osteoarthritis of right knee 01/30/2018    Primary osteoarthritis of left knee 01/30/2018     He  has a past surgical history that includes pr repair ing hernia,5+y/o,reducibl (Right, 10/26/2017); Tooth extraction; pr total knee arthroplasty (Right, 1/21/2019); and Knee arthroscopy  His family history includes Coronary artery disease in his brother; Diabetes in his father; No Known Problems in his mother and sister  He  reports that he quit smoking about 3 months ago  He quit after 20 00 years of use  He has never used smokeless tobacco  He reports current alcohol use   He reports that he does not use drugs  Current Outpatient Medications   Medication Sig Dispense Refill    escitalopram (LEXAPRO) 10 mg tablet Take 1 5 tablets (15 mg total) by mouth daily He will take 5mg daily x 2 weeks then go to 10mg daily 135 tablet 1    sildenafil (VIAGRA) 100 mg tablet Take 1 tablet (100 mg total) by mouth as needed for erectile dysfunction Take one hour before desired affect  10 tablet 1    meloxicam (MOBIC) 15 mg tablet Take 1 tablet (15 mg total) by mouth daily 30 tablet 3    metoprolol succinate (TOPROL-XL) 50 mg 24 hr tablet Take 1 tablet (50 mg total) by mouth daily 90 tablet 1     No current facility-administered medications for this visit  No current outpatient medications on file prior to visit  No current facility-administered medications on file prior to visit  He has No Known Allergies       Review of Systems   Constitutional: Negative  HENT: Negative  Respiratory: Negative  Cardiovascular: Negative  Musculoskeletal: Positive for arthralgias  Neurological: Positive for headaches  Objective:      /76 (BP Location: Left arm, Patient Position: Sitting, Cuff Size: Large)   Pulse 68   Temp 98 2 °F (36 8 °C) (Temporal)   Resp 16   Ht 6' (1 829 m)   Wt 104 kg (230 lb)   SpO2 98%   BMI 31 19 kg/m²          Physical Exam  Constitutional:       Appearance: Normal appearance  HENT:      Head: Normocephalic and atraumatic  Right Ear: Tympanic membrane normal       Left Ear: Tympanic membrane normal    Neck:      Musculoskeletal: Neck supple  Cardiovascular:      Rate and Rhythm: Normal rate and regular rhythm  Pulmonary:      Effort: Pulmonary effort is normal       Breath sounds: Normal breath sounds  Musculoskeletal:      Comments: +crepitance of knees  Lat/med lig ok  Neg ant/post drawere  - tend of meniscus   Lymphadenopathy:      Cervical: No cervical adenopathy  Neurological:      Mental Status: He is alert

## 2021-04-13 ENCOUNTER — TRANSCRIBE ORDERS (OUTPATIENT)
Dept: LAB | Facility: CLINIC | Age: 56
End: 2021-04-13

## 2021-04-13 ENCOUNTER — APPOINTMENT (OUTPATIENT)
Dept: LAB | Facility: CLINIC | Age: 56
End: 2021-04-13
Payer: COMMERCIAL

## 2021-04-13 DIAGNOSIS — E66.9 CLASS 1 OBESITY WITH BODY MASS INDEX (BMI) OF 31.0 TO 31.9 IN ADULT, UNSPECIFIED OBESITY TYPE, UNSPECIFIED WHETHER SERIOUS COMORBIDITY PRESENT: ICD-10-CM

## 2021-04-13 DIAGNOSIS — Z13.0 SCREENING, ANEMIA, DEFICIENCY, IRON: ICD-10-CM

## 2021-04-13 DIAGNOSIS — I10 ESSENTIAL HYPERTENSION: ICD-10-CM

## 2021-04-13 LAB
ALBUMIN SERPL BCP-MCNC: 3.7 G/DL (ref 3.5–5)
ALP SERPL-CCNC: 138 U/L (ref 46–116)
ALT SERPL W P-5'-P-CCNC: 31 U/L (ref 12–78)
ANION GAP SERPL CALCULATED.3IONS-SCNC: 8 MMOL/L (ref 4–13)
AST SERPL W P-5'-P-CCNC: 19 U/L (ref 5–45)
BILIRUB SERPL-MCNC: 0.5 MG/DL (ref 0.2–1)
BUN SERPL-MCNC: 18 MG/DL (ref 5–25)
CALCIUM SERPL-MCNC: 8.3 MG/DL (ref 8.3–10.1)
CHLORIDE SERPL-SCNC: 108 MMOL/L (ref 100–108)
CHOLEST SERPL-MCNC: 242 MG/DL (ref 50–200)
CO2 SERPL-SCNC: 27 MMOL/L (ref 21–32)
CREAT SERPL-MCNC: 0.89 MG/DL (ref 0.6–1.3)
ERYTHROCYTE [DISTWIDTH] IN BLOOD BY AUTOMATED COUNT: 12.2 % (ref 11.6–15.1)
GFR SERPL CREATININE-BSD FRML MDRD: 94 ML/MIN/1.73SQ M
GLUCOSE P FAST SERPL-MCNC: 102 MG/DL (ref 65–99)
HCT VFR BLD AUTO: 45.6 % (ref 36.5–49.3)
HDLC SERPL-MCNC: 58 MG/DL
HGB BLD-MCNC: 15.5 G/DL (ref 12–17)
LDLC SERPL CALC-MCNC: 167 MG/DL (ref 0–100)
MCH RBC QN AUTO: 30.9 PG (ref 26.8–34.3)
MCHC RBC AUTO-ENTMCNC: 34 G/DL (ref 31.4–37.4)
MCV RBC AUTO: 91 FL (ref 82–98)
NONHDLC SERPL-MCNC: 184 MG/DL
PLATELET # BLD AUTO: 200 THOUSANDS/UL (ref 149–390)
PMV BLD AUTO: 9.5 FL (ref 8.9–12.7)
POTASSIUM SERPL-SCNC: 4.6 MMOL/L (ref 3.5–5.3)
PROT SERPL-MCNC: 6.4 G/DL (ref 6.4–8.2)
RBC # BLD AUTO: 5.01 MILLION/UL (ref 3.88–5.62)
SODIUM SERPL-SCNC: 143 MMOL/L (ref 136–145)
TRIGL SERPL-MCNC: 87 MG/DL
TSH SERPL DL<=0.05 MIU/L-ACNC: 0.89 UIU/ML (ref 0.36–3.74)
WBC # BLD AUTO: 5.59 THOUSAND/UL (ref 4.31–10.16)

## 2021-04-13 PROCEDURE — 80061 LIPID PANEL: CPT

## 2021-04-13 PROCEDURE — 84443 ASSAY THYROID STIM HORMONE: CPT

## 2021-04-13 PROCEDURE — 80053 COMPREHEN METABOLIC PANEL: CPT

## 2021-04-13 PROCEDURE — 36415 COLL VENOUS BLD VENIPUNCTURE: CPT

## 2021-04-13 PROCEDURE — 85027 COMPLETE CBC AUTOMATED: CPT

## 2021-04-18 ENCOUNTER — IMMUNIZATIONS (OUTPATIENT)
Dept: FAMILY MEDICINE CLINIC | Facility: HOSPITAL | Age: 56
End: 2021-04-18

## 2021-04-18 DIAGNOSIS — Z23 ENCOUNTER FOR IMMUNIZATION: Primary | ICD-10-CM

## 2021-04-18 PROCEDURE — 91300 SARS-COV-2 / COVID-19 MRNA VACCINE (PFIZER-BIONTECH) 30 MCG: CPT

## 2021-04-18 PROCEDURE — 0002A SARS-COV-2 / COVID-19 MRNA VACCINE (PFIZER-BIONTECH) 30 MCG: CPT

## 2021-04-29 NOTE — PLAN OF CARE
Problem: PHYSICAL THERAPY ADULT  Goal: Performs mobility at highest level of function for planned discharge setting  See evaluation for individualized goals  Treatment/Interventions: Functional transfer training, LE strengthening/ROM, Therapeutic exercise, Endurance training, Bed mobility, Gait training, Spoke to nursing, Family  Equipment Recommended:  (Pt has RW)       See flowsheet documentation for full assessment, interventions and recommendations  Outcome: Progressing  Prognosis: Good  Problem List: Decreased strength, Decreased range of motion, Decreased endurance, Impaired balance, Decreased mobility, Decreased safety awareness, Decreased skin integrity, Orthopedic restrictions, Pain (WBAT RLE)  Assessment: Pt able to ambulate 100 feet with use of RW on tile and hardwood elana needing S level of A  During upright mobility,inc R knee flexion during stance phase of gait,needing minAx1 for verbal instruction for gait sequence and use of RW,dec heel toe movement of RLE during gait  Pt able to perform sit to stand transfers minAx1 and BM S level of A  Pt able to perform and complete BLE ther ex HEP supine in bed AAROM->AROM  pt fatigues quickly and reports "being tired"  pt able to perform 3 steps with use of SPC and HHA modAx1 and 15 steps with use of L rail minAx1  Verbal and physical instruction given to pt concerning A for TEMI from family and use of SPC, pt agreed and nods head "yes" in agreement  pt would cont to benefit from skilled inpt PT services to maximize functional independence  Barriers to Discharge: Inaccessible home environment (TEMI and 2 SH)     Recommendation: Outpatient PT, Home with family support     PT - OK to Discharge: Yes    See flowsheet documentation for full assessment  Stable

## 2021-08-10 ENCOUNTER — APPOINTMENT (OUTPATIENT)
Dept: LAB | Facility: CLINIC | Age: 56
End: 2021-08-10

## 2021-08-10 DIAGNOSIS — Z00.8 HEALTH EXAMINATION IN POPULATION SURVEY: ICD-10-CM

## 2021-08-10 LAB
CHOLEST SERPL-MCNC: 229 MG/DL (ref 50–200)
HDLC SERPL-MCNC: 50 MG/DL
LDLC SERPL CALC-MCNC: 154 MG/DL (ref 0–100)
NONHDLC SERPL-MCNC: 179 MG/DL
TRIGL SERPL-MCNC: 125 MG/DL

## 2021-08-10 PROCEDURE — 80061 LIPID PANEL: CPT

## 2021-08-16 DIAGNOSIS — G89.29 CHRONIC PAIN OF LEFT KNEE: ICD-10-CM

## 2021-08-16 DIAGNOSIS — M17.12 PRIMARY OSTEOARTHRITIS OF LEFT KNEE: ICD-10-CM

## 2021-08-16 DIAGNOSIS — M25.561 CHRONIC PAIN OF RIGHT KNEE: ICD-10-CM

## 2021-08-16 DIAGNOSIS — M17.11 PRIMARY OSTEOARTHRITIS OF RIGHT KNEE: ICD-10-CM

## 2021-08-16 DIAGNOSIS — M25.562 CHRONIC PAIN OF LEFT KNEE: ICD-10-CM

## 2021-08-16 DIAGNOSIS — G89.29 CHRONIC PAIN OF RIGHT KNEE: ICD-10-CM

## 2021-08-16 RX ORDER — MELOXICAM 15 MG/1
15 TABLET ORAL DAILY
Qty: 30 TABLET | Refills: 1 | Status: SHIPPED | OUTPATIENT
Start: 2021-08-16 | End: 2021-11-15 | Stop reason: SDUPTHER

## 2021-09-27 DIAGNOSIS — I10 ESSENTIAL HYPERTENSION: ICD-10-CM

## 2021-09-28 RX ORDER — METOPROLOL SUCCINATE 50 MG/1
50 TABLET, EXTENDED RELEASE ORAL DAILY
Qty: 90 TABLET | Refills: 1 | Status: SHIPPED | OUTPATIENT
Start: 2021-09-28 | End: 2021-11-15 | Stop reason: SDUPTHER

## 2021-11-15 ENCOUNTER — OFFICE VISIT (OUTPATIENT)
Dept: FAMILY MEDICINE CLINIC | Facility: CLINIC | Age: 56
End: 2021-11-15
Payer: COMMERCIAL

## 2021-11-15 ENCOUNTER — APPOINTMENT (OUTPATIENT)
Dept: LAB | Facility: MEDICAL CENTER | Age: 56
End: 2021-11-15
Payer: COMMERCIAL

## 2021-11-15 VITALS
BODY MASS INDEX: 30.48 KG/M2 | DIASTOLIC BLOOD PRESSURE: 70 MMHG | TEMPERATURE: 97.9 F | SYSTOLIC BLOOD PRESSURE: 138 MMHG | HEART RATE: 89 BPM | OXYGEN SATURATION: 98 % | RESPIRATION RATE: 16 BRPM | WEIGHT: 225 LBS | HEIGHT: 72 IN

## 2021-11-15 DIAGNOSIS — Z12.5 SCREENING FOR PROSTATE CANCER: ICD-10-CM

## 2021-11-15 DIAGNOSIS — N52.9 ERECTILE DYSFUNCTION, UNSPECIFIED ERECTILE DYSFUNCTION TYPE: ICD-10-CM

## 2021-11-15 DIAGNOSIS — M17.12 PRIMARY OSTEOARTHRITIS OF LEFT KNEE: ICD-10-CM

## 2021-11-15 DIAGNOSIS — I10 ESSENTIAL HYPERTENSION: ICD-10-CM

## 2021-11-15 DIAGNOSIS — M17.11 PRIMARY OSTEOARTHRITIS OF RIGHT KNEE: ICD-10-CM

## 2021-11-15 DIAGNOSIS — G44.229 CHRONIC TENSION-TYPE HEADACHE, NOT INTRACTABLE: Primary | ICD-10-CM

## 2021-11-15 PROCEDURE — 99214 OFFICE O/P EST MOD 30 MIN: CPT | Performed by: INTERNAL MEDICINE

## 2021-11-15 PROCEDURE — 84154 ASSAY OF PSA FREE: CPT

## 2021-11-15 PROCEDURE — 36415 COLL VENOUS BLD VENIPUNCTURE: CPT

## 2021-11-15 PROCEDURE — 84153 ASSAY OF PSA TOTAL: CPT

## 2021-11-15 RX ORDER — ESCITALOPRAM OXALATE 20 MG/1
20 TABLET ORAL DAILY
Qty: 90 TABLET | Refills: 1 | Status: SHIPPED | OUTPATIENT
Start: 2021-11-15 | End: 2022-06-22 | Stop reason: SDUPTHER

## 2021-11-15 RX ORDER — SILDENAFIL 100 MG/1
100 TABLET, FILM COATED ORAL AS NEEDED
Qty: 10 TABLET | Refills: 3 | Status: SHIPPED | OUTPATIENT
Start: 2021-11-15 | End: 2022-06-22 | Stop reason: SDUPTHER

## 2021-11-15 RX ORDER — METOPROLOL SUCCINATE 50 MG/1
75 TABLET, EXTENDED RELEASE ORAL DAILY
Qty: 135 TABLET | Refills: 1 | Status: SHIPPED | OUTPATIENT
Start: 2021-11-15 | End: 2022-06-22 | Stop reason: SDUPTHER

## 2021-11-15 RX ORDER — MELOXICAM 15 MG/1
15 TABLET ORAL DAILY
Qty: 90 TABLET | Refills: 1 | Status: SHIPPED | OUTPATIENT
Start: 2021-11-15 | End: 2022-06-22 | Stop reason: SDUPTHER

## 2021-11-17 LAB
PSA FREE MFR SERPL: 22 %
PSA FREE SERPL-MCNC: 0.22 NG/ML
PSA SERPL-MCNC: 1 NG/ML (ref 0–4)

## 2021-12-14 PROCEDURE — 87636 SARSCOV2 & INF A&B AMP PRB: CPT | Performed by: INTERNAL MEDICINE

## 2021-12-16 ENCOUNTER — TELEPHONE (OUTPATIENT)
Dept: FAMILY MEDICINE CLINIC | Facility: CLINIC | Age: 56
End: 2021-12-16

## 2022-02-16 ENCOUNTER — TELEPHONE (OUTPATIENT)
Dept: NEUROLOGY | Facility: CLINIC | Age: 57
End: 2022-02-16

## 2022-02-16 NOTE — TELEPHONE ENCOUNTER
1st attempt to reach patient from referral   Patient would like a new patient for headaches that happen from left side of head to above ear  New patient intake sent to triage

## 2022-04-30 ENCOUNTER — APPOINTMENT (OUTPATIENT)
Dept: LAB | Facility: CLINIC | Age: 57
End: 2022-04-30
Payer: COMMERCIAL

## 2022-04-30 DIAGNOSIS — Z00.8 ENCOUNTER FOR OTHER GENERAL EXAMINATION: ICD-10-CM

## 2022-04-30 LAB
CHOLEST SERPL-MCNC: 231 MG/DL
EST. AVERAGE GLUCOSE BLD GHB EST-MCNC: 111 MG/DL
HBA1C MFR BLD: 5.5 %
HDLC SERPL-MCNC: 52 MG/DL
LDLC SERPL CALC-MCNC: 158 MG/DL (ref 0–100)
NONHDLC SERPL-MCNC: 179 MG/DL
TRIGL SERPL-MCNC: 105 MG/DL

## 2022-04-30 PROCEDURE — 83036 HEMOGLOBIN GLYCOSYLATED A1C: CPT

## 2022-04-30 PROCEDURE — 80061 LIPID PANEL: CPT

## 2022-04-30 PROCEDURE — 36415 COLL VENOUS BLD VENIPUNCTURE: CPT

## 2022-06-22 ENCOUNTER — OFFICE VISIT (OUTPATIENT)
Dept: FAMILY MEDICINE CLINIC | Facility: CLINIC | Age: 57
End: 2022-06-22
Payer: COMMERCIAL

## 2022-06-22 VITALS
SYSTOLIC BLOOD PRESSURE: 114 MMHG | HEART RATE: 77 BPM | TEMPERATURE: 97.9 F | BODY MASS INDEX: 30.2 KG/M2 | RESPIRATION RATE: 16 BRPM | DIASTOLIC BLOOD PRESSURE: 66 MMHG | HEIGHT: 72 IN | WEIGHT: 223 LBS | OXYGEN SATURATION: 98 %

## 2022-06-22 DIAGNOSIS — N52.9 ERECTILE DYSFUNCTION, UNSPECIFIED ERECTILE DYSFUNCTION TYPE: ICD-10-CM

## 2022-06-22 DIAGNOSIS — Z13.220 SCREENING CHOLESTEROL LEVEL: ICD-10-CM

## 2022-06-22 DIAGNOSIS — I10 ESSENTIAL HYPERTENSION: Primary | ICD-10-CM

## 2022-06-22 DIAGNOSIS — M17.12 PRIMARY OSTEOARTHRITIS OF LEFT KNEE: ICD-10-CM

## 2022-06-22 DIAGNOSIS — E78.5 DYSLIPIDEMIA: ICD-10-CM

## 2022-06-22 DIAGNOSIS — R00.0 TACHYCARDIA: ICD-10-CM

## 2022-06-22 DIAGNOSIS — M17.11 PRIMARY OSTEOARTHRITIS OF RIGHT KNEE: ICD-10-CM

## 2022-06-22 DIAGNOSIS — Z13.0 SCREENING FOR DEFICIENCY ANEMIA: ICD-10-CM

## 2022-06-22 DIAGNOSIS — G44.229 CHRONIC TENSION-TYPE HEADACHE, NOT INTRACTABLE: ICD-10-CM

## 2022-06-22 DIAGNOSIS — R00.2 PALPITATION: ICD-10-CM

## 2022-06-22 PROCEDURE — 99214 OFFICE O/P EST MOD 30 MIN: CPT | Performed by: INTERNAL MEDICINE

## 2022-06-22 RX ORDER — METOPROLOL SUCCINATE 50 MG/1
75 TABLET, EXTENDED RELEASE ORAL DAILY
Qty: 135 TABLET | Refills: 1 | Status: SHIPPED | OUTPATIENT
Start: 2022-06-22

## 2022-06-22 RX ORDER — ESCITALOPRAM OXALATE 20 MG/1
20 TABLET ORAL DAILY
Qty: 90 TABLET | Refills: 1 | Status: SHIPPED | OUTPATIENT
Start: 2022-06-22

## 2022-06-22 RX ORDER — ROSUVASTATIN CALCIUM 5 MG/1
5 TABLET, COATED ORAL DAILY
Qty: 30 TABLET | Refills: 5 | Status: SHIPPED | OUTPATIENT
Start: 2022-06-22

## 2022-06-22 RX ORDER — SILDENAFIL 100 MG/1
100 TABLET, FILM COATED ORAL AS NEEDED
Qty: 10 TABLET | Refills: 3 | Status: SHIPPED | OUTPATIENT
Start: 2022-06-22

## 2022-06-22 RX ORDER — MELOXICAM 15 MG/1
15 TABLET ORAL DAILY
Qty: 90 TABLET | Refills: 1 | Status: SHIPPED | OUTPATIENT
Start: 2022-06-22

## 2022-06-22 NOTE — PROGRESS NOTES
BMI Counseling: Body mass index is 30 24 kg/m²  The BMI is above normal  Nutrition recommendations include decreasing portion sizes and limiting drinks that contain sugar  Exercise recommendations include exercising 3-5 times per week  No pharmacotherapy was ordered  Patient referred to PCP  Rationale for BMI follow-up plan is due to patient being overweight or obese  Depression Screening and Follow-up Plan: Patient was screened for depression during today's encounter  They screened negative with a PHQ-2 score of 0  Assessment/Plan:         Diagnoses and all orders for this visit:    Essential hypertension  Comments:  improved  Orders:  -     metoprolol succinate (TOPROL-XL) 50 mg 24 hr tablet; Take 1 5 tablets (75 mg total) by mouth daily  -     CBC; Future  -     Comprehensive metabolic panel; Future  -     TSH, 3rd generation with Free T4 reflex; Future    Erectile dysfunction, unspecified erectile dysfunction type  Comments:  prn viagra  Orders:  -     sildenafil (VIAGRA) 100 mg tablet; Take 1 tablet (100 mg total) by mouth as needed for erectile dysfunction Take one hour before desired affect  Primary osteoarthritis of left knee  Comments:  nsaid/  discussed gi protection  Orders:  -     meloxicam (MOBIC) 15 mg tablet; Take 1 tablet (15 mg total) by mouth daily    Primary osteoarthritis of right knee  -     meloxicam (MOBIC) 15 mg tablet; Take 1 tablet (15 mg total) by mouth daily    Chronic tension-type headache, not intractable  Comments:  will try to increase lexapro to 20mg  it has been effectual for h/a proph  requests neuro  Orders:  -     escitalopram (LEXAPRO) 20 mg tablet; Take 1 tablet (20 mg total) by mouth daily    Tachycardia  Comments:  check stress for complete eval prior to seeing card  Orders:  -     Echo complete w/ contrast if indicated; Future  -     Holter monitor; Future  -     Stress test only, exercise;  Future  -     Ambulatory Referral to Cardiology; Future    Palpitation  Comments:  echo  Orders:  -     Echo complete w/ contrast if indicated; Future  -     Holter monitor; Future  -     Ambulatory Referral to Cardiology; Future    Tachycardia  Comments:  hm  Orders:  -     Echo complete w/ contrast if indicated; Future  -     Holter monitor; Future  -     Stress test only, exercise; Future  -     Ambulatory Referral to Cardiology; Future    Screening for deficiency anemia  -     CBC; Future    Screening cholesterol level  -     Lipid panel; Future    Dyslipidemia  Comments:  agrees to start statin  Orders:  -     rosuvastatin (CRESTOR) 5 mg tablet; Take 1 tablet (5 mg total) by mouth daily          Subjective:      Patient ID: Antonia Lopez is a 59 y o  male  He now agrees to take crestor  Will rx and repeat lab in 1 month  He complains of palp and tachy  Request to see card      The following portions of the patient's history were reviewed and updated as appropriate: He  has a past medical history of Bronchospasm, Depression, Disc degeneration, lumbar, Erectile dysfunction, Hyperlipidemia, IFG (impaired fasting glucose), Palpitations, Primary osteoarthritis of knees, bilateral, Right inguinal hernia, and Spondylosis of lumbar region without myelopathy or radiculopathy  He   Patient Active Problem List    Diagnosis Date Noted    Essential hypertension 01/06/2020    Screening for prostate cancer 01/06/2020    Aftercare following right knee joint replacement surgery 01/29/2019    Status post right knee replacement 01/23/2019    Chronic pain of left knee 05/01/2018    Chronic pain of right knee 05/01/2018    Primary osteoarthritis of right knee 01/30/2018    Primary osteoarthritis of left knee 01/30/2018     He  has a past surgical history that includes pr repair ing hernia,5+y/o,reducibl (Right, 10/26/2017); Tooth extraction; pr total knee arthroplasty (Right, 1/21/2019); and Knee arthroscopy    His family history includes Coronary artery disease in his brother; Diabetes in his father; No Known Problems in his mother and sister  He  reports that he quit smoking about 17 months ago  He quit after 20 00 years of use  He has never used smokeless tobacco  He reports current alcohol use  He reports that he does not use drugs  Current Outpatient Medications   Medication Sig Dispense Refill    escitalopram (LEXAPRO) 20 mg tablet Take 1 tablet (20 mg total) by mouth daily 90 tablet 1    meloxicam (MOBIC) 15 mg tablet Take 1 tablet (15 mg total) by mouth daily 90 tablet 1    metoprolol succinate (TOPROL-XL) 50 mg 24 hr tablet Take 1 5 tablets (75 mg total) by mouth daily 135 tablet 1    rosuvastatin (CRESTOR) 5 mg tablet Take 1 tablet (5 mg total) by mouth daily 30 tablet 5    sildenafil (VIAGRA) 100 mg tablet Take 1 tablet (100 mg total) by mouth as needed for erectile dysfunction Take one hour before desired affect  10 tablet 3     No current facility-administered medications for this visit  No current outpatient medications on file prior to visit  No current facility-administered medications on file prior to visit  He has No Known Allergies       Review of Systems   Constitutional: Negative for chills and fever  HENT: Negative  Respiratory: Negative  Negative for chest tightness  Cardiovascular: Negative  Negative for chest pain  Neurological: Positive for headaches  Objective:      /66 (BP Location: Left arm, Patient Position: Sitting, Cuff Size: Large)   Pulse 77   Temp 97 9 °F (36 6 °C) (Temporal)   Resp 16   Ht 6' (1 829 m)   Wt 101 kg (223 lb)   SpO2 98%   BMI 30 24 kg/m²          Physical Exam  Constitutional:       Appearance: He is obese  HENT:      Head: Normocephalic and atraumatic  Right Ear: Tympanic membrane and ear canal normal       Left Ear: Tympanic membrane and ear canal normal    Cardiovascular:      Rate and Rhythm: Normal rate and regular rhythm     Pulmonary:      Effort: Pulmonary effort is normal       Breath sounds: Normal breath sounds  Musculoskeletal:      Cervical back: Neck supple  Lymphadenopathy:      Cervical: No cervical adenopathy  Neurological:      Mental Status: He is alert

## 2022-07-13 ENCOUNTER — CONSULT (OUTPATIENT)
Dept: NEUROLOGY | Facility: CLINIC | Age: 57
End: 2022-07-13
Payer: COMMERCIAL

## 2022-07-13 VITALS
DIASTOLIC BLOOD PRESSURE: 85 MMHG | WEIGHT: 220.1 LBS | BODY MASS INDEX: 29.81 KG/M2 | HEIGHT: 72 IN | SYSTOLIC BLOOD PRESSURE: 154 MMHG | HEART RATE: 73 BPM

## 2022-07-13 DIAGNOSIS — G43.009 MIGRAINE WITHOUT AURA AND WITHOUT STATUS MIGRAINOSUS, NOT INTRACTABLE: Primary | ICD-10-CM

## 2022-07-13 DIAGNOSIS — G44.229 CHRONIC TENSION-TYPE HEADACHE, NOT INTRACTABLE: ICD-10-CM

## 2022-07-13 PROCEDURE — 99204 OFFICE O/P NEW MOD 45 MIN: CPT | Performed by: PHYSICIAN ASSISTANT

## 2022-07-13 RX ORDER — GALCANEZUMAB 120 MG/ML
INJECTION, SOLUTION SUBCUTANEOUS
Qty: 2 ML | Refills: 0 | Status: SHIPPED | OUTPATIENT
Start: 2022-07-13

## 2022-07-13 RX ORDER — GALCANEZUMAB 120 MG/ML
INJECTION, SOLUTION SUBCUTANEOUS
Qty: 1 ML | Refills: 11 | Status: SHIPPED | OUTPATIENT
Start: 2022-07-13

## 2022-07-13 NOTE — PROGRESS NOTES
Patient ID: Aziza Barrera is a 59 y o  male  Assessment/Plan:     Diagnoses and all orders for this visit:    Migraine without aura and without status migrainosus, not intractable  -     Vitamin B12; Future  -     Vitamin D 25 hydroxy; Future  -     Galcanezumab-gnlm (Emgality) 120 MG/ML SOAJ; One ml subcutaneous on the right thigh and 1 ml subcutaneous on the left thigh at the same time for 1 dose  -     Galcanezumab-gnlm (Emgality) 120 MG/ML SOAJ; 30 days after loading dose, inject 1 pen subq every 30 days  -     Ubrogepant (UBRELVY) 100 MG tablet; 1 tabs at migraine onset, repeat in 2 hours if needed  Max 2 per day  Chronic tension-type headache, not intractable  Comments:  will try to increase lexapro to 20mg  it has been effectual for h/a proph  requests neuro  Orders:  -     Ambulatory referral to Neurology       Migraine headache with aura  Ddx: cranial neuralgia, nummular HA, temporal arteritis (low suspicion since lexapro helps, and no throbbing/ redness in temple- will consider ESR if HAs worsen)  The patient experiences very severe left parietal region headaches  Due to the fact that the description is throbbing, this is most characteristic of migraine most likely, however he does not have associated symptoms of light or sound sensitivity, nausea or any autonomic symptoms or visual aura  Regardless will treat as migraine  We discussed the importance of documenting frequency of headaches with the goal to identify any trigger or pattern  He mentions that he was transitioning to different jobs at the time his headaches started about 10 years ago, which could have been a trigger  Thankfully his headaches have resolved actually since starting Lexapro and titrating from 10-20 mg daily  Unfortunately the patient prefers not to take it because it may be triggering some side effects such as fatigue or sedation    We discussed his other options including supplementation such as magnesium and riboflavin, other medication options and ultimately CGRP injectables such as Emgality  He agreed upon the injectable since it is only once a month and he does not need to be compliant with a daily drug  Furthermore Emgality has a very good side effect profile of site injection reaction only, no other documented side effects  He should stay on the Lexapro while starting the Emgality  Typically it takes 3-6 months for Emgality to be therapeutic  He can start to wean the Lexapro slowly after the first injection if he prefers, but he needs to resume the lowest effective dose if headaches recur  I also provided Ubrelvy p r n  Migraine onset, and advised him to not take Advil at all as it was possible that he was experiencing medication overuse headache with Advil in the past     Avoiding Triptan medications due to high blood pressure  The patient should not hesitate to call me prior to his follow up with any questions or concerns  Subjective:    HPI    Mr Regulo Trevino [facility]") is a pleasant 57-year-old male who is here for neurological consultation for headaches  crestor every other day    Took advil it in the morning 3 pills, last all day about 8 hours, rebound     Migraines  Onset:  50years old  Head injury(?):  None  Current pain: 0/10  Reaches pain level at worst: 10/10    Frequency:  Every day, until he started Lexapro and increase the dose  As long as he stays on Lexapro he does not have headaches, but when he stopped it his headaches recurred  Duration:  Headache goes away after he takes a medication such as over-the-counter Advil, approximately 600 mg and it takes the headache away or alleviates it within 20 minutes  Location:  Left parietal  Quality:  Pulsing, throbbing  Associated with:  Concentration problems    Triggers:  Unsure, possibly stress?   Aura/ warning:  None    Medications tried:  Prevention-  Lexapro 20 mg, started on 10 mg and when increased to 20 the headaches went away, unfortunately gets some fatigue side effects  Abortive-  Ibuprofen 600 mg    Other non-medication therapies or treatments-    Neck pain and description:  None  Sleep concerns: perfect    Family hx of migraines: no  Family hx of cerebral aneurysms: no    The following portions of the patient's history were reviewed and updated as appropriate:   He  has a past medical history of Bronchospasm, Depression, Disc degeneration, lumbar, Erectile dysfunction, Hyperlipidemia, IFG (impaired fasting glucose), Palpitations, Primary osteoarthritis of knees, bilateral, Right inguinal hernia, and Spondylosis of lumbar region without myelopathy or radiculopathy  He   Patient Active Problem List    Diagnosis Date Noted    Migraine without aura and without status migrainosus, not intractable 07/13/2022    Essential hypertension 01/06/2020    Screening for prostate cancer 01/06/2020    Aftercare following right knee joint replacement surgery 01/29/2019    Status post right knee replacement 01/23/2019    Chronic pain of left knee 05/01/2018    Chronic pain of right knee 05/01/2018    Primary osteoarthritis of right knee 01/30/2018    Primary osteoarthritis of left knee 01/30/2018     He  has a past surgical history that includes pr repair ing hernia,5+y/o,reducibl (Right, 10/26/2017); Tooth extraction; pr total knee arthroplasty (Right, 1/21/2019); and Knee arthroscopy  His family history includes Coronary artery disease in his brother; Diabetes in his father; No Known Problems in his mother and sister  He  reports that he quit smoking about 19 months ago  He quit after 20 00 years of use  He has never used smokeless tobacco  He reports current alcohol use  He reports that he does not use drugs    Current Outpatient Medications   Medication Sig Dispense Refill    escitalopram (LEXAPRO) 20 mg tablet Take 1 tablet (20 mg total) by mouth daily 90 tablet 1    Galcanezumab-gnlm (Emgality) 120 MG/ML SOAJ One ml subcutaneous on the right thigh and 1 ml subcutaneous on the left thigh at the same time for 1 dose 2 mL 0    Galcanezumab-gnlm (Emgality) 120 MG/ML SOAJ 30 days after loading dose, inject 1 pen subq every 30 days  1 mL 11    meloxicam (MOBIC) 15 mg tablet Take 1 tablet (15 mg total) by mouth daily 90 tablet 1    metoprolol succinate (TOPROL-XL) 50 mg 24 hr tablet Take 1 5 tablets (75 mg total) by mouth daily 135 tablet 1    rosuvastatin (CRESTOR) 5 mg tablet Take 1 tablet (5 mg total) by mouth daily 30 tablet 5    sildenafil (VIAGRA) 100 mg tablet Take 1 tablet (100 mg total) by mouth as needed for erectile dysfunction Take one hour before desired affect  10 tablet 3    Ubrogepant (UBRELVY) 100 MG tablet 1 tabs at migraine onset, repeat in 2 hours if needed  Max 2 per day  10 tablet 2    benzonatate (TESSALON) 200 MG capsule Take 1 capsule (200 mg total) by mouth 3 (three) times a day as needed for cough 20 capsule 0    ofloxacin (FLOXIN) 0 3 % otic solution Administer 10 drops into the left ear daily for 7 days 10 mL 0     No current facility-administered medications for this visit  He has No Known Allergies            Objective:    Blood pressure 154/85, pulse 73, height 6' (1 829 m), weight 99 8 kg (220 lb 1 6 oz)  Body mass index is 29 85 kg/m²  Physical Exam    Neurological Exam  Vital signs reviewed  Well developed, well nourished  Speech is fluent and articulate  Mood and affect are very pleasant  Head: Normocephalic, atraumatic  Neck: Neck flexors 5/5, No TTP  CN 2-12: intact and symmetric, including EOMs which are normal b/l and PERRL  Fundi b/l are normal to crude ophthalmological examination  MSK: 5/5 t/o  ROM normal x all 4 extr  (prior R knee surgery)  Sensation: Inact to LT x4 extr  Romberg negative  Reflexes: 2+ in the knees and ankles, 1+ biceps  Non focal t/o  Coordination: Nml x4 extr  Gait: Steady normal gait, tandem gait is steady        ROS:    Review of Systems Constitutional: Negative  Negative for appetite change and fever  HENT: Negative  Negative for hearing loss, tinnitus, trouble swallowing and voice change  Eyes: Negative  Negative for photophobia and pain  Respiratory: Negative  Negative for shortness of breath  Cardiovascular: Negative  Negative for palpitations  Gastrointestinal: Negative  Negative for nausea and vomiting  Endocrine: Negative  Negative for cold intolerance  Genitourinary: Negative  Negative for dysuria, frequency and urgency  Musculoskeletal: Negative  Negative for myalgias and neck pain  Skin: Negative  Negative for rash  Neurological: Positive for headaches (only on the left )  Negative for dizziness, tremors, seizures, syncope, facial asymmetry, speech difficulty, weakness, light-headedness and numbness  Hematological: Negative  Does not bruise/bleed easily  Psychiatric/Behavioral: Negative  Negative for confusion, hallucinations and sleep disturbance  The following portions of the patient's history were reviewed and updated as appropriate: allergies, current medications/ medication history, past family history, past medical history, past social history, past surgical history and problem list     Review of systems was reviewed and otherwise unremarkable from a neurological perspective  I have spent 45 minutes with the patient today in which greater than 50% of this time was spent in counseling/coordination of care regarding diagnoses, test results, impression, plan and potential medication side effects

## 2022-07-15 ENCOUNTER — HOSPITAL ENCOUNTER (OUTPATIENT)
Dept: NON INVASIVE DIAGNOSTICS | Facility: HOSPITAL | Age: 57
Discharge: HOME | End: 2022-07-15
Payer: COMMERCIAL

## 2022-07-15 VITALS
HEIGHT: 72 IN | BODY MASS INDEX: 29.8 KG/M2 | WEIGHT: 220 LBS | SYSTOLIC BLOOD PRESSURE: 140 MMHG | DIASTOLIC BLOOD PRESSURE: 86 MMHG | HEART RATE: 60 BPM | OXYGEN SATURATION: 98 %

## 2022-07-15 VITALS
HEIGHT: 72 IN | BODY MASS INDEX: 29.8 KG/M2 | HEART RATE: 73 BPM | SYSTOLIC BLOOD PRESSURE: 154 MMHG | DIASTOLIC BLOOD PRESSURE: 85 MMHG | WEIGHT: 220 LBS

## 2022-07-15 DIAGNOSIS — R00.2 PALPITATION: ICD-10-CM

## 2022-07-15 DIAGNOSIS — R00.0 TACHYCARDIA: ICD-10-CM

## 2022-07-15 LAB
AORTIC ROOT: 3.2 CM
APICAL FOUR CHAMBER EJECTION FRACTION: 67 %
BASELINE ST DEPRESSION: 0 MM
E WAVE DECELERATION TIME: 182 MS
FRACTIONAL SHORTENING: 45 % (ref 28–44)
INTERVENTRICULAR SEPTUM IN DIASTOLE (PARASTERNAL SHORT AXIS VIEW): 1.1 CM
INTERVENTRICULAR SEPTUM: 1.2 CM (ref 0.6–1.1)
IVC: 3 MM
LAAS-AP4: 21.3 CM2
LEFT ATRIUM SIZE: 4.6 CM
LEFT INTERNAL DIMENSION IN SYSTOLE: 2.9 CM (ref 2.1–4)
LEFT VENTRICULAR INTERNAL DIMENSION IN DIASTOLE: 5.3 CM (ref 3.5–6)
LEFT VENTRICULAR POSTERIOR WALL IN END DIASTOLE: 1 CM
LEFT VENTRICULAR STROKE VOLUME: 108 ML
LVSV (TEICH): 108 ML
MAX HR PERCENT: 78 %
MAX HR: 127 BPM
MV E'TISSUE VEL-LAT: 8 CM/S
MV PEAK A VEL: 0.48 M/S
MV PEAK E VEL: 84 CM/S
MV STENOSIS PRESSURE HALF TIME: 53 MS
MV VALVE AREA P 1/2 METHOD: 4.15
RA PRESSURE ESTIMATED: 3 MMHG
RATE PRESSURE PRODUCT: NORMAL
RIGHT ATRIUM AREA SYSTOLE A4C: 20 CM2
RIGHT VENTRICLE ID DIMENSION: 3.8 CM
RV PSP: 35 MMHG
SL CV LV EF: 60
SL CV PED ECHO LEFT VENTRICLE DIASTOLIC VOLUME (MOD BIPLANE) 2D: 141 ML
SL CV PED ECHO LEFT VENTRICLE SYSTOLIC VOLUME (MOD BIPLANE) 2D: 33 ML
SL CV STRESS RECOVERY BP: NORMAL MMHG
SL CV STRESS RECOVERY HR: 70 BPM
SL CV STRESS RECOVERY O2 SAT: 98 %
SL CV STRESS STAGE REACHED: 3
STRESS ANGINA INDEX: 0
STRESS BASELINE BP: NORMAL MMHG
STRESS BASELINE HR: 60 BPM
STRESS O2 SAT REST: 98 %
STRESS PEAK HR: 127 BPM
STRESS POST ESTIMATED WORKLOAD: 7.1 METS
STRESS POST EXERCISE DUR MIN: 6 MIN
STRESS POST EXERCISE DUR SEC: 5 SEC
STRESS POST O2 SAT PEAK: 96 %
STRESS POST PEAK BP: 188 MMHG
TR MAX PG: 32 MMHG
TR PEAK VELOCITY: 2.8 M/S
TRICUSPID VALVE PEAK REGURGITATION VELOCITY: 2.83 M/S

## 2022-07-15 PROCEDURE — 93226 XTRNL ECG REC<48 HR SCAN A/R: CPT

## 2022-07-15 PROCEDURE — 93016 CV STRESS TEST SUPVJ ONLY: CPT | Performed by: INTERNAL MEDICINE

## 2022-07-15 PROCEDURE — 93306 TTE W/DOPPLER COMPLETE: CPT | Performed by: INTERNAL MEDICINE

## 2022-07-15 PROCEDURE — 93306 TTE W/DOPPLER COMPLETE: CPT

## 2022-07-15 PROCEDURE — 93018 CV STRESS TEST I&R ONLY: CPT | Performed by: INTERNAL MEDICINE

## 2022-07-15 PROCEDURE — 93017 CV STRESS TEST TRACING ONLY: CPT

## 2022-07-15 PROCEDURE — 93225 XTRNL ECG REC<48 HRS REC: CPT

## 2022-07-19 LAB
CHEST PAIN STATEMENT: NORMAL
MAX DIASTOLIC BP: 78 MMHG
MAX HEART RATE: 127 BPM
MAX PREDICTED HEART RATE: 161 BPM
MAX. SYSTOLIC BP: 188 MMHG
PROTOCOL NAME: NORMAL
TARGET HR FORMULA: NORMAL
TEST INDICATION: NORMAL
TIME IN EXERCISE PHASE: NORMAL

## 2022-07-20 PROCEDURE — 93227 XTRNL ECG REC<48 HR R&I: CPT | Performed by: INTERNAL MEDICINE

## 2022-07-20 NOTE — PROGRESS NOTES
Assessment/Plan:         Diagnoses and all orders for this visit:    Physical exam  Comments:  he agrees for colo   he had psa   agrees for more thorough lab  Screening for prostate cancer  -     PSA, total and free; Future    Screen for colon cancer  -     Ambulatory referral to Colorectal Surgery; Future    Tinea versicolor  -     Discontinue: ketoconazole (NIZORAL) 2 % cream; Apply topically daily  -     ketoconazole (NIZORAL) 2 % cream; Apply topically 2 (two) times a day Use 2-4 weeks    Fatigue, unspecified type  -     CBC; Future  -     Comprehensive metabolic panel; Future    Erectile dysfunction, unspecified erectile dysfunction type  -     sildenafil (VIAGRA) 100 mg tablet; Take 1 tablet (100 mg total) by mouth daily as needed for erectile dysfunction          Subjective:      Patient ID: Breezy Cardoso is a 52 y o  male  Pt in for well exam   Request med for tinea vesicolor  Lotrimin always helped but is stopped        The following portions of the patient's history were reviewed and updated as appropriate:   He  has a past medical history of Bronchospasm; Depression; Disc degeneration, lumbar; Erectile dysfunction; Hyperlipidemia; IFG (impaired fasting glucose); Palpitations; Primary osteoarthritis of knees, bilateral; Right inguinal hernia; and Spondylosis of lumbar region without myelopathy or radiculopathy  He   Patient Active Problem List    Diagnosis Date Noted    Chronic pain of left knee 05/01/2018    Chronic pain of right knee 05/01/2018    Primary osteoarthritis of right knee 01/30/2018    Left knee DJD 01/30/2018     He  has a past surgical history that includes Knee arthroscopy; pr repair ing hernia,5+y/o,reducibl (Right, 10/26/2017); and Tooth extraction  His family history includes Coronary artery disease in his brother; Diabetes in his father; No Known Problems in his mother and sister  He  reports that he has been smoking    He has never used smokeless tobacco  He reports that CLINICAL NUTRITION SERVICES - PEDIATRIC TELEPHONE/EMAIL NOTE    Received message from Aaron (RNs) from [facility] (phone: [phone]) looking for information on TPN. Called back, it sounds like they received the PA forms for parenteral nutrition, which our GI team was expecting. Will have them fax to Nutrition office which we will get to GI team (RN and physician).     Cheryl Giraldo RD, LD, CNSC, CCTD  Pediatric GI Registered Dietitian  [facility]  Phone: [phone]   Fax #: [phone]     he drinks alcohol  He reports that he does not use drugs  Current Outpatient Prescriptions   Medication Sig Dispense Refill    ketoconazole (NIZORAL) 2 % cream Apply topically 2 (two) times a day Use 2-4 weeks 30 g 0    sildenafil (VIAGRA) 100 mg tablet Take 1 tablet (100 mg total) by mouth daily as needed for erectile dysfunction 10 tablet 1     No current facility-administered medications for this visit  No current outpatient prescriptions on file prior to visit  No current facility-administered medications on file prior to visit  He has No Known Allergies       Review of Systems   Constitutional: Negative  HENT: Negative  Respiratory: Negative  Cardiovascular: Negative  Skin: Positive for rash           Objective:      /78 (BP Location: Right arm, Patient Position: Sitting, Cuff Size: Large)   Pulse 75   Temp 98 1 °F (36 7 °C) (Tympanic)   Resp 16   Ht 5' 10 5" (1 791 m)   Wt 98 4 kg (217 lb)   SpO2 98%   BMI 30 70 kg/m²          Physical Exam

## 2022-07-22 ENCOUNTER — TELEPHONE (OUTPATIENT)
Dept: NEUROLOGY | Facility: CLINIC | Age: 57
End: 2022-07-22

## 2022-07-22 NOTE — TELEPHONE ENCOUNTER
Pt wife calling to check on medication Emgality  Pt states pharmacy told her to call our office to follow up  [address] to inquire about Emgality, if it needs a PA  BIN: [id number]  PCN: MONICA  ID: [id number]  Group: [id number]    Key: [id number]    Called pt and asked him how many ha's he gets during the month  He states that before Lexapro (which he will be weaned off of after first injection) he was getting ha's every day, sometimes twice a day  PA submitted via CMM  Awaiting determination

## 2022-08-01 ENCOUNTER — OFFICE VISIT (OUTPATIENT)
Dept: URGENT CARE | Facility: CLINIC | Age: 57
End: 2022-08-01
Payer: COMMERCIAL

## 2022-08-01 VITALS
DIASTOLIC BLOOD PRESSURE: 91 MMHG | HEIGHT: 72 IN | HEART RATE: 73 BPM | OXYGEN SATURATION: 98 % | BODY MASS INDEX: 29.8 KG/M2 | SYSTOLIC BLOOD PRESSURE: 164 MMHG | TEMPERATURE: 98.1 F | RESPIRATION RATE: 16 BRPM | WEIGHT: 220 LBS

## 2022-08-01 DIAGNOSIS — H60.332 ACUTE SWIMMER'S EAR OF LEFT SIDE: ICD-10-CM

## 2022-08-01 DIAGNOSIS — R05.9 COUGH: Primary | ICD-10-CM

## 2022-08-01 PROCEDURE — 99203 OFFICE O/P NEW LOW 30 MIN: CPT

## 2022-08-01 RX ORDER — OFLOXACIN 3 MG/ML
10 SOLUTION AURICULAR (OTIC) DAILY
Qty: 10 ML | Refills: 0 | Status: SHIPPED | OUTPATIENT
Start: 2022-08-01 | End: 2022-08-08

## 2022-08-01 RX ORDER — BENZONATATE 200 MG/1
200 CAPSULE ORAL 3 TIMES DAILY PRN
Qty: 20 CAPSULE | Refills: 0 | Status: SHIPPED | OUTPATIENT
Start: 2022-08-01

## 2022-08-01 NOTE — PROGRESS NOTES
[facility]        NAME: Bonnie Boeck is a 59 y o  male  : 1965    MRN: [id number]  DATE: 2022  TIME: 8:40 AM    Assessment and Plan   Cough [R05 9]  1  Cough     57-year-old male presents for evaluation of cough and left ear pain  Tessalon Perles given for relief of cough, topical antibiotics for left otitis externa also given  May continue over-the-counter Tylenol/NSAIDs, decongestants and humidification as needed for symptoms  Follow-up with primary care provider in 1-2 weeks  Patient Instructions       Follow up with PCP in 3-5 days  Proceed to  ER if symptoms worsen  Chief Complaint     Chief Complaint   Patient presents with    Cough     Pt has a cough he cannot get rid of that started on Friday- now he is stuffed up  History of Present Illness       Patient is a 57-year-old male with past medical history significant for bronchospasm, palpitations who presents for evaluation of nonproductive cough since Friday  He also reports some left ear pain over the past couple of days after swimming  He took an at home COVID test which was negative and he is COVID vaccinated  He denies chest pain, palpitations, shortness of breath, neck pain or stiffness, fever  He has been taking NyQuil at bedtime with relief  Cough  Associated symptoms include ear pain  Pertinent negatives include no chest pain, chills, fever, headaches, myalgias, postnasal drip, rash, rhinorrhea, sore throat, shortness of breath or wheezing  There is no history of environmental allergies  Review of Systems   Review of Systems   Constitutional: Negative for chills, fatigue and fever  HENT: Positive for congestion and ear pain  Negative for postnasal drip, rhinorrhea, sinus pressure, sinus pain, sneezing and sore throat  Eyes: Negative for pain and visual disturbance  Respiratory: Positive for cough  Negative for apnea, choking, chest tightness, shortness of breath, wheezing and stridor  Cardiovascular: Negative for chest pain and palpitations  Gastrointestinal: Negative for abdominal pain, diarrhea, nausea and vomiting  Endocrine: Negative  Genitourinary: Negative for dysuria and hematuria  Musculoskeletal: Negative for arthralgias, back pain, myalgias, neck pain and neck stiffness  Skin: Negative for color change and rash  Allergic/Immunologic: Negative  Negative for environmental allergies  Neurological: Negative  Negative for dizziness, seizures, syncope, facial asymmetry, light-headedness, numbness and headaches  Hematological: Negative  Negative for adenopathy  Psychiatric/Behavioral: Negative  All other systems reviewed and are negative  Current Medications       Current Outpatient Medications:     escitalopram (LEXAPRO) 20 mg tablet, Take 1 tablet (20 mg total) by mouth daily, Disp: 90 tablet, Rfl: 1    Galcanezumab-gnlm (Emgality) 120 MG/ML SOAJ, One ml subcutaneous on the right thigh and 1 ml subcutaneous on the left thigh at the same time for 1 dose, Disp: 2 mL, Rfl: 0    Galcanezumab-gnlm (Emgality) 120 MG/ML SOAJ, 30 days after loading dose, inject 1 pen subq every 30 days  , Disp: 1 mL, Rfl: 11    meloxicam (MOBIC) 15 mg tablet, Take 1 tablet (15 mg total) by mouth daily, Disp: 90 tablet, Rfl: 1    metoprolol succinate (TOPROL-XL) 50 mg 24 hr tablet, Take 1 5 tablets (75 mg total) by mouth daily, Disp: 135 tablet, Rfl: 1    rosuvastatin (CRESTOR) 5 mg tablet, Take 1 tablet (5 mg total) by mouth daily, Disp: 30 tablet, Rfl: 5    sildenafil (VIAGRA) 100 mg tablet, Take 1 tablet (100 mg total) by mouth as needed for erectile dysfunction Take one hour before desired affect , Disp: 10 tablet, Rfl: 3    Ubrogepant (UBRELVY) 100 MG tablet, 1 tabs at migraine onset, repeat in 2 hours if needed   Max 2 per day , Disp: 10 tablet, Rfl: 2    Current Allergies     Allergies as of 08/01/2022    (No Known Allergies)            The following portions of the patient's history were reviewed and updated as appropriate: allergies, current medications, past family history, past medical history, past social history, past surgical history and problem list      Past Medical History:   Diagnosis Date    Bronchospasm     last assessed 6/14/2016    Depression     Disc degeneration, lumbar     last assessed 4/7/2016    Erectile dysfunction     Hyperlipidemia     IFG (impaired fasting glucose)     last assessed 7/30/2014    Palpitations     last assessed 4/4/2013    Primary osteoarthritis of knees, bilateral     last assessed 6/27/2017    Right inguinal hernia     last assessed 9/12/2017    Spondylosis of lumbar region without myelopathy or radiculopathy     last assessed 4/7/2016       Past Surgical History:   Procedure Laterality Date    KNEE ARTHROSCOPY      Right    HI REPAIR ING HERNIA,5+Y/O,REDUCIBL Right 10/26/2017    Procedure: REPAIR HERNIA INGUINAL;  Surgeon: Laron Moya MD;  Location: [facility];  Service: General    HI TOTAL KNEE ARTHROPLASTY Right 1/21/2019    Procedure: ARTHROPLASTY KNEE TOTAL;  Surgeon: Mic Johnston MD;  Location: [facility];  Service: Orthopedics    TOOTH EXTRACTION         Family History   Problem Relation Age of Onset    No Known Problems Mother     Diabetes Father     No Known Problems Sister     Coronary artery disease Brother          Medications have been verified  Objective   /91   Pulse 73   Temp 98 1 °F (36 7 °C)   Resp 16   Ht 6' (1 829 m)   Wt 99 8 kg (220 lb)   SpO2 98%   BMI 29 84 kg/m²        Physical Exam     Physical Exam  Vitals reviewed  Constitutional:       General: He is not in acute distress  Appearance: Normal appearance  He is not ill-appearing, toxic-appearing or diaphoretic  Interventions: He is not intubated  HENT:      Head: Normocephalic and atraumatic  Right Ear: Tympanic membrane, ear canal and external ear normal  There is no impacted cerumen        Left Ear: Hearing normal  Tenderness present  No mastoid tenderness  Ears:      Comments: Yellowish exudate lining left auditory canal and tympanic membrane     Nose: Congestion present  No rhinorrhea  Mouth/Throat:      Mouth: Mucous membranes are moist       Tongue: No lesions  Tongue does not deviate from midline  Palate: No mass and lesions  Pharynx: Oropharynx is clear  Uvula midline  No pharyngeal swelling, oropharyngeal exudate, posterior oropharyngeal erythema or uvula swelling  Tonsils: No tonsillar exudate or tonsillar abscesses  1+ on the right  1+ on the left  Eyes:      Extraocular Movements: Extraocular movements intact  Pupils: Pupils are equal, round, and reactive to light  Cardiovascular:      Rate and Rhythm: Normal rate and regular rhythm  Heart sounds: Normal heart sounds, S1 normal and S2 normal  Heart sounds not distant  No murmur heard  Pulmonary:      Effort: Pulmonary effort is normal  No tachypnea, bradypnea, accessory muscle usage, prolonged expiration, respiratory distress or retractions  He is not intubated  Breath sounds: Normal breath sounds  No stridor, decreased air movement or transmitted upper airway sounds  No decreased breath sounds, wheezing, rhonchi or rales  Chest:      Chest wall: No tenderness  Musculoskeletal:         General: Normal range of motion  Cervical back: Full passive range of motion without pain, normal range of motion and neck supple  No rigidity or tenderness  No spinous process tenderness or muscular tenderness  Normal range of motion  Lymphadenopathy:      Cervical: Cervical adenopathy present  Right cervical: Superficial cervical adenopathy present  Left cervical: Superficial cervical adenopathy present  Skin:     Capillary Refill: Capillary refill takes less than 2 seconds  Findings: No erythema  Neurological:      General: No focal deficit present  Mental Status: He is alert  Psychiatric:         Mood and Affect: Mood normal

## 2022-08-02 NOTE — TELEPHONE ENCOUNTER
emgality approved 7/25/2022 - 1/21/2023  patient aware, he said he already took emgality, no questions or concerns at this time, is having bw drawn tomorrow

## 2022-08-04 ENCOUNTER — APPOINTMENT (OUTPATIENT)
Dept: LAB | Facility: CLINIC | Age: 57
End: 2022-08-04
Payer: COMMERCIAL

## 2022-08-04 DIAGNOSIS — G43.009 MIGRAINE WITHOUT AURA AND WITHOUT STATUS MIGRAINOSUS, NOT INTRACTABLE: ICD-10-CM

## 2022-08-04 DIAGNOSIS — Z13.0 SCREENING FOR DEFICIENCY ANEMIA: ICD-10-CM

## 2022-08-04 DIAGNOSIS — Z13.220 SCREENING CHOLESTEROL LEVEL: ICD-10-CM

## 2022-08-04 DIAGNOSIS — I10 ESSENTIAL HYPERTENSION: ICD-10-CM

## 2022-08-04 LAB
25(OH)D3 SERPL-MCNC: 66.1 NG/ML (ref 30–100)
ALBUMIN SERPL BCP-MCNC: 4 G/DL (ref 3.5–5)
ALP SERPL-CCNC: 123 U/L (ref 34–104)
ALT SERPL W P-5'-P-CCNC: 29 U/L (ref 7–52)
ANION GAP SERPL CALCULATED.3IONS-SCNC: 4 MMOL/L (ref 4–13)
AST SERPL W P-5'-P-CCNC: 23 U/L (ref 13–39)
BILIRUB SERPL-MCNC: 0.41 MG/DL (ref 0.2–1)
BUN SERPL-MCNC: 14 MG/DL (ref 5–25)
CALCIUM SERPL-MCNC: 8.8 MG/DL (ref 8.4–10.2)
CHLORIDE SERPL-SCNC: 106 MMOL/L (ref 96–108)
CHOLEST SERPL-MCNC: 178 MG/DL
CO2 SERPL-SCNC: 29 MMOL/L (ref 21–32)
CREAT SERPL-MCNC: 1.01 MG/DL (ref 0.6–1.3)
ERYTHROCYTE [DISTWIDTH] IN BLOOD BY AUTOMATED COUNT: 12.4 % (ref 11.6–15.1)
GFR SERPL CREATININE-BSD FRML MDRD: 81 ML/MIN/1.73SQ M
GLUCOSE P FAST SERPL-MCNC: 112 MG/DL (ref 65–99)
HCT VFR BLD AUTO: 47.6 % (ref 36.5–49.3)
HDLC SERPL-MCNC: 46 MG/DL
HGB BLD-MCNC: 16.3 G/DL (ref 12–17)
LDLC SERPL CALC-MCNC: 109 MG/DL (ref 0–100)
MCH RBC QN AUTO: 31.6 PG (ref 26.8–34.3)
MCHC RBC AUTO-ENTMCNC: 34.2 G/DL (ref 31.4–37.4)
MCV RBC AUTO: 92 FL (ref 82–98)
NONHDLC SERPL-MCNC: 132 MG/DL
PLATELET # BLD AUTO: 179 THOUSANDS/UL (ref 149–390)
PMV BLD AUTO: 9.7 FL (ref 8.9–12.7)
POTASSIUM SERPL-SCNC: 4 MMOL/L (ref 3.5–5.3)
PROT SERPL-MCNC: 6.5 G/DL (ref 6.4–8.4)
RBC # BLD AUTO: 5.16 MILLION/UL (ref 3.88–5.62)
SODIUM SERPL-SCNC: 139 MMOL/L (ref 135–147)
TRIGL SERPL-MCNC: 117 MG/DL
TSH SERPL DL<=0.05 MIU/L-ACNC: 1.37 UIU/ML (ref 0.45–4.5)
VIT B12 SERPL-MCNC: 619 PG/ML (ref 100–900)
WBC # BLD AUTO: 5.81 THOUSAND/UL (ref 4.31–10.16)

## 2022-08-04 PROCEDURE — 36415 COLL VENOUS BLD VENIPUNCTURE: CPT

## 2022-08-04 PROCEDURE — 82607 VITAMIN B-12: CPT

## 2022-08-04 PROCEDURE — 80061 LIPID PANEL: CPT

## 2022-08-04 PROCEDURE — 85027 COMPLETE CBC AUTOMATED: CPT

## 2022-08-04 PROCEDURE — 80053 COMPREHEN METABOLIC PANEL: CPT

## 2022-08-04 PROCEDURE — 82306 VITAMIN D 25 HYDROXY: CPT

## 2022-08-04 PROCEDURE — 84443 ASSAY THYROID STIM HORMONE: CPT

## 2022-11-19 ENCOUNTER — APPOINTMENT (EMERGENCY)
Dept: RADIOLOGY | Facility: HOSPITAL | Age: 57
End: 2022-11-19

## 2022-11-19 ENCOUNTER — APPOINTMENT (OUTPATIENT)
Dept: RADIOLOGY | Facility: HOSPITAL | Age: 57
End: 2022-11-19

## 2022-11-19 ENCOUNTER — HOSPITAL ENCOUNTER (OUTPATIENT)
Facility: HOSPITAL | Age: 57
Setting detail: OBSERVATION
Discharge: HOME | End: 2022-11-20
Attending: EMERGENCY MEDICINE | Admitting: INTERNAL MEDICINE

## 2022-11-19 DIAGNOSIS — R07.9 CHEST PAIN: Primary | ICD-10-CM

## 2022-11-19 DIAGNOSIS — M79.601 RIGHT ARM PAIN: ICD-10-CM

## 2022-11-19 LAB
2HR DELTA HS TROPONIN: 0 NG/L
4HR DELTA HS TROPONIN: 0 NG/L
ALBUMIN SERPL BCP-MCNC: 3.4 G/DL (ref 3.5–5)
ALP SERPL-CCNC: 135 U/L (ref 46–116)
ALT SERPL W P-5'-P-CCNC: 44 U/L (ref 12–78)
ANION GAP SERPL CALCULATED.3IONS-SCNC: 6 MMOL/L (ref 4–13)
AST SERPL W P-5'-P-CCNC: 31 U/L (ref 5–45)
BASOPHILS # BLD AUTO: 0.03 THOUSANDS/ÂΜL (ref 0–0.1)
BASOPHILS NFR BLD AUTO: 1 % (ref 0–1)
BILIRUB SERPL-MCNC: 0.46 MG/DL (ref 0.2–1)
BUN SERPL-MCNC: 13 MG/DL (ref 5–25)
CALCIUM ALBUM COR SERPL-MCNC: 9.3 MG/DL (ref 8.3–10.1)
CALCIUM SERPL-MCNC: 8.8 MG/DL (ref 8.3–10.1)
CARDIAC TROPONIN I PNL SERPL HS: 4 NG/L
CHLORIDE SERPL-SCNC: 111 MMOL/L (ref 96–108)
CO2 SERPL-SCNC: 23 MMOL/L (ref 21–32)
CREAT SERPL-MCNC: 1.07 MG/DL (ref 0.6–1.3)
EOSINOPHIL # BLD AUTO: 0.22 THOUSAND/ÂΜL (ref 0–0.61)
EOSINOPHIL NFR BLD AUTO: 4 % (ref 0–6)
ERYTHROCYTE [DISTWIDTH] IN BLOOD BY AUTOMATED COUNT: 12.2 % (ref 11.6–15.1)
FLUAV RNA RESP QL NAA+PROBE: NEGATIVE
FLUBV RNA RESP QL NAA+PROBE: NEGATIVE
GFR SERPL CREATININE-BSD FRML MDRD: 75 ML/MIN/1.73SQ M
GLUCOSE SERPL-MCNC: 106 MG/DL (ref 65–140)
HCT VFR BLD AUTO: 45.9 % (ref 36.5–49.3)
HGB BLD-MCNC: 15.4 G/DL (ref 12–17)
IMM GRANULOCYTES # BLD AUTO: 0.01 THOUSAND/UL (ref 0–0.2)
IMM GRANULOCYTES NFR BLD AUTO: 0 % (ref 0–2)
LYMPHOCYTES # BLD AUTO: 1.33 THOUSANDS/ÂΜL (ref 0.6–4.47)
LYMPHOCYTES NFR BLD AUTO: 23 % (ref 14–44)
MCH RBC QN AUTO: 30.8 PG (ref 26.8–34.3)
MCHC RBC AUTO-ENTMCNC: 33.6 G/DL (ref 31.4–37.4)
MCV RBC AUTO: 92 FL (ref 82–98)
MONOCYTES # BLD AUTO: 0.49 THOUSAND/ÂΜL (ref 0.17–1.22)
MONOCYTES NFR BLD AUTO: 9 % (ref 4–12)
NEUTROPHILS # BLD AUTO: 3.6 THOUSANDS/ÂΜL (ref 1.85–7.62)
NEUTS SEG NFR BLD AUTO: 63 % (ref 43–75)
NRBC BLD AUTO-RTO: 0 /100 WBCS
PLATELET # BLD AUTO: 189 THOUSANDS/UL (ref 149–390)
PMV BLD AUTO: 9.7 FL (ref 8.9–12.7)
POTASSIUM SERPL-SCNC: 4 MMOL/L (ref 3.5–5.3)
PROT SERPL-MCNC: 6.9 G/DL (ref 6.4–8.4)
RBC # BLD AUTO: 5 MILLION/UL (ref 3.88–5.62)
RSV RNA RESP QL NAA+PROBE: NEGATIVE
SARS-COV-2 RNA RESP QL NAA+PROBE: NEGATIVE
SODIUM SERPL-SCNC: 140 MMOL/L (ref 135–147)
WBC # BLD AUTO: 5.68 THOUSAND/UL (ref 4.31–10.16)

## 2022-11-19 RX ORDER — ENOXAPARIN SODIUM 100 MG/ML
40 INJECTION SUBCUTANEOUS DAILY
Status: DISCONTINUED | OUTPATIENT
Start: 2022-11-20 | End: 2022-11-20 | Stop reason: HOSPADM

## 2022-11-19 RX ORDER — ASPIRIN 81 MG/1
81 TABLET ORAL DAILY
Status: DISCONTINUED | OUTPATIENT
Start: 2022-11-20 | End: 2022-11-20 | Stop reason: HOSPADM

## 2022-11-19 RX ORDER — KETOROLAC TROMETHAMINE 30 MG/ML
15 INJECTION, SOLUTION INTRAMUSCULAR; INTRAVENOUS ONCE
Status: COMPLETED | OUTPATIENT
Start: 2022-11-19 | End: 2022-11-19

## 2022-11-19 RX ORDER — ACETAMINOPHEN 160 MG/1
650 BAR, CHEWABLE ORAL EVERY 6 HOURS PRN
Status: DISCONTINUED | OUTPATIENT
Start: 2022-11-19 | End: 2022-11-20 | Stop reason: HOSPADM

## 2022-11-19 RX ORDER — PRAVASTATIN SODIUM 40 MG
40 TABLET ORAL
Status: DISCONTINUED | OUTPATIENT
Start: 2022-11-19 | End: 2022-11-20 | Stop reason: HOSPADM

## 2022-11-19 RX ORDER — ASPIRIN 325 MG
325 TABLET ORAL ONCE
Status: COMPLETED | OUTPATIENT
Start: 2022-11-19 | End: 2022-11-19

## 2022-11-19 RX ADMIN — ASPIRIN 325 MG ORAL TABLET 325 MG: 325 PILL ORAL at 15:52

## 2022-11-19 RX ADMIN — KETOROLAC TROMETHAMINE 15 MG: 30 INJECTION, SOLUTION INTRAMUSCULAR at 11:57

## 2022-11-19 NOTE — H&P
[facility]  H&P- Vita Goldmann 1965, 59 y o  male MRN: [id number]  Unit/Bed#: ED 26 Encounter: [id number]  Primary Care Provider: Serena Snell DO   Date and time admitted to hospital: 11/19/2022 10:50 AM    Essential hypertension  Assessment & Plan  · Blood pressure reasonable for now, continue home metoprolol succinate 75mg    Primary osteoarthritis of right knee  Assessment & Plan  · Chronic osteoarthritis of the bilateral knees, status post a right knee replacement  · While we are entertaining the possibility of ACS, would hold off of further NSAIDs such as Toradol  · Tylenol for now    * Chest pain  Assessment & Plan  · Patient with right arm and chest pain that started around 1:00 a m  This morning, resolved prior to admission  · Clinical suspicion for ACS is low at this time, however review of the chart patient had recent cardiac evaluation in July that included an exercise stress test   Stress is could not be completed due to patient's osteoarthritis, however some upsloping ST depressions were noted during stress accompanied by fatigue and dyspnea  · Patient was placed observation given that the abnormal stress test has not been followed up, it may be reasonable to perform a pharmacological stress test  · Placed cardiology consult       VTE Pharmacologic Prophylaxis:   Moderate Risk (Score 3-4) - Pharmacological DVT Prophylaxis Ordered: enoxaparin (Lovenox)  Code Status: Level 1 - Full Code   Discussion with family: Updated  (wife) at bedside  Anticipated Length of Stay: Patient will be admitted on an observation basis with an anticipated length of stay of less than 2 midnights secondary to Chest pain rule out  Total Time for Visit, including Counseling / Coordination of Care: 45 minutes Greater than 50% of this total time spent on direct patient counseling and coordination of care      Chief Complaint: Chest pain    History of Present Illness:  Rocio Patel is a 59 y o  male with a PMH of Hypertension who presents with chest pain  [place] Donis states he had been in a relatively normal state of health until he woke up at around 1:00 a m  complaining of right arm and chest pain pain  It is not made worse with inspiration, palpation, or manipulation of the right upper extremity  He has had pain similar to this in the past, and had a stress test in July that was aborted due to his osteoarthritis as well as fatigue and dyspnea  Patient did not take any aspirin and was not given any on arrival, he states he is no longer having any active chest pain  Workup was generally unremarkable however due to elevated risk factors and recent stress test that was unable to be completed, he was referred for observation    Review of Systems:  Review of Systems   Respiratory: Positive for chest tightness  Musculoskeletal: Positive for arthralgias  Knee pain   All other systems reviewed and are negative        Past Medical and Surgical History:   Past Medical History:   Diagnosis Date   • Bronchospasm     last assessed 6/14/2016   • Depression    • Disc degeneration, lumbar     last assessed 4/7/2016   • Erectile dysfunction    • Hyperlipidemia    • IFG (impaired fasting glucose)     last assessed 7/30/2014   • Palpitations     last assessed 4/4/2013   • Primary osteoarthritis of knees, bilateral     last assessed 6/27/2017   • Right inguinal hernia     last assessed 9/12/2017   • Spondylosis of lumbar region without myelopathy or radiculopathy     last assessed 4/7/2016       Past Surgical History:   Procedure Laterality Date   • KNEE ARTHROSCOPY      Right   • [place] REPAIR ING HERNIA,5+Y/O,REDUCIBL Right 10/26/2017    Procedure: REPAIR HERNIA INGUINAL;  Surgeon: Katharine Nunez MD;  Location: [facility];  Service: General   • [place] TOTAL KNEE ARTHROPLASTY Right 1/21/2019    Procedure: ARTHROPLASTY KNEE TOTAL;  Surgeon: Xiomara Gandhi MD;  Location: [facility]; Service: Orthopedics   • TOOTH EXTRACTION         Meds/Allergies:  Prior to Admission medications    Medication Sig Start Date End Date Taking? Authorizing Provider   Galcanezumab-gnlm (Emgality) 120 MG/ML SOAJ 30 days after loading dose, inject 1 pen subq every 30 days  7/13/22  Yes Michele Cárdenas PA-C   metoprolol succinate (TOPROL-XL) 50 mg 24 hr tablet Take 1 5 tablets (75 mg total) by mouth daily 6/22/22  Yes Maria E Borrero,    rosuvastatin (CRESTOR) 5 mg tablet Take 1 tablet (5 mg total) by mouth daily 6/22/22  Yes Maria E Borrero DO   benzonatate (TESSALON) 200 MG capsule Take 1 capsule (200 mg total) by mouth 3 (three) times a day as needed for cough 8/1/22 11/19/22  CORRY Gray   escitalopram (LEXAPRO) 20 mg tablet Take 1 tablet (20 mg total) by mouth daily  Patient not taking: Reported on 11/19/2022 6/22/22 11/19/22  Maria E Borrero DO   Galcanezumab-gnlm (Emgality) 120 MG/ML SOAJ One ml subcutaneous on the right thigh and 1 ml subcutaneous on the left thigh at the same time for 1 dose 7/13/22 11/19/22  Michele Cárdenas PA-C   meloxicam (MOBIC) 15 mg tablet Take 1 tablet (15 mg total) by mouth daily  Patient not taking: Reported on 11/19/2022 6/22/22 11/19/22  Maria E Borrero DO   sildenafil (VIAGRA) 100 mg tablet Take 1 tablet (100 mg total) by mouth as needed for erectile dysfunction Take one hour before desired affect  Patient not taking: Reported on 11/19/2022 6/22/22 11/19/22  Maria E Borrero DO   Ubrogepant (UBRELVY) 100 MG tablet 1 tabs at migraine onset, repeat in 2 hours if needed  Max 2 per day  Patient not taking: Reported on 11/19/2022 7/13/22 11/19/22  Michele Cárdenas PA-C     I have reviewed home medications with patient personally      Allergies: No Known Allergies    Social History:  Marital Status: /Civil Union   Occupation: Heating and AC technician  Patient Pre-hospital Living Situation: Home  Patient Pre-hospital Level of Mobility: walks  Patient Pre-hospital Diet Restrictions: N/A  Substance Use History:   Social History     Substance and Sexual Activity   Alcohol Use Yes    Comment:  occasional     Social History     Tobacco Use   Smoking Status Former   • Years: 20 00   • Types: Cigarettes   • Quit date:    • Years since quittin 8   Smokeless Tobacco Never   Tobacco Comments    4-5 cig day x 20 yrs     Social History     Substance and Sexual Activity   Drug Use No       Family History:  Family History   Problem Relation Age of Onset   • No Known Problems Mother    • Diabetes Father    • No Known Problems Sister    • Coronary artery disease Brother        Physical Exam:     Vitals:   Blood Pressure: 169/97 (22 1600)  Pulse: 60 (22 1600)  Temperature: 99 °F (37 2 °C) (22 1058)  Temp Source: Oral (22 1058)  Respirations: 18 (22 1600)  SpO2: 97 % (22 1600)    Physical Exam  Vitals reviewed  Constitutional:       General: He is not in acute distress  Appearance: Normal appearance  HENT:      Head: Normocephalic and atraumatic  Mouth/Throat:      Mouth: Mucous membranes are moist    Eyes:      General: No scleral icterus  Extraocular Movements: Extraocular movements intact  Pupils: Pupils are equal, round, and reactive to light  Cardiovascular:      Rate and Rhythm: Normal rate and regular rhythm  Pulses: Normal pulses  Heart sounds: Normal heart sounds  No murmur heard  No friction rub  No gallop  Pulmonary:      Effort: Pulmonary effort is normal  No respiratory distress  Breath sounds: Normal breath sounds  No wheezing, rhonchi or rales  Abdominal:      General: Abdomen is flat  Bowel sounds are normal       Palpations: Abdomen is soft  Tenderness: There is no abdominal tenderness  There is no guarding or rebound  Musculoskeletal:         General: No tenderness  Cervical back: Neck supple  No rigidity  Right lower leg: No edema  Left lower leg: No edema  Comments: Chest pain not reproducible to palpitation, inspiration, and manipulation of right upper extremity   Lymphadenopathy:      Cervical: No cervical adenopathy  Skin:     General: Skin is warm  Capillary Refill: Capillary refill takes less than 2 seconds  Neurological:      General: No focal deficit present  Mental Status: He is alert and oriented to person, place, and time  Cranial Nerves: No cranial nerve deficit  Psychiatric:         Mood and Affect: Mood normal          Behavior: Behavior normal          Thought Content: Thought content normal          Additional Data:     Lab Results:  Results from last 7 days   Lab Units 11/19/22  1104   WBC Thousand/uL 5 68   HEMOGLOBIN g/dL 15 4   HEMATOCRIT % 45 9   PLATELETS Thousands/uL 189   NEUTROS PCT % 63   LYMPHS PCT % 23   MONOS PCT % 9   EOS PCT % 4     Results from last 7 days   Lab Units 11/19/22  1104   SODIUM mmol/L 140   POTASSIUM mmol/L 4 0   CHLORIDE mmol/L 111*   CO2 mmol/L 23   BUN mg/dL 13   CREATININE mg/dL 1 07   ANION GAP mmol/L 6   CALCIUM mg/dL 8 8   ALBUMIN g/dL 3 4*   TOTAL BILIRUBIN mg/dL 0 46   ALK PHOS U/L 135*   ALT U/L 44   AST U/L 31   GLUCOSE RANDOM mg/dL 106                       Lines/Drains:  Invasive Devices     Peripheral Intravenous Line  Duration           Peripheral IV 11/19/22 Right Antecubital <1 day                    Imaging: Reviewed radiology reports from this admission including: chest xray  XR chest 1 view portable    (Results Pending)   XR elbow 3+ vw RIGHT    (Results Pending)       EKG and Other Studies Reviewed on Admission:   · EKG: NSR  HR 76     ** Please Note: This note has been constructed using a voice recognition system   **

## 2022-11-19 NOTE — ED ATTENDING ATTESTATION
11/19/2022  IClaudia MD, saw and evaluated the patient  I have discussed the patient with the resident/non-physician practitioner and agree with the resident's/non-physician practitioner's findings, Plan of Care, and MDM as documented in the resident's/non-physician practitioner's note, except where noted  All available labs and Radiology studies were reviewed  I was present for key portions of any procedure(s) performed by the resident/non-physician practitioner and I was immediately available to provide assistance  At this point I agree with the current assessment done in the Emergency Department  I have conducted an independent evaluation of this patient a history and physical is as follows:    60 y/o m with h/o HTN, erectile dysfunction who R arm tingling/pain x 3 days with associated chest tightness  Pt states that for the past 3 days he has awoken at night with RUE pain and tingling in his fingers  This morning he awoke at 1am with similar sxms and today it was associated with chest tightness, nausea and SOB  sxms resolved prior to arrival in the ED with exception of R arm tingling  No lighteadedness/dizziness  No headache  No neck pain  No trauma/falls  No fevers/chills  Denies similar sxms  + strong cardiac history in family  Pt had a stress test in July of this year that has not yet been followed up with by cardiology however noted to have upsloping ST depressions during exercise  PE, NAD, VSS, NC/AT, MMM, clear sclera/conjunctiva, neck supple/FROM, RR/-murmurs, lungs CTAB, abd soft, +Bs, -r/g, - edema, - calf ttp, RUE with intact sensation throughout, 5/5 strength, FROM, nonttp,no swelling, no erythema, intact distal pulses, capillary refill < 2 sec, AAO  A/p CP and RUE  eval with cardiac labs, CXR, EKG, monitor, re-eval and treat accordingly           ED Course         Critical Care Time  Procedures

## 2022-11-19 NOTE — ED PROVIDER NOTES
History  Chief Complaint   Patient presents with   • Chest Pain     Rt arm numbness/tingling/swelling for 3 days  Chest tightness that will not go away  Family hx of cardiac stents and valve replacements that presented in a similar way  Denies SOB     43-year-old male with a history hypertension, hyperlipidemia, and headaches who presents with pain and tingling in his right arm for the past 3 nights  He states that the pain has woken him up each of the past 3 nights  He states that the 1st 2 nights his symptoms resolved on their own, but this morning symptoms continued  He states that the pain and tingling begins in his lateral arm just proximal to his elbow, and radiates to the 2nd, 3rd, and 4th digits of his right hand  He has not taken any medications for his symptoms  His wife states that she thinks his right arm is swollen  He denies any recent injuries  He also complains of intermittent chest tightness that has been going on for the past month  He states that the tightness is in the center of his chest   He denies any associated diaphoresis, nausea, vomiting, and shortness of breath  He denies fever, chills, neck pain, back pain, abdominal pain  Prior to Admission Medications   Prescriptions Last Dose Informant Patient Reported? Taking? Galcanezumab-gnlm (Emgality) 120 MG/ML SOAJ   No Yes   Si days after loading dose, inject 1 pen subq every 30 days  Ubrogepant (UBRELVY) 100 MG tablet   No Yes   Si tabs at migraine onset, repeat in 2 hours if needed  Max 2 per day     escitalopram (LEXAPRO) 20 mg tablet   No Yes   Sig: Take 1 tablet (20 mg total) by mouth daily   meloxicam (MOBIC) 15 mg tablet   No Yes   Sig: Take 1 tablet (15 mg total) by mouth daily   metoprolol succinate (TOPROL-XL) 50 mg 24 hr tablet   No Yes   Sig: Take 1 5 tablets (75 mg total) by mouth daily   rosuvastatin (CRESTOR) 5 mg tablet   No Yes   Sig: Take 1 tablet (5 mg total) by mouth daily   sildenafil (VIAGRA) 100 mg tablet   No Yes   Sig: Take 1 tablet (100 mg total) by mouth as needed for erectile dysfunction Take one hour before desired affect  Facility-Administered Medications: None       Past Medical History:   Diagnosis Date   • Bronchospasm     last assessed 2016   • Depression    • Disc degeneration, lumbar     last assessed 2016   • Erectile dysfunction    • Hyperlipidemia    • IFG (impaired fasting glucose)     last assessed 2014   • Palpitations     last assessed 2013   • Primary osteoarthritis of knees, bilateral     last assessed 2017   • Right inguinal hernia     last assessed 2017   • Spondylosis of lumbar region without myelopathy or radiculopathy     last assessed 2016       Past Surgical History:   Procedure Laterality Date   • KNEE ARTHROSCOPY      Right   • DC REPAIR ING HERNIA,5+Y/O,REDUCIBL Right 10/26/2017    Procedure: REPAIR HERNIA INGUINAL;  Surgeon: Anna Hernandez MD;  Location: [facility];  Service: General   • DC TOTAL KNEE ARTHROPLASTY Right 2019    Procedure: ARTHROPLASTY KNEE TOTAL;  Surgeon: Vena Blizzard, MD;  Location: [facility];  Service: Orthopedics   • TOOTH EXTRACTION         Family History   Problem Relation Age of Onset   • No Known Problems Mother    • Diabetes Father    • No Known Problems Sister    • Coronary artery disease Brother      I have reviewed and agree with the history as documented  E-Cigarette/Vaping   • E-Cigarette Use Never User      E-Cigarette/Vaping Substances     Social History     Tobacco Use   • Smoking status: Former     Years:      Types: Cigarettes     Quit date:      Years since quittin 8   • Smokeless tobacco: Never   • Tobacco comments:     4-5 cig day x 20 yrs   Vaping Use   • Vaping Use: Never used   Substance Use Topics   • Alcohol use: Yes     Comment:  occasional   • Drug use: No        Review of Systems   Constitutional: Negative for chills, diaphoresis and fever     HENT: Negative for congestion and sore throat  Eyes: Negative for pain and redness  Respiratory: Positive for chest tightness  Negative for cough, shortness of breath and wheezing  Cardiovascular: Positive for chest pain  Negative for palpitations and leg swelling  Gastrointestinal: Positive for nausea  Negative for abdominal pain, diarrhea and vomiting  Genitourinary: Negative for dysuria and hematuria  Musculoskeletal: Positive for myalgias  Negative for back pain, neck pain and neck stiffness  Right arm pain  Skin: Negative for color change, pallor and rash  Neurological: Negative for syncope, weakness, light-headedness, numbness and headaches  Right arm tingling  All other systems reviewed and are negative  Physical Exam  ED Triage Vitals   Temperature Pulse Respirations Blood Pressure SpO2   11/19/22 1058 11/19/22 1058 11/19/22 1058 11/19/22 1058 11/19/22 1058   99 °F (37 2 °C) 86 18 159/80 97 %      Temp Source Heart Rate Source Patient Position - Orthostatic VS BP Location FiO2 (%)   11/19/22 1058 11/19/22 1058 11/19/22 1130 11/19/22 1130 --   Oral Monitor Sitting Right arm       Pain Score       11/19/22 1130       5             Orthostatic Vital Signs  Vitals:    11/19/22 1300 11/19/22 1330 11/19/22 1400 11/19/22 1430   BP: 149/72 142/83 150/75 161/94   Pulse: 58 (!) 54 60 60   Patient Position - Orthostatic VS: Sitting Sitting Sitting Sitting       Physical Exam  Constitutional:       General: He is not in acute distress  Appearance: Normal appearance  He is not ill-appearing, toxic-appearing or diaphoretic  HENT:      Head: Normocephalic and atraumatic  Nose: Nose normal       Mouth/Throat:      Mouth: Mucous membranes are moist       Pharynx: Oropharynx is clear  Eyes:      Conjunctiva/sclera: Conjunctivae normal       Pupils: Pupils are equal, round, and reactive to light  Cardiovascular:      Rate and Rhythm: Normal rate and regular rhythm        Pulses: Normal pulses  Heart sounds: Normal heart sounds  No murmur heard  No friction rub  No gallop  Pulmonary:      Effort: Pulmonary effort is normal       Breath sounds: Normal breath sounds  No wheezing, rhonchi or rales  Chest:      Chest wall: No tenderness  Abdominal:      General: Abdomen is flat  Palpations: Abdomen is soft  Tenderness: There is no abdominal tenderness  There is no guarding or rebound  Musculoskeletal:         General: No swelling or tenderness  Normal range of motion  Cervical back: Normal range of motion and neck supple  No rigidity or tenderness  Right lower leg: No edema  Left lower leg: No edema  Lymphadenopathy:      Cervical: No cervical adenopathy  Skin:     General: Skin is warm and dry  Capillary Refill: Capillary refill takes less than 2 seconds  Coloration: Skin is not jaundiced or pale  Findings: No bruising, erythema, lesion or rash  Neurological:      General: No focal deficit present  Mental Status: He is alert and oriented to person, place, and time  Sensory: No sensory deficit  Motor: No weakness           ED Medications  Medications   ketorolac (TORADOL) injection 15 mg (15 mg Intravenous Given 11/19/22 1157)       Diagnostic Studies  Results Reviewed     Procedure Component Value Units Date/Time    HS Troponin I 2hr [[id number]]  (Normal) Collected: 11/19/22 1340    Lab Status: Final result Specimen: Blood from Arm, Right Updated: 11/19/22 1424     hs TnI 2hr 4 ng/L      Delta 2hr hsTnI 0 ng/L     HS Troponin I 4hr [[id number]]     Lab Status: No result Specimen: Blood     HS Troponin 0hr (reflex protocol) [[id number]]  (Normal) Collected: 11/19/22 1104    Lab Status: Final result Specimen: Blood from Arm, Right Updated: 11/19/22 1149     hs TnI 0hr 4 ng/L     Comprehensive metabolic panel [[id number]]  (Abnormal) Collected: 11/19/22 1104    Lab Status: Final result Specimen: Blood from Arm, Right Updated: 11/19/22 1140     Sodium 140 mmol/L      Potassium 4 0 mmol/L      Chloride 111 mmol/L      CO2 23 mmol/L      ANION GAP 6 mmol/L      BUN 13 mg/dL      Creatinine 1 07 mg/dL      Glucose 106 mg/dL      Calcium 8 8 mg/dL      Corrected Calcium 9 3 mg/dL      AST 31 U/L      ALT 44 U/L      Alkaline Phosphatase 135 U/L      Total Protein 6 9 g/dL      Albumin 3 4 g/dL      Total Bilirubin 0 46 mg/dL      eGFR 75 ml/min/1 73sq m     Narrative:      National Kidney Disease Foundation guidelines for Chronic Kidney Disease (CKD):   •  Stage 1 with normal or high GFR (GFR > 90 mL/min/1 73 square meters)  •  Stage 2 Mild CKD (GFR = 60-89 mL/min/1 73 square meters)  •  Stage 3A Moderate CKD (GFR = 45-59 mL/min/1 73 square meters)  •  Stage 3B Moderate CKD (GFR = 30-44 mL/min/1 73 square meters)  •  Stage 4 Severe CKD (GFR = 15-29 mL/min/1 73 square meters)  •  Stage 5 End Stage CKD (GFR <15 mL/min/1 73 square meters)  Note: GFR calculation is accurate only with a steady state creatinine    CBC and differential [[id number]] Collected: 11/19/22 1104    Lab Status: Final result Specimen: Blood from Arm, Right Updated: 11/19/22 1115     WBC 5 68 Thousand/uL      RBC 5 00 Million/uL      Hemoglobin 15 4 g/dL      Hematocrit 45 9 %      MCV 92 fL      MCH 30 8 pg      MCHC 33 6 g/dL      RDW 12 2 %      MPV 9 7 fL      Platelets 257 Thousands/uL      nRBC 0 /100 WBCs      Neutrophils Relative 63 %      Immat GRANS % 0 %      Lymphocytes Relative 23 %      Monocytes Relative 9 %      Eosinophils Relative 4 %      Basophils Relative 1 %      Neutrophils Absolute 3 60 Thousands/µL      Immature Grans Absolute 0 01 Thousand/uL      Lymphocytes Absolute 1 33 Thousands/µL      Monocytes Absolute 0 49 Thousand/µL      Eosinophils Absolute 0 22 Thousand/µL      Basophils Absolute 0 03 Thousands/µL                  XR chest 1 view portable    (Results Pending)         Procedures  Procedures      ED Course             HEART Risk Score Flowsheet Row Most Recent Value   Heart Score Risk Calculator    History 1 Filed at: 11/19/2022 1323   ECG 0 Filed at: 11/19/2022 1323   Age 1 Filed at: 11/19/2022 1323   Risk Factors 2 Filed at: 11/19/2022 1323   Troponin 0 Filed at: 11/19/2022 1323   HEART Score 4 Filed at: 11/19/2022 1323                      SBIRT 22yo+    Flowsheet Row Most Recent Value   SBIRT (25 yo +)    In order to provide better care to our patients, we are screening all of our patients for alcohol and drug use  Would it be okay to ask you these screening questions? Yes Filed at: 11/19/2022 1102   Initial Alcohol Screen: US AUDIT-C     1  How often do you have a drink containing alcohol? 0 Filed at: 11/19/2022 1102   2  How many drinks containing alcohol do you have on a typical day you are drinking? 0 Filed at: 11/19/2022 1102   3a  Male UNDER 65: How often do you have five or more drinks on one occasion? 0 Filed at: 11/19/2022 1102   3b  FEMALE Any Age, or MALE 65+: How often do you have 4 or more drinks on one occassion? 0 Filed at: 11/19/2022 1102   Audit-C Score 0 Filed at: 11/19/2022 1102   BUCK: How many times in the past year have you    Used an illegal drug or used a prescription medication for non-medical reasons? Never Filed at: 11/19/2022 1102                MDM  Number of Diagnoses or Management Options  Chest pain  Right arm pain  Diagnosis management comments: 61-year-old male with a history of hypertension, hyperlipidemia, and headaches presents with right arm tingling and pain for the past 3 nights and it intermittent chest tightness for the past month that worsened this morning  The patient denies any recent injuries to his right arm  The patient did have a non diagnostic stress test on 7/15/22 that had to be stopped due to exercise intolerance but did show upsloping ST depression in V4, V5 and V6  Vitals are within the normal limits    On exam no cervical or thoracic spine tenderness, no tenderness of the right upper extremity, 5/5 strength in the right upper extremity, sensation is intact in the right upper extremity, 2+ peripheral pulses, capillary refill is less than 2 seconds, heart is regular rate and rhythm, lungs are clear to auscultation bilaterally, no lower extremity edema  Suspect musculoskeletal cause of right upper extremity discomfort  Given his chest tightness will order EKG, troponin, CBC, CMP, and chest x-ray  Will treat his pain with Toradol  The patient reports resolution of his symptoms after receiving Toradol  EKG rate 76, sinus rhythm rhythm, normal axis, normal intervals, no ST T wave changes, similar to prior  Initial troponin is 4  CBC and CMP are unremarkable  Chest x-ray shows no acute cardiopulmonary disease  Repeat troponin is 4 with a delta of 0  The patient is discussed with Dr Flor Mao and admitted to [place] [id number] internal medicine for chest pain  Disposition  Final diagnoses:   Chest pain   Right arm pain     Time reflects when diagnosis was documented in both MDM as applicable and the Disposition within this note     Time User Action Codes Description Comment    11/19/2022  3:12 PM Ricci KAHN Add [R07 9] Chest pain     11/19/2022  3:12 PM Kathy Lorenz Add [U71 [id number]] Right arm pain       ED Disposition     ED Disposition   Admit    Condition   Stable    Date/Time   Sat Nov 19, 2022  3:12 PM    Comment   Case was discussed with Dr Flor Mao and the patient's admission status was agreed to be Admission Status: observation status to the service of Dr Flor Mao  Follow-up Information    None         Patient's Medications   Discharge Prescriptions    No medications on file     No discharge procedures on file  PDMP Review     None           ED Provider  Attending physically available and evaluated Sedrick Luigi OLVERA managed the patient along with the ED Attending      Electronically Signed by         Grazyna Horton DO  11/19/22 2775

## 2022-11-19 NOTE — ASSESSMENT & PLAN NOTE
· Chronic osteoarthritis of the bilateral knees, status post a right knee replacement    · While we are entertaining the possibility of ACS, would hold off of further NSAIDs such as Toradol  · Tylenol for now

## 2022-11-19 NOTE — ASSESSMENT & PLAN NOTE
Pt a notification for hypotension · Patient with right arm and chest pain that started around 1:00 a m  This morning, resolved prior to admission    · Clinical suspicion for ACS is low at this time, however review of the chart patient had recent cardiac evaluation in July that included an exercise stress test   Stress is could not be completed due to patient's osteoarthritis, however some upsloping ST depressions were noted during stress accompanied by fatigue and dyspnea  · Patient was placed observation given that the abnormal stress test has not been followed up, it may be reasonable to perform a pharmacological stress test  · Placed cardiology consult

## 2022-11-20 ENCOUNTER — APPOINTMENT (OUTPATIENT)
Dept: RADIOLOGY | Facility: HOSPITAL | Age: 57
End: 2022-11-20

## 2022-11-20 ENCOUNTER — APPOINTMENT (OUTPATIENT)
Dept: NON INVASIVE DIAGNOSTICS | Facility: HOSPITAL | Age: 57
End: 2022-11-20

## 2022-11-20 VITALS
OXYGEN SATURATION: 98 % | BODY MASS INDEX: 29.8 KG/M2 | HEIGHT: 72 IN | HEART RATE: 69 BPM | RESPIRATION RATE: 16 BRPM | WEIGHT: 220 LBS | DIASTOLIC BLOOD PRESSURE: 81 MMHG | TEMPERATURE: 98.1 F | SYSTOLIC BLOOD PRESSURE: 131 MMHG

## 2022-11-20 PROBLEM — E78.5 HYPERLIPIDEMIA: Status: ACTIVE | Noted: 2022-11-20

## 2022-11-20 LAB
ATRIAL RATE: 76 BPM
CHOLEST SERPL-MCNC: 170 MG/DL
HDLC SERPL-MCNC: 60 MG/DL
LDLC SERPL CALC-MCNC: 85 MG/DL (ref 0–100)
MAX HR PERCENT: 97 %
MAX HR: 155 BPM
NUC STRESS EJECTION FRACTION: 57 %
P AXIS: 82 DEGREES
PR INTERVAL: 136 MS
QRS AXIS: 53 DEGREES
QRSD INTERVAL: 80 MS
QT INTERVAL: 364 MS
QTC INTERVAL: 409 MS
RATE PRESSURE PRODUCT: NORMAL
SL CV REST NUCLEAR ISOTOPE DOSE: 10.4 MCI
SL CV STRESS NUCLEAR ISOTOPE DOSE: 31.9 MCI
SL CV STRESS RECOVERY BP: NORMAL MMHG
SL CV STRESS RECOVERY HR: 96 BPM
SL CV STRESS RECOVERY O2 SAT: 96 %
SL CV STRESS STAGE REACHED: 3
STRESS ANGINA INDEX: 0
STRESS BASELINE BP: NORMAL MMHG
STRESS BASELINE HR: 69 BPM
STRESS DUKE TREADMILL SCORE: 7
STRESS O2 SAT REST: 98 %
STRESS PEAK HR: 155 BPM
STRESS POST ESTIMATED WORKLOAD: 10.1 METS
STRESS POST EXERCISE DUR MIN: 7 MIN
STRESS POST EXERCISE DUR SEC: 0 SEC
STRESS POST O2 SAT PEAK: 96 %
STRESS POST PEAK BP: 200 MMHG
STRESS ST DEPRESSION: 0 MM
STRESS/REST PERFUSION RATIO: 0.92
T WAVE AXIS: 72 DEGREES
TRIGL SERPL-MCNC: 127 MG/DL
VENTRICULAR RATE: 76 BPM

## 2022-11-20 RX ORDER — AMINOPHYLLINE DIHYDRATE 25 MG/ML
INJECTION, SOLUTION INTRAVENOUS
Status: DISCONTINUED
Start: 2022-11-20 | End: 2022-11-20 | Stop reason: HOSPADM

## 2022-11-20 RX ADMIN — ENOXAPARIN SODIUM 40 MG: 40 INJECTION SUBCUTANEOUS at 09:02

## 2022-11-20 RX ADMIN — ASPIRIN 81 MG: 81 TABLET, COATED ORAL at 09:02

## 2022-11-20 RX ADMIN — METOPROLOL SUCCINATE 75 MG: 50 TABLET, EXTENDED RELEASE ORAL at 09:03

## 2022-11-20 NOTE — ASSESSMENT & PLAN NOTE
· Patient presented with right arm and chest pain  · Patient underwent Holter monitor, exercise stress test and Echo in July  · Holter monitor was a normal study  · Exercise stress test was stopped because the patient experienced fatigue, dyspnea, and left knee pain  Stressed ECG revealed <0 5mm upsloping ST depressions in V4, V5, and V6    ECG was nondiagnostic due to submaximal stress  · Echocardiogram revealed left ventricular ejection fraction of 60%  · Cardiology was consulted on admission - plan for nuclear stress test now  · Continue aspirin, statin, beta-blocker  · High sensitivity troponins were negative x3  · Chest x-ray with no acute cardiopulmonary disease

## 2022-11-20 NOTE — PROGRESS NOTES
[facility]  Progress Note - Tj Leblanc 1965, 59 y o  male MRN: [id number]  Unit/Bed#: CW2 216-01 Encounter: [id number]  Primary Care Provider: Dalia Armendariz DO   Date and time admitted to hospital: 11/19/2022 10:50 AM    * Chest pain  Assessment & Plan  · Patient presented with right arm and chest pain  · Patient underwent Holter monitor, exercise stress test and Echo in July  · Holter monitor was a normal study  · Exercise stress test was stopped because the patient experienced fatigue, dyspnea, and left knee pain  Stressed ECG revealed <0 5mm upsloping ST depressions in V4, V5, and V6  ECG was nondiagnostic due to submaximal stress  · Echocardiogram revealed left ventricular ejection fraction of 60%  · Cardiology was consulted on admission - plan for nuclear stress test now  · Continue aspirin, statin, beta-blocker  · High sensitivity troponins were negative x3  · Chest x-ray with no acute cardiopulmonary disease    Essential hypertension  Assessment & Plan  · Blood pressure stable, mildly elevated, continue home metoprolol succinate 75mg    Hyperlipidemia  Assessment & Plan  · Continue statin    Primary osteoarthritis of right knee  Assessment & Plan  · Chronic osteoarthritis of the bilateral knees, status post a right knee replacement  VTE Pharmacologic Prophylaxis:   lovenox    Patient Centered Rounds: I performed bedside rounds with nursing staff today  Derna  Discussions with Specialists or Other Care Team Provider: Cardiology AP    Education and Discussions with Family / Patient: Patient declined call to   Time Spent for Care: 30 minutes  More than 50% of total time spent on counseling and coordination of care as described above      Current Length of Stay: 0 day(s)  Current Patient Status: Observation   Certification Statement: The patient, admitted on an observation basis, will now require > 2 midnight hospital stay due to stress test  Discharge Plan: pending stress test and further Cardiology recs    Code Status: Level 1 - Full Code    Subjective:   Mr Sancho Verdugo reports that he has been here since 10am yesterday and wants to know when he can go home  He reports no CP since being here  Reports right elbow pain which goes down to his middle three fingers overnight and feels like his arm is "pins and needles" and "asleep"    Objective:     Vitals:   Temp (24hrs), Av 5 °F (36 9 °C), Min:98 1 °F (36 7 °C), Max:99 °F (37 2 °C)    Temp:  [98 1 °F (36 7 °C)-99 °F (37 2 °C)] 98 3 °F (36 8 °C)  HR:  [54-86] 80  Resp:  [18] 18  BP: ()/(72-97) 152/82  SpO2:  [94 %-97 %] 96 %  There is no height or weight on file to calculate BMI  Input and Output Summary (last 24 hours):   No intake or output data in the 24 hours ending 22 0951    Physical Exam:   Physical Exam  Vitals reviewed  Constitutional:       Comments: Patient seen sitting in bed, NAD   Cardiovascular:      Rate and Rhythm: Normal rate and regular rhythm  Pulmonary:      Effort: Pulmonary effort is normal  No respiratory distress  Breath sounds: Normal breath sounds  Abdominal:      General: Bowel sounds are normal       Palpations: Abdomen is soft  Tenderness: There is no abdominal tenderness  Musculoskeletal:      Right lower leg: No edema  Left lower leg: No edema  Skin:     General: Skin is warm  Neurological:      Mental Status: He is alert and oriented to person, place, and time     Psychiatric:         Mood and Affect: Mood normal          Behavior: Behavior normal           Additional Data:     Labs:  Results from last 7 days   Lab Units 22  1104   WBC Thousand/uL 5 68   HEMOGLOBIN g/dL 15 4   HEMATOCRIT % 45 9   PLATELETS Thousands/uL 189   NEUTROS PCT % 63   LYMPHS PCT % 23   MONOS PCT % 9   EOS PCT % 4     Results from last 7 days   Lab Units 22  1104   SODIUM mmol/L 140   POTASSIUM mmol/L 4 0   CHLORIDE mmol/L 111*   CO2 mmol/L 23   BUN mg/dL 13   CREATININE mg/dL 1 07   ANION GAP mmol/L 6   CALCIUM mg/dL 8 8   ALBUMIN g/dL 3 4*   TOTAL BILIRUBIN mg/dL 0 46   ALK PHOS U/L 135*   ALT U/L 44   AST U/L 31   GLUCOSE RANDOM mg/dL 106                       Lines/Drains:  Invasive Devices     Peripheral Intravenous Line  Duration           Peripheral IV 11/19/22 Right Antecubital <1 day                  Telemetry:  Telemetry Orders (From admission, onward)             48 Hour Telemetry Monitoring  Continuous x 48 hours        References:    Telemetry Guidelines   Question:  Reason for 48 Hour Telemetry  Answer:  Acute MI, chest pain - R/O MI, or unstable angina                 Telemetry Reviewed: Normal Sinus Rhythm  Indication for Continued Telemetry Use: Acute MI/Unstable Angina/Rule out ACS             Imaging: Reviewed radiology reports from this admission including: chest xray    Recent Cultures (last 7 days):         Last 24 Hours Medication List:   Current Facility-Administered Medications   Medication Dose Route Frequency Provider Last Rate   • acetaminophen  640 mg Oral Q6H PRN Yaya Bhatt     • aspirin  81 mg Oral Daily Yaya Bhatt     • enoxaparin  40 mg Subcutaneous Daily Yaya Bhatt     • metoprolol succinate  75 mg Oral Daily Yumildrede Bhatt     • pravastatin  40 mg Oral Daily With Dinner Yaya Bhatt          Today, Patient Was Seen By: Angie Phan PA-C    **Please Note: This note may have been constructed using a voice recognition system  **

## 2022-11-20 NOTE — PLAN OF CARE
Problem: PAIN - ADULT  Goal: Verbalizes/displays adequate comfort level or baseline comfort level  Description: Interventions:  - Encourage patient to monitor pain and request assistance  - Assess pain using appropriate pain scale  - Administer analgesics based on type and severity of pain and evaluate response  - Implement non-pharmacological measures as appropriate and evaluate response  - Consider cultural and social influences on pain and pain management  - Notify physician/advanced practitioner if interventions unsuccessful or patient reports new pain  Outcome: Progressing     Problem: INFECTION - ADULT  Goal: Absence or prevention of progression during hospitalization  Description: INTERVENTIONS:  - Assess and monitor for signs and symptoms of infection  - Monitor lab/diagnostic results  - Monitor all insertion sites, i e  indwelling lines, tubes, and drains  - Monitor endotracheal if appropriate and nasal secretions for changes in amount and color  - [place] appropriate cooling/warming therapies per order  - Administer medications as ordered  - Instruct and encourage patient and family to use good hand hygiene technique  - Identify and instruct in appropriate isolation precautions for identified infection/condition  Outcome: Progressing  Goal: Absence of fever/infection during neutropenic period  Description: INTERVENTIONS:  - Monitor WBC    Outcome: Progressing     Problem: DISCHARGE PLANNING  Goal: Discharge to home or other facility with appropriate resources  Description: INTERVENTIONS:  - Identify barriers to discharge w/patient and caregiver  - Arrange for needed discharge resources and transportation as appropriate  - Identify discharge learning needs (meds, wound care, etc )  - Arrange for interpretive services to assist at discharge as needed  - Refer to Case Management Department for coordinating discharge planning if the patient needs post-hospital services based on physician/advanced practitioner order or complex needs related to functional status, cognitive ability, or social support system  Outcome: Progressing

## 2022-11-20 NOTE — CONSULTS
Consultation - Cardiology Team One  Vita Goldmann 59 y o  male MRN: [id number]  Unit/Bed#: CW2 216-01 Encounter: [id number]    Inpatient consult to Cardiology  Consult performed by: CORRY Wu  Consult ordered by: Adolfo Padilla      Physician Requesting Consult: Annalee Uribe MD  Reason for Consult / Principal Problem: chest pain    Assessment/ Plan    1  Chest pain  Largely atypical with negative troponins and nonischemic ECG  Given risk factors and family history would consider inpatient nuclear stress testing vs outpatient nuclear stress test in the next few days  Currently symptom free  Continue ASA, statin, and beta blocker  Is not on ASA at home but can continue at least until after stress test  Was started on rosuvastatin 5 mg QOD in the summer, repeat lipid panel pending  Final plan to be discussed with attending    2  HTN- mildly elevated, most recent /82  On Toprol XL 75 mg daily  3  HLD- lipid panel 8/2022- cholesterol 178, triglycerides 117, HDL 46, non ,   Started on rosuvastatin 5 mg QOD around that time, will repeat lipid panel  History of Present Illness   HPI: Vita Goldmann is a 59y o  year old male with HLD, HTN, depression, and hx of palpitations without any significant findings on a Holter 07/2022, prior tobacco use, and family history of CAD  He presented to the ED on 11/19 with c/o chest pain that began around 0100 but resolved prior to arrival at the ED  For the past 3 nights he has been woken up with severe right arm pain and numbness into his fingers  It would last a few hours before resolving on its own  He states he was clearly laying on his left side when he would wake up so he does not think his arm simply fell asleep during the night  Friday into Saturday he was again woken up with the right arm pain/numbness at 0130  This was associated with nausea and several hours later he also developed chest tightness   The chest tightness went away after about 30 minutes  His arm pain has gradually gotten better  He has had no recurrence of the chest pain since he came into the hospital  His older brother had stents placed at the age of 52 and his symptoms were bilateral arm pain/numbness which is why the patient was concerned and sought further care yesterday  His mother also had bypass surgery around the age of 79 (and is currently alive at age 80)  He drinks alcohol on the weekends and quit smoking 3 years ago (smoked about 1/2 ppd for at least 20 years)  He lives with his wife and 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  He also lifts weights to stay in shape  He has not noticed any recent exertional CP/SOB or fatigue when performing his usual activities  High sensitivity troponins were negative x 3  Blood pressure was only mildly elevated and rest of his labs were unremarkable  CXR was also without any significant cardiopulmonary disease  He had an exercise stress test in July 2022 that was non diagnostic due to failure to reach target heart rate  He did exercise for 6 minutes 5 seconds and test was terminated due to knee pain and dyspnea  He had <0 5 mm upsloping ST depressions in V4-V6 noted on stress ECG  Echocardiogram also in July showed LVEF 60% with normal wall motion, normal diastolic function, mild MR, and mild to moderate TR  EKG reviewed personally: NSR    Telemetry reviewed personally: NSR    Review of Systems   Constitutional: Negative for chills, malaise/fatigue and weight gain  Cardiovascular: Positive for chest pain  Negative for dyspnea on exertion, leg swelling, orthopnea, palpitations and syncope  Respiratory: Negative for cough, shortness of breath, sleep disturbances due to breathing, sputum production and wheezing  Gastrointestinal: Positive for nausea  Negative for bloating, abdominal pain and vomiting  Neurological: Negative for dizziness, light-headedness and weakness  Psychiatric/Behavioral: Negative for altered mental status  All other systems reviewed and are negative      Historical Information   Past Medical History:   Diagnosis Date   • Bronchospasm     last assessed 2016   • Depression    • Disc degeneration, lumbar     last assessed 2016   • Erectile dysfunction    • Hyperlipidemia    • IFG (impaired fasting glucose)     last assessed 2014   • Palpitations     last assessed 2013   • Primary osteoarthritis of knees, bilateral     last assessed 2017   • Right inguinal hernia     last assessed 2017   • Spondylosis of lumbar region without myelopathy or radiculopathy     last assessed 2016     Past Surgical History:   Procedure Laterality Date   • KNEE ARTHROSCOPY      Right   • NC REPAIR ING HERNIA,5+Y/O,REDUCIBL Right 10/26/2017    Procedure: REPAIR HERNIA INGUINAL;  Surgeon: Leeroy Esteban MD;  Location: [facility];  Service: General   • NC TOTAL KNEE ARTHROPLASTY Right 2019    Procedure: ARTHROPLASTY KNEE TOTAL;  Surgeon: Narayan Armstrong MD;  Location: [facility];  Service: Orthopedics   • TOOTH EXTRACTION       Social History     Substance and Sexual Activity   Alcohol Use Yes    Comment:  occasional     Social History     Substance and Sexual Activity   Drug Use No     Social History     Tobacco Use   Smoking Status Former   • Years: 20 00   • Types: Cigarettes   • Quit date:    • Years since quittin 8   Smokeless Tobacco Never   Tobacco Comments    4-5 cig day x 20 yrs     Family History:   Family History   Problem Relation Age of Onset   • No Known Problems Mother    • Diabetes Father    • No Known Problems Sister    • Coronary artery disease Brother      Meds/Allergies   all current active meds have been reviewed and current meds:   Current Facility-Administered Medications   Medication Dose Route Frequency   • acetaminophen (TYLENOL) chewable tablet 640 mg  640 mg Oral Q6H PRN   • aspirin (ECOTRIN LOW STRENGTH) EC tablet 81 mg  81 mg Oral Daily   • enoxaparin (LOVENOX) subcutaneous injection 40 mg  40 mg Subcutaneous Daily   • metoprolol succinate (TOPROL-XL) 24 hr tablet 75 mg  75 mg Oral Daily   • pravastatin (PRAVACHOL) tablet 40 mg  40 mg Oral Daily With Dinner          No Known Allergies    Objective   Vitals: Blood pressure 98/78, pulse 58, temperature 98 3 °F (36 8 °C), resp  rate 18, SpO2 96 %  , There is no height or weight on file to calculate BMI ,     Systolic (09BPH), NTK:700 , Min:98 , CGT:090     Diastolic (00XVU), ZNH:72, Min:72, Max:97      No intake or output data in the 24 hours ending 11/20/22 0804  Wt Readings from Last 3 Encounters:   08/01/22 99 8 kg (220 lb)   07/15/22 99 8 kg (220 lb)   07/15/22 99 8 kg (220 lb)     Invasive Devices     Peripheral Intravenous Line  Duration           Peripheral IV 11/19/22 Right Antecubital <1 day                Physical Exam  Vitals reviewed  Constitutional:       General: He is not in acute distress  Neck:      Vascular: No hepatojugular reflux or JVD  Cardiovascular:      Rate and Rhythm: Normal rate and regular rhythm  Pulses: Intact distal pulses  Heart sounds: Heart sounds are distant  No murmur heard  No friction rub  No gallop  Pulmonary:      Effort: Pulmonary effort is normal  No respiratory distress  Breath sounds: No rales  Abdominal:      General: Bowel sounds are normal  There is no distension  Palpations: Abdomen is soft  Tenderness: There is no abdominal tenderness  Musculoskeletal:         General: No tenderness or edema  Normal range of motion  Cervical back: Neck supple  Right lower leg: No edema  Left lower leg: No edema  Skin:     General: Skin is warm and dry  Capillary Refill: Capillary refill takes less than 2 seconds  Findings: No erythema  Neurological:      Mental Status: He is alert and oriented to person, place, and time     Psychiatric:         Mood and Affect: Mood and affect and mood normal      LABORATORY RESULTS:      CBC with diff:   Results from last 7 days   Lab Units 11/19/22  1104   WBC Thousand/uL 5 68   HEMOGLOBIN g/dL 15 4   HEMATOCRIT % 45 9   MCV fL 92   PLATELETS Thousands/uL 189   MCH pg 30 8   MCHC g/dL 33 6   RDW % 12 2   MPV fL 9 7   NRBC AUTO /100 WBCs 0       CMP:  Results from last 7 days   Lab Units 11/19/22  1104   POTASSIUM mmol/L 4 0   CHLORIDE mmol/L 111*   CO2 mmol/L 23   BUN mg/dL 13   CREATININE mg/dL 1 07   CALCIUM mg/dL 8 8   AST U/L 31   ALT U/L 44   ALK PHOS U/L 135*   EGFR ml/min/1 73sq m 75       BMP:  Results from last 7 days   Lab Units 11/19/22  1104   POTASSIUM mmol/L 4 0   CHLORIDE mmol/L 111*   CO2 mmol/L 23   BUN mg/dL 13   CREATININE mg/dL 1 07   CALCIUM mg/dL 8 8       Lab Results   Component Value Date    CREATININE 1 07 11/19/2022    CREATININE 1 01 08/04/2022    CREATININE 0 89 04/13/2021       No results found for: NTBNP                             Lipid Profile:   Lab Results   Component Value Date    CHOL 224 07/11/2015    CHOL 209 07/19/2014    CHOL 209 07/19/2014     Lab Results   Component Value Date    HDL 46 08/04/2022    HDL 52 04/30/2022    HDL 50 08/10/2021     Lab Results   Component Value Date    LDLCALC 109 (H) 08/04/2022    LDLCALC 158 (H) 04/30/2022    LDLCALC 154 (H) 08/10/2021     Lab Results   Component Value Date    TRIG 117 08/04/2022    TRIG 105 04/30/2022    TRIG 125 08/10/2021     Imaging: I have personally reviewed pertinent reports  and I have personally reviewed pertinent films in PACS  XR chest 1 view portable    Result Date: 11/19/2022  Narrative: CHEST INDICATION:   chest pain  COMPARISON:  Chest radiograph December 27, 2018  EXAM PERFORMED/VIEWS:  XR CHEST PORTABLE FINDINGS: Cardiomediastinal silhouette appears unremarkable  The lungs are clear  There are few small calcified granulomata  No pneumothorax or pleural effusion  Osseous structures appear within normal limits for patient age  Impression: No acute cardiopulmonary disease  Workstation performed: [id number]     XR elbow 3+ vw RIGHT    Result Date: 11/19/2022  Narrative: RIGHT ELBOW INDICATION:   pain, finger tingling  COMPARISON:  None VIEWS:  XR ELBOW 3+ VW RIGHT FINDINGS: There is no acute fracture or dislocation  There is no joint effusion  No significant degenerative changes  No lytic or blastic osseous lesion  Soft tissues are unremarkable  Impression: No acute osseous abnormality  Workstation performed: [id number]       Assessment  Principal Problem:    Chest pain  Active Problems:    Primary osteoarthritis of right knee    Essential hypertension      Thank you for allowing us to participate in this patient's care  This pt will follow up with Dr         once discharged  Counseling / Coordination of Care  Total floor / unit time spent today 45 minutes  Greater than 50% of total time was spent with the patient and / or family counseling and / or coordination of care  A description of the counseling / coordination of care: Review of history, current assessment, development of a plan  Code Status: Level 1 - Full Code    ** Please Note: Dragon 360 Dictation voice to text software may have been used in the creation of this document   **

## 2022-11-20 NOTE — UTILIZATION REVIEW
Initial Clinical Review    Admission: Date/Time/Statement:   Admission Orders (From admission, onward)     Ordered        11/19/22 1513  Place in Observation  Once                      Orders Placed This Encounter   Procedures   • Place in Observation     Standing Status:   Standing     Number of Occurrences:   1     Order Specific Question:   Level of Care     Answer:   Med Surg [16]     ED Arrival Information     Expected   -    Arrival   11/19/2022 10:44    Acuity   Urgent            Means of arrival   Walk-In    Escorted by   Spouse    Service   Hospitalist    Admission type   Emergency            Arrival complaint   sob, numbness, no blood thinners           Chief Complaint   Patient presents with   • Chest Pain     Rt arm numbness/tingling/swelling for 3 days  Chest tightness that will not go away  Family hx of cardiac stents and valve replacements that presented in a similar way  Denies SOB       Initial Presentation: 59 y o  male presents to ED from home with chest pain, right arm pain, started early the morning of admission     He has had pain similar to this in the past, and had a stress test in July that was aborted due to his osteoarthritis as well as fatigue and dyspnea  Pain not any worse with movement, inspiration  Denies any  Chest pain on ED  Arrival  Work up unremarkable  Additional PMH is  Essential HTN, OA  Admit  Observation with Chest pain and plan is  Cardiology consult, low  Clinical suspicion for ACS at present           ED Triage Vitals   Temperature Pulse Respirations Blood Pressure SpO2   11/19/22 1058 11/19/22 1058 11/19/22 1058 11/19/22 1058 11/19/22 1058   99 °F (37 2 °C) 86 18 159/80 97 %      Temp Source Heart Rate Source Patient Position - Orthostatic VS BP Location FiO2 (%)   11/19/22 1058 11/19/22 1058 11/19/22 1130 11/19/22 1130 --   Oral Monitor Sitting Right arm       Pain Score       11/19/22 1130       5          Wt Readings from Last 1 Encounters:   08/01/22 99 8 kg (220 lb)     Additional Vital Signs:   -- -- -- 152/82 105 -- -- --    11/20/22 0903 -- 80 -- 152/82 -- -- -- --   11/20/22 07:19:57 98 3 °F (36 8 °C) -- -- 98/78 85 -- -- --   11/20/22 0058 -- -- -- -- -- -- None (Room air) --   11/19/22 23:43:58 98 1 °F (36 7 °C) -- -- 150/73 99 -- -- --   11/19/22 1700 -- 58 18 167/92 125 96 % None (Room air) Sitting   11/19/22 1600 -- 60 18 169/97 134 97 % None (Room air) Sitting   11/19/22 1545 -- 60 18 166/95 118 97 % None (Room air) Sitting   11/19/22 1430 -- 60 18 161/94 122 96 % None (Room air) Sitting   11/19/22 1400 -- 60 18 150/75 102 96 % None (Room air) Sitting   11/19/22 1330 -- 54 Abnormal  18 142/83 108 95 % None (Room air) Sitting   11/19/22 1300 -- 58 18 149/72 112 94 % None (Room air) Sitting   11/19/22 1230 -- 60 18 153/89 108 95 % None (Room air) Sitting   11/19/22 1200 -- 66 18 152/86 111 95 % None (Room air) Sitting   11/19/22 1130 -- 72 18 152/79 101 95 % None (Room air) Sitting   11/19/22 1058 99 °F (37 2 °C) 86 18 159/80 -- 97 % None (Room air) --       Pertinent Labs/Diagnostic Test Results:   EKG  NSR     HR  76  XR elbow 3+ vw RIGHT   Final Result by Jenn Santana MD (11/19 1945)      No acute osseous abnormality  Workstation performed: [id number]         XR chest 1 view portable   Final Result by Jenn Santana MD (11/19 1945)      No acute cardiopulmonary disease                    Workstation performed: [id number]           Results from last 7 days   Lab Units 11/19/22  1550   SARS-COV-2  Negative     Results from last 7 days   Lab Units 11/19/22  1104   WBC Thousand/uL 5 68   HEMOGLOBIN g/dL 15 4   HEMATOCRIT % 45 9   PLATELETS Thousands/uL 189   NEUTROS ABS Thousands/µL 3 60         Results from last 7 days   Lab Units 11/19/22  1104   SODIUM mmol/L 140   POTASSIUM mmol/L 4 0   CHLORIDE mmol/L 111*   CO2 mmol/L 23   ANION GAP mmol/L 6   BUN mg/dL 13   CREATININE mg/dL 1 07   EGFR ml/min/1 73sq m 75   CALCIUM mg/dL 8 8 Results from last 7 days   Lab Units 11/19/22  1104   AST U/L 31   ALT U/L 44   ALK PHOS U/L 135*   TOTAL PROTEIN g/dL 6 9   ALBUMIN g/dL 3 4*   TOTAL BILIRUBIN mg/dL 0 46         Results from last 7 days   Lab Units 11/19/22  1104   GLUCOSE RANDOM mg/dL 106           Results from last 7 days   Lab Units 11/19/22  1550 11/19/22  1340 11/19/22  1104   HS TNI 0HR ng/L  --   --  4   HS TNI 2HR ng/L  --  4  --    HSTNI D2 ng/L  --  0  --    HS TNI 4HR ng/L 4  --   --    HSTNI D4 ng/L 0  --   --                Results from last 7 days   Lab Units 11/19/22  1550   INFLUENZA A PCR  Negative   INFLUENZA B PCR  Negative   RSV PCR  Negative             ED Treatment:   Medication Administration from 11/19/2022 1044 to 11/19/2022 1832       Date/Time Order Dose Route Action Comments     11/19/2022 1157 EST ketorolac (TORADOL) injection 15 mg 15 mg Intravenous Given --     11/19/2022 1552 EST aspirin tablet 325 mg 325 mg Oral Given --        Present on Admission:  • Essential hypertension  • Primary osteoarthritis of right knee      Admitting Diagnosis: Chest pain [R07 9]  Right arm pain [M79 601]  Age/Sex: 59 y o  male  Admission Orders:  Scheduled Medications:  aspirin, 81 mg, Oral, Daily  enoxaparin, 40 mg, Subcutaneous, Daily  metoprolol succinate, 75 mg, Oral, Daily  pravastatin, 40 mg, Oral, Daily With Dinner      Continuous IV Infusions:     PRN Meds:  acetaminophen, 640 mg, Oral, Q6H PRN        IP CONSULT TO CARDIOLOGY    Network Utilization Review Department  ATTENTION: Please call with any questions or concerns to [phone] and carefully listen to the prompts so that you are directed to the right person  All voicemails are confidential   Pepper Greenberg all requests for admission clinical reviews, approved or denied determinations and any other requests to dedicated fax number below belonging to the campus where the patient is receiving treatment   List of dedicated fax numbers for the Facilities:  [facility] [place] NUMBER   ADMISSION DENIALS (Administrative/Medical Necessity) [phone]   [address] (Maternity/NICU/Pediatrics) Patricia Evans [address] [place]essa [place]  [phone]   [address] [place] [place] [address] [facility] [address] [facility] [phone] [facility] [phone]

## 2022-11-20 NOTE — DISCHARGE SUMMARY
Discharge Summary - [facility] Internal Medicine    Patient Information: Shalom Cesar 59 y o  male MRN: [id number]  Unit/Bed#: CW2 216-01 Encounter: [id number]    Discharging Physician / Practitioner: Mathew Ruelas PA-C  PCP: Ayo Pino DO  Admission Date: 11/19/2022  Discharge Date: 11/20/22    Reason for Admission: chest pain    Discharge Diagnoses:     Principal Problem:    Chest pain  Active Problems:    Primary osteoarthritis of right knee    Essential hypertension  Resolved Problems:    * No resolved hospital problems  *      Consultations During Hospital Stay:  · Cardiology    Procedures Performed:   · CXR  · Right elbow XR  · CMP  · HS troponins  · CBC  · Influenza A/B, RSV, COVID-19  · Nuclear Stress test    Significant Findings:   · CXR: " no acute cardiopulmonary disease"  · Right elbow XR: "no acute osseous abnormality"  · HS troponins: 4, 4, 4  · Influenza A/B, RSV, COVID-19: all negative  · Nuclear Stress test: "No evidence of ischemia or infarction by exercise nuclear stress testing  Blood pressure demonstrated a borderline hypertensive response to exercise "    Incidental Findings:   · None     Test Results Pending at Discharge (will require follow up): · None     Outpatient Tests Requested:  · None    Complications:  None    Hospital Course:     Shalom Cesar is a 59 y o  male with migraines, HTN, OA, dyslipidemia who originally presented to the hospital on 11/19/2022 due to right arm and chest pain  Of note, the patient had outpatient Holter monitor in July which was a normal study  He also had an exercise stress test in July, however this was stopped because of fatigue, dyspnea, left knee pain but did note <0 5mm upsloping ST depression (V4, V5, V6) however the EKG was not diagnostic due to submaximal stress  He also underwent echocardiogram in July which revealed left ventricular ejection fraction of 60%    Cardiology was consulted on admission given the patient's complaint of chest pain and previous cardiac studies mentioned above  Troponins were trended and negative x3  Chest x-ray revealed no acute cardiopulmonary disease  Right elbow x-ray revealed no acute osseous abnormality  Cardiology recommended stress test while admitted  Stress test revealed no evidence of ischemia or infarction by exercise nuclear stress testing  I discussed with Cardiologist Dr Paige Castillo on day of discharge post-stress test who cleared the patient from their standpoint for discharge and recommended to try lifestyle changes to improve BP, if not, would need second agent - follow up with PCP outpatient  Condition at Discharge: stable     Discharge Day Visit / Exam:     Subjective:    Mr Vickie Vázquez reports that his chest pain has not recurred while he has been here and wants to go home  Vitals: Blood Pressure: 152/82 (11/20/22 0903)  Pulse: 80 (11/20/22 0903)  Temperature: 98 3 °F (36 8 °C) (11/20/22 0719)  Temp Source: Oral (11/19/22 1058)  Respirations: 18 (11/19/22 1700)  SpO2: 96 % (11/19/22 1700)  Exam:   Physical Exam  Vitals reviewed  Constitutional:       Comments: Patient seen sitting in bed comfortably resting  Pleasant and cooperative  No acute distress  Cardiovascular:      Rate and Rhythm: Normal rate and regular rhythm  Pulmonary:      Effort: Pulmonary effort is normal  No respiratory distress  Breath sounds: Normal breath sounds  Abdominal:      General: Bowel sounds are normal       Palpations: Abdomen is soft  Tenderness: There is no abdominal tenderness  Musculoskeletal:      Right lower leg: No edema  Left lower leg: No edema  Skin:     General: Skin is warm  Neurological:      Mental Status: He is alert and oriented to person, place, and time  Psychiatric:         Mood and Affect: Mood normal          Behavior: Behavior normal          Discharge instructions/Information to patient and family:   See after visit summary for information provided to patient and family  Provisions for Follow-Up Care:  See after visit summary for information related to follow-up care and any pertinent home health orders  Disposition:     Home    For Discharges to [facility] SNF:   · Not Applicable to this Patient - Not Applicable to this Patient    Planned Readmission: None     Discharge Statement:  I spent 37 minutes discharging the patient  This time was spent on the day of discharge  I had direct contact with the patient on the day of discharge  Greater than 50% of the total time was spent examining patient, answering all patient questions, arranging and discussing plan of care with patient as well as directly providing post-discharge instructions  Additional time then spent on discharge activities  Discharge Medications:  See after visit summary for reconciled discharge medications provided to patient and family  ** Please Note: Dragon 360 Dictation voice to text software may have been used in the creation of this document   **

## 2022-11-21 ENCOUNTER — TRANSITIONAL CARE MANAGEMENT (OUTPATIENT)
Dept: FAMILY MEDICINE CLINIC | Facility: CLINIC | Age: 57
End: 2022-11-21

## 2022-11-22 LAB
CHEST PAIN STATEMENT: NORMAL
MAX DIASTOLIC BP: 90 MMHG
MAX HEART RATE: 157 BPM
MAX PREDICTED HEART RATE: 161 BPM
MAX. SYSTOLIC BP: 200 MMHG
PROTOCOL NAME: NORMAL
REASON FOR TERMINATION: NORMAL
TARGET HR FORMULA: NORMAL
TEST INDICATION: NORMAL
TIME IN EXERCISE PHASE: NORMAL

## 2022-12-01 ENCOUNTER — OFFICE VISIT (OUTPATIENT)
Dept: FAMILY MEDICINE CLINIC | Facility: CLINIC | Age: 57
End: 2022-12-01

## 2022-12-01 VITALS
SYSTOLIC BLOOD PRESSURE: 138 MMHG | WEIGHT: 232 LBS | HEART RATE: 74 BPM | DIASTOLIC BLOOD PRESSURE: 76 MMHG | OXYGEN SATURATION: 98 % | HEIGHT: 72 IN | TEMPERATURE: 98.3 F | RESPIRATION RATE: 16 BRPM | BODY MASS INDEX: 31.42 KG/M2

## 2022-12-01 DIAGNOSIS — G43.009 MIGRAINE WITHOUT AURA AND WITHOUT STATUS MIGRAINOSUS, NOT INTRACTABLE: ICD-10-CM

## 2022-12-01 DIAGNOSIS — E78.5 DYSLIPIDEMIA: ICD-10-CM

## 2022-12-01 DIAGNOSIS — N52.9 ERECTILE DYSFUNCTION, UNSPECIFIED ERECTILE DYSFUNCTION TYPE: ICD-10-CM

## 2022-12-01 DIAGNOSIS — M79.601 RIGHT ARM PAIN: Primary | ICD-10-CM

## 2022-12-01 RX ORDER — SILDENAFIL 100 MG/1
100 TABLET, FILM COATED ORAL DAILY PRN
Qty: 10 TABLET | Refills: 0 | Status: SHIPPED | OUTPATIENT
Start: 2022-12-01

## 2022-12-01 RX ORDER — GALCANEZUMAB 120 MG/ML
INJECTION, SOLUTION SUBCUTANEOUS
Qty: 1 ML | Refills: 11 | Status: SHIPPED | OUTPATIENT
Start: 2022-12-01

## 2022-12-01 RX ORDER — ROSUVASTATIN CALCIUM 5 MG/1
5 TABLET, COATED ORAL DAILY
Qty: 30 TABLET | Refills: 5 | Status: SHIPPED | OUTPATIENT
Start: 2022-12-01

## 2022-12-01 NOTE — ASSESSMENT & PLAN NOTE
Patient here for transitional care visit after hospitalization between 11/19/2022 to 11/20/2022  Was evaluated for right arm numbness and pain for cardiac etiology  Cardiac workup negative including stress test, EKG, echocardiogram  Reassurance provided in regards to etiology of symptoms  May be due to radial nerve entrapment  Patient reports overall improvement his symptoms  Continue gentle exercises stretch with the right arm  If there is worsening symptoms patient may see physical therapy for evaluation treatment  If symptom worsens please return for evaluation

## 2022-12-01 NOTE — PROGRESS NOTES
Name: Brigid Shay      : 1965      MRN: [id number]  Encounter Provider: Antione Thornton MD  Encounter Date: 2022   Encounter department: [address] [place] [address]  Right arm pain  Assessment & Plan:  Patient here for transitional care visit after hospitalization between 2022 to 2022  Was evaluated for right arm numbness and pain for cardiac etiology  Cardiac workup negative including stress test, EKG, echocardiogram  Reassurance provided in regards to etiology of symptoms  May be due to radial nerve entrapment  Patient reports overall improvement his symptoms  Continue gentle exercises stretch with the right arm  If there is worsening symptoms patient may see physical therapy for evaluation treatment  If symptom worsens please return for evaluation      Orders:  -     Ambulatory Referral to Physical Therapy; Future       Patient requesting refill Viagra, Crestor, emgality, will provide at this time    TCM Call     Date and time call was made  2022 10:02 AM    Hospital care reviewed  Records reviewed    Patient was hospitialized at  [facility]    Date of Admission  22    Date of discharge  22    Diagnosis  Chest pain    Disposition  Home    Were the patients medications reviewed and updated  Yes      TCM Call     Scheduled for follow up? Patient Refused    Patient refusal reason  Going to orthopedic, doesn't require follow up with PCP at this time   He will call us if he needs anything    Patients specialists  Other (comment)    Other specialists names  Orthopedic    Did you obtain your prescribed medications  Yes    Do you need help managing your prescriptions or medications  No    Is transportation to your appointment needed  No    I have advised the patient to call PCP with any new or worsening symptoms  Wing Shi MA    Are you recieving any outpatient services  Yes    What type of services  physical therapy    Are you recieving home care services  No    Are you using any community resources  No    Current waiver services  No    Have you fallen in the last 12 months  No    Interperter language line needed  No            Subjective      HPI     78-year-old male patient presents for transitional care visit  Patient was recently hospitalized at [facility] from 11/she 19/22 until 11/22/2022  Patient reports 3 days prior to his hospitalization, he had frequent nighttime disruption his sleep due to numbness and tingling of his right arm  Denies any recent injury to the right arm, his numbness and tingling started at the level slightly above the elbow and radiated that down involving the 1st 3 fingers of his right arm  Patient denies any history of similar symptoms in the past   Patient reports the hospital for evaluation, troponin x3 negative, chest x-ray, x-ray of the right arm negative  Stress test, echocardiogram within normal range  Since discharge, patient reports improvement in his overall symptoms  He is right-handed  Pt reports recently started curling and benching which maybe contributing to the symptom  Review of Systems   Constitutional: Negative for chills and fever  HENT: Negative for congestion, rhinorrhea and sore throat  Respiratory: Negative for shortness of breath  Cardiovascular: Negative for chest pain  Gastrointestinal: Negative for abdominal pain  Musculoskeletal: Negative for arthralgias  Neurological: Positive for weakness and numbness  Negative for dizziness, light-headedness and headaches  Psychiatric/Behavioral: Negative for sleep disturbance  Current Outpatient Medications on File Prior to Visit   Medication Sig   • Galcanezumab-gnlm (Emgality) 120 MG/ML SOAJ 30 days after loading dose, inject 1 pen subq every 30 days     • metoprolol succinate (TOPROL-XL) 50 mg 24 hr tablet Take 1 5 tablets (75 mg total) by mouth daily   • rosuvastatin (CRESTOR) 5 mg tablet Take 1 tablet (5 mg total) by mouth daily       Objective     /76 (BP Location: Left arm, Patient Position: Sitting, Cuff Size: Large)   Pulse 74   Temp 98 3 °F (36 8 °C) (Temporal)   Resp 16   Ht 6' (1 829 m)   Wt 105 kg (232 lb)   SpO2 98%   BMI 31 46 kg/m²     Physical Exam  Vitals reviewed  Constitutional:       General: He is not in acute distress  Appearance: Normal appearance  He is not ill-appearing, toxic-appearing or diaphoretic  Cardiovascular:      Rate and Rhythm: Normal rate and regular rhythm  Pulses: Normal pulses  Heart sounds: Normal heart sounds  No murmur heard  Pulmonary:      Effort: Pulmonary effort is normal  No respiratory distress  Breath sounds: Normal breath sounds  Abdominal:      General: Abdomen is flat  Musculoskeletal:         General: No swelling, tenderness, deformity or signs of injury  Normal range of motion  Skin:     General: Skin is warm and dry  Capillary Refill: Capillary refill takes less than 2 seconds  Coloration: Skin is not jaundiced  Neurological:      General: No focal deficit present  Mental Status: He is alert     Psychiatric:         Mood and Affect: Mood normal           An Wilkerson MD

## 2023-02-03 ENCOUNTER — TELEPHONE (OUTPATIENT)
Dept: NEUROLOGY | Facility: CLINIC | Age: 58
End: 2023-02-03

## 2023-02-03 NOTE — TELEPHONE ENCOUNTER
Patient called needs PA for Emgality  He needs it now because the headaches are coming back      Scheduled appt:  5/18/23 8:15 [facility]

## 2023-02-06 NOTE — TELEPHONE ENCOUNTER
Hello,     Patient has called back requesting an update on his refill request for his Emgality  He asked for a call back [phone]  And asked that the refill be sent to his [facility]      Thank you,     Lisandro Nicholas

## 2023-02-06 NOTE — TELEPHONE ENCOUNTER
Patient is aware that previous PA  last month; he is due now for injection; [facility]; script already sent to [facility] in December by PCP

## 2023-02-06 NOTE — TELEPHONE ENCOUNTER
Submitted via [facility], [place] [id number], determination pending   ID [id number]  grp [id number]  john [facility]  bin [id number]

## 2023-02-13 ENCOUNTER — TELEPHONE (OUTPATIENT)
Dept: NEUROLOGY | Facility: CLINIC | Age: 58
End: 2023-02-13

## 2023-02-13 ENCOUNTER — TELEPHONE (OUTPATIENT)
Dept: FAMILY MEDICINE CLINIC | Facility: CLINIC | Age: 58
End: 2023-02-13

## 2023-02-13 NOTE — TELEPHONE ENCOUNTER
----- Message from Niko Arellano sent at 2/13/2023 10:05 AM EST -----  Regarding: FW: paxlovid  Contact: [phone]    ----- Message -----  From: Tiffany Boeck"  Sent: 2/10/2023   7:39 PM EST  To: [facility]  Subject: paxlovid                                         i tested positive for covid this evening, am i a candidate for paxlovid?

## 2023-02-13 NOTE — TELEPHONE ENCOUNTER
Patient sent my chart message:    I am still waiting on my emgality refill medication and just tested positive for covid  this evening , I have an intense headache and  I am wondering if i will be able to take paxlovid  or if i need too have it? I called insurance and was told they have all information they need; determination pending

## 2023-02-13 NOTE — TELEPHONE ENCOUNTER
I spoke to patient; she said he is feeling better; aware that emgality PA changed to urgent; hope to hear from insurance at the end of the day today or tomorrow

## 2023-02-13 NOTE — TELEPHONE ENCOUNTER
Spoke with nikia Mckeon and she confirmed that the PA refill for [facility] has been approved      Approval Dates: 2/6/2023-2/6/2024

## 2023-02-14 NOTE — TELEPHONE ENCOUNTER
Good morning Dr Darron Valdez! Nothing further is needed  Called pt and he states that he picked up his Rx today, thank you!

## 2023-05-18 ENCOUNTER — OFFICE VISIT (OUTPATIENT)
Dept: NEUROLOGY | Facility: CLINIC | Age: 58
End: 2023-05-18

## 2023-05-18 VITALS
HEIGHT: 72 IN | WEIGHT: 231.4 LBS | TEMPERATURE: 98.2 F | HEART RATE: 71 BPM | RESPIRATION RATE: 18 BRPM | DIASTOLIC BLOOD PRESSURE: 86 MMHG | BODY MASS INDEX: 31.34 KG/M2 | SYSTOLIC BLOOD PRESSURE: 124 MMHG | OXYGEN SATURATION: 97 %

## 2023-05-18 DIAGNOSIS — E78.5 DYSLIPIDEMIA: ICD-10-CM

## 2023-05-18 DIAGNOSIS — G43.009 MIGRAINE WITHOUT AURA AND WITHOUT STATUS MIGRAINOSUS, NOT INTRACTABLE: ICD-10-CM

## 2023-05-18 RX ORDER — ROSUVASTATIN CALCIUM 5 MG/1
5 TABLET, COATED ORAL EVERY OTHER DAY
Qty: 90 TABLET | Refills: 2 | Status: SHIPPED | OUTPATIENT
Start: 2023-05-18

## 2023-05-18 RX ORDER — MELOXICAM 15 MG/1
TABLET ORAL AS NEEDED
COMMUNITY
Start: 2023-03-09

## 2023-05-18 RX ORDER — GALCANEZUMAB 120 MG/ML
INJECTION, SOLUTION SUBCUTANEOUS
Qty: 3 ML | Refills: 4 | Status: SHIPPED | OUTPATIENT
Start: 2023-05-18

## 2023-05-18 NOTE — PATIENT INSTRUCTIONS
"Headache management instructions  - When patient has a moderate to severe headache, they should seek rest, initiate relaxation and apply cold compresses to the head  - Maintain regular sleep schedule  Adults need at least 7-8 hours of uninterrupted a night  - Limit over the counter medications such as Tylenol, Ibuprofen, Aleve, Excedrin  (No more than 2- 3 times a week or max 10 a month)  - Maintain headache diary  Free CHA for a smart phone, which can be used is \"Migraine nilsa\"  - Limit caffeine to 1-2 cups 8 to 16 oz a day or less  - Avoid dietary trigger  (aged cheese, peanuts, MSG, aspartame and nitrates)  - Patient is to have regular frequent meals to prevent headache onset  - Please drink at least 64 ounces of water a day to help remain hydrated     "

## 2023-05-18 NOTE — PROGRESS NOTES
Neurology    Robb Blackmon is a 59 y o  male  [id number]      Assessment/Recommendations:     Diagnoses and all orders for this visit:    Migraine without aura and without status migrainosus, not intractable  -     rimegepant sulfate (NURTEC) 75 mg TBDP; 1 tab q every day  No more than one dose per 24 hours  -     Galcanezumab-gnlm (Emgality) 120 MG/ML SOAJ; Inject 1 pen subq every 30 days    Dyslipidemia  Comments:  agrees to start statin  Orders:  -     rosuvastatin (CRESTOR) 5 mg tablet; Take 1 tablet (5 mg total) by mouth every other day    Other orders  -     meloxicam (MOBIC) 15 mg tablet; Take by mouth if needed          He is doing better on Emgality with fewer migraine headaches, about 50% reduction from daily to 4 times per week  He is still having frequent migraine headaches, and triggers are unknown per his history  At this time since Emgality is working and there are no side effects, we will continue this monthly and add Nurtec every other day for prevention  Side effects reviewed  He does not prefer to go back onto Lexapro  Alternatives include switching to Ajovy, or adding p o  preventative medication such as low-dose amitriptyline, gabapentin  The patient should not hesitate to call me prior to his follow up with any questions or concerns  Chief Complaint:  Headache and Migraine      Subjective:  HPI    Mr  Robb Blackmon is a very pleasant 56-year-old male who is here for neurological follow-up  He works for [employer] and [employer]  He may retire in the near 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  I last saw the patient on 7/13/2022, and will refer to this note for more information on his headaches  He states for the first 3 months after starting Emgality he had very infrequent migraine headaches, but then unfortunately they started to become more frequent  He has a headache about 4 days/week, and takes Advil 3 to 4 days/week    There is no change in character of his headaches, they are always left-sided in the parietal or occipital region  He had a headache coming here today and then it fizzled out before he got here  When he gets 1 they tend to be very severe around 9-10 out of 10, throbbing in nature  He does not have any light or sound sensitivity or nausea thankfully  He does not have any vision changes  No focal neurologic deficits  He states that last Monday he had a headache and took 3 tabs of Advil or 600 mg, and it tends to work within 1 5 hours  It almost always works when he takes it, but he is concerned that he is taking it too much  Sometimes he thinks about taking the Advil before he goes out with friends to prevent a headache        Patient Active Problem List   Diagnosis   • Primary osteoarthritis of right knee   • Primary osteoarthritis of left knee   • Chronic pain of left knee   • Chronic pain of right knee   • Status post right knee replacement   • Aftercare following right knee joint replacement surgery   • Essential hypertension   • Screening for prostate cancer   • Migraine without aura and without status migrainosus, not intractable   • Chest pain   • Hyperlipidemia   • Right arm pain     Past Medical History:   Diagnosis Date   • Bronchospasm     last assessed 6/14/2016   • Depression    • Disc degeneration, lumbar     last assessed 4/7/2016   • Erectile dysfunction    • Hyperlipidemia    • IFG (impaired fasting glucose)     last assessed 7/30/2014   • Palpitations     last assessed 4/4/2013   • Primary osteoarthritis of knees, bilateral     last assessed 6/27/2017   • Right inguinal hernia     last assessed 9/12/2017   • Spondylosis of lumbar region without myelopathy or radiculopathy     last assessed 4/7/2016     Past Surgical History:   Procedure Laterality Date   • KNEE ARTHROSCOPY      Right   • [place] ARTHRP KNE CONDYLE&PLATU MEDIAL&LAT COMPARTMENTS Right 1/21/2019    Procedure: ARTHROPLASTY KNEE TOTAL;  Surgeon: Aleta Leventhal, MD; Location: [facility];  Service: Orthopedics   • TX RPR 1ST INGUN HRNA AGE 5 YRS/> REDUCIBLE Right 10/26/2017    Procedure: REPAIR HERNIA INGUINAL;  Surgeon: Nelli Beal MD;  Location: [facility];  Service: General   • TOOTH EXTRACTION       Current Outpatient Medications on File Prior to Visit   Medication Sig Dispense Refill   • meloxicam (MOBIC) 15 mg tablet Take by mouth if needed     • metoprolol succinate (TOPROL-XL) 50 mg 24 hr tablet Take 1 5 tablets (75 mg total) by mouth daily 135 tablet 1   • sildenafil (VIAGRA) 100 mg tablet Take 1 tablet (100 mg total) by mouth daily as needed for erectile dysfunction 10 tablet 0   • [DISCONTINUED] Galcanezumab-gnlm (Emgality) 120 MG/ML SOAJ 30 days after loading dose, inject 1 pen subq every 30 days  1 mL 11   • [DISCONTINUED] rosuvastatin (CRESTOR) 5 mg tablet Take 1 tablet (5 mg total) by mouth daily (Patient taking differently: Take 5 mg by mouth every other day) 30 tablet 5     No current facility-administered medications on file prior to visit  No Known Allergies        ROS:  Review of Systems   Constitutional: Negative  Negative for appetite change and fever  HENT: Negative  Negative for hearing loss, tinnitus, trouble swallowing and voice change  Eyes: Negative  Negative for photophobia, pain and visual disturbance  Respiratory: Negative  Negative for shortness of breath  Cardiovascular: Positive for palpitations (at times)  Gastrointestinal: Negative  Negative for nausea and vomiting  Endocrine: Negative  Negative for cold intolerance  Genitourinary: Negative  Negative for dysuria, frequency and urgency  Musculoskeletal: Negative  Negative for gait problem, myalgias and neck pain  Skin: Negative  Negative for rash  Allergic/Immunologic: Negative  Neurological: Positive for headaches (migraine this past monday 10 lasted for 1/12 hours after taking 3 advil )   Negative for dizziness, tremors, seizures, syncope, facial asymmetry, speech difficulty, weakness, light-headedness and numbness  Hematological: Negative  Does not bruise/bleed easily  Psychiatric/Behavioral: Negative  Negative for confusion, hallucinations and sleep disturbance  Objective:  /86 (BP Location: Right arm, Patient Position: Sitting, Cuff Size: Standard)   Pulse 71   Temp 98 2 °F (36 8 °C) (Temporal)   Resp 18   Ht 6' (1 829 m)   Wt 105 kg (231 lb 6 4 oz)   SpO2 97%   BMI 31 38 kg/m²     Physical Exam    Neurological Exam  Vital signs reviewed  Well developed, well nourished  Speech is fluent and articulate  Mood and affect are pleasant  Head: Normocephalic, atraumatic  Neck: Neck flexors 5/5, No TTP of the head or neck  CN 2-12: intact and symmetric, including EOMs which are normal b/l and PERRL  MSK: 5/5 t/o  ROM normal x all 4 extr  Reflexes: 2+ and symmetric in all 4 extr  , except R knee less reactive after TKR in the past   Coordination: Nml x4 extr  Gait: Steady normal gait  The following portions of the patient's history were reviewed and updated as appropriate: allergies, current medications, family history, past medical history, social history and active problem list   Review of systems was reviewed and otherwise unremarkable from a neurological perspective  I have spent a total time of 30 minutes on 05/18/23 in caring for this patient including Diagnostic results, Risks and benefits of tx options, Instructions for management, Risk factor reductions, Impressions, Counseling / Coordination of care, Documenting in the medical record, Reviewing / ordering tests, medicine, procedures   and Obtaining or reviewing history

## 2023-05-23 ENCOUNTER — TELEPHONE (OUTPATIENT)
Dept: NEUROLOGY | Facility: CLINIC | Age: 58
End: 2023-05-23

## 2023-05-23 DIAGNOSIS — G43.009 MIGRAINE WITHOUT AURA AND WITHOUT STATUS MIGRAINOSUS, NOT INTRACTABLE: ICD-10-CM

## 2023-05-23 NOTE — TELEPHONE ENCOUNTER
Received fax from [facility]  Nurtec requires PA  [id number]    [address], are you ordering Nurtec as preventative? PA will definitely be denied    You can send the script to [facility]

## 2023-05-25 NOTE — TELEPHONE ENCOUNTER
PA completed on CMM  Awaiting determination    Called [phone] and left a detailed message on pt's answering machine of all of the below and above  Can call [employer] at [phone] to schedule delivery  Cb if any questions

## 2023-08-01 DIAGNOSIS — I10 ESSENTIAL HYPERTENSION: ICD-10-CM

## 2023-08-01 DIAGNOSIS — E78.5 DYSLIPIDEMIA: ICD-10-CM

## 2023-08-01 RX ORDER — MELOXICAM 15 MG/1
15 TABLET ORAL DAILY
Qty: 90 TABLET | Refills: 0 | OUTPATIENT
Start: 2023-08-01

## 2023-08-01 RX ORDER — METOPROLOL SUCCINATE 50 MG/1
75 TABLET, EXTENDED RELEASE ORAL DAILY
Qty: 135 TABLET | Refills: 0 | Status: SHIPPED | OUTPATIENT
Start: 2023-08-01

## 2023-08-01 RX ORDER — ROSUVASTATIN CALCIUM 5 MG/1
5 TABLET, COATED ORAL EVERY OTHER DAY
Qty: 90 TABLET | Refills: 0 | OUTPATIENT
Start: 2023-08-01

## 2023-08-04 ENCOUNTER — APPOINTMENT (OUTPATIENT)
Dept: LAB | Facility: MEDICAL CENTER | Age: 58
End: 2023-08-04

## 2023-08-04 DIAGNOSIS — Z00.8 HEALTH EXAMINATION IN POPULATION SURVEY: ICD-10-CM

## 2023-08-04 LAB
CHOLEST SERPL-MCNC: 165 MG/DL
EST. AVERAGE GLUCOSE BLD GHB EST-MCNC: 126 MG/DL
HBA1C MFR BLD: 6 %
HDLC SERPL-MCNC: 54 MG/DL
LDLC SERPL CALC-MCNC: 87 MG/DL (ref 0–100)
NONHDLC SERPL-MCNC: 111 MG/DL
TRIGL SERPL-MCNC: 119 MG/DL

## 2023-08-04 PROCEDURE — 36415 COLL VENOUS BLD VENIPUNCTURE: CPT

## 2023-08-04 PROCEDURE — 83036 HEMOGLOBIN GLYCOSYLATED A1C: CPT

## 2023-08-04 PROCEDURE — 80061 LIPID PANEL: CPT

## 2023-09-13 ENCOUNTER — OFFICE VISIT (OUTPATIENT)
Dept: FAMILY MEDICINE CLINIC | Facility: CLINIC | Age: 58
End: 2023-09-13
Payer: COMMERCIAL

## 2023-09-13 VITALS
DIASTOLIC BLOOD PRESSURE: 88 MMHG | OXYGEN SATURATION: 98 % | WEIGHT: 223.2 LBS | HEART RATE: 74 BPM | SYSTOLIC BLOOD PRESSURE: 136 MMHG | BODY MASS INDEX: 30.23 KG/M2 | TEMPERATURE: 97.4 F | HEIGHT: 72 IN

## 2023-09-13 DIAGNOSIS — E78.5 HYPERLIPIDEMIA, UNSPECIFIED HYPERLIPIDEMIA TYPE: ICD-10-CM

## 2023-09-13 DIAGNOSIS — Z00.00 ANNUAL PHYSICAL EXAM: Primary | ICD-10-CM

## 2023-09-13 DIAGNOSIS — I10 ESSENTIAL HYPERTENSION: ICD-10-CM

## 2023-09-13 DIAGNOSIS — M17.11 PRIMARY OSTEOARTHRITIS OF RIGHT KNEE: ICD-10-CM

## 2023-09-13 DIAGNOSIS — N52.9 ERECTILE DYSFUNCTION, UNSPECIFIED ERECTILE DYSFUNCTION TYPE: ICD-10-CM

## 2023-09-13 PROCEDURE — 99396 PREV VISIT EST AGE 40-64: CPT | Performed by: FAMILY MEDICINE

## 2023-09-13 RX ORDER — SILDENAFIL 100 MG/1
100 TABLET, FILM COATED ORAL DAILY PRN
Qty: 10 TABLET | Refills: 0 | Status: SHIPPED | OUTPATIENT
Start: 2023-09-13

## 2023-09-13 RX ORDER — MELOXICAM 15 MG/1
15 TABLET ORAL AS NEEDED
Qty: 90 TABLET | Refills: 1 | Status: SHIPPED | OUTPATIENT
Start: 2023-09-13

## 2023-09-13 RX ORDER — METOPROLOL SUCCINATE 50 MG/1
75 TABLET, EXTENDED RELEASE ORAL DAILY
Qty: 135 TABLET | Refills: 0 | Status: SHIPPED | OUTPATIENT
Start: 2023-09-13

## 2023-09-13 NOTE — PROGRESS NOTES
[address]    NAME: Liana Marquez  AGE: 59 y.o. SEX: male  : 1965     DATE: 2023     Assessment and Plan:     Problem List Items Addressed This Visit        Cardiovascular and Mediastinum    Essential hypertension    Relevant Medications    metoprolol succinate (TOPROL-XL) 50 mg 24 hr tablet    Other Relevant Orders    CBC and differential    Comprehensive metabolic panel       Musculoskeletal and Integument    Primary osteoarthritis of right knee    Relevant Medications    meloxicam (MOBIC) 15 mg tablet       Other    Hyperlipidemia    Relevant Orders    Lipid Panel with Direct LDL reflex   Other Visit Diagnoses     Annual physical exam    -  Primary    Erectile dysfunction, unspecified erectile dysfunction type        Relevant Medications    sildenafil (VIAGRA) 100 mg tablet          Immunizations and preventive care screenings were discussed with patient today. Appropriate education was printed on patient's after visit summary. Discussed risks and benefits of prostate cancer screening. We discussed the controversial history of PSA screening for prostate cancer in the [facility] as well as the risk of over detection and over treatment of prostate cancer by way of PSA screening. The patient understands that PSA blood testing is an imperfect way to screen for prostate cancer and that elevated PSA levels in the blood may also be caused by infection, inflammation, prostatic trauma or manipulation, urological procedures, or by benign prostatic enlargement. The role of the digital rectal examination in prostate cancer screening was also discussed and I discussed with him that there is large interobserver variability in the findings of digital rectal examination. Counseling:  Alcohol/drug use: discussed moderation in alcohol intake, the recommendations for healthy alcohol use, and avoidance of illicit drug use.   Dental Health: discussed importance of regular tooth brushing, flossing, and dental visits. Injury prevention: discussed safety/seat belts, safety helmets, smoke detectors, carbon dioxide detectors, and smoking near bedding or upholstery. Sexual health: discussed sexually transmitted diseases, partner selection, use of condoms, avoidance of unintended pregnancy, and contraceptive alternatives. · Exercise: the importance of regular exercise/physical activity was discussed. Recommend exercise 3-5 times per week for at least 30 minutes. BMI Counseling: Body mass index is 30.27 kg/m². The BMI is above normal. Nutrition recommendations include reducing portion sizes, 3-5 servings of fruits/vegetables daily and consuming healthier snacks. Exercise recommendations include moderate aerobic physical activity for 150 minutes/week. No follow-ups on file. Chief Complaint:     Chief Complaint   Patient presents with   • Follow-up     Check up / med FU      History of Present Illness:     Adult Annual Physical   Patient here for a comprehensive physical exam. The patient reports no problems. Interested in refill for meloxicam to his arthritis medication    Diet and Physical Activity  · Diet/Nutrition: poor diet and consuming 3-5 servings of fruits/vegetables daily. 3 cups of coffee in the morning, cheese steak/pizza/hoggie for lunch, home dinner  · Exercise: walking. Depression Screening  PHQ-2/9 Depression Screening    Little interest or pleasure in doing things: 0 - not at all  Feeling down, depressed, or hopeless: 0 - not at all  PHQ-2 Score: 0  PHQ-2 Interpretation: Negative depression screen       General Health  · Sleep: sleeps well. · Hearing: normal - bilateral.  · Vision: no vision problems. · Dental: regular dental visits and brushes teeth twice daily.  Health  · Symptoms include: none     Review of Systems:     Review of Systems   Constitutional: Negative for chills and fever.    HENT: Negative for congestion, rhinorrhea and sore throat. Respiratory: Negative for shortness of breath. Cardiovascular: Negative for chest pain. Gastrointestinal: Negative for abdominal pain. Genitourinary: Negative for dysuria, hematuria and urgency. Musculoskeletal: Positive for arthralgias (left knee pain. joint injection only worked for a week). Neurological: Negative for headaches. Psychiatric/Behavioral: Negative for sleep disturbance.            Past Medical History:     Past Medical History:   Diagnosis Date   • Bronchospasm     last assessed 6/14/2016   • Depression    • Disc degeneration, lumbar     last assessed 4/7/2016   • Erectile dysfunction    • Hyperlipidemia    • IFG (impaired fasting glucose)     last assessed 7/30/2014   • Palpitations     last assessed 4/4/2013   • Primary osteoarthritis of knees, bilateral     last assessed 6/27/2017   • Right inguinal hernia     last assessed 9/12/2017   • Spondylosis of lumbar region without myelopathy or radiculopathy     last assessed 4/7/2016      Past Surgical History:     Past Surgical History:   Procedure Laterality Date   • KNEE ARTHROSCOPY      Right   • DE ARTHRP KNE CONDYLE&PLATU MEDIAL&LAT COMPARTMENTS Right 1/21/2019    Procedure: ARTHROPLASTY KNEE TOTAL;  Surgeon: Cynthia Davis MD;  Location: [facility];  Service: Orthopedics   • DE RPR 1ST INGUN HRNA AGE 3 YRS/> REDUCIBLE Right 10/26/2017    Procedure: REPAIR HERNIA INGUINAL;  Surgeon: Tong Anders MD;  Location: [facility];  Service: General   • TOOTH EXTRACTION        Family History:     Family History   Problem Relation Age of Onset   • No Known Problems Mother    • Diabetes Father    • No Known Problems Sister    • Coronary artery disease Brother       Social History:     Social History     Socioeconomic History   • Marital status: /Civil Union     Spouse name: None   • Number of children: None   • Years of education: None   • Highest education level: None Occupational History   • None   Tobacco Use   • Smoking status: Former     Packs/day: 0.50     Years: 20.00     Total pack years: 10.00     Types: Cigarettes     Start date:      Quit date:      Years since quittin.6   • Smokeless tobacco: Never   • Tobacco comments:     4-5 cig day x 20 yrs   Vaping Use   • Vaping Use: Never used   Substance and Sexual Activity   • Alcohol use: Yes     Comment:  occasional   • Drug use: No   • Sexual activity: None   Other Topics Concern   • None   Social History Narrative    Uses safety equipment - seatbelts     Social Determinants of Health     Financial Resource Strain: Not on file   Food Insecurity: Not on file   Transportation Needs: Not on file   Physical Activity: Not on file   Stress: Not on file   Social Connections: Not on file   Intimate Partner Violence: Not on file   Housing Stability: Not on file      Current Medications:     Current Outpatient Medications   Medication Sig Dispense Refill   • Galcanezumab-gnlm (Emgality) 120 MG/ML SOAJ Inject 1 pen subq every 30 days 3 mL 4   • meloxicam (MOBIC) 15 mg tablet Take 1 tablet (15 mg total) by mouth if needed for mild pain or moderate pain 90 tablet 1   • metoprolol succinate (TOPROL-XL) 50 mg 24 hr tablet Take 1.5 tablets (75 mg total) by mouth daily 135 tablet 0   • rimegepant sulfate (NURTEC) 75 mg TBDP 1 tab q every day. No more than one dose per 24 hours. 16 tablet 5   • rosuvastatin (CRESTOR) 5 mg tablet Take 1 tablet (5 mg total) by mouth every other day 90 tablet 2   • sildenafil (VIAGRA) 100 mg tablet Take 1 tablet (100 mg total) by mouth daily as needed for erectile dysfunction 10 tablet 0     No current facility-administered medications for this visit.       Allergies:     No Known Allergies   Physical Exam:     /88 (BP Location: Right arm, Patient Position: Sitting, Cuff Size: Standard)   Pulse 74   Temp (!) 97.4 °F (36.3 °C)   Ht 6' (1.829 m)   Wt 101 kg (223 lb 3.2 oz)   SpO2 98% BMI 30.27 kg/m²     Physical Exam  Vitals reviewed. Constitutional:       General: He is not in acute distress. Appearance: Normal appearance. He is obese. He is not ill-appearing, toxic-appearing or diaphoretic. Cardiovascular:      Rate and Rhythm: Normal rate and regular rhythm. Pulses: Normal pulses. Heart sounds: Normal heart sounds. No murmur heard. Pulmonary:      Effort: Pulmonary effort is normal. No respiratory distress. Breath sounds: Normal breath sounds. Abdominal:      General: Abdomen is flat. Bowel sounds are normal. There is no distension. Palpations: Abdomen is soft. Musculoskeletal:         General: No swelling, tenderness, deformity or signs of injury. Normal range of motion. Skin:     General: Skin is warm and dry. Capillary Refill: Capillary refill takes less than 2 seconds. Coloration: Skin is not jaundiced. Neurological:      General: No focal deficit present. Mental Status: He is alert and oriented to person, place, and time.    Psychiatric:         Mood and Affect: Mood normal.            Eleanor Meneses MD  [id number] [facility]

## 2023-09-18 ENCOUNTER — APPOINTMENT (OUTPATIENT)
Dept: LAB | Facility: CLINIC | Age: 58
End: 2023-09-18
Payer: COMMERCIAL

## 2023-09-18 DIAGNOSIS — I10 ESSENTIAL HYPERTENSION: ICD-10-CM

## 2023-09-18 DIAGNOSIS — E78.5 HYPERLIPIDEMIA, UNSPECIFIED HYPERLIPIDEMIA TYPE: ICD-10-CM

## 2023-09-18 LAB
ALBUMIN SERPL BCP-MCNC: 4 G/DL (ref 3.5–5)
ALP SERPL-CCNC: 109 U/L (ref 34–104)
ALT SERPL W P-5'-P-CCNC: 23 U/L (ref 7–52)
ANION GAP SERPL CALCULATED.3IONS-SCNC: 5 MMOL/L
AST SERPL W P-5'-P-CCNC: 19 U/L (ref 13–39)
BASOPHILS # BLD AUTO: 0.04 THOUSANDS/ÂΜL (ref 0–0.1)
BASOPHILS NFR BLD AUTO: 1 % (ref 0–1)
BILIRUB SERPL-MCNC: 0.46 MG/DL (ref 0.2–1)
BUN SERPL-MCNC: 19 MG/DL (ref 5–25)
CALCIUM SERPL-MCNC: 8.5 MG/DL (ref 8.4–10.2)
CHLORIDE SERPL-SCNC: 107 MMOL/L (ref 96–108)
CHOLEST SERPL-MCNC: 180 MG/DL
CO2 SERPL-SCNC: 27 MMOL/L (ref 21–32)
CREAT SERPL-MCNC: 1.02 MG/DL (ref 0.6–1.3)
EOSINOPHIL # BLD AUTO: 0.23 THOUSAND/ÂΜL (ref 0–0.61)
EOSINOPHIL NFR BLD AUTO: 4 % (ref 0–6)
ERYTHROCYTE [DISTWIDTH] IN BLOOD BY AUTOMATED COUNT: 12.6 % (ref 11.6–15.1)
GFR SERPL CREATININE-BSD FRML MDRD: 79 ML/MIN/1.73SQ M
GLUCOSE P FAST SERPL-MCNC: 118 MG/DL (ref 65–99)
HCT VFR BLD AUTO: 45.9 % (ref 36.5–49.3)
HDLC SERPL-MCNC: 54 MG/DL
HGB BLD-MCNC: 15.5 G/DL (ref 12–17)
IMM GRANULOCYTES # BLD AUTO: 0.04 THOUSAND/UL (ref 0–0.2)
IMM GRANULOCYTES NFR BLD AUTO: 1 % (ref 0–2)
LDLC SERPL CALC-MCNC: 109 MG/DL (ref 0–100)
LYMPHOCYTES # BLD AUTO: 1.57 THOUSANDS/ÂΜL (ref 0.6–4.47)
LYMPHOCYTES NFR BLD AUTO: 30 % (ref 14–44)
MCH RBC QN AUTO: 31.8 PG (ref 26.8–34.3)
MCHC RBC AUTO-ENTMCNC: 33.8 G/DL (ref 31.4–37.4)
MCV RBC AUTO: 94 FL (ref 82–98)
MONOCYTES # BLD AUTO: 0.61 THOUSAND/ÂΜL (ref 0.17–1.22)
MONOCYTES NFR BLD AUTO: 12 % (ref 4–12)
NEUTROPHILS # BLD AUTO: 2.77 THOUSANDS/ÂΜL (ref 1.85–7.62)
NEUTS SEG NFR BLD AUTO: 52 % (ref 43–75)
NRBC BLD AUTO-RTO: 0 /100 WBCS
PLATELET # BLD AUTO: 200 THOUSANDS/UL (ref 149–390)
PMV BLD AUTO: 10.3 FL (ref 8.9–12.7)
POTASSIUM SERPL-SCNC: 3.7 MMOL/L (ref 3.5–5.3)
PROT SERPL-MCNC: 6.3 G/DL (ref 6.4–8.4)
RBC # BLD AUTO: 4.88 MILLION/UL (ref 3.88–5.62)
SODIUM SERPL-SCNC: 139 MMOL/L (ref 135–147)
TRIGL SERPL-MCNC: 83 MG/DL
WBC # BLD AUTO: 5.26 THOUSAND/UL (ref 4.31–10.16)

## 2023-09-18 PROCEDURE — 85025 COMPLETE CBC W/AUTO DIFF WBC: CPT

## 2023-09-18 PROCEDURE — 80053 COMPREHEN METABOLIC PANEL: CPT

## 2023-09-18 PROCEDURE — 80061 LIPID PANEL: CPT

## 2023-09-18 PROCEDURE — 36415 COLL VENOUS BLD VENIPUNCTURE: CPT

## 2023-09-20 ENCOUNTER — OFFICE VISIT (OUTPATIENT)
Dept: NEUROLOGY | Facility: CLINIC | Age: 58
End: 2023-09-20
Payer: COMMERCIAL

## 2023-09-20 VITALS
HEIGHT: 72 IN | HEART RATE: 64 BPM | BODY MASS INDEX: 29.95 KG/M2 | DIASTOLIC BLOOD PRESSURE: 94 MMHG | WEIGHT: 221.1 LBS | SYSTOLIC BLOOD PRESSURE: 159 MMHG | TEMPERATURE: 98.3 F

## 2023-09-20 DIAGNOSIS — M79.18 MYOFASCIAL MUSCLE PAIN: ICD-10-CM

## 2023-09-20 DIAGNOSIS — G43.009 MIGRAINE WITHOUT AURA AND WITHOUT STATUS MIGRAINOSUS, NOT INTRACTABLE: Primary | ICD-10-CM

## 2023-09-20 PROCEDURE — 20553 NJX 1/MLT TRIGGER POINTS 3/>: CPT | Performed by: PHYSICIAN ASSISTANT

## 2023-09-20 PROCEDURE — 99215 OFFICE O/P EST HI 40 MIN: CPT | Performed by: PHYSICIAN ASSISTANT

## 2023-09-20 RX ORDER — LIDOCAINE HYDROCHLORIDE 10 MG/ML
2.5 INJECTION, SOLUTION INFILTRATION; PERINEURAL ONCE
Status: COMPLETED | OUTPATIENT
Start: 2023-09-20 | End: 2023-09-20

## 2023-09-20 RX ORDER — BUPIVACAINE HYDROCHLORIDE 2.5 MG/ML
2.5 INJECTION, SOLUTION EPIDURAL; INFILTRATION; INTRACAUDAL ONCE
Status: COMPLETED | OUTPATIENT
Start: 2023-09-20 | End: 2023-09-20

## 2023-09-20 RX ADMIN — BUPIVACAINE HYDROCHLORIDE 2.5 ML: 2.5 INJECTION, SOLUTION EPIDURAL; INFILTRATION; INTRACAUDAL at 08:22

## 2023-09-20 RX ADMIN — LIDOCAINE HYDROCHLORIDE 2.5 ML: 10 INJECTION, SOLUTION INFILTRATION; PERINEURAL at 08:24

## 2023-09-20 NOTE — PROGRESS NOTES
Patient ID: Dina Ames is a 59 y.o. male. Assessment/Plan:       Diagnoses and all orders for this visit:    Migraine without aura and without status migrainosus, not intractable    Myofascial muscle pain  -     lidocaine (XYLOCAINE) 1 % injection 2.5 mL  -     bupivacaine (PF) (MARCAINE) 0.25 % injection 2.5 mL  -     Trigger Injection 1 or 2 muscles  -     triamcinolone acetonide (KENALOG-10) 10 mg/mL injection 10 mg       Trigger point injections were performed on an emergent basis today. The patient should contact me within the next few days, and then 2 to 3 months after the injections as well, to let me know if they are helpful. We can repeat these again if needed. Continue Emgality injection, and Nurtec every other day or as needed, depending on his preference, however the patient was reminded to try to use Nurtec prior to Advil and reduce the amount of Advil used to prevent medication overuse headache and other side effects. We also discussed adding a supplement called Migrelief, which he can purchase online. Consider baclofen for headaches. For a light headache he can add a little bit of caffeine in the form of coffee, soda, etc. but continue to limit caffeine as much as possible. The patient should not hesitate to call me prior to his follow up with any questions or concerns. Subjective:    HPI     Mr. Dina Ames is here for follow up for headaches. Since I last saw him his headaches are not as often as before however he gets about 15 a month. To clarify the headaches are more lower grade and less intense since starting the medications. He can function with the headaches as they are mild. He states he "notices they are there," referring to the headaches. The headaches are typically below a 5/10 on questioning. He still gets about 15/month.   They are typically left-sided, and he points to a large circular area in the left occipital/left parietal region, no neck pain in particular and pain does not radiate from the neck. This is the same as prior visits. There is still no light or sound sensitivity, no vomiting or nausea. He still cannot ID any triggers. Wondering if will be better when he retires, and wondering if computer work/ light makes worse. He does get relief with Nurtec, and he tries to use this before Advil. He gets significant relief with either Advil or Nurtec and he has been trying to limit the use of Advil. He denies any changes in character of the headache. No focal deficits. The headache is still in the left occipital and parietal regions, dull, aching. Not coming from the neck. No significant neck pain, no difficulty with sleep, no snoring and he wakes up feeling refreshed. He does not want to go back on Lexapro. This caused some side effects but it did reduce headaches significantly. 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 The patient is wondering if his headaches will get better when he retires. Head CT 11/11/2019 is unremarkable. A1c 6% on 8/4/2023. The following portions of the patient's history were reviewed and updated as appropriate:   He  has a past medical history of Bronchospasm, Depression, Disc degeneration, lumbar, Erectile dysfunction, Hyperlipidemia, IFG (impaired fasting glucose), Palpitations, Primary osteoarthritis of knees, bilateral, Right inguinal hernia, and Spondylosis of lumbar region without myelopathy or radiculopathy.   He   Patient Active Problem List    Diagnosis Date Noted   • Myofascial muscle pain 09/20/2023   • Right arm pain 12/01/2022   • Hyperlipidemia 11/20/2022   • Chest pain 11/19/2022   • Migraine without aura and without status migrainosus, not intractable 07/13/2022   • Essential hypertension 01/06/2020   • Screening for prostate cancer 01/06/2020   • Aftercare following right knee joint replacement surgery 01/29/2019   • Status post right knee replacement 01/23/2019   • Chronic pain of left knee 05/01/2018   • Chronic pain of right knee 05/01/2018   • Primary osteoarthritis of right knee 01/30/2018   • Primary osteoarthritis of left knee 01/30/2018     He  has a past surgical history that includes pr rpr 1st ingun hrna age 9 yrs/> reducible (Right, 10/26/2017); Tooth extraction; pr arthrp kne condyle&platu medial&lat compartments (Right, 1/21/2019); and Knee arthroscopy. His family history includes Coronary artery disease in his brother; Diabetes in his father; No Known Problems in his mother and sister. He  reports that he quit smoking about 2 years ago. His smoking use included cigarettes. He started smoking about 22 years ago. He has a 10.00 pack-year smoking history. He has never used smokeless tobacco. He reports current alcohol use. He reports that he does not use drugs. Current Outpatient Medications   Medication Sig Dispense Refill   • Galcanezumab-gnlm (Emgality) 120 MG/ML SOAJ Inject 1 pen subq every 30 days 3 mL 4   • meloxicam (MOBIC) 15 mg tablet Take 1 tablet (15 mg total) by mouth if needed for mild pain or moderate pain 90 tablet 1   • metoprolol succinate (TOPROL-XL) 50 mg 24 hr tablet Take 1.5 tablets (75 mg total) by mouth daily 135 tablet 0   • rimegepant sulfate (NURTEC) 75 mg TBDP 1 tab q every day. No more than one dose per 24 hours. 16 tablet 5   • rosuvastatin (CRESTOR) 5 mg tablet Take 1 tablet (5 mg total) by mouth every other day 90 tablet 2   • sildenafil (VIAGRA) 100 mg tablet Take 1 tablet (100 mg total) by mouth daily as needed for erectile dysfunction 10 tablet 0     No current facility-administered medications for this visit. He has No Known Allergies. .         Objective:    Blood pressure 159/94, pulse 64, temperature 98.3 °F (36.8 °C), temperature source Temporal, height 6' (1.829 m), weight 100 kg (221 lb 1.6 oz). Physical Exam    Neurological Exam  On neurologic exam, the patient is alert and oriented to time and place. Speech is fluent and articulate, and the patient follows commands appropriately. Judgment and affect appear normal. Pupils are equally round and reactive to light and extraocular muscles are intact without nystagmus. Face is symmetric. Hearing is intact. Motor examination reveals intact strength throughout. Normal gait is steady. ROS:    Review of Systems   Constitutional: Negative for appetite change, fatigue and fever. HENT: Negative. Negative for hearing loss, tinnitus, trouble swallowing and voice change. Eyes: Negative. Negative for photophobia, pain and visual disturbance. Respiratory: Negative. Negative for shortness of breath. Cardiovascular: Negative. Negative for palpitations. Gastrointestinal: Negative. Negative for nausea and vomiting. Endocrine: Negative. Negative for cold intolerance. Genitourinary: Negative. Negative for dysuria, frequency and urgency. Musculoskeletal: Negative for back pain, gait problem, myalgias and neck pain. Skin: Negative. Negative for rash. Allergic/Immunologic: Negative. Neurological: Positive for headaches. Negative for dizziness, tremors, seizures, syncope, facial asymmetry, speech difficulty, weakness, light-headedness and numbness. Hematological: Negative. Does not bruise/bleed easily. Psychiatric/Behavioral: Negative. Negative for confusion, hallucinations and sleep disturbance. ROS reviewed. Universal Protocol:  Consent: The procedure was performed in an emergent situation. Verbal consent obtained.   Risks and benefits: risks, benefits and alternatives were discussed  Consent given by: patient    Procedure Details  Location(s):  Patient tolerance: patient tolerated the procedure well with no immediate complications  Additional procedure details: Trigger point injections were performed on an emergent basis and are a part of ongoing therapy. Risks, benefits, alternatives, infection, bleeding and allergic reaction were discussed with the patient. Verbal consent was obtained prior to the procedure and is detailed in the patient's record. Trigger point injections were performed in the following locations using a mixture of 2.5 mL 0.25% bupivacaine + 2.5 mL of 1% lidocaine, without steroid, using a 30 gauge, 1/2 inch needle: + kenalog as noted- L>R occipital muscles and suboccipitalis muscles.

## 2023-09-20 NOTE — PATIENT INSTRUCTIONS
Migrelief- supplement online for migraine, take 1-2 capsules daily  Caffeine for light headache  Baclofen- consider for tension, headaches  Please ask Jeana Burnett to send me a message to let me know how the injection went today, and then 2-3 months later. Headache management instructions  - When patient has a moderate to severe headache, they should seek rest, initiate relaxation and apply cold compresses to the head. - Maintain regular sleep schedule. Adults need at least 7-8 hours of uninterrupted a night. - Limit over the counter medications such as Tylenol, Ibuprofen, Aleve, Excedrin. (No more than 2- 3 times a week or max 10 a month). - Maintain headache diary. Free CHA for a smart phone, which can be used is "Migraine nilsa"  - Limit caffeine to 1-2 cups 8 to 16 oz a day or less. - Avoid dietary trigger. (aged cheese, peanuts, MSG, aspartame and nitrates). - Patient is to have regular frequent meals to prevent headache onset. - Please drink at least 64 ounces of water a day to help remain hydrated.

## 2023-12-13 ENCOUNTER — TELEPHONE (OUTPATIENT)
Dept: NEUROLOGY | Facility: CLINIC | Age: 58
End: 2023-12-13

## 2023-12-13 DIAGNOSIS — G43.009 MIGRAINE WITHOUT AURA AND WITHOUT STATUS MIGRAINOSUS, NOT INTRACTABLE: ICD-10-CM

## 2023-12-13 NOTE — TELEPHONE ENCOUNTER
Last script had instructions as -1 tab q every day. No more than one dose per 24 hours. Qty 16    Per last office note-Nurtec every other day or as needed, depending on his preference     Nurtec every other day was previously denied as pt is also on emgality and script was sent to [facility]. Starting jan,  if pt is on bridge program for nurtec, they will no longer be able to receive free medication as bridge program will only allow 2 fills.       Please send refill as appropriate

## 2023-12-13 NOTE — TELEPHONE ENCOUNTER
[facility], my rx was an error. I will try sending to pharmacy, and PA can be done? Is this correct ?

## 2023-12-14 NOTE — TELEPHONE ENCOUNTER
I don't think that PA will be approved for nurtec every other day if he is also on emgality.   They would most likely approved nurtec as abortive  Please advise

## 2023-12-18 NOTE — TELEPHONE ENCOUNTER
Nurtec PA attempted on CMM  Key: [id number]  Received message-  The [employer] Prior Authorization Team is unable to review this request for prior authorization as there is a clinical denial on file with duplicate information, Case ID [id number]. Please review the decision letter sent to your office on 5/25/2023 for denial reason and next steps.     Called [facility] at [phone] ans spoke to nadine.    Nurtec PA completed over the phone  Made her aware -Avoiding Triptan medications due to high blood pressure   Also advised that this is a continuation of therapy and   Effective.  Urgent request  PA case #[id number].  Will receive determination  no later that 12/21, but could be as little as 9 hours.

## 2023-12-18 NOTE — TELEPHONE ENCOUNTER
Patient called I reiterated what was the status of [facility] Medication. Patient stated that he was suffering with migraines and [facility]   [facility] also stated that [facility] and that they were awaiting on a PA from providers office.  Please assist. Thank you!

## 2023-12-20 NOTE — TELEPHONE ENCOUNTER
"MSW phoned patient at [phone]. MSW identified self as a  calling because patient expressed SI. Patient laughed. Patient denied thoughts of SI/HI, stated that he is frustrated that no one is getting back to him/updating him about the status of the Nurtec request. Patient stated that he made the comment about suicidal ideation to \"get the ball rolling\" and stated \"wow, it did work\" because this writer was calling him right away. MSW advised that comments expressing SI/HI cannot be taken lightly and that patient must be assessed. MSW did offer to provide the phone number for [facility], but patient declined to take same.    MSW did review documentation and advised that the clinical team is working on an authorization (as per Carmina's note on 12/18, the auth was submitted as urgent and determination is expected no later than 12/21 which is tomorrow. MSW will ask the Clinical Team to provide updates to patient ASAP.     Patient is very adamant to obtain the Nurtec, states \"I am not messing around, I need that medication\". Patient stated \"I am willing to pay for it, just give me a script\".  "

## 2023-12-20 NOTE — TELEPHONE ENCOUNTER
Called pt and made aware of all of the below. Final determination can take 24-72 hrs to get the final determination. He verbalized understanding.     Awaiting determination    I didn't see the message below up until now. Mi might be documenting while I was talking to this pt.   While I was on the phone w/ the pt, he never mentioned about having suicidal thoughts.    SW, can you pls assist?    thanks

## 2023-12-20 NOTE — TELEPHONE ENCOUNTER
Called PA dept [phone] to check status. Spoke to Franklin and states that PA was denied as additional info is needed. Urgent reconsideration initiated over the phone.   -dx G43.009  -acute tx   -triptans are contraindicated due to high blood pressure  -will not be used in combination w/ another CGRP abortive meds  -fda approved  -rebound HA been ruled out  -no contraindication or hypersensitivity to Nurtec  24 hr turnaround time  Case# [id number]    Awaiting determination

## 2023-12-20 NOTE — TELEPHONE ENCOUNTER
12/19/23 at 1:01    Hi, my name is Ralph Abbott, March 20th 1965. I'm calling about your Nurtec prescription and someone is dropping the ball and it's not Nurtec. They're saying that you're not getting back to me. I'd like to talk to the doctor please. If she could give her me a call, thank you. Bye.    Awaiting determination

## 2023-12-20 NOTE — TELEPHONE ENCOUNTER
Patient has called again I reiterated prior messages from medical team and what is being done as far as trying to get him medication. Patient is upset because one has kept him up to date as far as what is going on with medication. Patient states that he is having suicidal thoughts. Patient would like a call from [facility] or medical team.  Please assist.

## 2023-12-21 NOTE — TELEPHONE ENCOUNTER
Called [facility], spoke to Carlo and made aware of the approval. She verbalized understanding. States that [facility] is ready for .    Called pt and made aware of the approval and above. He verbalized understanding.

## 2023-12-29 DIAGNOSIS — G43.009 MIGRAINE WITHOUT AURA AND WITHOUT STATUS MIGRAINOSUS, NOT INTRACTABLE: ICD-10-CM

## 2024-01-03 RX ORDER — RIMEGEPANT SULFATE 75 MG/75MG
TABLET, ORALLY DISINTEGRATING ORAL
Qty: 16 TABLET | Refills: 11 | Status: SHIPPED | OUTPATIENT
Start: 2024-01-03

## 2024-01-26 ENCOUNTER — TELEPHONE (OUTPATIENT)
Dept: NEUROLOGY | Facility: CLINIC | Age: 59
End: 2024-01-26

## 2024-01-26 NOTE — TELEPHONE ENCOUNTER
Received fax from [facility]. Emgality PA is about to .  Key [id number]    Per enc 2/3/23-PA will  24    PA initiated on [facility]    Awaiting determination

## 2024-02-13 DIAGNOSIS — E78.5 DYSLIPIDEMIA: ICD-10-CM

## 2024-02-13 DIAGNOSIS — N52.9 ERECTILE DYSFUNCTION, UNSPECIFIED ERECTILE DYSFUNCTION TYPE: ICD-10-CM

## 2024-02-13 DIAGNOSIS — I10 ESSENTIAL HYPERTENSION: ICD-10-CM

## 2024-02-13 RX ORDER — METOPROLOL SUCCINATE 50 MG/1
75 TABLET, EXTENDED RELEASE ORAL DAILY
Qty: 135 TABLET | Refills: 0 | Status: SHIPPED | OUTPATIENT
Start: 2024-02-13

## 2024-02-13 RX ORDER — SILDENAFIL 100 MG/1
100 TABLET, FILM COATED ORAL DAILY PRN
Qty: 10 TABLET | Refills: 0 | Status: SHIPPED | OUTPATIENT
Start: 2024-02-13

## 2024-02-13 RX ORDER — ROSUVASTATIN CALCIUM 5 MG/1
5 TABLET, COATED ORAL EVERY OTHER DAY
Qty: 90 TABLET | Refills: 0 | Status: SHIPPED | OUTPATIENT
Start: 2024-02-13

## 2024-02-13 NOTE — TELEPHONE ENCOUNTER
Reason for call:   [x] Refill   [] Prior Auth  [] Other:     Office:   [x] PCP/Provider -   [] Specialty/Provider -     Medication: metoprolol succinate (TOPROL-XL) 50 mg 24 hr tablet, Take 1.5 tablets (75 mg total) by mouth daily,                       rosuvastatin (CRESTOR) 5 mg tablet, Take 1 tablet (5 mg total) by mouth every other day,                        sildenafil (VIAGRA) 100 mg tablet, Take 1 tablet (100 mg total) by mouth daily as needed for erectile dysfunction,       [facility]     Does the patient have enough for 3 days?   [x] Yes   [] No - Send as HP to POD

## 2024-02-21 PROBLEM — Z12.5 SCREENING FOR PROSTATE CANCER: Status: RESOLVED | Noted: 2020-01-06 | Resolved: 2024-02-21

## 2024-03-04 ENCOUNTER — TELEPHONE (OUTPATIENT)
Dept: NEUROLOGY | Facility: CLINIC | Age: 59
End: 2024-03-04

## 2024-03-04 NOTE — TELEPHONE ENCOUNTER
Left message that we need to reschedule his upcoming appt on 3/20/24, but I do have an appt 3/8/24 at 7:30 on hold currently if he wants to reschedule to that day with MARLENA.

## 2024-04-01 ENCOUNTER — OFFICE VISIT (OUTPATIENT)
Dept: NEUROLOGY | Facility: CLINIC | Age: 59
End: 2024-04-01
Payer: COMMERCIAL

## 2024-04-01 VITALS
WEIGHT: 239 LBS | OXYGEN SATURATION: 95 % | DIASTOLIC BLOOD PRESSURE: 85 MMHG | TEMPERATURE: 98 F | BODY MASS INDEX: 32.37 KG/M2 | HEART RATE: 72 BPM | SYSTOLIC BLOOD PRESSURE: 145 MMHG | HEIGHT: 72 IN

## 2024-04-01 DIAGNOSIS — M79.18 MYOFASCIAL MUSCLE PAIN: ICD-10-CM

## 2024-04-01 DIAGNOSIS — G43.009 MIGRAINE WITHOUT AURA AND WITHOUT STATUS MIGRAINOSUS, NOT INTRACTABLE: Primary | ICD-10-CM

## 2024-04-01 DIAGNOSIS — G44.229 CHRONIC TENSION-TYPE HEADACHE, NOT INTRACTABLE: ICD-10-CM

## 2024-04-01 PROCEDURE — 99212 OFFICE O/P EST SF 10 MIN: CPT | Performed by: PHYSICIAN ASSISTANT

## 2024-04-01 NOTE — PROGRESS NOTES
Patient ID: Ralph Abbott is a 59 y.o. male.    Assessment/Plan:       Diagnoses and all orders for this visit:    Migraine without aura and without status migrainosus, not intractable    Myofascial muscle pain    Chronic tension-type headache, not intractable           Lex is doing well neurologically.  He reports no headaches for the last 3 days and they seem to be lessening up over time.  He plans to see orthopedics to get his left knee replaced if appropriate.  After that we will see him again in routine follow-up.  In the meantime he should continue preventative therapy with Emgality injection every month, as well as Nurtec.  Try to avoid more than 2-3 doses of any over-the-counter analgesic to prevent medication overuse headache, and limit caffeine.  Consider baclofen if needed in the future, cervicogenic headache?  For a light headache he can add a little bit of caffeine in the form of coffee, soda, etc. but continue to limit caffeine as much as possible.    Of note, the patient is going to consider asking his spouse about any apnea events overnight.  The patient does not think he has apnea, but somewhat tired when he wakes up.  However his headaches are getting better so we will consider a study in the future if needed.    The patient should not hesitate to call me prior to his follow up with any questions or concerns.      Subjective:    HPI      Mr. Lex Abbott is here for follow up for headaches.  His headaches seem to be lessening since I last saw him.  He did not benefit from the trigger point injections as noted at the last visit, but they did not make him worse or cause any side effects.  He states for the last 3 days he did not have a headache.  He states he did not have any headaches when on vacation recently.  He has a new employer/owner of the business, but he is still very independent at work.  He would like to see if his headaches or stress level goes down with the new owner.    He takes Emgality  and Nurtec on a regular basis, denies side effects.  He states over time he feels that the headaches have dwindled which he is happy about.    Prior documentation:   He still gets about 15/month.  They are typically left-sided, and he points to a large circular area in the left occipital/left parietal region, no neck pain in particular and pain does not radiate from the neck.  This is the same as prior visits.  There is still no light or sound sensitivity, no vomiting or nausea.  He still cannot ID any triggers. Wondering if will be better when he retires, and wondering if computer work/ light makes worse.     He does get relief with Nurtec, and he tries to use this before Advil.  He gets significant relief with either Advil or Nurtec and he has been trying to limit the use of Advil.     He denies any changes in character of the headache.  No focal deficits.  The headache is still in the left occipital and parietal regions, dull, aching.  Not coming from the neck.  No significant neck pain, no difficulty with sleep, no snoring and he wakes up feeling refreshed.     He does not want to go back on Lexapro.  This caused some side effects but it did reduce headaches significantly.     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  The patient is wondering if his headaches will get better when he retires.     Head CT 11/11/2019 is unremarkable.  A1c 6% on 8/4/2023.       The following portions of the patient's history were reviewed and updated as appropriate: He  has a past medical history of Bronchospasm, Depression, Disc degeneration, lumbar, Erectile dysfunction, Hyperlipidemia, IFG (impaired fasting glucose), Palpitations, Primary osteoarthritis of knees, bilateral, Right inguinal hernia, and Spondylosis of lumbar region without myelopathy or radiculopathy.  He    Patient Active Problem List    Diagnosis Date Noted    Chronic tension-type headache, not intractable 04/01/2024    Myofascial muscle pain 09/20/2023    Right arm pain 12/01/2022    Hyperlipidemia 11/20/2022    Chest pain 11/19/2022    Migraine without aura and without status migrainosus, not intractable 07/13/2022    Essential hypertension 01/06/2020    Aftercare following right knee joint replacement surgery 01/29/2019    Status post right knee replacement 01/23/2019    Chronic pain of left knee 05/01/2018    Chronic pain of right knee 05/01/2018    Primary osteoarthritis of right knee 01/30/2018    Primary osteoarthritis of left knee 01/30/2018     He  has a past surgical history that includes pr rpr 1st ingun hrna age 3 yrs/> reducible (Right, 10/26/2017); Tooth extraction; pr arthrp kne condyle&platu medial&lat compartments (Right, 1/21/2019); and Knee arthroscopy.  His family history includes Coronary artery disease in his brother; Diabetes in his father; No Known Problems in his mother and sister.  He  reports that he quit smoking about 3 years ago. His smoking use included cigarettes. He started smoking about 23 years ago. He has a 10 pack-year smoking history. He has never used smokeless tobacco. He reports current alcohol use. He reports that he does not use drugs.  Current Outpatient Medications   Medication Sig Dispense Refill    Galcanezumab-gnlm (Emgality) 120 MG/ML SOAJ Inject 1 pen subq every 30 days 3 mL 4    meloxicam (MOBIC) 15 mg tablet Take 1 tablet (15 mg total) by mouth if needed for mild pain or moderate pain 90 tablet 1    metoprolol succinate (TOPROL-XL) 50 mg 24 hr tablet Take 1.5 tablets (75 mg total) by mouth daily 135 tablet 0    rimegepant sulfate (Nurtec) 75 mg TBDP TAKE 1 TABLET BY MOUTH EVERY DAY AS NEEDED FOR MIGRAINE HEADACHE. NO MORE THAN 1 DOSE PER 24 HOURS. 16 tablet 11    rosuvastatin (CRESTOR) 5 mg tablet Take 1 tablet (5 mg total) by mouth every other day 90 tablet 0     sildenafil (VIAGRA) 100 mg tablet Take 1 tablet (100 mg total) by mouth daily as needed for erectile dysfunction 10 tablet 0     No current facility-administered medications for this visit.     He has No Known Allergies..         Objective:    Blood pressure 145/85, pulse 72, temperature 98 °F (36.7 °C), temperature source Temporal, height 6' (1.829 m), weight 108 kg (239 lb), SpO2 95%.  Body mass index is 32.41 kg/m².    Physical Exam    Neurological Exam  On neurologic exam, the patient is alert and oriented to time and place. Speech is fluent and articulate, and the patient follows commands appropriately. Judgment and affect appear normal. Pupils are equally round and reactive to light and extraocular muscles are intact without nystagmus. Face is symmetric, and tongue, uvula, and palate are midline. Hearing is intact. Motor examination reveals intact strength throughout. Normal gait is steady.  Neck flexors are 5 out of 5, no tenderness to palpation.      ROS:    Review of Systems   Constitutional: Negative.  Negative for chills and fever.   HENT: Negative.  Negative for ear pain and sore throat.    Eyes: Negative.  Negative for pain and visual disturbance.   Respiratory: Negative.  Negative for cough and shortness of breath.    Cardiovascular: Negative.  Negative for chest pain and palpitations.   Gastrointestinal: Negative.  Negative for abdominal pain and vomiting.   Endocrine: Negative.    Genitourinary: Negative.  Negative for dysuria and hematuria.   Musculoskeletal: Negative.  Negative for arthralgias and back pain.   Skin: Negative.  Negative for color change and rash.   Allergic/Immunologic: Negative.    Neurological:  Positive for headaches (3 times a week). Negative for seizures and syncope.   Hematological: Negative.    Psychiatric/Behavioral: Negative.     All other systems reviewed and are negative.    ROS reviewed.

## 2024-04-12 ENCOUNTER — OFFICE VISIT (OUTPATIENT)
Dept: FAMILY MEDICINE CLINIC | Facility: CLINIC | Age: 59
End: 2024-04-12
Payer: COMMERCIAL

## 2024-04-12 ENCOUNTER — OFFICE VISIT (OUTPATIENT)
Dept: OBGYN CLINIC | Facility: MEDICAL CENTER | Age: 59
End: 2024-04-12

## 2024-04-12 ENCOUNTER — APPOINTMENT (OUTPATIENT)
Dept: RADIOLOGY | Facility: MEDICAL CENTER | Age: 59
End: 2024-04-12
Payer: COMMERCIAL

## 2024-04-12 ENCOUNTER — TELEPHONE (OUTPATIENT)
Dept: FAMILY MEDICINE CLINIC | Facility: CLINIC | Age: 59
End: 2024-04-12

## 2024-04-12 VITALS — SYSTOLIC BLOOD PRESSURE: 130 MMHG | HEART RATE: 84 BPM | DIASTOLIC BLOOD PRESSURE: 88 MMHG

## 2024-04-12 VITALS
DIASTOLIC BLOOD PRESSURE: 93 MMHG | BODY MASS INDEX: 32.37 KG/M2 | HEART RATE: 79 BPM | SYSTOLIC BLOOD PRESSURE: 138 MMHG | HEIGHT: 72 IN | WEIGHT: 239 LBS

## 2024-04-12 DIAGNOSIS — M17.12 PRIMARY OSTEOARTHRITIS OF LEFT KNEE: ICD-10-CM

## 2024-04-12 DIAGNOSIS — M25.562 LEFT KNEE PAIN, UNSPECIFIED CHRONICITY: ICD-10-CM

## 2024-04-12 DIAGNOSIS — M25.562 LEFT KNEE PAIN, UNSPECIFIED CHRONICITY: Primary | ICD-10-CM

## 2024-04-12 DIAGNOSIS — I10 ESSENTIAL HYPERTENSION: Primary | ICD-10-CM

## 2024-04-12 PROCEDURE — 73560 X-RAY EXAM OF KNEE 1 OR 2: CPT

## 2024-04-12 PROCEDURE — 99211 OFF/OP EST MAY X REQ PHY/QHP: CPT | Performed by: FAMILY MEDICINE

## 2024-04-12 RX ORDER — ENOXAPARIN SODIUM 100 MG/ML
40 INJECTION SUBCUTANEOUS DAILY
Qty: 11.2 ML | Refills: 0 | Status: SHIPPED | OUTPATIENT
Start: 2024-04-12 | End: 2024-05-10

## 2024-04-12 RX ORDER — FERROUS SULFATE 324(65)MG
324 TABLET, DELAYED RELEASE (ENTERIC COATED) ORAL
Qty: 30 TABLET | Refills: 2 | Status: SHIPPED | OUTPATIENT
Start: 2024-04-12

## 2024-04-12 RX ORDER — SODIUM CHLORIDE, SODIUM LACTATE, POTASSIUM CHLORIDE, CALCIUM CHLORIDE 600; 310; 30; 20 MG/100ML; MG/100ML; MG/100ML; MG/100ML
125 INJECTION, SOLUTION INTRAVENOUS CONTINUOUS
OUTPATIENT
Start: 2024-04-12

## 2024-04-12 RX ORDER — ASCORBIC ACID 500 MG
500 TABLET ORAL 2 TIMES DAILY
Qty: 60 TABLET | Refills: 2 | Status: SHIPPED | OUTPATIENT
Start: 2024-04-12

## 2024-04-12 RX ORDER — MULTIVIT-MIN/IRON FUM/FOLIC AC 7.5 MG-4
1 TABLET ORAL DAILY
Qty: 30 TABLET | Refills: 2 | Status: SHIPPED | OUTPATIENT
Start: 2024-04-12

## 2024-04-12 RX ORDER — ACETAMINOPHEN 325 MG/1
975 TABLET ORAL ONCE
OUTPATIENT
Start: 2024-04-12 | End: 2024-04-12

## 2024-04-12 RX ORDER — CHLORHEXIDINE GLUCONATE ORAL RINSE 1.2 MG/ML
15 SOLUTION DENTAL ONCE
OUTPATIENT
Start: 2024-04-12 | End: 2024-04-12

## 2024-04-12 RX ORDER — GABAPENTIN 300 MG/1
300 CAPSULE ORAL ONCE
OUTPATIENT
Start: 2024-04-12 | End: 2024-04-12

## 2024-04-12 RX ORDER — FOLIC ACID 1 MG/1
1 TABLET ORAL DAILY
Qty: 30 TABLET | Refills: 2 | Status: SHIPPED | OUTPATIENT
Start: 2024-04-12

## 2024-04-12 RX ORDER — TRANEXAMIC ACID 10 MG/ML
1000 INJECTION, SOLUTION INTRAVENOUS ONCE
OUTPATIENT
Start: 2024-04-12 | End: 2024-04-12

## 2024-04-12 RX ORDER — CEFAZOLIN SODIUM 2 G/50ML
2000 SOLUTION INTRAVENOUS ONCE
OUTPATIENT
Start: 2024-04-12 | End: 2024-04-12

## 2024-04-12 NOTE — PROGRESS NOTES
Assessment:   Diagnosis ICD-10-CM Associated Orders   1. Left knee pain, unspecified chronicity  M25.562 XR knee 1 or 2 vw left      2. Primary osteoarthritis of left knee  M17.12 Case request operating room: ARTHROPLASTY KNEE TOTAL W ROBOT     Notify physician     Diet NPO; Sips with meds     Nursing Communication Warmimg Interventions Implemented     Nursing Communication CHG bath, have staff wash entire body (neck down) per pre-op bathing protocol. Routine, evening prior to, and day of surgery.     Nursing Communication Swab both nares with Povidone-Iodine solution, EXCLUDE if patient has shellfish/Iodine allergy, and replace with nasal alcohol swabstick. Routine, day of surgery, on call to OR     chlorhexidine (PERIDEX) 0.12 % oral rinse 15 mL     Void on call to OR     Insert peripheral IV     lactated ringers infusion     acetaminophen (TYLENOL) tablet 975 mg     gabapentin (NEURONTIN) capsule 300 mg     tranexamic acid (CYKLOKAPRON) 1000-0.7 MG/100ML-% injection 1,000 mg     ceFAZolin (ANCEF) IVPB (premix in dextrose) 2,000 mg 50 mL     ascorbic acid (VITAMIN C) 500 MG tablet     ferrous sulfate 324 (65 Fe) mg     folic acid (FOLVITE) 1 mg tablet     Multiple Vitamins-Minerals (multivitamin with minerals) tablet     Comprehensive metabolic panel     Hemoglobin A1C W/EAG Estimation     CBC and differential     Anemia Panel w/Reflex     Protime-INR     APTT     Type and screen     Ambulatory referral to Family Practice     Ambulatory referral to Physical Therapy     Place sequential compression device     enoxaparin (LOVENOX) 40 mg/0.4 mL     Case request operating room: ARTHROPLASTY KNEE TOTAL W ROBOT          Plan:  59 y.o. male with left knee osteoarthritis   - Educated on total knee arthroplasty procedure and recovery  - Meet with surgery coordinator today to schedule   - Pre-op vitamins sent to pharmacy today   - Post-op lovenox sent to pharmacy today  - f/u post-op       The above stated was discussed in  layman's terms and the patient expressed understanding.  All questions were answered to the patient's satisfaction.       To do next visit:  Follow up post-op     Diagnostics reviewed and physical exam performed.  Diagnosis, treatment options and associated risks were discussed with the patient including no treatment, nonsurgical treatment and potential for surgical intervention.  The patient was given the opportunity to ask questions regarding each.      Subjective:   Ralph Abbott is a 59 y.o. male who presents for evaluation of chronic left knee pain.  Patient has known osteoarthritis of the left knee which he claims has been worsening over the past few months.  Of note, patient had right total knee arthroplasty completed with us about 5 years ago.  Patient describes severe and unrelenting pain of the left knee that is worse with weight bearing activities and activity.  He denies improvement from CSI, visco supplementation, therapy, rest, and activity modifications.  X-rays taken today reveal significant degeneration of the left knee.  At this point, patient was recommended surgery.       Discussed with patient the risks and benefits of operative intervention. Risk of the surgery are inclusive of but not limited to bleeding, infection, nerve injury, blood clot, worsening of symptoms, not achieving the anticipated results, persistent stiffness, weakness and the need for additional surgery. The patient verbally stated they understood those risks and would like to proceed with the surgery. Consent signed in office today.   Pain score: 9/10     Review of systems negative unless otherwise specified in HPI    Past Medical History:   Diagnosis Date    Bronchospasm     last assessed 6/14/2016    Depression     Disc degeneration, lumbar     last assessed 4/7/2016    Erectile dysfunction     Hyperlipidemia     IFG (impaired fasting glucose)     last assessed 7/30/2014    Palpitations     last assessed 4/4/2013    Primary  osteoarthritis of knees, bilateral     last assessed 6/27/2017    Right inguinal hernia     last assessed 9/12/2017    Spondylosis of lumbar region without myelopathy or radiculopathy     last assessed 4/7/2016       Past Surgical History:   Procedure Laterality Date    KNEE ARTHROSCOPY      Right    DC ARTHRP KNE CONDYLE&PLATU MEDIAL&LAT COMPARTMENTS Right 1/21/2019    Procedure: ARTHROPLASTY KNEE TOTAL;  Surgeon: Ronnie Dotson MD;  Location: [facility];  Service: Orthopedics    DC RPR 1ST INGUN HRNA AGE 3 YRS/> REDUCIBLE Right 10/26/2017    Procedure: REPAIR HERNIA INGUINAL;  Surgeon: Ha Hand MD;  Location: [facility];  Service: General    TOOTH EXTRACTION         Family History   Problem Relation Age of Onset    No Known Problems Mother     Diabetes Father     No Known Problems Sister     Coronary artery disease Brother        Social History     Occupational History    Not on file   Tobacco Use    Smoking status: Former     Current packs/day: 0.00     Average packs/day: 0.5 packs/day for 20.0 years (10.0 ttl pk-yrs)     Types: Cigarettes     Start date: 2001     Quit date: 2021     Years since quitting: 3.2    Smokeless tobacco: Never    Tobacco comments:     4-5 cig day x 20 yrs   Vaping Use    Vaping status: Never Used   Substance and Sexual Activity    Alcohol use: Yes     Comment:  occasional    Drug use: No    Sexual activity: Not on file         Current Outpatient Medications:     ascorbic acid (VITAMIN C) 500 MG tablet, Take 1 tablet (500 mg total) by mouth 2 (two) times a day, Disp: 60 tablet, Rfl: 2    enoxaparin (LOVENOX) 40 mg/0.4 mL, Inject 0.4 mL (40 mg total) under the skin daily for 28 days To start Post-Op, Disp: 11.2 mL, Rfl: 0    ferrous sulfate 324 (65 Fe) mg, Take 1 tablet (324 mg total) by mouth daily before breakfast, Disp: 30 tablet, Rfl: 2    folic acid (FOLVITE) 1 mg tablet, Take 1 tablet (1 mg total) by mouth daily, Disp: 30 tablet, Rfl: 2    Galcanezumab-gnlm (Emgality) 120  "MG/ML SOAJ, Inject 1 pen subq every 30 days, Disp: 3 mL, Rfl: 4    meloxicam (MOBIC) 15 mg tablet, Take 1 tablet (15 mg total) by mouth if needed for mild pain or moderate pain, Disp: 90 tablet, Rfl: 1    metoprolol succinate (TOPROL-XL) 50 mg 24 hr tablet, Take 1.5 tablets (75 mg total) by mouth daily, Disp: 135 tablet, Rfl: 0    Multiple Vitamins-Minerals (multivitamin with minerals) tablet, Take 1 tablet by mouth daily, Disp: 30 tablet, Rfl: 2    rimegepant sulfate (Nurtec) 75 mg TBDP, TAKE 1 TABLET BY MOUTH EVERY DAY AS NEEDED FOR MIGRAINE HEADACHE. NO MORE THAN 1 DOSE PER 24 HOURS., Disp: 16 tablet, Rfl: 11    rosuvastatin (CRESTOR) 5 mg tablet, Take 1 tablet (5 mg total) by mouth every other day, Disp: 90 tablet, Rfl: 0    sildenafil (VIAGRA) 100 mg tablet, Take 1 tablet (100 mg total) by mouth daily as needed for erectile dysfunction, Disp: 10 tablet, Rfl: 0    No Known Allergies         Vitals:    04/12/24 1306   BP: 138/93   Pulse: 79       Objective:  Physical exam  General: Awake, Alert, Oriented  Eyes: Pupils equal, round and reactive to light  Heart: regular rate and rhythm  Lungs: No audible wheezing  Abdomen: soft                    Ortho Exam  Left knee:   No erythema or ecchymosis  No effusion or swelling  Normal strength  Good ROM with crepitus   Extension: 5  Flexion: 100  Calf compartments soft and supple  Sensation intact  Toes are warm sensate and mobile     Diagnostics, reviewed and taken today if performed as documented:    Left knee x-ray:   - severe tricompartmental osteoarthritis   - genu varum deformity with bone on bone articulation medially, subchondral sclerosis, and numerous osteophytes noted     Procedures, if performed today:    None performed      Portions of the record may have been created with voice recognition software.  Occasional wrong word or \"sound a like\" substitutions may have occurred due to the inherent limitations of voice recognition software.  Read the chart " carefully and recognize, using context, where substitutions have occurred.

## 2024-04-12 NOTE — TELEPHONE ENCOUNTER
Pt returned call to schedule blood pressure check nurse visit.  He was a little confused because he is seeing Ortho today and thought they would probably check his blood pressure, but he did schedule appt for 4/15/24.  If he has his blood pressure checked at his ortho appt today, he will stop to make Dr Macedo aware and ask if he should keep the appt for Monday

## 2024-04-29 ENCOUNTER — TELEPHONE (OUTPATIENT)
Dept: OBGYN CLINIC | Facility: HOSPITAL | Age: 59
End: 2024-04-29

## 2024-04-29 NOTE — TELEPHONE ENCOUNTER
Preoperative Elective Admission Assessment- spoke to patient.     Living Situation:    Who does pt live with: spouse  What kind of home: multi-level  How do they enter the home: front  How many levels in home: 2 story home   # of steps to enter home: 3 to enter   # of steps to second floor: 9 to 2nd floor.   Are there handrails: Yes  Are there landings: No  Sleeping arrangement: first/entry floor  Where is Bathroom: Second floor only, walk In shower with seat.      First Floor Setup:   Is there a bathroom: No -- has BSC  Where would pt sleep: couch     DME:  Crutches, ordering RW.   (Has BSC ) - instructed to bring RW DOS.   PROVIDED [facility] NUMBER TO CONTACT: [phone]       We discussed clearing pathways in the home and making sure there is accessibly to use the walker, for example, removing throw rugs.      Patient's Current Level of Function: Ambulates: Independently and ADLs: Independent    Post-op Caregiver: spouse  Currently receive any HHC/aides/community supports: No     Post-op Transport: spouse  To/from hospital: spouse  To/from PT 2-3x/week: spouse  Uses community transport now: No     Outpatient Physical Therapy Site:  Site: [facility]   pre and post-op appts scheduled? Yes     Medication Management: self  Preferred Pharmacy for Post-op Medications: [facility] - [place] - [address] [[id number]]   Blood Management Vitamin Regimen:  Pt has at home to start 30 days before.   Post-op anticoagulant: prescribed preoperatively - patient has at home ready for after surgery use only     DC Plan: Pt plans to be discharged home    Barriers to DC identified preoperatively: none identified    BMI: 32.41    Patient Education:  Pt educated on post-op pain, early mobilization (POD0), LOS goals, OP PT goals, and preoperative bathing. Patient educated that our goal is to appropriately discharge patient based off their post-op function while striving to maintain maximal  independence. The goal is to discharge patient to home and for them to attend outpatient physical therapy.    Assigned to care team? Yes

## 2024-04-30 ENCOUNTER — ANESTHESIA EVENT (OUTPATIENT)
Age: 59
End: 2024-04-30
Payer: COMMERCIAL

## 2024-05-03 DIAGNOSIS — Z01.818 PREOPERATIVE CLEARANCE: Primary | ICD-10-CM

## 2024-05-06 PROBLEM — E66.811 CLASS 1 OBESITY DUE TO EXCESS CALORIES WITHOUT SERIOUS COMORBIDITY WITH BODY MASS INDEX (BMI) OF 32.0 TO 32.9 IN ADULT: Status: ACTIVE | Noted: 2024-05-06

## 2024-05-06 PROBLEM — E66.09 CLASS 1 OBESITY DUE TO EXCESS CALORIES WITHOUT SERIOUS COMORBIDITY WITH BODY MASS INDEX (BMI) OF 32.0 TO 32.9 IN ADULT: Status: ACTIVE | Noted: 2024-05-06

## 2024-05-06 NOTE — PATIENT INSTRUCTIONS
BEFORE SURGERY    Contact surgical nurse  navigator with any questions regarding preoperative plan or schedule.  Stop all over the counter supplements, herbal, naturopathic  medications for 1 week prior to surgery UNLESS prescribed by your surgeon  Hold NSAIDS (i.e. advil, alleve, motrin, ibuprofen, celebrex) minimum 5 days prior to surgery  Follow presurgical medication instructions provided by preadmission nursing team reviewed during your presurgery phone call  Strategies for optimizing your surgery through breathing exercises, nutrition and physical activity can be found at [email or web address]/best  Call [phone] with any presurgical concerns or medications questions    AFTER SURGERY    Recommend using Tylenol ( acetaminophen ) 1000 mg every eight hours during the first week post discharge along with icing the area for 20 mins every 3-4 hours while awake can be helpful in reducing your need for post operative opioid use. This opioid sparing plan can be used along side your surgeons pain plan.  Use stool softener over the counter (colace) daily after surgery during the first 1-2 weeks to avoid post operative constipation issues  If no bowel movement within 3 days after surgery then use over the counter Miralax in addition to your stool softener   If cleared by your surgical team for activity then early and often walking is encouraged and can be important in prevention of post surgical blood clots. Additionally spend as much time out of bed as possible and allowed by your surgical team  Use your incentive spirometer twice per hour in the first seven days after surgery to help prevent post surgery lung complications and infections  Call [phone] with any post discharge concerns or medical issues

## 2024-05-06 NOTE — PROGRESS NOTES
Internal Medicine Pre-Operative Evaluation:     Reason for Visit: Pre-operative Evaluation for Risk Stratification and Optimization    Patient ID: Ralph Abbott is a 59 y.o. male.     Surgery: Arthroplasty of left knee  Referring Provider: Dr Dotson      Recommendations to Proceed withSurgery    Patient is considered to be Low risk for Medium risk procedure.     After evaluation and discussion with patient with emphasis that all surgery has some degree of inherent risk it is acknowledged by patient this risk is Acceptable.    Patient is optimized and may proceed with planned procedure.     Assessment    Pre-operative Medical Evaluation for planned surgery  Recommendations as listed in PLAN section below  Contact surgical nurse  navigator with any questions regarding preoperative plan or schedule.      Problem List Items Addressed This Visit          Cardiovascular and Mediastinum    Essential hypertension     Stable  Monitor post operative BP   Avoid hypotension if at all possible  Refer to PAT instructions regarding medication administration the morning of surgery           Migraine without aura and without status migrainosus, not intractable     F/b neurology  Continue current medications            Musculoskeletal and Integument    Primary osteoarthritis of left knee     Failed outpatient conservative measures  Electing to undergo arthroplasty              Other    Class 1 obesity due to excess calories without serious comorbidity with body mass index (BMI) of 32.0 to 32.9 in adult     Recommend ongoing attempts at weight loss  Current BMI meets criteria of <40 per ML[facility] preoperative qualifications            Other Visit Diagnoses       Preoperative clearance    -  Primary        IFG  Hgb A1c 5.8  Recommend following DM diet  Monitor FBS           Plan:     1. Further preoperative workup as follows:   - none no further testing required may proceed with surgery    2. Preoperative Medication Management Review  performed by PAT nursing  YES    3. Patient requires further consultation with:   No Consults Required    4. Discharge Planning / Barriers to Discharge  none identified - patients has post discharge therapy plan in place, transportation arranged for discharge day, adequate family support at home to assist with discharge to home.        Subjective:           History of Present Illness:     Ralph Abbott is a 59 y.o. male who presents to the office today for a preoperative consultation at the request of surgeon. The patient understands this is an elective procedure and not emergent. They are electing to undergo planned procedure with an understanding that all surgery has inherent risk. They have worked with their surgeon and failed conservative treatment measures. Today they present for preoperative risk assessment and recommendations for optimization in preparation for surgery.    Pt seen in surgical optimization center for upcoming proposed surgery. They have failed previous conservative measures and have elected surgical intervention.     Pt meets presurgical lab and BMI optimization goals.    Upon interview questioning patient is able to perform greater than 4 METs workload in daily life without any reported cardio-pulmonary symptoms.        ROS: No TIA's or unusual headaches, no dysphagia.  No prolonged cough. No dyspnea or chest pain on exertion.  No abdominal pain, change in bowel habits, black or bloody stools.  No urinary tract or BPH symptoms.  Positive reported pain in arthritic joint. Positive difficulty with gait. No skin rashes or issues.      Objective:    /82   Pulse 72   Ht 6' (1.829 m)   Wt 108 kg (237 lb 4.8 oz)   SpO2 97%   BMI 32.18 kg/m²       General Appearance: no distress, conversive  HEENT: PERRLA, conjuctiva normal; oropharynx clear; mucous membranes moist;   Neck:  Supple, no lymphadenopathy or thyromegaly  Lungs: breath sounds normal, normal respiratory effort, no retractions,  expiratory effort normal  CV: normal heart sounds S1/S2, PMI normal   ABD: soft non tender, no masses , no hepatic or splenomegaly  EXT: DP pulses intact, no lymphadenopathy, no edema  Skin: normal turgor, normal texture, no rash  Psych: affect normal, mood normal  Neuro: AAOx3        The following portions of the patient's history were reviewed and updated as appropriate: allergies, current medications, past family history, past medical history, past social history, past surgical history and problem list.     Past History:       Past Medical History:   Diagnosis Date    Bronchospasm     last assessed 6/14/2016    Depression     Disc degeneration, lumbar     last assessed 4/7/2016    Erectile dysfunction     Hyperlipidemia     IFG (impaired fasting glucose)     last assessed 7/30/2014    Palpitations     last assessed 4/4/2013    Primary osteoarthritis of knees, bilateral     last assessed 6/27/2017    Right inguinal hernia     last assessed 9/12/2017    Spondylosis of lumbar region without myelopathy or radiculopathy     last assessed 4/7/2016    Past Surgical History:   Procedure Laterality Date    KNEE ARTHROSCOPY      Right    NV ARTHRP KNE CONDYLE&PLATU MEDIAL&LAT COMPARTMENTS Right 1/21/2019    Procedure: ARTHROPLASTY KNEE TOTAL;  Surgeon: Ronnie Dotson MD;  Location: [facility];  Service: Orthopedics    NV RPR 1ST INGUN HRNA AGE 5 YRS/> REDUCIBLE Right 10/26/2017    Procedure: REPAIR HERNIA INGUINAL;  Surgeon: Ha Hand MD;  Location: [facility];  Service: General    TOOTH EXTRACTION            Social History     Tobacco Use    Smoking status: Former     Current packs/day: 0.00     Average packs/day: 0.5 packs/day for 20.0 years (10.0 ttl pk-yrs)     Types: Cigarettes     Start date: 2001     Quit date: 2021     Years since quitting: 3.3    Smokeless tobacco: Never    Tobacco comments:     4-5 cig day x 20 yrs   Vaping Use    Vaping status: Never Used   Substance Use Topics    Alcohol use: Yes      Comment:  occasional    Drug use: No     Family History   Problem Relation Age of Onset    No Known Problems Mother     Diabetes Father     No Known Problems Sister     Coronary artery disease Brother           Allergies:     No Known Allergies     Current Medications:     Current Outpatient Medications   Medication Instructions    ascorbic acid (VITAMIN C) 500 mg, Oral, 2 times daily    enoxaparin (LOVENOX) 40 mg, Subcutaneous, Daily, To start Post-Op    ferrous sulfate 324 mg, Oral, Daily before breakfast    folic acid (FOLVITE) 1 mg, Oral, Daily    Galcanezumab-gnlm (Emgality) 120 MG/ML SOAJ Inject 1 pen subq every 30 days    meloxicam (MOBIC) 15 mg, Oral, As needed    metoprolol succinate (TOPROL-XL) 75 mg, Oral, Daily    Multiple Vitamins-Minerals (multivitamin with minerals) tablet 1 tablet, Oral, Daily    rimegepant sulfate (Nurtec) 75 mg TBDP TAKE 1 TABLET BY MOUTH EVERY DAY AS NEEDED FOR MIGRAINE HEADACHE. NO MORE THAN 1 DOSE PER 24 HOURS.    rosuvastatin (CRESTOR) 5 mg, Oral, Every other day    sildenafil (VIAGRA) 100 mg, Oral, Daily PRN           PRE-OP WORKSHEET DATA    Assessment of Pre-Operative Risks     MLJ Quality Hard Stops:    BMI (<40) : Estimated body mass index is 32.18 kg/m² as calculated from the following:    Height as of this encounter: 6' (1.829 m).    Weight as of this encounter: 108 kg (237 lb 4.8 oz).    Hgb ( >11):   Lab Results   Component Value Date    HGB 15.7 05/10/2024    HGB 15.5 09/18/2023    HGB 15.4 11/19/2022       HbA1c (<7.5) :   Lab Results   Component Value Date    HGBA1C 5.8 (H) 05/10/2024       GFR (>60) (Less then 45 = Nephrology consult):    Lab Results   Component Value Date    EGFR 80 05/10/2024    EGFR 79 09/18/2023    EGFR 75 11/19/2022         Active Decompensated Chronic Conditions which would delay surgery  No acutely decompensated medical issues such as recent CVA, MI, new onset arrhythmia, severe aortic stenosis, CHF, uncontrolled COPD       Functional  capacity: HIKING UPHILL , WEIGHT LIFTING                                             6 METS  Pick the highest level patient can comfortably perform   (if less the 4 mets consider functional assessment via cardiac stress testing or consultation)    Assessment of intra and post operative respiratory, hemodynamic and thrombotic risks     Prior Anesthesia Reactions: No     Personal history of venous thromboembolic disease? No    History of steroid use > 5 mg for >2 weeks within last year? No      The patient's risk factors for cardiac complications include :  none    Ralph Abbott has an IN HOSPITAL cardiac risk of RCI RISK CLASS I (0 risk factors, risk of major cardiac compl. appr. 0.5%) based on RCRI calculator    Cardiac Risk Estimation: per the Revised Cardiac Risk Index (Circ. 100:1043, 1999),        Pre-Op Data Reviewed:       Laboratory Results: I have personally reviewed the pertinent laboratory results/reports     EKG:I have personally reviewed pertinent reports.  . I personally reviewed and interpreted available tracings in the electronic medical record    pending    OLD RECORDS: reviewed old records in the chart review section if EHR on day of visit.    Previous cardiopulmonary studies within the past year:  Echocardiogram: yes   Lab Results   Component Value Date    LVEF 60 07/15/2022     Cardiac Catheterization: no  Stress Test: yes, 2022 negative      Time of visit including pre-visit chart review, visit and post-visit coordination of plan and care , review of pre-surgical lab work, preparation and time spent documenting note in electronic medical record, time spent face-to-face in physical examination answering patient questions by care team 45 minutes             [facility]

## 2024-05-10 ENCOUNTER — APPOINTMENT (OUTPATIENT)
Dept: LAB | Facility: CLINIC | Age: 59
End: 2024-05-10
Payer: COMMERCIAL

## 2024-05-10 DIAGNOSIS — M17.12 PRIMARY OSTEOARTHRITIS OF LEFT KNEE: ICD-10-CM

## 2024-05-10 LAB
ALBUMIN SERPL BCP-MCNC: 3.9 G/DL (ref 3.5–5)
ALP SERPL-CCNC: 116 U/L (ref 34–104)
ALT SERPL W P-5'-P-CCNC: 27 U/L (ref 7–52)
ANION GAP SERPL CALCULATED.3IONS-SCNC: 7 MMOL/L (ref 4–13)
APTT PPP: 27 SECONDS (ref 23–37)
AST SERPL W P-5'-P-CCNC: 19 U/L (ref 13–39)
BASOPHILS # BLD AUTO: 0.04 THOUSANDS/ÂΜL (ref 0–0.1)
BASOPHILS NFR BLD AUTO: 1 % (ref 0–1)
BILIRUB SERPL-MCNC: 0.39 MG/DL (ref 0.2–1)
BUN SERPL-MCNC: 18 MG/DL (ref 5–25)
CALCIUM SERPL-MCNC: 8.5 MG/DL (ref 8.4–10.2)
CHLORIDE SERPL-SCNC: 108 MMOL/L (ref 96–108)
CO2 SERPL-SCNC: 25 MMOL/L (ref 21–32)
CREAT SERPL-MCNC: 1 MG/DL (ref 0.6–1.3)
EOSINOPHIL # BLD AUTO: 0.3 THOUSAND/ÂΜL (ref 0–0.61)
EOSINOPHIL NFR BLD AUTO: 5 % (ref 0–6)
ERYTHROCYTE [DISTWIDTH] IN BLOOD BY AUTOMATED COUNT: 12.6 % (ref 11.6–15.1)
EST. AVERAGE GLUCOSE BLD GHB EST-MCNC: 120 MG/DL
GFR SERPL CREATININE-BSD FRML MDRD: 80 ML/MIN/1.73SQ M
GLUCOSE P FAST SERPL-MCNC: 125 MG/DL (ref 65–99)
HBA1C MFR BLD: 5.8 %
HCT VFR BLD AUTO: 46.9 % (ref 36.5–49.3)
HGB BLD-MCNC: 15.7 G/DL (ref 12–17)
IMM GRANULOCYTES # BLD AUTO: 0.04 THOUSAND/UL (ref 0–0.2)
IMM GRANULOCYTES NFR BLD AUTO: 1 % (ref 0–2)
INR PPP: 0.86 (ref 0.84–1.19)
LYMPHOCYTES # BLD AUTO: 1.68 THOUSANDS/ÂΜL (ref 0.6–4.47)
LYMPHOCYTES NFR BLD AUTO: 28 % (ref 14–44)
MCH RBC QN AUTO: 31 PG (ref 26.8–34.3)
MCHC RBC AUTO-ENTMCNC: 33.5 G/DL (ref 31.4–37.4)
MCV RBC AUTO: 93 FL (ref 82–98)
MONOCYTES # BLD AUTO: 0.64 THOUSAND/ÂΜL (ref 0.17–1.22)
MONOCYTES NFR BLD AUTO: 11 % (ref 4–12)
NEUTROPHILS # BLD AUTO: 3.25 THOUSANDS/ÂΜL (ref 1.85–7.62)
NEUTS SEG NFR BLD AUTO: 54 % (ref 43–75)
NRBC BLD AUTO-RTO: 0 /100 WBCS
PLATELET # BLD AUTO: 183 THOUSANDS/UL (ref 149–390)
PMV BLD AUTO: 9.9 FL (ref 8.9–12.7)
POTASSIUM SERPL-SCNC: 3.9 MMOL/L (ref 3.5–5.3)
PROT SERPL-MCNC: 6.2 G/DL (ref 6.4–8.4)
PROTHROMBIN TIME: 12.3 SECONDS (ref 11.6–14.5)
RBC # BLD AUTO: 5.06 MILLION/UL (ref 3.88–5.62)
SODIUM SERPL-SCNC: 140 MMOL/L (ref 135–147)
WBC # BLD AUTO: 5.95 THOUSAND/UL (ref 4.31–10.16)

## 2024-05-10 PROCEDURE — 86850 RBC ANTIBODY SCREEN: CPT

## 2024-05-10 PROCEDURE — 36415 COLL VENOUS BLD VENIPUNCTURE: CPT

## 2024-05-10 PROCEDURE — 85025 COMPLETE CBC W/AUTO DIFF WBC: CPT

## 2024-05-10 PROCEDURE — 86900 BLOOD TYPING SEROLOGIC ABO: CPT

## 2024-05-10 PROCEDURE — 80053 COMPREHEN METABOLIC PANEL: CPT

## 2024-05-10 PROCEDURE — 83036 HEMOGLOBIN GLYCOSYLATED A1C: CPT

## 2024-05-10 PROCEDURE — 85610 PROTHROMBIN TIME: CPT

## 2024-05-10 PROCEDURE — 85730 THROMBOPLASTIN TIME PARTIAL: CPT

## 2024-05-10 PROCEDURE — 86901 BLOOD TYPING SEROLOGIC RH(D): CPT

## 2024-05-11 ENCOUNTER — LAB REQUISITION (OUTPATIENT)
Dept: LAB | Facility: HOSPITAL | Age: 59
End: 2024-05-11
Payer: COMMERCIAL

## 2024-05-11 DIAGNOSIS — M17.12 UNILATERAL PRIMARY OSTEOARTHRITIS, LEFT KNEE: ICD-10-CM

## 2024-05-14 ENCOUNTER — OFFICE VISIT (OUTPATIENT)
Age: 59
End: 2024-05-14
Payer: COMMERCIAL

## 2024-05-14 VITALS
HEIGHT: 72 IN | WEIGHT: 237.3 LBS | DIASTOLIC BLOOD PRESSURE: 82 MMHG | HEART RATE: 72 BPM | BODY MASS INDEX: 32.14 KG/M2 | SYSTOLIC BLOOD PRESSURE: 142 MMHG | OXYGEN SATURATION: 97 %

## 2024-05-14 DIAGNOSIS — M17.12 PRIMARY OSTEOARTHRITIS OF LEFT KNEE: ICD-10-CM

## 2024-05-14 DIAGNOSIS — E66.09 CLASS 1 OBESITY DUE TO EXCESS CALORIES WITHOUT SERIOUS COMORBIDITY WITH BODY MASS INDEX (BMI) OF 32.0 TO 32.9 IN ADULT: ICD-10-CM

## 2024-05-14 DIAGNOSIS — G43.009 MIGRAINE WITHOUT AURA AND WITHOUT STATUS MIGRAINOSUS, NOT INTRACTABLE: ICD-10-CM

## 2024-05-14 DIAGNOSIS — I10 ESSENTIAL HYPERTENSION: ICD-10-CM

## 2024-05-14 DIAGNOSIS — Z01.818 PREOPERATIVE CLEARANCE: Primary | ICD-10-CM

## 2024-05-14 PROCEDURE — 99215 OFFICE O/P EST HI 40 MIN: CPT | Performed by: INTERNAL MEDICINE

## 2024-05-16 NOTE — PRE-PROCEDURE INSTRUCTIONS
Pre-Surgery Instructions:   Medication Instructions    ascorbic acid (VITAMIN C) 500 MG tablet Hold day of surgery.    ferrous sulfate 324 (65 Fe) mg Hold day of surgery.    folic acid (FOLVITE) 1 mg tablet Hold day of surgery.    Galcanezumab-gnlm (Emgality) 120 MG/ML SOAJ Instructions provided by MD    meloxicam (MOBIC) 15 mg tablet Stop taking 7 days prior to surgery.    metoprolol succinate (TOPROL-XL) 50 mg 24 hr tablet Take night before surgery    Multiple Vitamins-Minerals (multivitamin with minerals) tablet Hold day of surgery.    rimegepant sulfate (Nurtec) 75 mg TBDP Uses PRN- OK to take day of surgery    rosuvastatin (CRESTOR) 5 mg tablet Take night before surgery    sildenafil (VIAGRA) 100 mg tablet Hold day of surgery.   T[facility] Patient Education reviewed by surgeon's office/nurse navigator  Instructed to call nurse navigator with any questions  Instructed to bring walker DOS  Patient has Ortho Packet and reviewed information  Pre op instructions per [facility] protocol,medications per surgeon/anesthesia guidelines reviewed and showering instructions per [facility] protocol reviewed by surgeon's office-Patient has hibiclens soap and cloths  Patient taking Folic Acid,Vitamin C,Iron and a Multivitamin and will continue until  Pt. Verbalized understanding of current visitor restrictions   Instructed to call surgeon with any illness,change in health or covid exposure now till DOS   All medications instructed to take DOS are with sips water only   Instructed to avoid all ASA and OTC Vit/Supp 1 week prior to surgery and to avoid NSAIDs 3 days prior to surgery per anesthesia guidelines.Tylenol ok to take prn.   No Alcohol 24 hours prior to surgery  Patient aware Lovenox or other Blood Thinner prescribed is for POST OP ONLY  Pt. Verbalized an understanding of all instructions reviewed and offers no concerns at this time.

## 2024-05-28 ENCOUNTER — EVALUATION (OUTPATIENT)
Dept: PHYSICAL THERAPY | Facility: CLINIC | Age: 59
End: 2024-05-28
Payer: COMMERCIAL

## 2024-05-28 DIAGNOSIS — M17.12 PRIMARY OSTEOARTHRITIS OF LEFT KNEE: ICD-10-CM

## 2024-05-28 PROCEDURE — 97162 PT EVAL MOD COMPLEX 30 MIN: CPT | Performed by: PHYSICAL THERAPIST

## 2024-05-28 NOTE — PROGRESS NOTES
PT Evaluation     Today's date: 2024  Patient name: Ralph Abbott  : 1965  MRN: [id number]  Referring provider: Ronnie Dotson,*  Dx:   Encounter Diagnosis     ICD-10-CM    1. Primary osteoarthritis of left knee  M17.12 Ambulatory referral to Physical Therapy                     Assessment  Impairments: abnormal muscle firing, abnormal or restricted ROM, activity intolerance, impaired physical strength, lacks appropriate home exercise program, pain with function, poor posture  and poor body mechanics    Assessment details: Ralph Abbott is a 59 y.o. male who presents to the clinic for a pre-op evaluation prior to a left knee replacement on 24.  The patient presents with the above listed impairments.  He demonstrates decreased knee ROM and strength and is limited with ambulation and weight-bearing.  He is eager to improve his overall function and should benefit from skilled physical therapy.  Thank you for the referral.      Understanding of Dx/Px/POC: good     Prognosis: good    Goals  Impairment Goals  - Decrease pain by 50% in 12 weeks  - Increase left flexibility by 50% in 12 weeks  - Increase left lower extremity strength [place] to 4+/5 in 12 weeks    Functional Goals  - Return to Prior Level of Function in 12 weeks  - Patient will be independent with HEP in 12 weeks  - Patient will be able to ambulate with the least restrictive assistive device in 12 weeks  - Patient will be able to ascend/descend stairs with decreased pain in 12 weeks  - Patient will be able to complete ADLs with decreased pain in 12 weeks       Plan  Patient would benefit from: skilled physical therapy  Planned modality interventions: cryotherapy, thermotherapy: hydrocollator packs and TENS    Planned therapy interventions: home exercise program, graded exercise, functional ROM exercises, flexibility, body mechanics training, postural training, patient education, therapeutic activities, therapeutic exercise, manual  "therapy, joint mobilization and neuromuscular re-education    Frequency: 2x week  Duration in weeks: 4  Treatment plan discussed with: patient        Subjective Evaluation    History of Present Illness  Mechanism of injury: HPI:  Patient notes having left knee pain for ~5 years.  He notes that he pain has gotten worse over time.  He has tried injections with little to no benefit.  He is now scheduled for a left knee replacement on 24.      Pain Location:  L Knee   Occupation:  Heating  Prior Functional Limitations:  AGG:  Working, climbing, crawling, ambulating   Ease:  Sit, rest, elevate   Patient Goals:  \"Want to be able to move\"     Pain  Current pain ratin  At best pain ratin  At worst pain ratin  Quality: sharp      Objective     Active Range of Motion   Left Knee   Flexion: 125 degrees with pain  Extension: -2 degrees     Right Knee   Flexion: 122 degrees   Extension: 0 degrees     Strength/Myotome Testing     Left Hip   Planes of Motion   Flexion: 5    Right Hip   Planes of Motion   Flexion: 5    Left Knee   Flexion: 4+  Extension: 4+    Right Knee   Normal strength  Flexion: 4  Extension: 4    Left Ankle/Foot   Dorsiflexion: 5  Great toe extension: 5    Right Ankle/Foot   Dorsiflexion: 5  Great toe extension: 5    Functional Assessment        Comments  TU.64 sec no AD 24               Diagnosis:    Precautions:    POC Expires Auth Status Start Date Expiration Date PT Visit Limit     No Auth NA NA NA   Date        Used 1       Remaining        Manuals        PROM        Mobs                                There Ex        Bike        Heel Slides        Gastroc Stretch        Hamstring Stretch                        Neruo Re-Ed        Quad Set        SAQ/LAQ        SLR        TKE        Leg Press                                                                               Ther Act                                         Modalities             CP PRN                             "

## 2024-06-05 NOTE — DISCHARGE INSTR - AVS FIRST PAGE
Discharge Instructions - Orthopedics  Ralph Abbott 59 y.o. male MRN: [id number]  Unit/Bed#:     Weight Bearing Status:                                           Weight Bearing as tolerated to the left lower extremity.    DVT prophylaxis:  Complete course of Lovenox at home over 28 days.     Pain:  Continue pain medications as directed.  Continue other medications provided as prescribed.     Showering Instructions:   Do not shower until first follow up appointment.     Dressing Instructions:   Keep dressing clean, dry and intact until follow up appointment.    Driving Instructions:  No driving until cleared by Orthopaedic Surgery.    PT/OT:  Continue PT/OT on outpatient basis as directed    Appt Instructions:   Follow up as scheduled on 6/14/2024 in [place].   If you need to change or cancel your appointment for any reason, please call the clinic at [phone]    Contact the office sooner if you experience any increased numbness/tingling in the extremities.    Miscellaneous:  Please contact the office if you need refills of your medications prior to your next visit.   Advise against any dental cleanings or procedures for 3 months after surgery.   - If there is a dental emergency, please contact the office for further instructions.

## 2024-06-06 ENCOUNTER — HOSPITAL ENCOUNTER (OUTPATIENT)
Age: 59
Setting detail: OUTPATIENT SURGERY
Discharge: HOME | End: 2024-06-07
Attending: ORTHOPAEDIC SURGERY | Admitting: ORTHOPAEDIC SURGERY
Payer: COMMERCIAL

## 2024-06-06 ENCOUNTER — ANESTHESIA (OUTPATIENT)
Age: 59
End: 2024-06-06
Payer: COMMERCIAL

## 2024-06-06 DIAGNOSIS — Z96.652 AFTERCARE FOLLOWING LEFT KNEE JOINT REPLACEMENT SURGERY: ICD-10-CM

## 2024-06-06 DIAGNOSIS — M17.12 PRIMARY OSTEOARTHRITIS OF LEFT KNEE: Primary | ICD-10-CM

## 2024-06-06 DIAGNOSIS — Z01.810 PRE-OPERATIVE CARDIOVASCULAR EXAMINATION: Primary | ICD-10-CM

## 2024-06-06 DIAGNOSIS — Z47.1 AFTERCARE FOLLOWING LEFT KNEE JOINT REPLACEMENT SURGERY: ICD-10-CM

## 2024-06-06 DIAGNOSIS — M25.562 PAIN AND SWELLING OF LEFT KNEE: ICD-10-CM

## 2024-06-06 DIAGNOSIS — M25.462 PAIN AND SWELLING OF LEFT KNEE: ICD-10-CM

## 2024-06-06 PROCEDURE — C1776 JOINT DEVICE (IMPLANTABLE): HCPCS | Performed by: ORTHOPAEDIC SURGERY

## 2024-06-06 PROCEDURE — NC001 PR NO CHARGE: Performed by: ORTHOPAEDIC SURGERY

## 2024-06-06 PROCEDURE — 97163 PT EVAL HIGH COMPLEX 45 MIN: CPT | Performed by: PHYSICAL THERAPIST

## 2024-06-06 PROCEDURE — C1713 ANCHOR/SCREW BN/BN,TIS/BN: HCPCS | Performed by: ORTHOPAEDIC SURGERY

## 2024-06-06 PROCEDURE — 97167 OT EVAL HIGH COMPLEX 60 MIN: CPT

## 2024-06-06 PROCEDURE — 27447 TOTAL KNEE ARTHROPLASTY: CPT | Performed by: ORTHOPAEDIC SURGERY

## 2024-06-06 PROCEDURE — 27447 TOTAL KNEE ARTHROPLASTY: CPT

## 2024-06-06 PROCEDURE — C9290 INJ, BUPIVACAINE LIPOSOME: HCPCS | Performed by: STUDENT IN AN ORGANIZED HEALTH CARE EDUCATION/TRAINING PROGRAM

## 2024-06-06 PROCEDURE — 99024 POSTOP FOLLOW-UP VISIT: CPT | Performed by: PHYSICIAN ASSISTANT

## 2024-06-06 PROCEDURE — S2900 ROBOTIC SURGICAL SYSTEM: HCPCS | Performed by: ORTHOPAEDIC SURGERY

## 2024-06-06 DEVICE — ATTUNE KNEE SYSTEM TIBIAL INSERT FIXED BEARING POSTERIOR STABILIZED 8 6MM AOX
Type: IMPLANTABLE DEVICE | Site: KNEE | Status: FUNCTIONAL
Brand: ATTUNE

## 2024-06-06 DEVICE — ATTUNE PATELLA MEDIALIZED DOME 38MM CEMENTED AOX
Type: IMPLANTABLE DEVICE | Site: KNEE | Status: FUNCTIONAL
Brand: ATTUNE

## 2024-06-06 DEVICE — ATTUNE KNEE SYSTEM FEMORAL POSTERIOR STABILIZED SIZE 8 LEFT CEMENTED
Type: IMPLANTABLE DEVICE | Site: KNEE | Status: FUNCTIONAL
Brand: ATTUNE

## 2024-06-06 DEVICE — SMARTSET HV HIGH VISCOSITY BONE CEMENT 40G
Type: IMPLANTABLE DEVICE | Site: KNEE | Status: FUNCTIONAL
Brand: SMARTSET

## 2024-06-06 DEVICE — ATTUNE KNEE SYSTEM TIBIAL BASE FIXED BEARING SIZE 8 CEMENTED
Type: IMPLANTABLE DEVICE | Site: KNEE | Status: FUNCTIONAL
Brand: ATTUNE

## 2024-06-06 RX ORDER — ATORVASTATIN CALCIUM 20 MG/1
40 TABLET, FILM COATED ORAL
Status: DISCONTINUED | OUTPATIENT
Start: 2024-06-06 | End: 2024-06-07 | Stop reason: HOSPADM

## 2024-06-06 RX ORDER — GLYCOPYRROLATE 0.2 MG/ML
INJECTION INTRAMUSCULAR; INTRAVENOUS AS NEEDED
Status: DISCONTINUED | OUTPATIENT
Start: 2024-06-06 | End: 2024-06-06

## 2024-06-06 RX ORDER — NEOSTIGMINE METHYLSULFATE 1 MG/ML
INJECTION INTRAVENOUS AS NEEDED
Status: DISCONTINUED | OUTPATIENT
Start: 2024-06-06 | End: 2024-06-06

## 2024-06-06 RX ORDER — CALCIUM CARBONATE 500 MG/1
1000 TABLET, CHEWABLE ORAL DAILY PRN
Status: DISCONTINUED | OUTPATIENT
Start: 2024-06-06 | End: 2024-06-07 | Stop reason: HOSPADM

## 2024-06-06 RX ORDER — ACETAMINOPHEN 325 MG/1
650 TABLET ORAL EVERY 6 HOURS PRN
Status: DISCONTINUED | OUTPATIENT
Start: 2024-06-06 | End: 2024-06-07 | Stop reason: HOSPADM

## 2024-06-06 RX ORDER — CHLORHEXIDINE GLUCONATE ORAL RINSE 1.2 MG/ML
15 SOLUTION DENTAL ONCE
Status: COMPLETED | OUTPATIENT
Start: 2024-06-06 | End: 2024-06-06

## 2024-06-06 RX ORDER — SODIUM CHLORIDE, SODIUM LACTATE, POTASSIUM CHLORIDE, CALCIUM CHLORIDE 600; 310; 30; 20 MG/100ML; MG/100ML; MG/100ML; MG/100ML
125 INJECTION, SOLUTION INTRAVENOUS CONTINUOUS
Status: DISCONTINUED | OUTPATIENT
Start: 2024-06-06 | End: 2024-06-07 | Stop reason: HOSPADM

## 2024-06-06 RX ORDER — DEXAMETHASONE SODIUM PHOSPHATE 10 MG/ML
INJECTION, SOLUTION INTRAMUSCULAR; INTRAVENOUS AS NEEDED
Status: DISCONTINUED | OUTPATIENT
Start: 2024-06-06 | End: 2024-06-06

## 2024-06-06 RX ORDER — DIPHENHYDRAMINE HCL 25 MG
25 TABLET ORAL
Status: DISCONTINUED | OUTPATIENT
Start: 2024-06-06 | End: 2024-06-06

## 2024-06-06 RX ORDER — ACETAMINOPHEN 325 MG/1
975 TABLET ORAL ONCE
Status: COMPLETED | OUTPATIENT
Start: 2024-06-06 | End: 2024-06-06

## 2024-06-06 RX ORDER — ONDANSETRON 2 MG/ML
4 INJECTION INTRAMUSCULAR; INTRAVENOUS EVERY 6 HOURS PRN
Status: DISCONTINUED | OUTPATIENT
Start: 2024-06-06 | End: 2024-06-07 | Stop reason: HOSPADM

## 2024-06-06 RX ORDER — DIPHENHYDRAMINE HCL 25 MG
25 TABLET ORAL
Status: DISCONTINUED | OUTPATIENT
Start: 2024-06-06 | End: 2024-06-07 | Stop reason: HOSPADM

## 2024-06-06 RX ORDER — FENTANYL CITRATE/PF 50 MCG/ML
50 SYRINGE (ML) INJECTION
Status: DISCONTINUED | OUTPATIENT
Start: 2024-06-06 | End: 2024-06-06 | Stop reason: HOSPADM

## 2024-06-06 RX ORDER — SENNOSIDES 8.6 MG
650 CAPSULE ORAL EVERY 8 HOURS PRN
Qty: 30 TABLET | Refills: 0 | Status: SHIPPED | OUTPATIENT
Start: 2024-06-06 | End: 2024-06-10 | Stop reason: SDUPTHER

## 2024-06-06 RX ORDER — BUPIVACAINE HYDROCHLORIDE 5 MG/ML
INJECTION, SOLUTION EPIDURAL; INTRACAUDAL
Status: DISCONTINUED | OUTPATIENT
Start: 2024-06-06 | End: 2024-06-06

## 2024-06-06 RX ORDER — PHENYLEPHRINE HCL IN 0.9% NACL 1 MG/10 ML
SYRINGE (ML) INTRAVENOUS AS NEEDED
Status: DISCONTINUED | OUTPATIENT
Start: 2024-06-06 | End: 2024-06-06

## 2024-06-06 RX ORDER — OXYCODONE HYDROCHLORIDE 10 MG/1
10 TABLET ORAL EVERY 4 HOURS PRN
Status: DISCONTINUED | OUTPATIENT
Start: 2024-06-06 | End: 2024-06-07 | Stop reason: HOSPADM

## 2024-06-06 RX ORDER — FENTANYL CITRATE 50 UG/ML
INJECTION, SOLUTION INTRAMUSCULAR; INTRAVENOUS AS NEEDED
Status: DISCONTINUED | OUTPATIENT
Start: 2024-06-06 | End: 2024-06-06

## 2024-06-06 RX ORDER — METHOCARBAMOL 500 MG/1
500 TABLET, FILM COATED ORAL 3 TIMES DAILY PRN
Qty: 60 TABLET | Refills: 0 | Status: SHIPPED | OUTPATIENT
Start: 2024-06-06

## 2024-06-06 RX ORDER — PROPOFOL 10 MG/ML
INJECTION, EMULSION INTRAVENOUS AS NEEDED
Status: DISCONTINUED | OUTPATIENT
Start: 2024-06-06 | End: 2024-06-06

## 2024-06-06 RX ORDER — ONDANSETRON 2 MG/ML
4 INJECTION INTRAMUSCULAR; INTRAVENOUS ONCE AS NEEDED
Status: COMPLETED | OUTPATIENT
Start: 2024-06-06 | End: 2024-06-06

## 2024-06-06 RX ORDER — CEFAZOLIN SODIUM 1 G/50ML
1000 SOLUTION INTRAVENOUS EVERY 8 HOURS
Status: COMPLETED | OUTPATIENT
Start: 2024-06-06 | End: 2024-06-07

## 2024-06-06 RX ORDER — OXYCODONE HYDROCHLORIDE 5 MG/1
5 TABLET ORAL EVERY 6 HOURS PRN
Qty: 20 TABLET | Refills: 0 | Status: SHIPPED | OUTPATIENT
Start: 2024-06-06 | End: 2024-06-10 | Stop reason: SDUPTHER

## 2024-06-06 RX ORDER — HYDROMORPHONE HCL/PF 1 MG/ML
0.5 SYRINGE (ML) INJECTION EVERY 2 HOUR PRN
Status: DISCONTINUED | OUTPATIENT
Start: 2024-06-06 | End: 2024-06-07 | Stop reason: HOSPADM

## 2024-06-06 RX ORDER — ONDANSETRON 2 MG/ML
INJECTION INTRAMUSCULAR; INTRAVENOUS AS NEEDED
Status: DISCONTINUED | OUTPATIENT
Start: 2024-06-06 | End: 2024-06-06

## 2024-06-06 RX ORDER — ENOXAPARIN SODIUM 100 MG/ML
40 INJECTION SUBCUTANEOUS DAILY
Status: DISCONTINUED | OUTPATIENT
Start: 2024-06-06 | End: 2024-06-07 | Stop reason: HOSPADM

## 2024-06-06 RX ORDER — LIDOCAINE HYDROCHLORIDE 10 MG/ML
INJECTION, SOLUTION EPIDURAL; INFILTRATION; INTRACAUDAL; PERINEURAL AS NEEDED
Status: DISCONTINUED | OUTPATIENT
Start: 2024-06-06 | End: 2024-06-06

## 2024-06-06 RX ORDER — SODIUM CHLORIDE, SODIUM LACTATE, POTASSIUM CHLORIDE, CALCIUM CHLORIDE 600; 310; 30; 20 MG/100ML; MG/100ML; MG/100ML; MG/100ML
50 INJECTION, SOLUTION INTRAVENOUS CONTINUOUS
Status: CANCELLED | OUTPATIENT
Start: 2024-06-06

## 2024-06-06 RX ORDER — METHOCARBAMOL 500 MG/1
500 TABLET, FILM COATED ORAL EVERY 6 HOURS SCHEDULED
Status: DISCONTINUED | OUTPATIENT
Start: 2024-06-06 | End: 2024-06-07 | Stop reason: HOSPADM

## 2024-06-06 RX ORDER — GABAPENTIN 100 MG/1
100 CAPSULE ORAL EVERY 8 HOURS
Status: DISCONTINUED | OUTPATIENT
Start: 2024-06-06 | End: 2024-06-07 | Stop reason: HOSPADM

## 2024-06-06 RX ORDER — HYDROMORPHONE HCL/PF 1 MG/ML
0.5 SYRINGE (ML) INJECTION
Status: DISCONTINUED | OUTPATIENT
Start: 2024-06-06 | End: 2024-06-06 | Stop reason: HOSPADM

## 2024-06-06 RX ORDER — OXYCODONE HYDROCHLORIDE 5 MG/1
5 TABLET ORAL EVERY 4 HOURS PRN
Status: DISCONTINUED | OUTPATIENT
Start: 2024-06-06 | End: 2024-06-07 | Stop reason: HOSPADM

## 2024-06-06 RX ORDER — SODIUM CHLORIDE, SODIUM LACTATE, POTASSIUM CHLORIDE, CALCIUM CHLORIDE 600; 310; 30; 20 MG/100ML; MG/100ML; MG/100ML; MG/100ML
50 INJECTION, SOLUTION INTRAVENOUS CONTINUOUS
Status: DISCONTINUED | OUTPATIENT
Start: 2024-06-06 | End: 2024-06-06

## 2024-06-06 RX ORDER — ROCURONIUM BROMIDE 10 MG/ML
INJECTION, SOLUTION INTRAVENOUS AS NEEDED
Status: DISCONTINUED | OUTPATIENT
Start: 2024-06-06 | End: 2024-06-06

## 2024-06-06 RX ORDER — MIDAZOLAM HYDROCHLORIDE 2 MG/2ML
INJECTION, SOLUTION INTRAMUSCULAR; INTRAVENOUS AS NEEDED
Status: DISCONTINUED | OUTPATIENT
Start: 2024-06-06 | End: 2024-06-06

## 2024-06-06 RX ORDER — METOPROLOL SUCCINATE 25 MG/1
75 TABLET, EXTENDED RELEASE ORAL
Status: DISCONTINUED | OUTPATIENT
Start: 2024-06-06 | End: 2024-06-07 | Stop reason: HOSPADM

## 2024-06-06 RX ORDER — GABAPENTIN 300 MG/1
300 CAPSULE ORAL ONCE
Status: COMPLETED | OUTPATIENT
Start: 2024-06-06 | End: 2024-06-06

## 2024-06-06 RX ORDER — DOCUSATE SODIUM 100 MG/1
100 CAPSULE, LIQUID FILLED ORAL 2 TIMES DAILY
Status: DISCONTINUED | OUTPATIENT
Start: 2024-06-06 | End: 2024-06-07 | Stop reason: HOSPADM

## 2024-06-06 RX ORDER — CEFAZOLIN SODIUM 2 G/50ML
2000 SOLUTION INTRAVENOUS ONCE
Status: COMPLETED | OUTPATIENT
Start: 2024-06-06 | End: 2024-06-06

## 2024-06-06 RX ORDER — TRANEXAMIC ACID 10 MG/ML
1000 INJECTION, SOLUTION INTRAVENOUS ONCE
Status: COMPLETED | OUTPATIENT
Start: 2024-06-06 | End: 2024-06-06

## 2024-06-06 RX ORDER — MAGNESIUM HYDROXIDE 1200 MG/15ML
LIQUID ORAL AS NEEDED
Status: DISCONTINUED | OUTPATIENT
Start: 2024-06-06 | End: 2024-06-06 | Stop reason: HOSPADM

## 2024-06-06 RX ADMIN — GLYCOPYRROLATE 0.4 MG: 0.2 INJECTION, SOLUTION INTRAMUSCULAR; INTRAVENOUS at 08:47

## 2024-06-06 RX ADMIN — METHOCARBAMOL 500 MG: 500 TABLET ORAL at 17:28

## 2024-06-06 RX ADMIN — SODIUM CHLORIDE, SODIUM LACTATE, POTASSIUM CHLORIDE, AND CALCIUM CHLORIDE 125 ML/HR: .6; .31; .03; .02 INJECTION, SOLUTION INTRAVENOUS at 11:17

## 2024-06-06 RX ADMIN — HYDROMORPHONE HYDROCHLORIDE 0.5 MG: 0.5 INJECTION, SOLUTION INTRAMUSCULAR; INTRAVENOUS; SUBCUTANEOUS at 13:41

## 2024-06-06 RX ADMIN — SODIUM CHLORIDE, SODIUM LACTATE, POTASSIUM CHLORIDE, AND CALCIUM CHLORIDE 125 ML/HR: .6; .31; .03; .02 INJECTION, SOLUTION INTRAVENOUS at 09:13

## 2024-06-06 RX ADMIN — FENTANYL CITRATE 100 MCG: 50 INJECTION, SOLUTION INTRAMUSCULAR; INTRAVENOUS at 07:07

## 2024-06-06 RX ADMIN — ACETAMINOPHEN 325MG 975 MG: 325 TABLET ORAL at 06:17

## 2024-06-06 RX ADMIN — ROCURONIUM BROMIDE 50 MG: 10 INJECTION, SOLUTION INTRAVENOUS at 07:33

## 2024-06-06 RX ADMIN — ACETAMINOPHEN 325MG 650 MG: 325 TABLET ORAL at 19:23

## 2024-06-06 RX ADMIN — SODIUM CHLORIDE, SODIUM LACTATE, POTASSIUM CHLORIDE, AND CALCIUM CHLORIDE 125 ML/HR: .6; .31; .03; .02 INJECTION, SOLUTION INTRAVENOUS at 19:24

## 2024-06-06 RX ADMIN — METOPROLOL SUCCINATE 75 MG: 25 TABLET, EXTENDED RELEASE ORAL at 17:28

## 2024-06-06 RX ADMIN — FENTANYL CITRATE 50 MCG: 50 INJECTION, SOLUTION INTRAMUSCULAR; INTRAVENOUS at 09:31

## 2024-06-06 RX ADMIN — TRANEXAMIC ACID 1000 MG: 10 INJECTION, SOLUTION INTRAVENOUS at 07:37

## 2024-06-06 RX ADMIN — OXYCODONE HYDROCHLORIDE 10 MG: 10 TABLET ORAL at 19:22

## 2024-06-06 RX ADMIN — GABAPENTIN 300 MG: 300 CAPSULE ORAL at 06:17

## 2024-06-06 RX ADMIN — GABAPENTIN 100 MG: 100 CAPSULE ORAL at 21:00

## 2024-06-06 RX ADMIN — CEFAZOLIN SODIUM 1000 MG: 1 SOLUTION INTRAVENOUS at 15:09

## 2024-06-06 RX ADMIN — METHOCARBAMOL 500 MG: 500 TABLET ORAL at 23:07

## 2024-06-06 RX ADMIN — OXYCODONE 5 MG: 5 TABLET ORAL at 11:04

## 2024-06-06 RX ADMIN — BUPIVACAINE HYDROCHLORIDE 20 ML: 5 INJECTION, SOLUTION EPIDURAL; INTRACAUDAL at 07:10

## 2024-06-06 RX ADMIN — ATORVASTATIN CALCIUM 40 MG: 20 TABLET, FILM COATED ORAL at 16:56

## 2024-06-06 RX ADMIN — DIPHENHYDRAMINE HYDROCHLORIDE 25 MG: 25 TABLET ORAL at 23:36

## 2024-06-06 RX ADMIN — DOCUSATE SODIUM 100 MG: 100 CAPSULE, LIQUID FILLED ORAL at 17:28

## 2024-06-06 RX ADMIN — ACETAMINOPHEN 325MG 650 MG: 325 TABLET ORAL at 11:10

## 2024-06-06 RX ADMIN — FENTANYL CITRATE 50 MCG: 50 INJECTION, SOLUTION INTRAMUSCULAR; INTRAVENOUS at 09:17

## 2024-06-06 RX ADMIN — BUPIVACAINE 7 ML: 13.3 INJECTION, SUSPENSION, LIPOSOMAL INFILTRATION at 07:10

## 2024-06-06 RX ADMIN — NEOSTIGMINE METHYLSULFATE 3 MG: 1 INJECTION INTRAVENOUS at 08:47

## 2024-06-06 RX ADMIN — MIDAZOLAM 2 MG: 1 INJECTION INTRAMUSCULAR; INTRAVENOUS at 07:26

## 2024-06-06 RX ADMIN — SODIUM CHLORIDE, SODIUM LACTATE, POTASSIUM CHLORIDE, AND CALCIUM CHLORIDE 125 ML/HR: .6; .31; .03; .02 INJECTION, SOLUTION INTRAVENOUS at 06:23

## 2024-06-06 RX ADMIN — DEXAMETHASONE SODIUM PHOSPHATE 10 MG: 10 INJECTION, SOLUTION INTRAMUSCULAR; INTRAVENOUS at 07:36

## 2024-06-06 RX ADMIN — ONDANSETRON 4 MG: 2 INJECTION INTRAMUSCULAR; INTRAVENOUS at 07:36

## 2024-06-06 RX ADMIN — PROPOFOL 200 MG: 10 INJECTION, EMULSION INTRAVENOUS at 07:32

## 2024-06-06 RX ADMIN — FENTANYL CITRATE 100 MCG: 50 INJECTION, SOLUTION INTRAMUSCULAR; INTRAVENOUS at 07:32

## 2024-06-06 RX ADMIN — PHENYLEPHRINE HYDROCHLORIDE 30 MCG/MIN: 10 INJECTION INTRAVENOUS at 07:58

## 2024-06-06 RX ADMIN — LIDOCAINE HYDROCHLORIDE 50 MG: 10 INJECTION, SOLUTION EPIDURAL; INFILTRATION; INTRACAUDAL; PERINEURAL at 07:31

## 2024-06-06 RX ADMIN — ONDANSETRON 4 MG: 2 INJECTION INTRAMUSCULAR; INTRAVENOUS at 09:18

## 2024-06-06 RX ADMIN — CHLORHEXIDINE GLUCONATE 15 ML: 1.2 RINSE ORAL at 06:18

## 2024-06-06 RX ADMIN — FENTANYL CITRATE 50 MCG: 50 INJECTION, SOLUTION INTRAMUSCULAR; INTRAVENOUS at 09:55

## 2024-06-06 RX ADMIN — Medication 100 MCG: at 07:32

## 2024-06-06 RX ADMIN — Medication 100 MCG: at 08:43

## 2024-06-06 RX ADMIN — OXYCODONE 5 MG: 5 TABLET ORAL at 23:07

## 2024-06-06 RX ADMIN — CEFAZOLIN SODIUM 2000 MG: 2 SOLUTION INTRAVENOUS at 07:31

## 2024-06-06 RX ADMIN — METHOCARBAMOL 500 MG: 500 TABLET ORAL at 11:10

## 2024-06-06 RX ADMIN — OXYCODONE HYDROCHLORIDE 10 MG: 10 TABLET ORAL at 15:09

## 2024-06-06 RX ADMIN — GABAPENTIN 100 MG: 100 CAPSULE ORAL at 13:41

## 2024-06-06 RX ADMIN — Medication 100 MCG: at 07:58

## 2024-06-06 RX ADMIN — CEFAZOLIN SODIUM 1000 MG: 1 SOLUTION INTRAVENOUS at 23:07

## 2024-06-06 RX ADMIN — BUPIVACAINE HYDROCHLORIDE 10 ML: 5 INJECTION, SOLUTION EPIDURAL; INTRACAUDAL; PERINEURAL at 07:10

## 2024-06-06 RX ADMIN — Medication 100 MCG: at 07:51

## 2024-06-06 RX ADMIN — ENOXAPARIN SODIUM 40 MG: 40 INJECTION SUBCUTANEOUS at 20:57

## 2024-06-06 NOTE — ANESTHESIA PROCEDURE NOTES
Peripheral Block    Patient location during procedure: holding area  Start time: 6/6/2024 7:10 AM  Reason for block: at surgeon's request and post-op pain management  Staffing  Performed by: Dav Whatley MD  Authorized by: Dav Whatley MD    Preanesthetic Checklist  Completed: patient identified, IV checked, site marked, risks and benefits discussed, surgical consent, monitors and equipment checked, pre-op evaluation and timeout performed  Peripheral Block  Patient position: supine  Prep: ChloraPrep  Patient monitoring: frequent blood pressure checks, continuous pulse oximetry and heart rate  Block type: Popliteal  Laterality: left  Injection technique: single-shot  Procedures: ultrasound guided, Ultrasound guidance required for the procedure to increase accuracy and safety of medication placement and decrease risk of complications.  Ultrasound permanent image saved  bupivacaine (PF) (MARCAINE) 0.5 % injection 20 mL - Perineural   20 mL - 6/6/2024 7:10:00 AM  bupivacaine liposomal (EXPAREL) 1.3 % injection 20 mL - Perineural   13 mL - 6/6/2024 7:10:00 AM  Needle  Needle type: Stimuplex   Needle gauge: 20 G  Needle length: 4 in  Needle localization: anatomical landmarks and ultrasound guidance  Assessment  Injection assessment: incremental injection, frequent aspiration, injected with ease, negative aspiration, negative for heart rate change, no paresthesia on injection, no symptoms of intraneural/intravenous injection and needle tip visualized at all times  Paresthesia pain: none  Post-procedure:  site cleaned  patient tolerated the procedure well with no immediate complications

## 2024-06-06 NOTE — OP NOTE
OPERATIVE REPORT  PATIENT NAME: Ralph Abbott  : 1965  MRN: [id number]  Pt Location:  [facility] OR ROOM 03    Surgery Date: 2024    Surgeons and Role:     * Ronnie Dotson MD - Primary     * Valery Joiner PA-C     Preop Diagnosis:  Primary osteoarthritis of left knee [M17.12]    Post-Op Diagnosis Codes:     * Primary osteoarthritis of left knee [M17.12]    Procedure(s):  Left - ARTHROPLASTY KNEE TOTAL W ROBOT    Specimens:  * No specimens in log *    Estimated Blood Loss:   Minimal    Drains:  Closed/Suction Drain Anterior;Left Knee Accordion 10 Fr. (Active)   Number of days: 0       Anesthesia Type:   Choice     Operative Indications:  Primary osteoarthritis of left knee [M17.12]    Operative Findings:  Depuy attune   Femur-8   Poly-6   Tibia-8   Patella-38    Complications:   None    Knee Technique: Suture (direct) Repair  Knee Approach: Medial Parapatellar    Procedure and Technique:  Following the induction medical level of general anesthesia, antibiotics administered.  Left thigh was then fitted with a thigh-high tourniquet.  The left lower extremity then underwent sterile prep and drape.  The left lower extremity was a segment to gravity, tourniquet inflated to 300 mmHg..  A midline knee incision was created the knee in flexion.  Full-thickness flaps were raised in order to access the extensor mechanism.  A medial arthrotomy was guided up the knee joint.  2 half pins were placed in proximal tibia, 2 half pins were placed with distal femur.  In doing so, modules were created.  The arrays were attached to the modules.  Checkpoints made the proximal tibia distal femur.  The knee was then registered.  The surgery is planned out on the computer, plan was finalized.  The robot was docked.  The first maneuver of the distal femoral cut followed with anterior posterior cuts.  The chamfer cuts complete the process.  The proximal tibia cut was made next.  Care was taken preserve the taken medial collateral,  lateral collateral, posterior structures during these maneuvers.  The box cut was made for the posterior stabilized unit, and remnant medial and lateral discectomies and performed.  Trials were inserted, the knee was taken through range of motion, found to be capable full extension, good flexion, stable throughout.  The patella was then resurfaced while utilizing manufactures equipment and found to be a 38 mm button.  The trial components removed and the knee was prepared for insertion of cemented components.  The cemented tibia, cemented femur, trial poly-, cemented patella placed.  Excess cement was removed, the knee was brought into extension.  The cement was allowed to cure.  The trial poly was taken out, the knee was packed off.  The tourniquet was deflated, hemostasis was secured.  The insert polyethylene was then snapped into position.  The knee was taken their final range of motion, found to be capable full extension, good flexion, stable throughout, next patella tracking.  Satisfied with the extent of surgery, the wound was then flushed with saline and closed.  A Betadine soak initiated.  A drain is placed deep brought via cephalad stab incision.  The arthrotomy was closed with number Vicryl suture.  The subcu tissues were closed with 2-0 Vicryl suture.  The skin was closed with staples.  Sterile dressings were applied.  He was then awakened from general anesthesia and taken recovery room in stable condition plans include physical therapy weightbearing tolerance, he will require DVT prophylaxis Lovenox.  Please note, there is no qualified orthopedic resident was available to assist, the assistance of Valery Joiner PA-C was instrumental in the safe execution of this patient surgery.  Started patient position, patient prepped draped and IntraOp assistance, wound closure, dressing application, patient transfers, all of these were performed under my direct supervision   I was present for the entire  procedure.    Patient Disposition:  PACU             SIGNATURE: Ronnie Dotson MD  DATE: June 6, 2024  TIME: 8:53 AM

## 2024-06-06 NOTE — PLAN OF CARE
Problem: OCCUPATIONAL THERAPY ADULT  Goal: Performs self-care activities at highest level of function for planned discharge setting.  See evaluation for individualized goals.  Description: Treatment Interventions: ADL retraining, UE strengthening/ROM, Functional transfer training, Endurance training, Cognitive reorientation, Patient/family training, Equipment evaluation/education, Compensatory technique education, Energy conservation, Activityengagement          See flowsheet documentation for full assessment, interventions and recommendations.   6/6/2024 1530 by Liberty Wynn OT  Note: Limitation: Decreased ADL status, Decreased UE ROM, Decreased Safe judgement during ADL, Decreased UE strength, Decreased endurance, Decreased self-care trans, Decreased high-level ADLs  Prognosis: Fair  Assessment: Pt is a 59 y.o. male who presented to [facility] on 6/6/2024 with OA of L knee. Pt s/p L TKA with robot. Pt WBAT on LLE. 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 Pt greeted bedside for OT evaluation on 06/06/24. Pt resides with spouse in a 2SH with TEMI (no rail). PTA, Pt reports being I with ADLs/IADLs/no AD/ +. Pt with supportive spouse able to assist upon DC. Pt demonstrating the following occupational performance levels: S with UB ADLs, min A with LB ADLs, S with bed mobility, min A with functional transfers, and min Ax1 with RW. Limitations that impact functional performance include decreased ADL status, decreased UE ROM, decreased UE strength, decreased safe judgement during ADLs, decreased cognition, decreased endurance, decreased self care transfers, decreased high level ADLs, and pain. Occupational performance areas to  address ADL retraining, functional transfer training, UE strengthening/ROM, endurance training, cognitive reorientation, Pt/caregiver education, equipment evaluation/education, compensatory technique education, energy conservation, and activity engagement . Pt would benefit from continued skilled OT services while in hospital to maximize independence with ADLs. Will continue to follow Pt's progress. Pt would benefit from returning home with increased social support upon DC to maximize safety and independence with ADLs and functional tasks of choice.     Rehab Resource Intensity Level, OT: No post-acute rehabilitation needs       6/6/2024 1528 by Liberty Wynn OT  Note: Limitation: Decreased ADL status, Decreased UE ROM, Decreased Safe judgement during ADL, Decreased UE strength, Decreased endurance, Decreased self-care trans, Decreased high-level ADLs  Prognosis: Fair  Assessment: Pt is a 59 y.o. male who [facility] on [facility]/6/[facility]24 with OA of L knee. Pt s/p L TKA with robot. Pt WBAT on LLE. 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 Pt greeted bedside for OT evaluation on 06/06/24. Pt resides with spouse in a 2SH with TEMI (no rail). PTA, Pt reports being I with ADLs/IADLs/no AD/ +. Pt with supportive spouse able to assist upon DC. Pt demonstrating the following occupational performance levels: S with UB ADLs, min A with LB ADLs, S with bed mobility, min A with functional transfers, and min Ax1 with RW. Limitations that impact functional performance include decreased ADL status, decreased UE ROM, decreased UE strength, decreased safe judgement during ADLs, decreased cognition, decreased endurance, decreased self care  transfers, decreased high level ADLs, and pain. Occupational performance areas to address ADL retraining, functional transfer training, UE strengthening/ROM, endurance training, cognitive reorientation, Pt/caregiver education, equipment evaluation/education, compensatory technique education, energy conservation, and activity engagement . Pt would benefit from continued skilled OT services while in hospital to maximize independence with ADLs. Will continue to follow Pt's progress. Pt would benefit from returning home with increased social support upon DC to maximize safety and independence with ADLs and functional tasks of choice.     Rehab Resource Intensity Level, OT: No post-acute rehabilitation needs

## 2024-06-06 NOTE — ANESTHESIA POSTPROCEDURE EVALUATION
Post-Op Assessment Note    CV Status:  Stable  Pain Score: 0    Pain management: adequate       Mental Status:  Alert and awake   Hydration Status:  Euvolemic   PONV Controlled:  Controlled   Airway Patency:  Patent  Two or more mitigation strategies used for obstructive sleep apnea   Post Op Vitals Reviewed: Yes    No anethesia notable event occurred.    Staff: CRNA, Anesthesiologist               BP      Temp      Pulse     Resp      SpO2

## 2024-06-06 NOTE — PHYSICAL THERAPY NOTE
PT EVALUATION    Pt. Name: Ralph Abbott  Pt. Age: 59 y.o.  MRN: [id number]  LENGTH OF STAY: 0    Patient Active Problem List   Diagnosis    Primary osteoarthritis of right knee    Primary osteoarthritis of left knee    Chronic pain of left knee    Chronic pain of right knee    Status post right knee replacement    Aftercare following right knee joint replacement surgery    Essential hypertension    Migraine without aura and without status migrainosus, not intractable    Chest pain    Hyperlipidemia    Right arm pain    Myofascial muscle pain    Chronic tension-type headache, not intractable    Class 1 obesity due to excess calories without serious comorbidity with body mass index (BMI) of 32.0 to 32.9 in adult    Pain and swelling of left knee    Aftercare following left knee joint replacement surgery       Admitting Diagnoses:   Primary osteoarthritis of left knee [M17.12]    Past Medical History:   Diagnosis Date    Arthritis     Bronchospasm     last assessed 6/14/2016    Depression     Disc degeneration, lumbar     last assessed 4/7/2016    Erectile dysfunction     Hyperlipidemia     Hypertension     IFG (impaired fasting glucose)     last assessed 7/30/2014    Palpitations     last assessed 4/4/2013    Primary osteoarthritis of knees, bilateral     last assessed 6/27/2017    Right inguinal hernia     last assessed 9/12/2017    Spondylosis of lumbar region without myelopathy or radiculopathy     last assessed 4/7/2016       Past Surgical History:   Procedure Laterality Date    COLONOSCOPY      KNEE ARTHROSCOPY      Right    MS ARTHRP KNE CONDYLE&PLATU MEDIAL&LAT COMPARTMENTS Right 01/21/2019    Procedure: ARTHROPLASTY KNEE TOTAL;  Surgeon: Ronnie Dotson MD;  Location: [facility];  Service: Orthopedics    MS RPR 1ST INGUN HRNA AGE 5 YRS/> REDUCIBLE Right 10/26/2017    Procedure: REPAIR HERNIA INGUINAL;  Surgeon: Ha Hand MD;  Location: [facility];  Service: General    TOOTH EXTRACTION          Imaging Studies:  No orders to display        06/06/24 1333   PT Last Visit   PT Visit Date 06/06/24   Note Type   Note type Evaluation   Pain Assessment   Pain Assessment Tool 0-10   Pain Score 8   Pain Location/Orientation Orientation: Left;Location: Leg   Hospital Pain Intervention(s) Repositioned;Ambulation/increased activity;Elevated;Emotional support;Rest   Restrictions/Precautions   Weight Bearing Precautions Per Order Yes   LLE Weight Bearing Per Order WBAT   Braces or Orthoses Other (Comment)  (hemovac)   Other Precautions Bed Alarm;Chair Alarm;Fall Risk;Pain;Multiple lines;WBS   Home Living   Type of Home House   Home Layout Two level;Stairs to enter without rails   Bathroom Shower/Tub Walk-in shower   Bathroom Toilet Raised   Bathroom Equipment Shower chair;Commode   Home Equipment Walker;Crutches   Additional Comments Pt resides with spouse in a 2SH with 3 TEMI (no rail).   Prior Function   Level of [place] Independent with ADLs;Independent with functional mobility;Independent with IADLS   Lives With Spouse   Receives Help From Family   IADLs Independent with driving;Independent with meal prep;Independent with medication management   Falls in the last 6 months 0   Vocational Full time employment  ([employer])   Comments PTA, Pt reports being I with ADLs/IADLs/no AD/ +. Pt with supportive spouse able to assist upon DC.   General   Family/Caregiver Present Yes   Cognition   Overall Cognitive Status WFL   Arousal/Participation Alert   Orientation Level Oriented X4   Following Commands Follows all commands and directions without difficulty   Comments Pt pleasant and motivated   Subjective   Subjective I wanna get up   RUE Assessment   RUE Assessment   (see OT note)   LUE Assessment   LUE Assessment   (see OT note)   RLE Assessment   RLE Assessment WFL   LLE Assessment   LLE Assessment X  (Did not assess due to post op, able to flex hip and move ankle through normal ROM)   Light Touch   RLE Light  Touch Grossly intact   LLE Light Touch Grossly intact   Bed Mobility   Supine to Sit 5  Supervision   Additional items Increased time required;Verbal cues;Bedrails   Sit to Supine Unable to assess   Additional Comments pt greeted in supine in bed, left in recliner chair at end of session   Transfers   Sit to Stand 4  Minimal assistance   Additional items Assist x 1;Increased time required;Verbal cues  (RW)   Stand to Sit 4  Minimal assistance   Additional items Assist x 1;Increased time required;Verbal cues  (RW)   Additional Comments cues for RW safety and LE management. VITALS BPseated /99, standing /92 (-) symptoms   Ambulation/Elevation   Gait pattern L Knee Lexx;Improper Weight shift;Antalgic;Decreased L stance;Excessively slow;Step to;Decreased heel strike;Decreased toe off   Gait Assistance 4  Minimal assist   Additional items Assist x 1;Verbal cues   Assistive Device Rolling walker   Distance 20'x2   Ambulation/Elevation Additional Comments cues for RW safety. Minimal buckling in L LE during ambulation, cues for knee extension in standing   Balance   Static Sitting Fair +   Dynamic Sitting Fair   Static Standing Fair -   Dynamic Standing Poor +   Ambulatory Poor +   Endurance Deficit   Endurance Deficit Yes   Endurance Deficit Description fatigue and pain   Activity Tolerance   Activity Tolerance Patient tolerated treatment well;Patient limited by fatigue;Patient limited by pain   Medical Staff Made Aware ALEXIS Dunlap   Nurse Made Aware yes   Assessment   Prognosis Good   Problem List Decreased strength;Decreased range of motion;Decreased endurance;Impaired balance;Decreased mobility;Orthopedic restrictions;Pain   Assessment Pt is a 59 y.o. male who presented to [facility] on 6/6[facility]24 s/p L TKA done by Dr. Dotson. Precautions include WBAT. Pt  has a past medical history of Arthritis, Bronchospasm, Depression, Disc degeneration, lumbar, Erectile dysfunction, Hyperlipidemia, Hypertension,  IFG, Palpitations, Right inguinal hernia, and Spondylosis of lumbar region without myelopathy or radiculopathy.. Pt greeted at bedside for PT evaluation on 06/06/24. Pt referred to PT for functional mobility evaluation & D/C planning w/ orders of activity beginning POD #0. PTA, pt reports being I w/o AD and I w/ IADLs. Personal factors affecting pt at time of IE include: lives in 2 story house and stairs to enter home. Please find objective findings from PT assessment regarding body systems outlined above with impairments and limitations including weakness, decreased ROM, impaired balance, gait deviations, pain, decreased activity tolerance, decreased functional mobility tolerance, fall risk, and orthopedic restrictions.  Please see flow sheet above for objective findings and level of assistance required for safe completion of functional tasks. Pt was able to perform supine to sit with S and use of bedrails. Pt able to perform sit<>stand with minAx1 and RW. Pt needed cues for hand placement. Pt able to ambulate 20'x2 with MinAx1 and RW. Vitals taken t/o session, please see flowsheet for objective data.  Pt demonstrated dec endurance and tolerance to activity. Denies reports of dizziness or SOB t/o session. Patient was left in recliner chair  with call bell and all needs within reach. Pt was educated on fall precautions and reinforced w/ good understanding. Based on pt presentation and impaired function, pt would benefit from level III, (minimal resource intensity) at D/C. From PT/mobility standpoint, pt would benefit from OPPT to address deficits as defined above and maximize level of independence and return pt to PLOF. CM to follow. Nsg staff to continue to mobilized pt (OOB in chair for all meals & ambulate in room/unit) as tolerated to prevent further decline in function. Nsg notified. Co-eval performed with OT to complete this evaluation for the pts best interest given pts medical complexity and functional  level.   Barriers to Discharge Inaccessible home environment  (TEMI)   Goals   Patient Goals to have less pain   STG Expiration Date 06/13/24   Short Term Goal #1 1).  Pt will perform bed mobility with Aicha demonstrating appropriate technique 100% of the time in order to improve function.2)  Perform all transfers with Aicha demonstrating safe and appropriate technique 100% of the time in order to improve ability to negotiate safely in home environment.3) Amb with least restrictive AD > 200'x1 with Aicha in order to demonstrate ability to negotiate in home environment.4)  Improve overall strength and balance 1/2 grade in order to optimize ability to perform functional tasks and reduce fall risk.5) Increase activity tolerance to 45 minutes in order to improve endurance to functional tasks.6)  Negotiate stairs using most appropriate technique and Aicha in order to be able to negotiate safely in home environment. 7) PT for ongoing patient and family/caregiver education, DME needs and d/c planning in order to promote highest level of function in least restrictive environment.   Plan   Treatment/Interventions Functional transfer training;LE strengthening/ROM;Elevations;Therapeutic exercise;Endurance training;Patient/family training;Bed mobility;Gait training;OT;Spoke to nursing;Family   PT Frequency Twice a day  (prn)   Discharge Recommendation   Rehab Resource Intensity Level, PT III (Minimum Resource Intensity)   Equipment Recommended Walker  (pt owns)   Additional Comments The patient's AM-PAC Basic Mobility Inpatient Short Form Raw Score is 18. A Raw score of greater than 16 suggests the patient may benefit from discharge to home. Please also refer to the recommendation of the Physical Therapist for safe discharge planning.   AM-PAC Basic Mobility Inpatient   Turning in Flat Bed Without Bedrails 3   Lying on Back to Sitting on Edge of Flat Bed Without Bedrails 3   Moving Bed to Chair 3   Standing Up From Chair Using Arms  3   Walk in Room 3   Climb 3-5 Stairs With Railing 3   Basic Mobility Inpatient Raw Score 18   Basic Mobility Standardized Score 41.05   [facility] Highest Level Of Mobility   -HLM Goal 6: Walk 10 steps or more   -HLM Achieved 6: Walk 10 steps or more   End of Consult   Patient Position at End of Consult Bedside chair;All needs within reach;Bed/Chair alarm activated   End of Consult Comments Pt stable, left in the recliner chair with alarm on. RN updated       Hx/personal factors: co-morbidities, mutliple lines, pain, WB restrictions, and fall risk  Examination: dec mobility, dec balance, dec endurance, dec amb, risk for falls, pain, assessed body system, balance, endurance, amb, D/C disposition & fall risk, WB restrictions, impairements in locomotion, musculoskeletal, balance, endurance, posture, coordination  Clinical: unpredictable (ongoing medical status, risk for falls, POD #0, and pain mgt)  Complexity: high    Yin Collins, PT

## 2024-06-06 NOTE — OCCUPATIONAL THERAPY NOTE
Occupational Therapy Evaluation     Patient Name: Ralph Abbott  Today's Date: 6/6/2024  Problem List  Principal Problem:    Primary osteoarthritis of left knee    Past Medical History  Past Medical History:   Diagnosis Date    Arthritis     Bronchospasm     last assessed 6/14/2016    Depression     Disc degeneration, lumbar     last assessed 4/7/2016    Erectile dysfunction     Hyperlipidemia     Hypertension     IFG (impaired fasting glucose)     last assessed 7/30/2014    Palpitations     last assessed 4/4/2013    Primary osteoarthritis of knees, bilateral     last assessed 6/27/2017    Right inguinal hernia     last assessed 9/12/2017    Spondylosis of lumbar region without myelopathy or radiculopathy     last assessed 4/7/2016     Past Surgical History  Past Surgical History:   Procedure Laterality Date    COLONOSCOPY      KNEE ARTHROSCOPY      Right    MD ARTHRP KNE CONDYLE&PLATU MEDIAL&LAT COMPARTMENTS Right 01/21/2019    Procedure: ARTHROPLASTY KNEE TOTAL;  Surgeon: Ronnie Dotson MD;  [facility];  Service: Orthopedics    MD RPR 1ST INGUN HRNA AGE 5 YRS/> REDUCIBLE Right 10/26/2017    Procedure: REPAIR HERNIA INGUINAL;  Surgeon: Ha Hand MD;  [facility];  Service: General    TOOTH EXTRACTION             06/06/24 1307   OT Last Visit   OT Visit Date 06/06/24   Note Type   Note type Evaluation   Pain Assessment   Pain Assessment Tool 0-10   Pain Score 8  (increased to 10/10 at end of session and RN aware.)   Pain Location/Orientation Orientation: Left;Location: Leg   Hospital Pain Intervention(s) Repositioned;Ambulation/increased activity   Restrictions/Precautions   Weight Bearing Precautions Per Order Yes   LLE Weight Bearing Per Order WBAT   Other Precautions WBS;Bed Alarm;Chair Alarm;Fall Risk;Pain;Multiple lines  (hemovac LLE)   Home Living   Type of Home House   Home Layout Two level;Stairs to enter without rails   Bathroom Shower/Tub Walk-in shower   Bathroom Toilet Raised    Bathroom Equipment Shower chair;Commode   Home Equipment Walker;Crutches   Additional Comments Pt resides with spouse in a 2SH with 3 TEMI (no rail).   Prior Function   Level of [place] Independent with ADLs;Independent with functional mobility;Independent with IADLS   Lives With Spouse   Receives Help From Family   IADLs Independent with driving;Independent with meal prep;Independent with medication management   Falls in the last 6 months 0   Vocational Full time employment   Comments PTA, Pt reports being I with ADLs/IADLs/no AD/ +. Pt with supportive spouse able to assist upon DC.   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   Intrinsic Gratification Enjoys walking & swimming   ADL   Where Assessed Other (Comment)  (bathroom)   Eating Assistance 7  Independent   Grooming Assistance 5  Supervision/Setup   UB Bathing Assistance 5  Supervision/Setup   LB Bathing Assistance 4  Minimal Assistance   UB Dressing Assistance 5  Supervision/Setup   LB Dressing Assistance 4  Minimal Assistance   Toileting Assistance  4  Minimal Assistance  (with RW- standing by toilet)   Functional Assistance 4  Minimal Assistance   Functional Deficit Increased time to complete;Supervision/safety;Setup   Bed Mobility   Supine to Sit 5  Supervision   Additional items Increased time required;Verbal cues   Sit to Supine Unable to assess   Additional Comments Pt greeted supine in bed. VITALS BPseated /99, standing /92 (-) symptoms.   Transfers   Sit to Stand 4  Minimal assistance   Additional items Assist x 1;Increased time required;Verbal cues   Stand to Sit 4  Minimal assistance   Additional items Assist x 1;Increased time required;Verbal cues   Additional Comments with RW   Functional Mobility   Functional Mobility 4  Minimal assistance   Additional Comments Min AX1 with RW- within room distances/short household  distances. Pt with LLE knee buckling with functional mobility.   Additional items Rolling walker   Balance   Static Sitting Fair +   Dynamic Sitting Fair   Static Standing Fair -   Dynamic Standing Poor +   Ambulatory Poor +   Activity Tolerance   Activity Tolerance Patient tolerated treatment well   Medical Staff Made Aware Co-eval with DAVY Thomas 2* to Pt's medical complexity, decreased endurance, and post-surgical day #0.   Nurse Made Aware RN cleared/updated.   RUE Assessment   RUE Assessment WFL   LUE Assessment   LUE Assessment WFL   Hand Function   Gross Motor Coordination Functional   Fine Motor Coordination Functional   Sensation   Light Touch No apparent deficits   Psychosocial   Psychosocial (WDL) WDL   Cognition   Overall Cognitive Status WFL   Arousal/Participation Alert;Cooperative   Attention Within functional limits   Orientation Level Oriented X4   Memory Within functional limits   Following Commands Follows all commands and directions without difficulty   Comments Pt very pleasant and cooperative during OT session.   Assessment   Limitation Decreased ADL status;Decreased UE ROM;Decreased Safe judgement during ADL;Decreased UE strength;Decreased endurance;Decreased self-care trans;Decreased high-level ADLs   Prognosis Fair   Assessment Pt is a 59 y.o. male who [facility] on [facility] with OA of L knee. Pt s/p L TKA with robot. Pt WBAT on LLE. 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 Pt greeted bedside for OT evaluation on 06/06/24. Pt resides with spouse in a 2SH with TEMI (no rail). PTA, Pt reports being I with ADLs/IADLs/no AD/ +. Pt with supportive spouse able to assist upon DC. Pt demonstrating the  following occupational performance levels: S with UB ADLs, min A with LB ADLs, S with bed mobility, min A with functional transfers, and min Ax1 with RW. Limitations that impact functional performance include decreased ADL status, decreased UE ROM, decreased UE strength, decreased safe judgement during ADLs, decreased cognition, decreased endurance, decreased self care transfers, decreased high level ADLs, and pain. Occupational performance areas to address ADL retraining, functional transfer training, UE strengthening/ROM, endurance training, cognitive reorientation, Pt/caregiver education, equipment evaluation/education, compensatory technique education, energy conservation, and activity engagement . Pt would benefit from continued skilled OT services while in hospital to maximize independence with ADLs. Will continue to follow Pt's progress. Pt would benefit from returning home with increased social support upon DC to maximize safety and independence with ADLs and functional tasks of choice.   Goals   Patient Goals To decrease pain   LTG Time Frame 3-7   Long Term Goal #1 See goals listed below   Plan   Treatment Interventions ADL retraining;UE strengthening/ROM;Functional transfer training;Endurance training;Cognitive reorientation;Patient/family training;Equipment evaluation/education;Compensatory technique education;Energy conservation;Activityengagement   Goal Expiration Date 06/09/24   OT Frequency 3-5x/wk  (BID PRN)   Discharge Recommendation   Rehab Resource Intensity Level, OT No post-acute rehabilitation needs   Additional Comments  The patient's raw score on the -PAC Daily Activity Inpatient Short Form is 19. A raw score of greater than or equal to 19 suggests the patient may benefit from discharge to home. Please refer to the recommendation of the Occupational Therapist for safe discharge planning.   AM-PAC Daily Activity Inpatient   Lower Body Dressing 3   Bathing 3   Toileting 3   Upper Body  Dressing 3   Grooming 3   Eating 4   Daily Activity Raw Score 19   Daily Activity Standardized Score (Calc for Raw Score >=11) 40.22   AM-PAC Applied Cognition Inpatient   Following a Speech/Presentation 4   Understanding Ordinary Conversation 4   Taking Medications 4   Remembering Where Things Are Placed or Put Away 4   Remembering List of 4-5 Errands 4   Taking Care of Complicated Tasks 4   Applied Cognition Raw Score 24   Applied Cognition Standardized Score 62.21   End of Consult   Education Provided Yes   Patient Position at End of Consult All needs within reach;Bed/Chair alarm activated;Seated edge of bed   Nurse Communication Nurse aware of consult       GOALS:  Pt will complete UB ADLs with I in order to maximize participation with ADLs.   Pt will complete LB ADLs with I in order to maximize safety with ADLs.   Pt will complete toileting routine (transfer, hygiene, and clothing management) with I in order to return to prior level of function.  Pt will complete bed mobility with I in order to maximize participation with ADLs.   Pt will complete functional transfers at I level in order to increase participation with ADLs.  Pt will increase dynamic standing balance to F+ in order to increase safety with ADLs.  Pt will increase standing tolerance x10 min in order to increase participation with ADLs.   Pt will complete functional mobility with AD PRN for item retrieval task at [place] level in order to increase participation with ADLs.  Pt will complete IADL tasks/simulation of IADLs tasks with I in order to return to PLOF.  Pt will demonstrate G energy conservation techniques with ADLs/IADLs in order to reduce the risk of falls.  Pt will be attentive 100% of the time for ongoing functional/formal cognitive assessment to assist with safe dc planning prn.    Liberty Wynn MS, OTR/L

## 2024-06-06 NOTE — H&P
H&P Exam - Orthopedics   Ralph Abbott 59 y.o. male MRN: [id number]      Assessment/Plan   Assessment:  left Knee Osteoarthritis who continues to have pain and dysfunction despite appropriate nonsurgical treatments    Plan:  left  Total Knee Arthroplasty.  Patient is familiar with risks, benefits, and alternatives    TOTAL KNEE REPLACEMENT INDICATIONS AND RISKS    We had a lengthy discussion with the patient regarding the potential options for treatment. The patient has had an extensive conservative management course up until this time and therefore I do not feel that any additional conservative management will provide additional relief. The patient's symptoms have progressively worsened to the point where they now limit the patient's daily activities and quality of life.     At this time, I feel that this patient would be an excellent candidate for a total knee replacement. The patient has failed non-operative care and continues to have unacceptable symptoms. We discussed the treatment options and alternatives and the risks and benefits of surgery in great detail.    While no guarantees can be made, total knee replacement has a very high success rate in terms of relieving a patient's knee pain and returning them to a more active, independent lifestyle for 10-15 years or more. All surgery carries some risk; for knee replacement, the complication rate is low but may include: death (very rare), infection, bleeding requiring transfusion, blood clots in legs traveling to lungs, nerve and/or blood vessel damage, bone fracture, persistent knee pain and/or stiffness, and repeat surgery(ies). The risk of a major complication is about 1-2 per 1000 cases. Total knee replacement should only be done if conservative treatment has failed. The revision rate for total knee replacements is about 1-2% per year; in other words, 85-95% of knee replacements may last 10 years; 75-85% last 20 years; and so on, assuming no injury to the  knee. Finally we discussed anesthesia related complications which will be discussed in greater detail with the anesthesia team before surgery.     The patient was shown total knee booklets, diagrams and/or models and all of their questions have been answered at the present time. The patient may call or come in if they have any other concerns or questions. The patient also understands the post-operative rehabilitation process and the need for their cooperation and participation, and that their results may be compromised by their lack of compliance. The patient would like to proceed. Patient is encouraged to seek additional opinions if they so desire. The patient voiced their understanding of the surgical plan and potential complications and wishes to proceed with surgery.      History of Present Illness   HPI:  Ralph Abbott is a 59 y.o. male who presents with pain in the left knee. Patient is no longer getting adequate relief from non-operative modalities. Patient is continuing to have debilitating pain from their knee, interfering with daily activities and sleep. Patient denies any concerns with infections, new neuropathies, or any acute injuries.     Historical Information  Review Of Systems:   Skin: Normal  Neuro: See HPI  Musculoskeletal: See HPI  14 point review of systems negative except as stated above     Past Medical History:   Past Medical History:   Diagnosis Date    Arthritis     Bronchospasm     last assessed 6/14/2016    Depression     Disc degeneration, lumbar     last assessed 4/7/2016    Erectile dysfunction     Hyperlipidemia     Hypertension     IFG (impaired fasting glucose)     last assessed 7/30/2014    Palpitations     last assessed 4/4/2013    Primary osteoarthritis of knees, bilateral     last assessed 6/27/2017    Right inguinal hernia     last assessed 9/12/2017    Spondylosis of lumbar region without myelopathy or radiculopathy     last assessed 4/7/2016       Past Surgical History:   Past  Surgical History:   Procedure Laterality Date    COLONOSCOPY      KNEE ARTHROSCOPY      Right    VT ARTHRP KNE CONDYLE&PLATU MEDIAL&LAT COMPARTMENTS Right 01/21/2019    Procedure: ARTHROPLASTY KNEE TOTAL;  Surgeon: Ronnie Dotson MD;  [facility];  Service: Orthopedics    VT RPR 1ST INGUN HRNA AGE 5 YRS/> REDUCIBLE Right 10/26/2017    Procedure: REPAIR HERNIA INGUINAL;  Surgeon: Ha Hand MD;  [facility];  Service: General    TOOTH EXTRACTION         Family History:  Family history reviewed and non-contributory  Family History   Problem Relation Age of Onset    No Known Problems Mother     Diabetes Father     No Known Problems Sister     Coronary artery disease Brother        Social History:  Social History     Socioeconomic History    Marital status: /Civil Union     Spouse name: None    Number of children: None    Years of education: None    Highest education level: None   Occupational History    None   Tobacco Use    Smoking status: Former     Current packs/day: 0.00     Average packs/day: 0.5 packs/day for 20.0 years (10.0 ttl pk-yrs)     Types: Cigarettes     Start date: 2001     Quit date: 2021     Years since quitting: 3.4    Smokeless tobacco: Never    Tobacco comments:     4-5 cig day x 20 yrs   Vaping Use    Vaping status: Never Used   Substance and Sexual Activity    Alcohol use: Yes     Comment:  occasional    Drug use: Never    Sexual activity: None   Other Topics Concern    None   Social History Narrative    Uses safety equipment - seatbelts     Social Determinants of Health     Financial Resource Strain: Not on file   Food Insecurity: Not on file   Transportation Needs: Not on file   Physical Activity: Not on file   Stress: Not on file   Social Connections: Not on file   Intimate Partner Violence: Not on file   Housing Stability: Not on file       Allergies:   No Known Allergies        Labs:  0   Lab Value Date/Time    HCT 46.9 05/10/2024 0653    HCT 45.9 09/18/2023  "0644    HCT 45.9 11/19/2022 1104    HGB 15.7 05/10/2024 0653    HGB 15.5 09/18/2023 0644    HGB 15.4 11/19/2022 1104    INR 0.86 05/10/2024 0653    WBC 5.95 05/10/2024 0653    WBC 5.26 09/18/2023 0644    WBC 5.68 11/19/2022 1104    ESR 2 11/07/2019 0650    CRP 10.5 (H) 12/27/2018 0948       Meds:    Current Facility-Administered Medications:     bupivacaine liposomal (EXPAREL) 1.3 % injection 20 mL, 20 mL, Infiltration, Once, Dav Whatley MD    ceFAZolin (ANCEF) IVPB (premix in dextrose) 2,000 mg 50 mL, 2,000 mg, Intravenous, Once, Valery Joiner PA-C    lactated ringers infusion, 125 mL/hr, Intravenous, Continuous, Valery Joiner PA-C, Last Rate: 125 mL/hr at 06/06/24 0623, 125 mL/hr at 06/06/24 0623    tranexamic acid (CYKLOKAPRON) 1000-0.7 MG/100ML-% injection 1,000 mg, 1,000 mg, Intravenous, Once, Valery Joiner PA-C    Blood Culture:   No results found for: \"BLOODCX\"    Wound Culture:   No results found for: \"WOUNDCULT\"    Ins and Outs:  No intake/output data recorded.          Physical Exam  /88   Pulse 64   Temp 98.4 °F (36.9 °C) (Temporal)   Resp 17   Ht 6' (1.829 m)   Wt 103 kg (226 lb)   SpO2 95%   BMI 30.65 kg/m²   /88   Pulse 64   Temp 98.4 °F (36.9 °C) (Temporal)   Resp 17   Ht 6' (1.829 m)   Wt 103 kg (226 lb)   SpO2 95%   BMI 30.65 kg/m²   Gen: No acute distress, resting comfortably in bed  HEENT: Eyes clear, moist mucus membranes, hearing intact  Respiratory: No audible wheezing or stridor  Cardiovascular: Well Perfused peripherally, 2+ distal pulse  Abdomen: nondistended, no peritoneal signs  Ortho Exam: limited ROM due to pain and mechanical blocking  Neuro Exam: intact    Lab Results: Reviewed  Imaging: Reviewed   "

## 2024-06-06 NOTE — ANESTHESIA PROCEDURE NOTES
Peripheral Block    Patient location during procedure: holding area  Start time: 6/6/2024 7:10 AM  Reason for block: at surgeon's request and post-op pain management  Staffing  Performed by: Dav Whatley MD  Authorized by: Dav Whatley MD    Preanesthetic Checklist  Completed: patient identified, IV checked, site marked, risks and benefits discussed, surgical consent, monitors and equipment checked, pre-op evaluation and timeout performed  Peripheral Block  Patient position: supine  Prep: ChloraPrep  Patient monitoring: frequent blood pressure checks, continuous pulse oximetry and heart rate  Block type: Adductor Canal  Laterality: left  Injection technique: single-shot  Procedures: ultrasound guided, Ultrasound guidance required for the procedure to increase accuracy and safety of medication placement and decrease risk of complications.  Ultrasound permanent image saved  bupivacaine (PF) (MARCAINE) 0.5 % injection 20 mL - Perineural   10 mL - 6/6/2024 7:10:00 AM  bupivacaine liposomal (EXPAREL) 1.3 % injection 20 mL - Perineural   7 mL - 6/6/2024 7:10:00 AM  Needle  Needle type: Stimuplex   Needle gauge: 20 G  Needle length: 4 in  Needle localization: anatomical landmarks and ultrasound guidance  Assessment  Injection assessment: incremental injection, frequent aspiration, injected with ease, negative aspiration, negative for heart rate change, no paresthesia on injection, no symptoms of intraneural/intravenous injection and needle tip visualized at all times  Paresthesia pain: none  Post-procedure:  site cleaned  patient tolerated the procedure well with no immediate complications

## 2024-06-06 NOTE — PLAN OF CARE
Problem: PAIN - ADULT  Goal: Verbalizes/displays adequate comfort level or baseline comfort level  Description: Interventions:  - Encourage patient to monitor pain and request assistance  - Assess pain using appropriate pain scale  - Administer analgesics based on type and severity of pain and evaluate response  - Implement non-pharmacological measures as appropriate and evaluate response  - Consider cultural and social influences on pain and pain management  - Notify physician/advanced practitioner if interventions unsuccessful or patient reports new pain  6/6/2024 1151 by Agustina Yanes RN  Outcome: Progressing  6/6/2024 1151 by Agustina Yanes RN  Outcome: Progressing  6/6/2024 1052 by Agustina Yanes RN  Outcome: Progressing     Problem: INFECTION - ADULT  Goal: Absence or prevention of progression during hospitalization  Description: INTERVENTIONS:  - Assess and monitor for signs and symptoms of infection  - Monitor lab/diagnostic results  - Monitor all insertion sites, i.e. indwelling lines, tubes, and drains  - Monitor endotracheal if appropriate and nasal secretions for changes in amount and color  - [place] appropriate cooling/warming therapies per order  - Administer medications as ordered  - Instruct and encourage patient and family to use good hand hygiene technique  - Identify and instruct in appropriate isolation precautions for identified infection/condition  6/6/2024 1151 by Agustina Yanes RN  Outcome: Progressing  6/6/2024 1151 by Agustina Yanes RN  Outcome: Progressing  6/6/2024 1052 by Agustina Yanes RN  Outcome: Progressing     Problem: SAFETY ADULT  Goal: Patient will remain free of falls  Description: INTERVENTIONS:  - Educate patient/family on patient safety including physical limitations  - Instruct patient to call for assistance with activity   - Consult OT/PT to assist with strengthening/mobility   - Keep Call bell within reach  - Keep bed low and locked with side rails adjusted as  appropriate  - Keep care items and personal belongings within reach  - Initiate and maintain comfort rounds  - Make Fall Risk Sign visible to staff  - Offer Toileting every 2 Hours, in advance of need  - Initiate/Maintain bed alarm  - Obtain necessary fall risk management equipment:   - Apply yellow socks and bracelet for high fall risk patients  - Consider moving patient to room near nurses station  6/6/2024 1151 by Agustina Yanes RN  Outcome: Progressing  6/6/2024 1151 by Agustina Yanes RN  Outcome: Progressing  6/6/2024 1052 by Agustina Yanes RN  Outcome: Progressing     Problem: SAFETY ADULT  Goal: Maintains/Returns to pre admission functional level  Description: INTERVENTIONS:  - Perform AM-PAC 6 Click Basic Mobility/ Daily Activity assessment daily.  - Set and communicate daily mobility goal to care team and patient/family/caregiver.   - Collaborate with rehabilitation services on mobility goals if consulted  - Perform Range of Motion 3 times a day.  - Reposition patient every 3 hours.  - Dangle patient 3 times a day  - Stand patient 3 times a day  - Ambulate patient 3 times a day  - Out of bed to chair 3 times a day   - Out of bed for meals 3 times a day  - Out of bed for toileting  - Record patient progress and toleration of activity level   6/6/2024 1151 by Agustina Yanes RN  Outcome: Progressing  6/6/2024 1151 by Agustina Yanes RN  Outcome: Progressing  6/6/2024 1052 by Agustina Yanes RN  Outcome: Progressing     Problem: DISCHARGE PLANNING  Goal: Discharge to home or other facility with appropriate resources  Description: INTERVENTIONS:  - Identify barriers to discharge w/patient and caregiver  - Arrange for needed discharge resources and transportation as appropriate  - Identify discharge learning needs (meds, wound care, etc.)  - Arrange for interpretive services to assist at discharge as needed  - Refer to Case Management Department for coordinating discharge planning if the patient needs post-hospital  services based on physician/advanced practitioner order or complex needs related to functional status, cognitive ability, or social support system  6/6/2024 1151 by Agustina Yanes RN  Outcome: Progressing  6/6/2024 1151 by Agustina Yanes RN  Outcome: Progressing  6/6/2024 1052 by Agustina Yanes RN  Outcome: Progressing

## 2024-06-06 NOTE — ANESTHESIA PREPROCEDURE EVALUATION
Procedure:  ARTHROPLASTY KNEE TOTAL W ROBOT (Left: Knee)    Relevant Problems   CARDIO   (+) Chest pain   (+) Essential hypertension   (+) Hyperlipidemia   (+) Migraine without aura and without status migrainosus, not intractable      MUSCULOSKELETAL   (+) Primary osteoarthritis of left knee   (+) Primary osteoarthritis of right knee      NEURO/PSYCH   (+) Chronic tension-type headache, not intractable   (+) Migraine without aura and without status migrainosus, not intractable      Orthopedic/Musculoskeletal   (+) Chronic pain of left knee   (+) Chronic pain of right knee   (+) Myofascial muscle pain   (+) Right arm pain   (+) Status post right knee replacement      Other   (+) Class 1 obesity due to excess calories without serious comorbidity with body mass index (BMI) of 32.0 to 32.9 in adult      Lab Results   Component Value Date     07/19/2014    SODIUM 140 05/10/2024    K 3.9 05/10/2024     05/10/2024    CO2 25 05/10/2024    ANIONGAP 6 07/19/2014    AGAP 7 05/10/2024    BUN 18 05/10/2024    CREATININE 1.00 05/10/2024    GLUC 106 11/19/2022    GLUF 125 (H) 05/10/2024    CALCIUM 8.5 05/10/2024    AST 19 05/10/2024    ALT 27 05/10/2024    ALKPHOS 116 (H) 05/10/2024    PROT 6.4 07/19/2014    TP 6.2 (L) 05/10/2024    BILITOT 0.3 07/19/2014    TBILI 0.39 05/10/2024    EGFR 80 05/10/2024     Lab Results   Component Value Date    WBC 5.95 05/10/2024    HGB 15.7 05/10/2024    HCT 46.9 05/10/2024    MCV 93 05/10/2024     05/10/2024         Physical Exam    Airway    Mallampati score: II  TM Distance: >3 FB  Neck ROM: full     Dental   No notable dental hx     Cardiovascular      Pulmonary      Other Findings        Anesthesia Plan  ASA Score- 2     Anesthesia Type- general with ASA Monitors.         Additional Monitors:     Airway Plan: ETT.           Plan Factors-Exercise tolerance (METS): >4 METS.    Chart reviewed. EKG reviewed. Imaging results reviewed. Existing labs reviewed. Patient summary  reviewed.                  Induction- intravenous.    Postoperative Plan-         Informed Consent- Anesthetic plan and risks discussed with patient.  I personally reviewed this patient with the CRNA. Discussed and agreed on the Anesthesia Plan with the CRNA..

## 2024-06-06 NOTE — PLAN OF CARE
Problem: PHYSICAL THERAPY ADULT  Goal: Performs mobility at highest level of function for planned discharge setting.  See evaluation for individualized goals.  Description: Treatment/Interventions: Functional transfer training, LE strengthening/ROM, Elevations, Therapeutic exercise, Endurance training, Patient/family training, Bed mobility, Gait training, OT, Spoke to nursing, Family  Equipment Recommended: Walker (pt owns)       See flowsheet documentation for full assessment, interventions and recommendations.  Note: Prognosis: Good  Problem List: Decreased strength, Decreased range of motion, Decreased endurance, Impaired balance, Decreased mobility, Orthopedic restrictions, Pain  Assessment: Pt is a 59 y.o. male who presented to [facility] on 6/6/[facility]24 s/p L TKA done by Dr. Dotson. Precautions include WBAT. Pt  has a past medical history of Arthritis, Bronchospasm, Depression, Disc degeneration, lumbar, Erectile dysfunction, Hyperlipidemia, Hypertension, IFG, Palpitations, Right inguinal hernia, and Spondylosis of lumbar region without myelopathy or radiculopathy.. Pt greeted at bedside for PT evaluation on 06/06/24. Pt referred to PT for functional mobility evaluation & D/C planning w/ orders of activity beginning POD #0. PTA, pt reports being I w/o AD and I w/ IADLs. Personal factors affecting pt at time of IE include: lives in 2 story house and stairs to enter home. Please find objective findings from PT assessment regarding body systems outlined above with impairments and limitations including weakness, decreased ROM, impaired balance, gait deviations, pain, decreased activity tolerance, decreased functional mobility tolerance, fall risk, and orthopedic restrictions.  Please see flow sheet above for objective findings and level of assistance required for safe completion of functional tasks. Pt was able to perform supine to sit with S and use of bedrails. Pt able to perform sit<>stand with minAx1 and RW. Pt needed  cues for hand placement. Pt able to ambulate 20'x2 with MinAx1 and RW. Vitals taken t/o session, please see flowsheet for objective data.  Pt demonstrated dec endurance and tolerance to activity. Denies reports of dizziness or SOB t/o session. Patient was left in recliner chair  with call bell and all needs within reach. Pt was educated on fall precautions and reinforced w/ good understanding. Based on pt presentation and impaired function, pt would benefit from level III, (minimal resource intensity) at D/C. From PT/mobility standpoint, pt would benefit from OPPT to address deficits as defined above and maximize level of independence and return pt to PLOF. CM to follow. Nsg staff to continue to mobilized pt (OOB in chair for all meals & ambulate in room/unit) as tolerated to prevent further decline in function. Nsg notified. Co-eval performed with OT to complete this evaluation for the pts best interest given pts medical complexity and functional level.  Barriers to Discharge: Inaccessible home environment (TEMI)     Rehab Resource Intensity Level, PT: III (Minimum Resource Intensity)    See flowsheet documentation for full assessment.

## 2024-06-07 VITALS
SYSTOLIC BLOOD PRESSURE: 134 MMHG | RESPIRATION RATE: 18 BRPM | HEART RATE: 60 BPM | DIASTOLIC BLOOD PRESSURE: 69 MMHG | BODY MASS INDEX: 30.61 KG/M2 | OXYGEN SATURATION: 97 % | HEIGHT: 72 IN | WEIGHT: 226 LBS | TEMPERATURE: 98.7 F

## 2024-06-07 LAB
ANION GAP SERPL CALCULATED.3IONS-SCNC: 6 MMOL/L (ref 4–13)
BUN SERPL-MCNC: 14 MG/DL (ref 5–25)
CALCIUM SERPL-MCNC: 8.1 MG/DL (ref 8.4–10.2)
CHLORIDE SERPL-SCNC: 105 MMOL/L (ref 96–108)
CO2 SERPL-SCNC: 28 MMOL/L (ref 21–32)
CREAT SERPL-MCNC: 0.89 MG/DL (ref 0.6–1.3)
ERYTHROCYTE [DISTWIDTH] IN BLOOD BY AUTOMATED COUNT: 12.4 % (ref 11.6–15.1)
GFR SERPL CREATININE-BSD FRML MDRD: 92 ML/MIN/1.73SQ M
GLUCOSE P FAST SERPL-MCNC: 138 MG/DL (ref 65–99)
GLUCOSE SERPL-MCNC: 138 MG/DL (ref 65–140)
HCT VFR BLD AUTO: 37.2 % (ref 36.5–49.3)
HGB BLD-MCNC: 12.5 G/DL (ref 12–17)
MCH RBC QN AUTO: 31.3 PG (ref 26.8–34.3)
MCHC RBC AUTO-ENTMCNC: 33.6 G/DL (ref 31.4–37.4)
MCV RBC AUTO: 93 FL (ref 82–98)
PLATELET # BLD AUTO: 158 THOUSANDS/UL (ref 149–390)
PMV BLD AUTO: 10.1 FL (ref 8.9–12.7)
POTASSIUM SERPL-SCNC: 4.2 MMOL/L (ref 3.5–5.3)
RBC # BLD AUTO: 4 MILLION/UL (ref 3.88–5.62)
SODIUM SERPL-SCNC: 139 MMOL/L (ref 135–147)
WBC # BLD AUTO: 10.61 THOUSAND/UL (ref 4.31–10.16)

## 2024-06-07 PROCEDURE — 85027 COMPLETE CBC AUTOMATED: CPT

## 2024-06-07 PROCEDURE — 97535 SELF CARE MNGMENT TRAINING: CPT

## 2024-06-07 PROCEDURE — 97116 GAIT TRAINING THERAPY: CPT

## 2024-06-07 PROCEDURE — 99024 POSTOP FOLLOW-UP VISIT: CPT | Performed by: PHYSICIAN ASSISTANT

## 2024-06-07 PROCEDURE — 80048 BASIC METABOLIC PNL TOTAL CA: CPT

## 2024-06-07 PROCEDURE — 97110 THERAPEUTIC EXERCISES: CPT

## 2024-06-07 PROCEDURE — 97530 THERAPEUTIC ACTIVITIES: CPT

## 2024-06-07 RX ORDER — LANOLIN ALCOHOL/MO/W.PET/CERES
3 CREAM (GRAM) TOPICAL
Status: DISCONTINUED | OUTPATIENT
Start: 2024-06-07 | End: 2024-06-07

## 2024-06-07 RX ADMIN — OXYCODONE HYDROCHLORIDE 10 MG: 10 TABLET ORAL at 11:06

## 2024-06-07 RX ADMIN — OXYCODONE HYDROCHLORIDE 10 MG: 10 TABLET ORAL at 03:00

## 2024-06-07 RX ADMIN — DOCUSATE SODIUM 100 MG: 100 CAPSULE, LIQUID FILLED ORAL at 08:29

## 2024-06-07 RX ADMIN — ACETAMINOPHEN 325MG 650 MG: 325 TABLET ORAL at 03:00

## 2024-06-07 RX ADMIN — GABAPENTIN 100 MG: 100 CAPSULE ORAL at 06:26

## 2024-06-07 RX ADMIN — METHOCARBAMOL 500 MG: 500 TABLET ORAL at 06:26

## 2024-06-07 RX ADMIN — OXYCODONE HYDROCHLORIDE 10 MG: 10 TABLET ORAL at 07:09

## 2024-06-07 NOTE — PLAN OF CARE
Problem: PAIN - ADULT  Goal: Verbalizes/displays adequate comfort level or baseline comfort level  Description: Interventions:  - Encourage patient to monitor pain and request assistance  - Assess pain using appropriate pain scale  - Administer analgesics based on type and severity of pain and evaluate response  - Implement non-pharmacological measures as appropriate and evaluate response  - Consider cultural and social influences on pain and pain management  - Notify physician/advanced practitioner if interventions unsuccessful or patient reports new pain  Outcome: Progressing     Problem: INFECTION - ADULT  Goal: Absence or prevention of progression during hospitalization  Description: INTERVENTIONS:  - Assess and monitor for signs and symptoms of infection  - Monitor lab/diagnostic results  - Monitor all insertion sites, i.e. indwelling lines, tubes, and drains  - Monitor endotracheal if appropriate and nasal secretions for changes in amount and color  - Foster appropriate cooling/warming therapies per order  - Administer medications as ordered  - Instruct and encourage patient and family to use good hand hygiene technique  - Identify and instruct in appropriate isolation precautions for identified infection/condition  Outcome: Progressing  Goal: Absence of fever/infection during neutropenic period  Description: INTERVENTIONS:  - Monitor WBC    Outcome: Progressing     Problem: SAFETY ADULT  Goal: Patient will remain free of falls  Description: INTERVENTIONS:  - Educate patient/family on patient safety including physical limitations  - Instruct patient to call for assistance with activity   - Consult OT/PT to assist with strengthening/mobility   - Keep Call bell within reach  - Keep bed low and locked with side rails adjusted as appropriate  - Keep care items and personal belongings within reach  - Initiate and maintain comfort rounds  - Make Fall Risk Sign visible to staff  - Offer Toileting every 2 Hours,  in advance of need  - Initiate/Maintain bed alarm  - Obtain necessary fall risk management equipment: walker nearby  - Apply yellow socks and bracelet for high fall risk patients  - Consider moving patient to room near nurses station  Outcome: Progressing  Goal: Maintain or return to baseline ADL function  Description: INTERVENTIONS:  -  Assess patient's ability to carry out ADLs; assess patient's baseline for ADL function and identify physical deficits which impact ability to perform ADLs (bathing, care of mouth/teeth, toileting, grooming, dressing, etc.)  - Assess/evaluate cause of self-care deficits   - Assess range of motion  - Assess patient's mobility; develop plan if impaired  - Assess patient's need for assistive devices and provide as appropriate  - Encourage maximum independence but intervene and supervise when necessary  - Involve family in performance of ADLs  - Assess for home care needs following discharge   - Consider OT consult to assist with ADL evaluation and planning for discharge  - Provide patient education as appropriate  Outcome: Progressing  Goal: Maintains/Returns to pre admission functional level  Description: INTERVENTIONS:  - Perform AM-PAC 6 Click Basic Mobility/ Daily Activity assessment daily.  - Set and communicate daily mobility goal to care team and patient/family/caregiver.   - Collaborate with rehabilitation services on mobility goals if consulted  - Perform Range of Motion 3 times a day.  - Reposition patient every 2 hours.  - Dangle patient 3 times a day  - Stand patient 3 times a day  - Ambulate patient 3 times a day  - Out of bed to chair 3 times a day   - Out of bed for meals 3 times a day  - Out of bed for toileting  - Record patient progress and toleration of activity level   Outcome: Progressing     Problem: DISCHARGE PLANNING  Goal: Discharge to home or other facility with appropriate resources  Description: INTERVENTIONS:  - Identify barriers to discharge w/patient and  caregiver  - Arrange for needed discharge resources and transportation as appropriate  - Identify discharge learning needs (meds, wound care, etc.)  - Arrange for interpretive services to assist at discharge as needed  - Refer to Case Management Department for coordinating discharge planning if the patient needs post-hospital services based on physician/advanced practitioner order or complex needs related to functional status, cognitive ability, or social support system  Outcome: Progressing     Problem: Knowledge Deficit  Goal: Patient/family/caregiver demonstrates understanding of disease process, treatment plan, medications, and discharge instructions  Description: Complete learning assessment and assess knowledge base.  Interventions:  - Provide teaching at level of understanding  - Provide teaching via preferred learning methods  Outcome: Progressing

## 2024-06-07 NOTE — PROGRESS NOTES
Post Op Check Note -Surgery PA  Ralph CLARK Scar 59 y.o. male MRN: [id number]  Unit/Bed#: WE 2 N -01 Encounter: [id number]      Subjective:   60 yo s/p left TKA. Patient is doing well postop. Pain is controlled. Denies headache, dizziness, SOB, or N/V. He denies any numbness and tingling in left leg.     Objective:   Blood pressure 146/79, pulse 73, temperature 98.6 °F (37 °C), resp. rate 18, SpO2 95%.    Physical Exam:  General: Alert and oriented x 4  CV: RRR; no murmurs, gallops, or rubs  Resp: No SOB; lungs CTA - no wheezes, rales or rhonchi  Abd: BS x 4 quadrants, soft and nontender    Left lower extremity:  Dressings C/D/I  Toes warm and well perfused  Motor and sensation grossly intact distally  HV x 1 - 450 since 3 PM, 500 since surgery  2+ DP Pulse    Labs:  0   Lab Value Date/Time    HCT 46.9 05/10/2024 0653    HCT 45.9 09/18/2023 0644    HCT 45.9 11/19/2022 1104    HGB 15.7 05/10/2024 0653    HGB 15.5 09/18/2023 0644    HGB 15.4 11/19/2022 1104    INR 0.86 05/10/2024 0653    WBC 5.95 05/10/2024 0653    WBC 5.26 09/18/2023 0644    WBC 5.68 11/19/2022 1104    ESR 2 11/07/2019 0650    CRP 10.5 (H) 12/27/2018 0948     Meds:    Current Facility-Administered Medications:     acetaminophen (TYLENOL) tablet 650 mg, 650 mg, Oral, Q6H PRN, Valery Joiner PA-C, 650 mg at 06/06/24 1923    atorvastatin (LIPITOR) tablet 40 mg, 40 mg, Oral, Daily With Dinner, Valery Joiner PA-C, 40 mg at 06/06/24 1656    calcium carbonate (TUMS) chewable tablet 1,000 mg, 1,000 mg, Oral, Daily PRN, Valery Joiner PA-C    ceFAZolin (ANCEF) IVPB (premix in dextrose) 1,000 mg 50 mL, 1,000 mg, Intravenous, Q8H, ARMEN Goff-C, Stopped at 06/06/24 1540    docusate sodium (COLACE) capsule 100 mg, 100 mg, Oral, BID, Valery Joiner PA-C, 100 mg at 06/06/24 1728    enoxaparin (LOVENOX) subcutaneous injection 40 mg, 40 mg, Subcutaneous, Daily, Valery Joiner PA-C    gabapentin (NEURONTIN) capsule 100 mg, 100 mg, Oral, Q8H, Valery Ciashakila PA-C, 100 mg at  06/06/24 1341    HYDROmorphone (DILAUDID) injection 0.5 mg, 0.5 mg, Intravenous, Q2H PRN, Valery Joiner PA-C, 0.5 mg at 06/06/24 1341    lactated ringers bolus 1,000 mL, 1,000 mL, Intravenous, Once PRN **AND** lactated ringers bolus 1,000 mL, 1,000 mL, Intravenous, Once PRN, Valery Clarkno, PA-C    lactated ringers infusion, 125 mL/hr, Intravenous, Continuous, Valery Amberno, PA-C, Last Rate: 125 mL/hr at 06/06/24 1924, 125 mL/hr at 06/06/24 1924    lactated ringers infusion, 125 mL/hr, Intravenous, Continuous, Valery Joiner PA-C, Stopped at 06/06/24 1035    lactated ringers infusion, 50 mL/hr, Intravenous, Continuous, Aguilar Torres, CRNA    methocarbamol (ROBAXIN) tablet 500 mg, 500 mg, Oral, Q6H ROBERT, Valery Joiner, PA-C, 500 mg at 06/06/24 1728    metoprolol succinate (TOPROL-XL) 24 hr tablet 75 mg, 75 mg, Oral, After Dinner, Valery Joiner, PA-C, 75 mg at 06/06/24 1728    ondansetron (ZOFRAN) injection 4 mg, 4 mg, Intravenous, Q6H PRN, Valery Joiner PA-C    oxyCODONE (ROXICODONE) immediate release tablet 10 mg, 10 mg, Oral, Q4H PRN, Valery Joiner PA-C, 10 mg at 06/06/24 1922    oxyCODONE (ROXICODONE) IR tablet 5 mg, 5 mg, Oral, Q4H PRN, Valery Clarkno, PA-C, 5 mg at 06/06/24 1104    sodium chloride 0.9 % bolus 1,000 mL, 1,000 mL, Intravenous, Once PRN **AND** sodium chloride 0.9 % bolus 1,000 mL, 1,000 mL, Intravenous, Once PRN, Valery Clarkno, PA-C    Assessment/Plan:   Assessment:   59 y.o.male post operative day 0 left total knee arthroplasty. Doing well. Pain controlled.    Plan:  Incentive spirometry  Weight Bearing as tolerated LLE  Up and out of bed with walker  DVT prophylaxis - SCDs and Lovenox  Drain: remove before discharge  Analgesics and ice  PT/OT  Will continue to assess for acute blood loss anemia  Discharge per ortho when cleared by PT  Point of care follow-up with PCP  Postop appointment with ortho    Karla Robledo PA-C

## 2024-06-07 NOTE — PROGRESS NOTES
Orthopedics  Ralph Abbott 59 y.o. male MRN: [id number]  Unit/Bed#: WE 2 N -01        Subjective:    59 y.o.male seen and examined at the bedside.  Patient reports overall doing well.  Has pain in the left knee, but not severe.  Did not sleep much last night.  No associated numbness/tingling in the LLE.          Objective:    Labs:  0   Lab Value Date/Time    HCT 37.2 06/07/2024 0456    HCT 46.9 05/10/2024 0653    HCT 45.9 09/18/2023 0644    HGB 12.5 06/07/2024 0456    HGB 15.7 05/10/2024 0653    HGB 15.5 09/18/2023 0644    INR 0.86 05/10/2024 0653    WBC 10.61 (H) 06/07/2024 0456    WBC 5.95 05/10/2024 0653    WBC 5.26 09/18/2023 0644    ESR 2 11/07/2019 0650    CRP 10.5 (H) 12/27/2018 0948       Meds:    Current Facility-Administered Medications:     acetaminophen (TYLENOL) tablet 650 mg, 650 mg, Oral, Q6H PRN, ARMEN Goff-C, 650 mg at 06/07/24 0300    atorvastatin (LIPITOR) tablet 40 mg, 40 mg, Oral, Daily With Dinner, ARMEN Goff-C, 40 mg at 06/06/24 1656    calcium carbonate (TUMS) chewable tablet 1,000 mg, 1,000 mg, Oral, Daily PRN, ARMEN Goff-C    diphenhydrAMINE (BENADRYL) tablet 25 mg, 25 mg, Oral, HS PRN, ARMEN Silva-C, 25 mg at 06/06/24 2336    docusate sodium (COLACE) capsule 100 mg, 100 mg, Oral, BID, ARMEN Goff-C, 100 mg at 06/06/24 1728    enoxaparin (LOVENOX) subcutaneous injection 40 mg, 40 mg, Subcutaneous, Daily, ARMEN Goff-C, 40 mg at 06/06/24 2057    gabapentin (NEURONTIN) capsule 100 mg, 100 mg, Oral, Q8H, ARMEN Goff-C, 100 mg at 06/07/24 0626    HYDROmorphone (DILAUDID) injection 0.5 mg, 0.5 mg, Intravenous, Q2H PRN, ARMEN Goff-C, 0.5 mg at 06/06/24 1341    lactated ringers bolus 1,000 mL, 1,000 mL, Intravenous, Once PRN **AND** lactated ringers bolus 1,000 mL, 1,000 mL, Intravenous, Once PRN, ARMEN Goff-C    lactated ringers infusion, 125 mL/hr, Intravenous, Continuous, ARMEN Goff-C, Last Rate: 125 mL/hr at 06/06/24 1924, 125 mL/hr at  "06/06/24 1924    lactated ringers infusion, 125 mL/hr, Intravenous, Continuous, ARMEN Goff-LOUISE, Stopped at 06/06/24 1035    methocarbamol (ROBAXIN) tablet 500 mg, 500 mg, Oral, Q6H ROBERT, Valery Joiner PA-C, 500 mg at 06/07/24 0626    metoprolol succinate (TOPROL-XL) 24 hr tablet 75 mg, 75 mg, Oral, After Dinner, ARMEN Goff-C, 75 mg at 06/06/24 1728    ondansetron (ZOFRAN) injection 4 mg, 4 mg, Intravenous, Q6H PRN, ARMEN Goff-LOUISE    oxyCODONE (ROXICODONE) immediate release tablet 10 mg, 10 mg, Oral, Q4H PRN, ARMEN Goff-C, 10 mg at 06/07/24 0300    oxyCODONE (ROXICODONE) IR tablet 5 mg, 5 mg, Oral, Q4H PRN, ARMEN Goff-C, 5 mg at 06/06/24 2307    sodium chloride 0.9 % bolus 1,000 mL, 1,000 mL, Intravenous, Once PRN **AND** sodium chloride 0.9 % bolus 1,000 mL, 1,000 mL, Intravenous, Once PRN, ARMEN Goff-C    Blood Culture:   No results found for: \"BLOODCX\"    Wound Culture:   No results found for: \"WOUNDCULT\"    Ins and Outs:  I/O last 24 hours:  In: 4800 [I.V.:4800]  Out: 3025 [Urine:2300; Drains:725]      Orthopedic Physical Exam:    Vitals:    06/07/24 0256   BP: 144/63   Pulse: 79   Resp: 18   Temp: 98.7 °F (37.1 °C)   SpO2: 97%   Sitting upright in bed, NAD.  Breathing unlabored.  No diaphoresis.    left Lower Extremity extremity:  ACE Dressings C/D/I, no saturation or leaking  Visible skin no erythema or ecchymosis  HV drain x 1 present and holding suction with mild amount of bloody drainage in the cannister.  Thigh soft, no palpable hematoma  5/5 strength with ankle DF/PF, EHL/FHL  SILT   No calf tenderness or pitting edema  Toes warm, sensate, mobile    _*_*_*_*_*_*_*_*_*_*_*_*_*_*_*_*_*_*_*_*_*_*_*_*_*_*_*_*_*_*_*_*_*_*_*_*_*_*_*_*_*    Assessment:     59 y.o.male POD 1 s/p left total knee arthroplasty. Doing well.      Plan:    Weight bearing as tolerated left lower extremity  Up and out of bed with assistance as needed  DVT prophylaxis - Lovenox  HV drain x 1 removed this AM.  "   Analgesics  PT/OT  Dispo: Discharge planning.    Patient noted to have acute blood loss anemia due to a drop in Hbg of > 2.0g from preop levels, will monitor vital signs and resuscitate with IV fluids as needed.  H 12.5 this AM, VSS, monitor.         Malik Dawson PA-C

## 2024-06-07 NOTE — OCCUPATIONAL THERAPY NOTE
Occupational Therapy Progress Note     Patient Name: Ralph Abbott  Today's Date: 6/7/2024  Problem List  Principal Problem:    Primary osteoarthritis of left knee          06/07/24 0832   OT Last Visit   OT Visit Date 06/07/24   Note Type   Note Type Treatment   Pain Assessment   Pain Assessment Tool 0-10   Pain Score 8   Pain Location/Orientation Orientation: Left;Location: Knee  (Pt continues to endorse 8/10 throughout session- RN aware.)   Hospital Pain Intervention(s) Repositioned;Ambulation/increased activity   Restrictions/Precautions   Weight Bearing Precautions Per Order Yes   LLE Weight Bearing Per Order WBAT   Other Precautions Chair Alarm;Bed Alarm;Pain;Fall Risk;WBS   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 Pt with hand held showers, and build in shower chairs. Pt also educated on use of spongebathing upon DC per MD DC orders. Pt reports understanding.   UB Dressing Assistance 5  Supervision/Setup   UB Dressing Deficit Setup   LB Dressing Assistance 5  Supervision/Setup   LB Dressing Deficit Setup;Thread LLE into pants;Thread RLE into pants   Toileting Assistance  5  Supervision/Setup   Toileting Deficit Setup;Supervison/safety;Increased time to complete;Grab bar use   Toileting Comments Pt simulated home environment.   Bed Mobility   Additional Comments Pt greeted supine in bed.   Transfers   Sit to Stand 5  Supervision   Additional items Increased time required;Verbal cues   Stand to Sit 5  Supervision   Additional items Increased time required;Verbal cues   Additional Comments with RW   Functional Mobility   Functional Mobility 5  Supervision   Additional Comments S with functional  mobility with RW- within room distances. Pt with LLE knee flexion during larger steps of functional mobility with   Additional items Rolling walker   Cognition   Overall Cognitive Status WFL   Arousal/Participation Alert;Cooperative   Attention Within functional limits   Orientation Level Oriented X4   Memory Within functional limits   Following Commands Follows all commands and directions without difficulty   Comments Pt very pleasant and cooperative during OT session.   Activity Tolerance   Activity Tolerance Patient tolerated treatment well   Medical Staff Made Aware RN cleared/updated.   Assessment   Assessment Pt greeted up in recliner chair for OT treatment on 06/07/24 focusing on maximizing independence with ADLs. Pt is making great functional progress towards meeting goals. Pt completes functional transfers with S with RW and functional mobility with S RW to/from bathroom. Pt engages in ADLs routine: I with eating, S with UB/LB dressing, S with toileting, and I with eating. Pt with good carryover with compensatory techniques with ADLs. Limitations that impact functional performance include decreased ADL status, decreased UE ROM, decreased UE strength, decreased safe judgement during ADLs, decreased cognition, decreased endurance, decreased self care transfers, decreased high level ADLs, and pain. Occupational performance areas to address ADL retraining, functional transfer training, UE strengthening/ROM, endurance training, cognitive reorientation, Pt/caregiver education, equipment evaluation/education, compensatory technique education, energy conservation, and activity engagement . Pt would benefit from continued skilled OT services while in hospital to maximize independence with ADLs. Will continue to follow Pt's goals and progress. Pt would benefit from returning home with increased social support upon DC to maximize safety and independence with ADLs and functional tasks of choice.   Plan   Treatment  Interventions ADL retraining;UE strengthening/ROM;Functional transfer training;Cognitive reorientation;Endurance training;Patient/family training;Equipment evaluation/education;Compensatory technique education;Energy conservation;Activityengagement   Goal Expiration Date 06/09/24   OT Treatment Day 1   OT Frequency 3-5x/wk   Discharge Recommendation   Rehab Resource Intensity Level, OT No post-acute rehabilitation needs   Additional Comments  The patient's raw score on the AM-PAC Daily Activity Inpatient Short Form is 19. A raw score of greater than or equal to 19 suggests the patient may benefit from discharge to home. Please refer to the recommendation of the Occupational Therapist for safe discharge planning.   -PAC Daily Activity Inpatient   Lower Body Dressing 3   Bathing 3   Toileting 3   Upper Body Dressing 3   Grooming 3   Eating 4   Daily Activity Raw Score 19   Daily Activity Standardized Score (Calc for Raw Score >=11) 40.22   AM-PAC Applied Cognition Inpatient   Following a Speech/Presentation 4   Understanding Ordinary Conversation 4   Taking Medications 4   Remembering Where Things Are Placed or Put Away 4   Remembering List of 4-5 Errands 4   Taking Care of Complicated Tasks 4   Applied Cognition Raw Score 24   Applied Cognition Standardized Score 62.21   End of Consult   Education Provided Yes   Patient Position at End of Consult Bedside chair;Bed/Chair alarm activated;All needs within reach   Nurse Communication Nurse aware of consult       Liberty Wynn MS, OTR/L

## 2024-06-07 NOTE — QUICK NOTE
58 yo POD 1 s/p left TKA. Patient is tired because he did not sleep well overnight but otherwise feels he doing well postop. No new complaints at this time. No incidents overnight.     Afeb VSS.  Blood pressure 144/63, pulse 79, temperature 98.7 °F (37.1 °C), resp. rate 18, SpO2 97%.    Lab, Imaging and other studies:  Recent Labs     06/07/24  0456   WBC 10.61*   HGB 12.5      SODIUM 139   K 4.2      CO2 28   BUN 14   CREATININE 0.89   GLUC 138   CALCIUM 8.1*     HV drain - 500 cc since surgery  Dressing C/D/I. Motor and sensation grossly intact.   Karla Robledo PA-C

## 2024-06-07 NOTE — PLAN OF CARE
Problem: PHYSICAL THERAPY ADULT  Goal: Performs mobility at highest level of function for planned discharge setting.  See evaluation for individualized goals.  Description: Treatment/Interventions: Functional transfer training, LE strengthening/ROM, Elevations, Therapeutic exercise, Endurance training, Patient/family training, Bed mobility, Gait training, OT, Spoke to nursing, Family  Equipment Recommended: Walker (pt owns)       See flowsheet documentation for full assessment, interventions and recommendations.  Outcome: Adequate for Discharge  Note: Prognosis: Good  Problem List: Decreased strength, Decreased range of motion, Decreased endurance, Impaired balance, Decreased mobility, Decreased skin integrity, Orthopedic restrictions, Pain  Assessment: Seen for progression of PT.  Demonstrates Aicha for transfers with increased time.  Able to progress with ambulation and stair navigation.  Gait progressing from step to pattern with encouragement to step through pattern.  Increased knee flexion in stance observed.  Able to negotiate stairs without difficulty with B/L rails progression to B/L AC given no rails on TEMI home.  Issued and reviewed written HEP with good understanding.  Review of car transfers for d/c to home.  The patient's AM-PAC Basic Mobility Inpatient Short Form Raw Score is 22. A Raw score of greater than 16 suggests the patient may benefit from discharge to home. Please also refer to the recommendation of the Physical Therapist for safe discharge planning. Plan OPPT set up for Monday.  Home with spouse who is available for support.  Barriers to Discharge: Inaccessible home environment     Rehab Resource Intensity Level, PT: III (Minimum Resource Intensity)    See flowsheet documentation for full assessment.

## 2024-06-07 NOTE — PHYSICAL THERAPY NOTE
PHYSICAL THERAPY NOTE          Patient Name: Ralph Abbott  Today's Date: 6/7/2024  09:05-09:43 ( 38 minutes)       06/07/24 0905   PT Last Visit   PT Visit Date 06/07/24   Note Type   Note Type Treatment   Pain Assessment   Pain Location/Orientation Orientation: Left;Location: Knee   Pain Rating: FLACC (Rest) - Face 0   Pain Rating: FLACC (Rest) - Legs 1   Pain Rating: FLACC (Rest) - Activity 0   Pain Rating: FLACC (Rest) - Cry 1   Pain Rating: FLACC (Rest) - Consolability 0   Score: FLACC (Rest) 2   Restrictions/Precautions   LLE Weight Bearing Per Order WBAT   Other Precautions Chair Alarm;Bed Alarm;Pain;Fall Risk   General   Chart Reviewed Yes   Response to Previous Treatment Patient with no complaints from previous session.   Family/Caregiver Present No   Cognition   Overall Cognitive Status WFL   Arousal/Participation Alert;Cooperative   Attention Within functional limits   Comments Motivated to go home   Subjective   Subjective Ready to go home!   Bed Mobility   Additional Comments Received sitting OOB in chair.   Transfers   Sit to Stand 6  Modified independent   Additional items Armrests   Stand to Sit 6  Modified independent   Additional items Armrests   Additional Comments Increased time.   Ambulation/Elevation   Gait pattern Improper Weight shift;Antalgic;Decreased foot clearance;Decreased L stance;Inconsistent neva;Foward flexed;Short stride;Step to  (with cues working on progression to step through pattern)   Gait Assistance 5  Supervision   Additional items Verbal cues   Assistive Device Rolling walker   Distance 70'x1, stair training then additional 150'x1.  Working on progression from step to to step through pattern.  Standing rests x 2   Stair Management Assistance 5  Supervision   Additional items Verbal cues   Stair Management Technique Two rails;Foreward;No rails;Step to pattern;With crutches  (B/L rails, then  progression to B/L cruches as has no TEMI to enter home.  ( has crutches at home))   Number of Stairs 5  (performed  stairs x 2)   Ambulation/Elevation Additional Comments increased knee flexion noted with posture, cues for heel toe gait and step through pattern.   Balance   Static Sitting Good   Dynamic Sitting Fair +   Static Standing Fair   Dynamic Standing Fair -   Ambulatory Fair -  (with walker)   Endurance Deficit   Endurance Deficit Yes   Endurance Deficit Description pain and fatigue.   Activity Tolerance   Activity Tolerance Patient tolerated treatment well;Patient limited by fatigue;Patient limited by pain   Medical Staff Made Aware NurseAgustina   Nurse Made Aware yes   Exercises   TKR   (issued and reviewed written HEP.)   Neuro re-ed review of car transfers.   Assessment   Prognosis Good   Problem List Decreased strength;Decreased range of motion;Decreased endurance;Impaired balance;Decreased mobility;Decreased skin integrity;Orthopedic restrictions;Pain   Assessment Seen for progression of PT.  Demonstrates Aicha for transfers with increased time.  Able to progress with ambulation and stair navigation.  Gait progressing from step to pattern with encouragement to step through pattern.  Increased knee flexion in stance observed.  Able to negotiate stairs without difficulty with B/L rails progression to B/L AC given no rails on TEMI home.  Issued and reviewed written HEP with good understanding.  Review of car transfers for d/c to home.  The patient's AM-PAC Basic Mobility Inpatient Short Form Raw Score is 22. A Raw score of greater than 16 suggests the patient may benefit from discharge to home. Please also refer to the recommendation of the Physical Therapist for safe discharge planning. Plan OPPT set up for Monday.  Home with spouse who is available for support.   Barriers to Discharge Inaccessible home environment   Goals   Patient Goals get better   STG Expiration Date 06/13/24   PT Treatment Day  1   Plan   Treatment/Interventions Functional transfer training;LE strengthening/ROM;Elevations;Therapeutic exercise;Endurance training;Patient/family training;Equipment eval/education;Bed mobility;Gait training;Compensatory technique education;Spoke to nursing   Progress Improving as expected   PT Frequency 2-3x/wk   Discharge Recommendation   Rehab Resource Intensity Level, PT III (Minimum Resource Intensity)   Equipment Recommended Walker   AM-PAC Basic Mobility Inpatient   Turning in Flat Bed Without Bedrails 4   Lying on Back to Sitting on Edge of Flat Bed Without Bedrails 4   Moving Bed to Chair 4   Standing Up From Chair Using Arms 4   Walk in Room 3   Climb 3-5 Stairs With Railing 3   Basic Mobility Inpatient Raw Score 22   Basic Mobility Standardized Score 47.4   [facility] Highest Level Of Mobility   -HLM Goal 7: Walk 25 feet or more   -HLM Achieved 7: Walk 25 feet or more   Education   Education Provided Mobility training;Home exercise program;Assistive device   Patient Demonstrates acceptance/verbal understanding   End of Consult   Patient Position at End of Consult Bedside chair;Bed/Chair alarm activated;All needs within reach   Tana Garcia, PT

## 2024-06-07 NOTE — PLAN OF CARE
Problem: OCCUPATIONAL THERAPY ADULT  Goal: Performs self-care activities at highest level of function for planned discharge setting.  See evaluation for individualized goals.  Description: Treatment Interventions: ADL retraining, UE strengthening/ROM, Functional transfer training, Endurance training, Cognitive reorientation, Patient/family training, Equipment evaluation/education, Compensatory technique education, Energy conservation, Activityengagement          See flowsheet documentation for full assessment, interventions and recommendations.   Outcome: Adequate for Discharge  Note: Limitation: Decreased ADL status, Decreased UE ROM, Decreased Safe judgement during ADL, Decreased UE strength, Decreased endurance, Decreased self-care trans, Decreased high-level ADLs  Prognosis: Fair  Assessment: Pt greeted up in recliner chair for OT treatment on 06/07/24 focusing on maximizing independence with ADLs. Pt is making great functional progress towards meeting goals. Pt completes functional transfers with S with RW and functional mobility with S RW to/from bathroom. Pt engages in ADLs routine: I with eating, S with UB/LB dressing, S with toileting, and I with eating. Pt with good carryover with compensatory techniques with ADLs. Limitations that impact functional performance include decreased ADL status, decreased UE ROM, decreased UE strength, decreased safe judgement during ADLs, decreased cognition, decreased endurance, decreased self care transfers, decreased high level ADLs, and pain. Occupational performance areas to address ADL retraining, functional transfer training, UE strengthening/ROM, endurance training, cognitive reorientation, Pt/caregiver education, equipment evaluation/education, compensatory technique education, energy conservation, and activity engagement . Pt would benefit from continued skilled OT services while in hospital to maximize independence with ADLs. Will continue to follow Pt's goals and  progress. Pt would benefit from returning home with increased social support upon DC to maximize safety and independence with ADLs and functional tasks of choice.     Rehab Resource Intensity Level, OT: No post-acute rehabilitation needs

## 2024-06-10 ENCOUNTER — OFFICE VISIT (OUTPATIENT)
Dept: PHYSICAL THERAPY | Facility: CLINIC | Age: 59
End: 2024-06-10
Payer: COMMERCIAL

## 2024-06-10 ENCOUNTER — TELEPHONE (OUTPATIENT)
Dept: OBGYN CLINIC | Facility: HOSPITAL | Age: 59
End: 2024-06-10

## 2024-06-10 DIAGNOSIS — M17.12 PRIMARY OSTEOARTHRITIS OF LEFT KNEE: Primary | ICD-10-CM

## 2024-06-10 DIAGNOSIS — M17.12 PRIMARY OSTEOARTHRITIS OF LEFT KNEE: ICD-10-CM

## 2024-06-10 DIAGNOSIS — Z47.89 ORTHOPEDIC AFTERCARE: Primary | ICD-10-CM

## 2024-06-10 PROCEDURE — 97112 NEUROMUSCULAR REEDUCATION: CPT

## 2024-06-10 PROCEDURE — 97110 THERAPEUTIC EXERCISES: CPT

## 2024-06-10 PROCEDURE — 97140 MANUAL THERAPY 1/> REGIONS: CPT

## 2024-06-10 RX ORDER — METHOCARBAMOL 500 MG/1
500 TABLET, FILM COATED ORAL 4 TIMES DAILY
Qty: 60 TABLET | Refills: 0 | Status: SHIPPED | OUTPATIENT
Start: 2024-06-10

## 2024-06-10 RX ORDER — OXYCODONE HYDROCHLORIDE 5 MG/1
5 TABLET ORAL EVERY 6 HOURS PRN
Qty: 20 TABLET | Refills: 0 | Status: SHIPPED | OUTPATIENT
Start: 2024-06-10

## 2024-06-10 NOTE — PROGRESS NOTES
PT Evaluation     Today's date: 6/10/2024  Patient name: Ralph Abbott  : 1965  MRN: [id number]  Referring provider: Ronnie Dotson,*  Dx:   Encounter Diagnosis     ICD-10-CM    1. Primary osteoarthritis of left knee  M17.12                        Assessment  Impairments: abnormal muscle firing, abnormal or restricted ROM, activity intolerance, impaired physical strength, lacks appropriate home exercise program, pain with function, poor posture  and poor body mechanics    Assessment details: RE: Patient presents s/p L TKA on 24. Surgery went well with no complications. They presents with decreased ROM especially extension, decreased strength, increased pain and edema which translates to decreased functional ability secondary to post operative state. Incision inspected with no adverse affects. Patient will benefit from skilled PT to address deficits with  progressive strengthening, gait training, and manual therapy in order to return patient to OF.     Ralph Abbott is a 59 y.o. male who presents to the clinic for a pre-op evaluation prior to a left knee replacement on 24.  The patient presents with the above listed impairments.  He demonstrates decreased knee ROM and strength and is limited with ambulation and weight-bearing.  He is eager to improve his overall function and should benefit from skilled physical therapy.  Thank you for the referral.      Understanding of Dx/Px/POC: good     Prognosis: good    Goals  Impairment Goals  - Decrease pain by 50% in 12 weeks  - Increase left flexibility by 50% in 12 weeks  - Increase left lower extremity strength [employer] to 4+/5 in 12 weeks    Functional Goals  - Return to Prior Level of Function in 12 weeks  - Patient will be independent with HEP in 12 weeks  - Patient will be able to ambulate with the least restrictive assistive device in 12 weeks  - Patient will be able to ascend/descend stairs with decreased pain in 12 weeks  - Patient will be able  "to complete ADLs with decreased pain in 12 weeks       Plan  Patient would benefit from: skilled physical therapy  Planned modality interventions: cryotherapy, thermotherapy: hydrocollator packs and TENS    Planned therapy interventions: home exercise program, graded exercise, functional ROM exercises, flexibility, body mechanics training, postural training, patient education, therapeutic activities, therapeutic exercise, manual therapy, joint mobilization and neuromuscular re-education    Frequency: 2x week  Duration in weeks: 4  Treatment plan discussed with: patient        Subjective Evaluation    History of Present Illness  Mechanism of injury: RE: Patient presents s/p L knee TKA on 24. Patient did stay over night and went home the next day. He reports that Patient reports high pain, and decreased appetite. He reports that mobility is hard, and he has had a lot of pain. Patient reports that he feels horrible. He notes stairs are hard.     HPI:  Patient notes having left knee pain for ~5 years.  He notes that he pain has gotten worse over time.  He has tried injections with little to no benefit.  He is now scheduled for a left knee replacement on 24.      Pain Location:  L Knee   Occupation:  Heating  Prior Functional Limitations:  AGG:  Working, climbing, crawling, ambulating   Ease:  Sit, rest, elevate   Patient Goals:  \"Want to be able to move\"     Pain  Current pain ratin  At best pain ratin  At worst pain rating: 10  Quality: sharp        Objective     Active Range of Motion   Left Knee   Flexion: 90 degrees with pain  Extension: -25 degrees     Right Knee   Flexion: 122 degrees   Extension: 0 degrees     Strength/Myotome Testing     Left Hip   Planes of Motion   Flexion: 5    Right Hip   Planes of Motion   Flexion: 5    Left Knee   Flexion: 4+  Extension: 4+    Right Knee   Normal strength  Flexion: 4  Extension: 4    Left Ankle/Foot   Dorsiflexion: 5  Great toe extension: 5    Right " "Ankle/Foot   Dorsiflexion: 5  Great toe extension: 5    Additional Strength Details  Reflects pre-operative measurements     Swelling     Left Knee Girth Measurement (cm)   Joint line: 43 cm  10 cm above joint line: 45 cm  10 cm below joint line: 36 cm    Functional Assessment        Comments  TU.64 sec no AD 24               Diagnosis:    Precautions:    POC Expires Auth Status Start Date Expiration Date PT Visit Limit     No Auth NA NA NA   Date 5/28 6/10      Used 1 2      Remaining        Manuals        PROM  SP       Mobs                                There Ex        Bike        Heel Slides  10x      Gastroc Stretch  10x10\"      Hamstring Stretch                        Neruo Re-Ed        Quad Set  10x10\"      SAQ/LAQ        SLR        TKE        Leg Press                                                                               Ther Act                                         Modalities             CP PRN                                        "

## 2024-06-10 NOTE — TELEPHONE ENCOUNTER
"Patient contacted for a postoperative follow up assessment. Patient reports doing \"ok\"   Patient states current pain level of a  4/10  when sitting and  10/10 when walking with RW. Patient reports slight swelling to left ankle and foot and dressing is clean, dry and intact. Patient is icing the site regularly.     We reviewed patients AVS medication list. Patient is taking Tylenol 650mg every 8 hours (explained to patient he can take up to 1000mg q8 for pain management), Oxycodone 5mg PRN, lovenox inj daily, Colace 100mg BID. Patient has had a BM since being home. Denies any urinary or GI symptoms.     Patient denies nausea, vomiting, abdominal pain, chest pain, shortness of breath, fever, dizziness and calf pain. Patient confirmed post-op appointment with PT today, 6/10 and with surgeon on 6/14. .Patient is requesting a refill of pain medication at this time. I will send a message to the surgeon. Pt does not have any other questions or concerns at this time. Pt was encouraged to call with any questions, concerns or issues.    "

## 2024-06-12 RX ORDER — OXYCODONE HYDROCHLORIDE 5 MG/1
5 TABLET ORAL EVERY 6 HOURS PRN
Qty: 20 TABLET | Refills: 0 | Status: SHIPPED | OUTPATIENT
Start: 2024-06-12 | End: 2024-06-21

## 2024-06-12 RX ORDER — SENNOSIDES 8.6 MG
650 CAPSULE ORAL EVERY 8 HOURS PRN
Qty: 30 TABLET | Refills: 0 | Status: SHIPPED | OUTPATIENT
Start: 2024-06-12

## 2024-06-13 ENCOUNTER — OFFICE VISIT (OUTPATIENT)
Dept: PHYSICAL THERAPY | Facility: CLINIC | Age: 59
End: 2024-06-13
Payer: COMMERCIAL

## 2024-06-13 DIAGNOSIS — M17.12 PRIMARY OSTEOARTHRITIS OF LEFT KNEE: Primary | ICD-10-CM

## 2024-06-13 PROCEDURE — 97140 MANUAL THERAPY 1/> REGIONS: CPT

## 2024-06-13 PROCEDURE — 97110 THERAPEUTIC EXERCISES: CPT

## 2024-06-13 PROCEDURE — 97112 NEUROMUSCULAR REEDUCATION: CPT

## 2024-06-13 NOTE — PROGRESS NOTES
"Daily Note     Today's date: 2024  Patient name: Ralph Abbott  : 1965  MRN: [id number]  Referring provider: Ronnie Dotson,*  Dx:   Encounter Diagnosis     ICD-10-CM    1. Primary osteoarthritis of left knee  M17.12           Subjective: Pt c/o 8/10 knee pain upon arrival. States bruising in L calf is worse.      Objective: See treatment diary below      Assessment: Patient presented with bruising L calf. No increased warmth, redness or tenderness to palpation. Fair tolerance to PROM, significant tightness with both knee flexion and extension with increased pain at end range. AAROM knee flexion 73*. Compensation with hip flexion with LAQ/SAQ.    Plan: Continue per plan of care.  Progress treatment as tolerated.       Diagnosis:    Precautions:    POC Expires Auth Status Start Date Expiration Date PT Visit Limit     No Auth NA NA NA   Date 5/28 6/10 6/13     Used 1 2 3     Remaining        Manuals        PROM  SP  AMC 16-73     Mobs                                There Ex        Bike   5' for ROM     Heel Slides  10x 10''X10     Gastroc Stretch  10x10\" 10''x10     Hamstring Stretch   10''x10                     Neruo Re-Ed        Quad Set  10x10\" 10''x10     SAQ/LAQ   2x10 SAQ/LAQ     SLR        TKE        Leg Press                                                                               Ther Act                                         Modalities             CP PRN                                             "

## 2024-06-14 ENCOUNTER — OFFICE VISIT (OUTPATIENT)
Dept: OBGYN CLINIC | Facility: MEDICAL CENTER | Age: 59
End: 2024-06-14

## 2024-06-14 ENCOUNTER — APPOINTMENT (OUTPATIENT)
Dept: RADIOLOGY | Facility: MEDICAL CENTER | Age: 59
End: 2024-06-14
Payer: COMMERCIAL

## 2024-06-14 VITALS
SYSTOLIC BLOOD PRESSURE: 130 MMHG | WEIGHT: 226 LBS | BODY MASS INDEX: 30.61 KG/M2 | DIASTOLIC BLOOD PRESSURE: 80 MMHG | HEART RATE: 99 BPM | HEIGHT: 72 IN

## 2024-06-14 DIAGNOSIS — Z98.890 STATUS POST SURGERY: ICD-10-CM

## 2024-06-14 DIAGNOSIS — Z98.890 STATUS POST SURGERY: Primary | ICD-10-CM

## 2024-06-14 PROCEDURE — 73560 X-RAY EXAM OF KNEE 1 OR 2: CPT

## 2024-06-14 PROCEDURE — 99024 POSTOP FOLLOW-UP VISIT: CPT | Performed by: ORTHOPAEDIC SURGERY

## 2024-06-14 NOTE — PROGRESS NOTES
Assessment:  1. S/P Arthroplasty Knee Total W Robot - Left  XR knee 1 or 2 vw left          Plan:  8 days s/p left TKA with robot, 6/6/2024  The patient is doing well   He should continue daily Lovenox, pain medications as needed and current physical therapy regimen.    The patient can shower letting soapy water over incision, yet should not to submerge the incision, and then pat dry.    The patient should follow up in one week      To do next visit:  Return in about 1 week (around 6/21/2024).    The above stated was discussed in layman's terms and the patient expressed understanding.  All questions were answered to the patient's satisfaction.       Scribe Attestation      I,:  Anthony Siddiqui am acting as a scribe while in the presence of the attending physician.:       I,:  Ronnie Dotson MD personally performed the services described in this documentation    as scribed in my presence.:               Subjective:   Ralph Abbott is a 59 y.o. male who presents 8 days s/p left TKA with robot, 6/6/2024.  The patient is doing well.  Today he complains of generalized left knee pain.  He does participate in physical therapy.  He does use Lovenox daily.  He does use oxycodone and Tylenol for pain control.  He denies fever, chills or shortness of breath.        Review of systems negative unless otherwise specified in HPI    Past Medical History:   Diagnosis Date    Arthritis     Bronchospasm     last assessed 6/14/2016    Depression     Disc degeneration, lumbar     last assessed 4/7/2016    Erectile dysfunction     Hyperlipidemia     Hypertension     IFG (impaired fasting glucose)     last assessed 7/30/2014    Palpitations     last assessed 4/4/2013    Primary osteoarthritis of knees, bilateral     last assessed 6/27/2017    Right inguinal hernia     last assessed 9/12/2017    Spondylosis of lumbar region without myelopathy or radiculopathy     last assessed 4/7/2016       Past Surgical History:   Procedure  Laterality Date    COLONOSCOPY      KNEE ARTHROSCOPY      Right    MI ARTHRP KNE CONDYLE&PLATU MEDIAL&LAT COMPARTMENTS Right 01/21/2019    Procedure: ARTHROPLASTY KNEE TOTAL;  Surgeon: Ronnie Dotson MD;  Location: [facility];  Service: Orthopedics    MI ARTHRP KNE CONDYLE&PLATU MEDIAL&LAT COMPARTMENTS Left 6/6/2024    Procedure: ARTHROPLASTY KNEE TOTAL W ROBOT;  Surgeon: Ronnie Dotson MD;  Location: [facility];  Service: Orthopedics    MI RPR 1ST INGUN HRNA AGE 5 YRS/> REDUCIBLE Right 10/26/2017    Procedure: REPAIR HERNIA INGUINAL;  Surgeon: Ha Hand MD;  Location: [facility];  Service: General    TOOTH EXTRACTION         Family History   Problem Relation Age of Onset    No Known Problems Mother     Diabetes Father     No Known Problems Sister     Coronary artery disease Brother        Social History     Occupational History    Not on file   Tobacco Use    Smoking status: Former     Current packs/day: 0.00     Average packs/day: 0.5 packs/day for 20.0 years (10.0 ttl pk-yrs)     Types: Cigarettes     Start date: 2001     Quit date: 2021     Years since quitting: 3.4    Smokeless tobacco: Never    Tobacco comments:     4-5 cig day x 20 yrs   Vaping Use    Vaping status: Never Used   Substance and Sexual Activity    Alcohol use: Yes     Alcohol/week: 4.0 standard drinks of alcohol     Types: 4 Cans of beer per week     Comment:  occasional    Drug use: Never    Sexual activity: Not on file         Current Outpatient Medications:     acetaminophen (TYLENOL) 650 mg CR tablet, Take 1 tablet (650 mg total) by mouth every 8 (eight) hours as needed for mild pain, Disp: 30 tablet, Rfl: 0    Galcanezumab-gnlm (Emgality) 120 MG/ML SOAJ, Inject 1 pen subq every 30 days, Disp: 3 mL, Rfl: 4    meloxicam (MOBIC) 15 mg tablet, Take 1 tablet (15 mg total) by mouth if needed for mild pain or moderate pain, Disp: 90 tablet, Rfl: 1    methocarbamol (ROBAXIN) 500 mg tablet, Take 1 tablet (500 mg total) by mouth 3  (three) times a day as needed for muscle spasms, Disp: 60 tablet, Rfl: 0    methocarbamol (ROBAXIN) 500 mg tablet, Take 1 tablet (500 mg total) by mouth 4 (four) times a day, Disp: 60 tablet, Rfl: 0    metoprolol succinate (TOPROL-XL) 50 mg 24 hr tablet, Take 1.5 tablets (75 mg total) by mouth daily (Patient taking differently: Take 75 mg by mouth daily after dinner), Disp: 135 tablet, Rfl: 0    oxyCODONE (Roxicodone) 5 immediate release tablet, Take 1 tablet (5 mg total) by mouth every 6 (six) hours as needed for moderate pain for up to 10 days Max Daily Amount: 20 mg, Disp: 20 tablet, Rfl: 0    oxyCODONE (Roxicodone) 5 immediate release tablet, Take 1 tablet (5 mg total) by mouth every 6 (six) hours as needed for moderate pain Max Daily Amount: 20 mg, Disp: 20 tablet, Rfl: 0    rimegepant sulfate (Nurtec) 75 mg TBDP, TAKE 1 TABLET BY MOUTH EVERY DAY AS NEEDED FOR MIGRAINE HEADACHE. NO MORE THAN 1 DOSE PER 24 HOURS., Disp: 16 tablet, Rfl: 11    rosuvastatin (CRESTOR) 5 mg tablet, Take 1 tablet (5 mg total) by mouth every other day (Patient taking differently: Take 5 mg by mouth every other day @HS), Disp: 90 tablet, Rfl: 0    sildenafil (VIAGRA) 100 mg tablet, Take 1 tablet (100 mg total) by mouth daily as needed for erectile dysfunction, Disp: 10 tablet, Rfl: 0    enoxaparin (LOVENOX) 40 mg/0.4 mL, Inject 0.4 mL (40 mg total) under the skin daily for 28 days To start Post-Op, Disp: 11.2 mL, Rfl: 0    No Known Allergies         Vitals:    06/14/24 1511   BP: 130/80   Pulse: 99       Objective:  Physical exam  General: Awake, Alert, Oriented  Eyes: Pupils equal, round and reactive to light  Heart: regular rate and rhythm  Lungs: No audible wheezing  Abdomen: soft                    Ortho Exam  Left knee:  Incision clean dry and intact  Staples well approximated   No erythema and no ecchymosis  Appropriate swelling of lower limb  Appropriate warmth of knee  Extensor mechanism intact  Extension 15  Flexion 55  Calf  "compartments soft and supple  Sensation intact  Toes are warm sensate and mobile      Diagnostics, reviewed and taken today if performed as documented:    The attending physician has personally reviewed the pertinent films in PACS and interpretation is as follows:  Left knee x-ray:  Well aligned prosthesis with no acute changes.      Procedures, if performed today:    Procedures    None performed      Portions of the record may have been created with voice recognition software.  Occasional wrong word or \"sound a like\" substitutions may have occurred due to the inherent limitations of voice recognition software.  Read the chart carefully and recognize, using context, where substitutions have occurred.    "

## 2024-06-17 ENCOUNTER — OFFICE VISIT (OUTPATIENT)
Dept: PHYSICAL THERAPY | Facility: CLINIC | Age: 59
End: 2024-06-17
Payer: COMMERCIAL

## 2024-06-17 DIAGNOSIS — M17.12 PRIMARY OSTEOARTHRITIS OF LEFT KNEE: Primary | ICD-10-CM

## 2024-06-17 PROCEDURE — 97110 THERAPEUTIC EXERCISES: CPT | Performed by: PHYSICAL THERAPIST

## 2024-06-17 PROCEDURE — 97140 MANUAL THERAPY 1/> REGIONS: CPT | Performed by: PHYSICAL THERAPIST

## 2024-06-17 PROCEDURE — 97112 NEUROMUSCULAR REEDUCATION: CPT | Performed by: PHYSICAL THERAPIST

## 2024-06-17 NOTE — PROGRESS NOTES
"Daily Note     Today's date: 2024  Patient name: Ralph Abbott  : 1965  MRN: [id number]  Referring provider: Ronnie Dotson,*  Dx:   Encounter Diagnosis     ICD-10-CM    1. Primary osteoarthritis of left knee  M17.12                      Subjective: Patient notes that he did not have pain coming into therapy today, but he has had pain daily.        Objective: See treatment diary below      Assessment: Tolerated treatment well. Patient would benefit from continued PT.  Patient had his best day of PT yet today.  Able to demonstrate improved ROM with both flexion and extension.  Patient fatigued s/p session.  Able to tolerate leg press today as well to promote further strengthening and motion.  Continue to work on ROM with future visits.        Plan: Progress treatment as tolerated.       Diagnosis:    Precautions:    POC Expires Auth Status Start Date Expiration Date PT Visit Limit     No Auth NA NA NA   Date 5/28 6/10 6/13 6/17    Used 1 2 3 4    Remaining        Manuals        PROM  SP  AMC 16-73 Flex & Ext     Mobs                                There Ex        Bike   5' for ROM 5 min ROM     Heel Slides  10x 10''X10 X10     Gastroc Stretch  10x10\" 10''x10 10\" x 5 @ rail     Hamstring Stretch   10''x10 10\" x 5 supine            ROM    Flex:  86  Ext:  -8    Neruo Re-Ed        Quad Set  10x10\" 10''x10     SAQ/LAQ   2x10 SAQ/LAQ     SLR        TKE        Leg Press    50# DL x30                                                                            Ther Act                                         Modalities             CP PRN                                               "

## 2024-06-20 ENCOUNTER — OFFICE VISIT (OUTPATIENT)
Dept: PHYSICAL THERAPY | Facility: CLINIC | Age: 59
End: 2024-06-20
Payer: COMMERCIAL

## 2024-06-20 DIAGNOSIS — M17.12 PRIMARY OSTEOARTHRITIS OF LEFT KNEE: Primary | ICD-10-CM

## 2024-06-20 PROCEDURE — 97112 NEUROMUSCULAR REEDUCATION: CPT | Performed by: PHYSICAL THERAPIST

## 2024-06-20 PROCEDURE — 97110 THERAPEUTIC EXERCISES: CPT | Performed by: PHYSICAL THERAPIST

## 2024-06-20 PROCEDURE — 97140 MANUAL THERAPY 1/> REGIONS: CPT | Performed by: PHYSICAL THERAPIST

## 2024-06-20 NOTE — PROGRESS NOTES
"Daily Note     Today's date: 2024  Patient name: Ralph Abbott  : 1965  MRN: [id number]  Referring provider: Ronnie Dotson,*  Dx:   Encounter Diagnosis     ICD-10-CM    1. Primary osteoarthritis of left knee  M17.12                      Subjective: Patient notes that he still has difficulty with his sleep.        Objective: See treatment diary below      Assessment: Tolerated treatment well. Patient would benefit from continued PT.  Patient doing well.  Able to make improvements today with his ROM.  Still noting pain and notes difficulty with sleep.  Has some difficulty with pain tolerance which is limiting his motion.  Continue to progress as able.        Plan: Progress treatment as tolerated.       Diagnosis:    Precautions:    POC Expires Auth Status Start Date Expiration Date PT Visit Limit     No Auth NA NA NA   Date 5/28 6/10 6/13 6/17 6/20   Used 1 2 3 4 5   Remaining        Manuals        PROM  SP  AMC 16-73 Flex & Ext  Flex & Ext    Mobs                                There Ex        Bike   5' for ROM 5 min ROM  5 min ROM    Heel Slides  10x 10''X10 X10     Gastroc Stretch  10x10\" 10''x10 10\" x 5 @ rail  20\" x 4 w/ strap   Hamstring Stretch   10''x10 10\" x 5 supine 20\" w/ strap            ROM    Flex:  86  Ext:  -8 Flex:  91  Ext:  -6   Neruo Re-Ed        Quad Set  10x10\" 10''x10     SAQ/LAQ   2x10 SAQ/LAQ  3\" x10   3\" x 10 w/ strap assist    SLR        TKE        Leg Press    50# DL x30                                                                            Ther Act                                         Modalities             CP PRN                                                 "

## 2024-06-21 ENCOUNTER — TELEPHONE (OUTPATIENT)
Dept: OBGYN CLINIC | Facility: MEDICAL CENTER | Age: 59
End: 2024-06-21

## 2024-06-21 ENCOUNTER — OFFICE VISIT (OUTPATIENT)
Dept: OBGYN CLINIC | Facility: MEDICAL CENTER | Age: 59
End: 2024-06-21

## 2024-06-21 ENCOUNTER — APPOINTMENT (OUTPATIENT)
Dept: LAB | Facility: MEDICAL CENTER | Age: 59
End: 2024-06-21

## 2024-06-21 VITALS
HEIGHT: 72 IN | WEIGHT: 226 LBS | BODY MASS INDEX: 30.61 KG/M2 | SYSTOLIC BLOOD PRESSURE: 130 MMHG | DIASTOLIC BLOOD PRESSURE: 80 MMHG | HEART RATE: 91 BPM

## 2024-06-21 DIAGNOSIS — Z00.8 HEALTH EXAMINATION IN POPULATION SURVEYS: ICD-10-CM

## 2024-06-21 DIAGNOSIS — Z47.1 AFTERCARE FOLLOWING LEFT KNEE JOINT REPLACEMENT SURGERY: Primary | ICD-10-CM

## 2024-06-21 DIAGNOSIS — Z96.652 AFTERCARE FOLLOWING LEFT KNEE JOINT REPLACEMENT SURGERY: Primary | ICD-10-CM

## 2024-06-21 LAB
CHOLEST SERPL-MCNC: 183 MG/DL
HDLC SERPL-MCNC: 34 MG/DL
LDLC SERPL CALC-MCNC: 103 MG/DL (ref 0–100)
NONHDLC SERPL-MCNC: 149 MG/DL
TRIGL SERPL-MCNC: 232 MG/DL

## 2024-06-21 PROCEDURE — 80061 LIPID PANEL: CPT

## 2024-06-21 PROCEDURE — 36415 COLL VENOUS BLD VENIPUNCTURE: CPT

## 2024-06-21 PROCEDURE — 83036 HEMOGLOBIN GLYCOSYLATED A1C: CPT

## 2024-06-21 PROCEDURE — 99024 POSTOP FOLLOW-UP VISIT: CPT | Performed by: ORTHOPAEDIC SURGERY

## 2024-06-21 RX ORDER — OXYCODONE HYDROCHLORIDE 5 MG/1
5 TABLET ORAL EVERY 6 HOURS PRN
Qty: 20 TABLET | Refills: 0 | Status: SHIPPED | OUTPATIENT
Start: 2024-06-21 | End: 2024-07-01

## 2024-06-21 NOTE — TELEPHONE ENCOUNTER
I called and left a message for the patient letting him know that I resent the forms to [employer] and they are time stamped for 3:49 today.

## 2024-06-21 NOTE — PROGRESS NOTES
Assessment:   Diagnosis ICD-10-CM Associated Orders   1. Aftercare following left knee joint replacement surgery  Z47.1     Z96.652           Plan:  Second postoperative visit 2 weeks status post left total knee arthroplasty.  His incision is healed adequately and staples removed successfully.  He may get his incision wet, do not submerge, pat dry.  Complete Lovenox as instructed.  Continue physical therapy home exercise program to maximize recovery and to focus on regaining his motion.  Ice and elevate as indicated.  Crutch to cane transition when able to do so.  Weightbearing and activity as tolerated.  Rx of oxy sent to his pharmacy.     To do next visit:  Return in about 4 weeks (around 7/19/2024) for re-check.    The above stated was discussed in layman's terms and the patient expressed understanding.  All questions were answered to the patient's satisfaction.       Scribe Attestation      I,:  Royal Alvarez am acting as a scribe while in the presence of the attending physician.:       I,:  Ronnie Dotson MD personally performed the services described in this documentation    as scribed in my presence.:               Subjective:   Ralph Abbott is a 59 y.o. male who presents today for repeat evaluation second postoperative visit 2 weeks status post left total knee arthroplasty.  Overall he is doing well.  His pain is rather controlled.  He presents using 2 axillary crutches for ambulatory assistance.  He denies any calf or thigh pain.  Denies any fevers or chills.  He is attending physical therapy and progressing.      Review of systems negative unless otherwise specified in HPI  Review of Systems    Past Medical History:   Diagnosis Date    Arthritis     Bronchospasm     last assessed 6/14/2016    Depression     Disc degeneration, lumbar     last assessed 4/7/2016    Erectile dysfunction     Hyperlipidemia     Hypertension     IFG (impaired fasting glucose)     last assessed 7/30/2014    Palpitations      last assessed 4/4/2013    Primary osteoarthritis of knees, bilateral     last assessed 6/27/2017    Right inguinal hernia     last assessed 9/12/2017    Spondylosis of lumbar region without myelopathy or radiculopathy     last assessed 4/7/2016       Past Surgical History:   Procedure Laterality Date    COLONOSCOPY      KNEE ARTHROSCOPY      Right    MI ARTHRP KNE CONDYLE&PLATU MEDIAL&LAT COMPARTMENTS Right 01/21/2019    Procedure: ARTHROPLASTY KNEE TOTAL;  Surgeon: Ronnie Dotson MD;  Location: [facility];  Service: Orthopedics    MI ARTHRP KNE CONDYLE&PLATU MEDIAL&LAT COMPARTMENTS Left 6/6/2024    Procedure: ARTHROPLASTY KNEE TOTAL W ROBOT;  Surgeon: Ronnie Dotson MD;  Location: [facility];  Service: Orthopedics    MI RPR 1ST INGUN HRNA AGE 5 YRS/> REDUCIBLE Right 10/26/2017    Procedure: REPAIR HERNIA INGUINAL;  Surgeon: Ha Hand MD;  Location: [facility];  Service: General    TOOTH EXTRACTION         Family History   Problem Relation Age of Onset    No Known Problems Mother     Diabetes Father     No Known Problems Sister     Coronary artery disease Brother        Social History     Occupational History    Not on file   Tobacco Use    Smoking status: Former     Current packs/day: 0.00     Average packs/day: 0.5 packs/day for 20.0 years (10.0 ttl pk-yrs)     Types: Cigarettes     Start date: 2001     Quit date: 2021     Years since quitting: 3.4    Smokeless tobacco: Never    Tobacco comments:     4-5 cig day x 20 yrs   Vaping Use    Vaping status: Never Used   Substance and Sexual Activity    Alcohol use: Yes     Alcohol/week: 4.0 standard drinks of alcohol     Types: 4 Cans of beer per week     Comment:  occasional    Drug use: Never    Sexual activity: Not on file         Current Outpatient Medications:     acetaminophen (TYLENOL) 650 mg CR tablet, Take 1 tablet (650 mg total) by mouth every 8 (eight) hours as needed for mild pain, Disp: 30 tablet, Rfl: 0    Galcanezumab-gnlm (Emgality) 120  MG/ML SOAJ, Inject 1 pen subq every 30 days, Disp: 3 mL, Rfl: 4    meloxicam (MOBIC) 15 mg tablet, Take 1 tablet (15 mg total) by mouth if needed for mild pain or moderate pain, Disp: 90 tablet, Rfl: 1    methocarbamol (ROBAXIN) 500 mg tablet, Take 1 tablet (500 mg total) by mouth 3 (three) times a day as needed for muscle spasms, Disp: 60 tablet, Rfl: 0    methocarbamol (ROBAXIN) 500 mg tablet, Take 1 tablet (500 mg total) by mouth 4 (four) times a day, Disp: 60 tablet, Rfl: 0    metoprolol succinate (TOPROL-XL) 50 mg 24 hr tablet, Take 1.5 tablets (75 mg total) by mouth daily (Patient taking differently: Take 75 mg by mouth daily after dinner), Disp: 135 tablet, Rfl: 0    oxyCODONE (Roxicodone) 5 immediate release tablet, Take 1 tablet (5 mg total) by mouth every 6 (six) hours as needed for moderate pain for up to 10 days Max Daily Amount: 20 mg, Disp: 20 tablet, Rfl: 0    rimegepant sulfate (Nurtec) 75 mg TBDP, TAKE 1 TABLET BY MOUTH EVERY DAY AS NEEDED FOR MIGRAINE HEADACHE. NO MORE THAN 1 DOSE PER 24 HOURS., Disp: 16 tablet, Rfl: 11    rosuvastatin (CRESTOR) 5 mg tablet, Take 1 tablet (5 mg total) by mouth every other day (Patient taking differently: Take 5 mg by mouth every other day @HS), Disp: 90 tablet, Rfl: 0    sildenafil (VIAGRA) 100 mg tablet, Take 1 tablet (100 mg total) by mouth daily as needed for erectile dysfunction, Disp: 10 tablet, Rfl: 0    enoxaparin (LOVENOX) 40 mg/0.4 mL, Inject 0.4 mL (40 mg total) under the skin daily for 28 days To start Post-Op, Disp: 11.2 mL, Rfl: 0    oxyCODONE (Roxicodone) 5 immediate release tablet, Take 1 tablet (5 mg total) by mouth every 6 (six) hours as needed for moderate pain Max Daily Amount: 20 mg, Disp: 20 tablet, Rfl: 0    No Known Allergies         Vitals:    06/21/24 1510   BP: 130/80   Pulse: 91       Body mass index is 30.65 kg/m².  Wt Readings from Last 3 Encounters:   06/21/24 103 kg (226 lb)   06/14/24 103 kg (226 lb)   06/06/24 103 kg (226 lb)  "      Objective:                    Left Knee Exam     Range of Motion   Extension:  10   Flexion:  80     Other   Erythema: absent  Swelling: moderate  Effusion: effusion present    Comments:    Intact extensor mechanism with an adequately healed anterior incision with staples in place was removed successfully.  No drainage.  Appropriate amount of warmth.  No gross signs of infection.  Calf or thigh are soft and nontender no signs DVT            Diagnostics, reviewed and taken today if performed as documented:    None performed          Procedures, if performed today:    Procedures    None performed      Portions of the record may have been created with voice recognition software.  Occasional wrong word or \"sound a like\" substitutions may have occurred due to the inherent limitations of voice recognition software.  Read the chart carefully and recognize, using context, where substitutions have occurred.  "

## 2024-06-21 NOTE — TELEPHONE ENCOUNTER
Patient came to appt and stated that they spoke to [employer] today and they claim they did not receive the forms for disability    Looks like forms were faxed to [employer] on 6/17. Patient is asking for packet to be refaxed.     They would like a phone call once refaxed    # [phone]

## 2024-06-22 LAB
EST. AVERAGE GLUCOSE BLD GHB EST-MCNC: 111 MG/DL
HBA1C MFR BLD: 5.5 %

## 2024-06-24 ENCOUNTER — OFFICE VISIT (OUTPATIENT)
Dept: PHYSICAL THERAPY | Facility: CLINIC | Age: 59
End: 2024-06-24
Payer: COMMERCIAL

## 2024-06-24 DIAGNOSIS — M17.12 PRIMARY OSTEOARTHRITIS OF LEFT KNEE: Primary | ICD-10-CM

## 2024-06-24 PROCEDURE — 97110 THERAPEUTIC EXERCISES: CPT | Performed by: PHYSICAL THERAPIST

## 2024-06-24 PROCEDURE — 97140 MANUAL THERAPY 1/> REGIONS: CPT | Performed by: PHYSICAL THERAPIST

## 2024-06-24 PROCEDURE — 97112 NEUROMUSCULAR REEDUCATION: CPT | Performed by: PHYSICAL THERAPIST

## 2024-06-24 NOTE — PROGRESS NOTES
"Daily Note     Today's date: 2024  Patient name: Ralph Abbott  : 1965  MRN: [id number]  Referring provider: Ronnie Dotson,*  Dx:   Encounter Diagnosis     ICD-10-CM    1. Primary osteoarthritis of left knee  M17.12                      Subjective: Patient notes that he feels better today, but had pain in his knee over the weekend.        Objective: See treatment diary below      Assessment: Tolerated treatment well. Patient would benefit from continued PT.  Patient made improvements tonight with his knee flexion ROM, but is still lacking extension.  Difficult tolerating pain with extension PROM.  Patient educated on continuing to work on extension extensively.  Progress ROM as able.        Plan: Progress treatment as tolerated.       Diagnosis:    Precautions:    POC Expires Auth Status Start Date Expiration Date PT Visit Limit     No Auth NA NA NA   Date    Used 6  3 4 5   Remaining        Manuals        PROM Flex & Ext   AMC 16-73 Flex & Ext  Flex & Ext    Mobs                                There Ex        Bike 5 min ROM  5' for ROM 5 min ROM  5 min ROM    Heel Slides x10  10''X10 X10     Gastroc Stretch 20\" x 4 w/ strap  10''x10 10\" x 5 @ rail  20\" x 4 w/ strap   Hamstring Stretch 20\" x 4 w/ srap  10''x10 10\" x 5 supine 20\" w/ strap    HS Curls OSB 2x10               ROM Flex:  98  Ext:  -6   Flex:  86  Ext:  -8 Flex:  91  Ext:  -6   Neruo Re-Ed        Quad Set   10''x10     SAQ/LAQ   2x10 SAQ/LAQ  3\" x10   3\" x 10 w/ strap assist    SLR        TKE        Leg Press 55# DL 2x10  20# SL 2x10   50# DL x30                                                                           Ther Act                                      Modalities            CP PRN                                                 "

## 2024-06-27 ENCOUNTER — OFFICE VISIT (OUTPATIENT)
Dept: PHYSICAL THERAPY | Facility: CLINIC | Age: 59
End: 2024-06-27
Payer: COMMERCIAL

## 2024-06-27 DIAGNOSIS — M17.12 PRIMARY OSTEOARTHRITIS OF LEFT KNEE: Primary | ICD-10-CM

## 2024-06-27 PROCEDURE — 97140 MANUAL THERAPY 1/> REGIONS: CPT | Performed by: PHYSICAL THERAPIST

## 2024-06-27 PROCEDURE — 97112 NEUROMUSCULAR REEDUCATION: CPT | Performed by: PHYSICAL THERAPIST

## 2024-06-27 PROCEDURE — 97110 THERAPEUTIC EXERCISES: CPT | Performed by: PHYSICAL THERAPIST

## 2024-06-27 NOTE — PROGRESS NOTES
"Daily Note     Today's date: 2024  Patient name: Ralph Abbott  : 1965  MRN: [id number]  Referring provider: Ronnie Dotson,*  Dx:   Encounter Diagnosis     ICD-10-CM    1. Primary osteoarthritis of left knee  M17.12                      Subjective: Patient with no new complaints today.        Objective: See treatment diary below      Assessment: Tolerated treatment well. Patient would benefit from continued PT.  Patient did well with PT today.  Able to attain new ROM with both flexion and extension.  Still noting increased pain with PROM, but was able to make strides with flexion and extension.  Continue to progress as able.        Plan: Progress treatment as tolerated.       Diagnosis:    Precautions:    POC Expires Auth Status Start Date Expiration Date PT Visit Limit     No Auth NA NA NA   Date    Used 6 7  4 5   Remaining        Manuals        PROM Flex & Ext  Flex & Ext   Flex & Ext  Flex & Ext    Mobs                                There Ex        Bike 5 min ROM 5 min ROM  5 min ROM  5 min ROM    Heel Slides x10   X10     Gastroc Stretch 20\" x 4 w/ strap 20\" x 4 w/ strap  10\" x 5 @ rail  20\" x 4 w/ strap   Hamstring Stretch 20\" x 4 w/ srap 20\" x 4 w/ strap   10\" x 5 supine 20\" w/ strap    HS Curls OSB 2x10               ROM Flex:  98  Ext:  -6 Flex: 112  Ext: -4  Flex:  86  Ext:  -8 Flex:  91  Ext:  -6   Neruo Re-Ed        Quad Set        SAQ/LAQ  3\" x 10 4#   3x10 w/ strap assist    3\" x10   3\" x 10 w/ strap assist    SLR        TKE        Leg Press 55# DL 2x10  20# SL 2x10      50# DL x30                                                                          Ther Act                                   Modalities           CP PRN                                                   "

## 2024-06-28 DIAGNOSIS — E78.5 DYSLIPIDEMIA: ICD-10-CM

## 2024-06-28 DIAGNOSIS — I10 ESSENTIAL HYPERTENSION: ICD-10-CM

## 2024-06-28 RX ORDER — METOPROLOL SUCCINATE 50 MG/1
75 TABLET, EXTENDED RELEASE ORAL DAILY
Qty: 135 TABLET | Refills: 1 | Status: SHIPPED | OUTPATIENT
Start: 2024-06-28

## 2024-06-28 RX ORDER — ROSUVASTATIN CALCIUM 5 MG/1
5 TABLET, COATED ORAL EVERY OTHER DAY
Qty: 90 TABLET | Refills: 1 | Status: SHIPPED | OUTPATIENT
Start: 2024-06-28

## 2024-07-01 ENCOUNTER — OFFICE VISIT (OUTPATIENT)
Dept: PHYSICAL THERAPY | Facility: CLINIC | Age: 59
End: 2024-07-01
Payer: COMMERCIAL

## 2024-07-01 DIAGNOSIS — Z47.89 ORTHOPEDIC AFTERCARE: ICD-10-CM

## 2024-07-01 DIAGNOSIS — M17.12 PRIMARY OSTEOARTHRITIS OF LEFT KNEE: Primary | ICD-10-CM

## 2024-07-01 PROCEDURE — 97110 THERAPEUTIC EXERCISES: CPT | Performed by: PHYSICAL THERAPIST

## 2024-07-01 PROCEDURE — 97112 NEUROMUSCULAR REEDUCATION: CPT | Performed by: PHYSICAL THERAPIST

## 2024-07-01 PROCEDURE — 97140 MANUAL THERAPY 1/> REGIONS: CPT | Performed by: PHYSICAL THERAPIST

## 2024-07-01 RX ORDER — OXYCODONE HYDROCHLORIDE 5 MG/1
5 TABLET ORAL EVERY 6 HOURS PRN
Qty: 20 TABLET | Refills: 0 | Status: SHIPPED | OUTPATIENT
Start: 2024-07-01

## 2024-07-01 NOTE — PROGRESS NOTES
"Daily Note     Today's date: 2024  Patient name: Ralph Abbott  : 1965  MRN: [id number]  Referring provider: Ronnie Dotson,*  Dx:   Encounter Diagnosis     ICD-10-CM    1. Primary osteoarthritis of left knee  M17.12                      Subjective: Patient notes that he did not have a good day yesterday, but notes that he is feeling better toady.        Objective: See treatment diary below      Assessment: Tolerated treatment well. Patient would benefit from continued PT.  Patient still has difficulty with pain and stiffness in his knee  Will hold is knee lacking 14 degrees of extension on the table but can attain lacking 4 degrees of extension with over pressure.  Flexion declined some today as patient noted stiffness in the knee.  Reviewed low load prolonged stretching at home.  Continue to progress as able.        Plan: Progress treatment as tolerated.       Diagnosis:    Precautions:    POC Expires Auth Status Start Date Expiration Date PT Visit Limit     No Auth NA NA NA   Date    Used 6 7 8  5   Remaining        Manuals        PROM Flex & Ext  Flex & Ext  Flex & Ext   Flex & Ext    Mobs                                There Ex        Bike 5 min ROM 5 min ROM 5 min ROM   5 min ROM    Heel Slides x10       Gastroc Stretch 20\" x 4 w/ strap 20\" x 4 w/ strap 20\" x 4 w/ strap   20\" x 4 w/ strap   Hamstring Stretch 20\" x 4 w/ srap 20\" x 4 w/ strap  20\" x 4  20\" w/ strap    HS Curls OSB 2x10               ROM Flex:  98  Ext:  -6 Flex: 112  Ext: -4 Flex:  103  Ext:  -4  Flex:  91  Ext:  -6   Neruo Re-Ed        Quad Set        SAQ/LAQ  3\" x 10 4#   3x10 w/ strap assist  SAQ 5# x10  3\" x10   3\" x 10 w/ strap assist    SLR        TKE   Reviewed      Leg Press 55# DL 2x10  20# SL 2x10     TRX Squats x10                                                                          Ther Act                                Modalities          CP PRN                               "

## 2024-07-03 ENCOUNTER — OFFICE VISIT (OUTPATIENT)
Dept: PHYSICAL THERAPY | Facility: CLINIC | Age: 59
End: 2024-07-03
Payer: COMMERCIAL

## 2024-07-03 DIAGNOSIS — M17.12 PRIMARY OSTEOARTHRITIS OF LEFT KNEE: Primary | ICD-10-CM

## 2024-07-03 PROCEDURE — 97116 GAIT TRAINING THERAPY: CPT | Performed by: PHYSICAL THERAPIST

## 2024-07-03 PROCEDURE — 97140 MANUAL THERAPY 1/> REGIONS: CPT | Performed by: PHYSICAL THERAPIST

## 2024-07-03 PROCEDURE — 97110 THERAPEUTIC EXERCISES: CPT | Performed by: PHYSICAL THERAPIST

## 2024-07-03 NOTE — PROGRESS NOTES
"Daily Note     Today's date: 7/3/2024  Patient name: Ralph Abbott  : 1965  MRN: [id number]  Referring provider: Ronnie Dotson,*  Dx:   Encounter Diagnosis     ICD-10-CM    1. Primary osteoarthritis of left knee  M17.12                      Subjective: Patient notes that his knee feels very stiff and tight.        Objective: See treatment diary below      Assessment: Tolerated treatment fair. Patient would benefit from continued PT.  Worked on more PREs today to help with knee extension.  Patient tends to hold his knee stiff during ambulation.  Worked on knee extension with functional movement - retro walking.  Continued to work on stretching as he is still lacking full ROM.  Progress as able.        Plan: Progress treatment as tolerated.       Diagnosis:    Precautions:    POC Expires Auth Status Start Date Expiration Date PT Visit Limit     No Auth NA NA NA   Date 6/24 6/27 7/1 7/3    Used 6 7 8 9    Remaining        Manuals        PROM Flex & Ext  Flex & Ext  Flex & Ext  Flex & Ext    Mobs                                There Ex        Bike 5 min ROM 5 min ROM 5 min ROM  5 min ROM    Heel Slides x10   x10    Gastroc Stretch 20\" x 4 w/ strap 20\" x 4 w/ strap 20\" x 4 w/ strap  20\" x 4 w/ strap    Hamstring Stretch 20\" x 4 w/ srap 20\" x 4 w/ strap  20\" x 4 20\" x 4 w/ strap    Seated 20\" x 5    HS Curls OSB 2x10               ROM Flex:  98  Ext:  -6 Flex: 112  Ext: -4 Flex:  103  Ext:  -4 Flex:  114  Ext: -2    Neruo Re-Ed        Quad Set    TKE @ wall x10    SAQ/LAQ  3\" x 10 4#   3x10 w/ strap assist  SAQ 5# x10     SLR        TKE   Reviewed      Leg Press 55# DL 2x10  20# SL 2x10     TRX Squats x10      Retro Walking     [place] 15# x 8                                                              Gait Training          Hurdles      Leading and Following with L LE 8x ea              Modalities         CP PRN                                                      "

## 2024-07-08 ENCOUNTER — OFFICE VISIT (OUTPATIENT)
Dept: PHYSICAL THERAPY | Facility: CLINIC | Age: 59
End: 2024-07-08
Payer: COMMERCIAL

## 2024-07-08 DIAGNOSIS — Z47.89 ORTHOPEDIC AFTERCARE: ICD-10-CM

## 2024-07-08 DIAGNOSIS — M17.12 PRIMARY OSTEOARTHRITIS OF LEFT KNEE: Primary | ICD-10-CM

## 2024-07-08 PROCEDURE — 97112 NEUROMUSCULAR REEDUCATION: CPT | Performed by: PHYSICAL THERAPIST

## 2024-07-08 PROCEDURE — 97110 THERAPEUTIC EXERCISES: CPT | Performed by: PHYSICAL THERAPIST

## 2024-07-08 PROCEDURE — 97140 MANUAL THERAPY 1/> REGIONS: CPT | Performed by: PHYSICAL THERAPIST

## 2024-07-08 NOTE — PROGRESS NOTES
"Daily Note     Today's date: 2024  Patient name: Ralph Abbott  : 1965  MRN: [id number]  Referring provider: Ronnie Dotson,*  Dx:   Encounter Diagnosis     ICD-10-CM    1. Primary osteoarthritis of left knee  M17.12                      Subjective: Patient notes that he could not sleep last night.  Notes that his knee still feels very tight.        Objective: See treatment diary below      Assessment: Tolerated treatment well. Patient would benefit from continued PT.  Patient was able to attain full passive ROM in extension tonight.  Still feels that his knee is very tight.  Still struggles with pain - worked on more AROM today before doing PROM.  Patient is now 1x / week per bundle protocol.  Progress as able.        Plan: Progress treatment as tolerated.       Diagnosis:    Precautions:    POC Expires Auth Status Start Date Expiration Date PT Visit Limit     No Auth NA NA NA   Date 6/24 6/27 7/1 7/3 7/8   Used  8 9 10   Remaining        Manuals        PROM Flex & Ext  Flex & Ext  Flex & Ext  Flex & Ext Flex & Ext    Mobs                                There Ex        Bike 5 min ROM 5 min ROM 5 min ROM  5 min ROM 5 min ROM    Heel Slides x10   x10 x10   Gastroc Stretch 20\" x 4 w/ strap 20\" x 4 w/ strap 20\" x 4 w/ strap  20\" x 4 w/ strap 20\" x 4 w/ strap   Hamstring Stretch 20\" x 4 w/ srap 20\" x 4 w/ strap  20\" x 4 20\" x 4 w/ strap    Seated 20\" x 5 20\" x 4 w/ strap    HS Curls OSB 2x10               ROM Flex:  98  Ext:  -6 Flex: 112  Ext: -4 Flex:  103  Ext:  -4 Flex:  114  Ext: -2 Flex:  110  Ext:  1 degrees extension    Neruo Re-Ed        Quad Set    TKE @ wall x10    SAQ/LAQ  3\" x 10 4#   3x10 w/ strap assist  SAQ 5# x10     SLR        TKE   Reviewed      Leg Press 55# DL 2x10  20# SL 2x10     TRX Squats x10   TRX Squats x 15  70# DL x20    Retro Walking     [place] 15# x 8                                                              Gait Training          Hurdles      Leading and " Following with L LE 8x ea Leading, following,and reciprocal gait pattern 8x             Modalities         CP PRN

## 2024-07-09 RX ORDER — OXYCODONE HYDROCHLORIDE 5 MG/1
5 TABLET ORAL EVERY 6 HOURS PRN
Qty: 20 TABLET | Refills: 0 | Status: SHIPPED | OUTPATIENT
Start: 2024-07-09 | End: 2024-07-15 | Stop reason: SDUPTHER

## 2024-07-09 NOTE — TELEPHONE ENCOUNTER
Refill must be reviewed and completed by the office or provider. The refill is unable to be approved or denied by the medication management team.    Cannot be delegated    Unable to complete PDMP. Will forward to clinical staff to review.

## 2024-07-11 ENCOUNTER — APPOINTMENT (OUTPATIENT)
Dept: PHYSICAL THERAPY | Facility: CLINIC | Age: 59
End: 2024-07-11
Payer: COMMERCIAL

## 2024-07-15 ENCOUNTER — OFFICE VISIT (OUTPATIENT)
Dept: PHYSICAL THERAPY | Facility: CLINIC | Age: 59
End: 2024-07-15
Payer: COMMERCIAL

## 2024-07-15 DIAGNOSIS — M17.12 PRIMARY OSTEOARTHRITIS OF LEFT KNEE: Primary | ICD-10-CM

## 2024-07-15 DIAGNOSIS — Z47.89 ORTHOPEDIC AFTERCARE: ICD-10-CM

## 2024-07-15 DIAGNOSIS — M17.12 PRIMARY OSTEOARTHRITIS OF LEFT KNEE: ICD-10-CM

## 2024-07-15 PROCEDURE — 97140 MANUAL THERAPY 1/> REGIONS: CPT | Performed by: PHYSICAL THERAPIST

## 2024-07-15 PROCEDURE — 97112 NEUROMUSCULAR REEDUCATION: CPT | Performed by: PHYSICAL THERAPIST

## 2024-07-15 PROCEDURE — 97110 THERAPEUTIC EXERCISES: CPT | Performed by: PHYSICAL THERAPIST

## 2024-07-15 RX ORDER — OXYCODONE HYDROCHLORIDE 5 MG/1
5 TABLET ORAL EVERY 6 HOURS PRN
Qty: 20 TABLET | Refills: 0 | Status: SHIPPED | OUTPATIENT
Start: 2024-07-15 | End: 2024-07-22 | Stop reason: SDUPTHER

## 2024-07-15 RX ORDER — SENNOSIDES 8.6 MG
650 CAPSULE ORAL EVERY 8 HOURS PRN
Qty: 30 TABLET | Refills: 0 | Status: SHIPPED | OUTPATIENT
Start: 2024-07-15

## 2024-07-15 NOTE — PROGRESS NOTES
"Daily Note     Today's date: 7/15/2024  Patient name: Ralph Abbott  : 1965  MRN: [id number]  Referring provider: Ronnie Dotson,*  Dx:   Encounter Diagnosis     ICD-10-CM    1. Primary osteoarthritis of left knee  M17.12                      Subjective: Patient notes that his knee still feels very stiff.        Objective: See treatment diary below      Assessment: Tolerated treatment fair. Patient would benefit from continued PT.  Worked on more functional movement today to help with strength and knee motion.  Patient still notes having a very stiff knee and the patient does demonstrate swelling still.  He is to see Dr. Dotson for follow-up this week.  Progress as able.        Plan: Progress treatment as tolerated.       Diagnosis:    Precautions:    POC Expires Auth Status Start Date Expiration Date PT Visit Limit     No Auth NA NA NA   Date 7/15  7/1 7/3 7/8   Used 11  8 9 10   Remaining        Manuals        PROM Flex (supine & prone) & Ext   Flex & Ext  Flex & Ext Flex & Ext    Mobs                                There Ex        Bike 5 min ROM   5 min ROM  5 min ROM 5 min ROM    Heel Slides    x10 x10   Gastroc Stretch   20\" x 4 w/ strap  20\" x 4 w/ strap 20\" x 4 w/ strap   Hamstring Stretch   20\" x 4 20\" x 4 w/ strap    Seated 20\" x 5 20\" x 4 w/ strap    HS Curls Standing 0# x10  4# x15        Prone Hangs 1' x 2 w/ 4#               ROM Flex: 114  Ext:  -3  Flex:  103  Ext:  -4 Flex:  114  Ext: -2 Flex:  110  Ext:  1 degrees extension    Neruo Re-Ed        Quad Set    TKE @ wall x10    SAQ/LAQ   SAQ 5# x10     SLR        TKE   Reviewed      Leg Press DL 80# 2x10  SL 50# 2x10  TRX Squats x10   TRX Squats x 15  70# DL x20    Retro Walking     [place] 15# x 8    Lateral Tap Down 6\" 2x10 on L     Up and over 6\" 2x10                                                        Gait Training         Hurdles     Leading and Following with L LE 8x ea Leading, following,and reciprocal gait pattern 8x          "   Modalities        CP PRN

## 2024-07-18 ENCOUNTER — APPOINTMENT (OUTPATIENT)
Dept: PHYSICAL THERAPY | Facility: CLINIC | Age: 59
End: 2024-07-18
Payer: COMMERCIAL

## 2024-07-19 ENCOUNTER — OFFICE VISIT (OUTPATIENT)
Dept: OBGYN CLINIC | Facility: MEDICAL CENTER | Age: 59
End: 2024-07-19

## 2024-07-19 VITALS
HEIGHT: 72 IN | HEART RATE: 86 BPM | DIASTOLIC BLOOD PRESSURE: 82 MMHG | BODY MASS INDEX: 30.61 KG/M2 | SYSTOLIC BLOOD PRESSURE: 144 MMHG | WEIGHT: 226 LBS

## 2024-07-19 DIAGNOSIS — Z47.1 AFTERCARE FOLLOWING LEFT KNEE JOINT REPLACEMENT SURGERY: Primary | ICD-10-CM

## 2024-07-19 DIAGNOSIS — Z96.652 AFTERCARE FOLLOWING LEFT KNEE JOINT REPLACEMENT SURGERY: Primary | ICD-10-CM

## 2024-07-19 PROCEDURE — 99024 POSTOP FOLLOW-UP VISIT: CPT | Performed by: ORTHOPAEDIC SURGERY

## 2024-07-19 NOTE — PROGRESS NOTES
Assessment:  1. Aftercare following left knee joint replacement surgery            Plan:  6 weeks s/p left TKA with robot, 6/6/2024   He is doing well.    He should continue physical therapy with focus on ROM  2-3x/week  He should follow up in 6 weeks      To do next visit:  Return in about 6 weeks (around 8/30/2024) for re-check with x-rays.    The above stated was discussed in layman's terms and the patient expressed understanding.  All questions were answered to the patient's satisfaction.       Scribe Attestation      I,:  Anthony Siddiqui am acting as a scribe while in the presence of the attending physician.:       I,:  Ronnie Dotson MD personally performed the services described in this documentation    as scribed in my presence.:               Subjective:   Ralph Abbott is a 59 y.o. male who presents 6 weeks s/p left TKA with robot, 6/6/2024.  He is doing well.  Today he has minimal left knee soreness.  He continues physical therapy with benefit.  He does swim in pool daily with benefit.  He denies fever, chills or shortness of breath.        Review of systems negative unless otherwise specified in HPI    Past Medical History:   Diagnosis Date    Arthritis     Bronchospasm     last assessed 6/14/2016    Depression     Disc degeneration, lumbar     last assessed 4/7/2016    Erectile dysfunction     Hyperlipidemia     Hypertension     IFG (impaired fasting glucose)     last assessed 7/30/2014    Palpitations     last assessed 4/4/2013    Primary osteoarthritis of knees, bilateral     last assessed 6/27/2017    Right inguinal hernia     last assessed 9/12/2017    Spondylosis of lumbar region without myelopathy or radiculopathy     last assessed 4/7/2016       Past Surgical History:   Procedure Laterality Date    COLONOSCOPY      KNEE ARTHROSCOPY      Right    DE ARTHRP KNE CONDYLE&PLATU MEDIAL&LAT COMPARTMENTS Right 01/21/2019    Procedure: ARTHROPLASTY KNEE TOTAL;  Surgeon: Ronnie Dotson MD;  Location:  [facility];  Service: Orthopedics    CO ARTHRP KNE CONDYLE&PLATU MEDIAL&LAT COMPARTMENTS Left 6/6/2024    Procedure: ARTHROPLASTY KNEE TOTAL W ROBOT;  Surgeon: Ronnie Dotson MD;  Location: [facility];  Service: Orthopedics    CO RPR 1ST INGUN HRNA AGE 5 YRS/> REDUCIBLE Right 10/26/2017    Procedure: REPAIR HERNIA INGUINAL;  Surgeon: Ha Hand MD;  Location: [facility];  Service: General    TOOTH EXTRACTION         Family History   Problem Relation Age of Onset    No Known Problems Mother     Diabetes Father     No Known Problems Sister     Coronary artery disease Brother        Social History     Occupational History    Not on file   Tobacco Use    Smoking status: Former     Current packs/day: 0.00     Average packs/day: 0.5 packs/day for 20.0 years (10.0 ttl pk-yrs)     Types: Cigarettes     Start date: 2001     Quit date: 2021     Years since quitting: 3.5    Smokeless tobacco: Never    Tobacco comments:     4-5 cig day x 20 yrs   Vaping Use    Vaping status: Never Used   Substance and Sexual Activity    Alcohol use: Yes     Alcohol/week: 4.0 standard drinks of alcohol     Types: 4 Cans of beer per week     Comment:  occasional    Drug use: Never    Sexual activity: Not on file         Current Outpatient Medications:     acetaminophen (TYLENOL) 650 mg CR tablet, Take 1 tablet (650 mg total) by mouth every 8 (eight) hours as needed for mild pain, Disp: 30 tablet, Rfl: 0    Galcanezumab-gnlm (Emgality) 120 MG/ML SOAJ, Inject 1 pen subq every 30 days, Disp: 3 mL, Rfl: 4    meloxicam (MOBIC) 15 mg tablet, Take 1 tablet (15 mg total) by mouth if needed for mild pain or moderate pain, Disp: 90 tablet, Rfl: 1    metoprolol succinate (TOPROL-XL) 50 mg 24 hr tablet, Take 1.5 tablets (75 mg total) by mouth daily, Disp: 135 tablet, Rfl: 1    oxyCODONE (Roxicodone) 5 immediate release tablet, Take 1 tablet (5 mg total) by mouth every 6 (six) hours as needed for moderate pain Max Daily Amount: 20 mg, Disp: 20  "tablet, Rfl: 0    rimegepant sulfate (Nurtec) 75 mg TBDP, TAKE 1 TABLET BY MOUTH EVERY DAY AS NEEDED FOR MIGRAINE HEADACHE. NO MORE THAN 1 DOSE PER 24 HOURS., Disp: 16 tablet, Rfl: 11    rosuvastatin (CRESTOR) 5 mg tablet, Take 1 tablet (5 mg total) by mouth every other day, Disp: 90 tablet, Rfl: 1    sildenafil (VIAGRA) 100 mg tablet, Take 1 tablet (100 mg total) by mouth daily as needed for erectile dysfunction, Disp: 10 tablet, Rfl: 0    enoxaparin (LOVENOX) 40 mg/0.4 mL, Inject 0.4 mL (40 mg total) under the skin daily for 28 days To start Post-Op, Disp: 11.2 mL, Rfl: 0    methocarbamol (ROBAXIN) 500 mg tablet, Take 1 tablet (500 mg total) by mouth 3 (three) times a day as needed for muscle spasms (Patient not taking: Reported on 7/19/2024), Disp: 60 tablet, Rfl: 0    methocarbamol (ROBAXIN) 500 mg tablet, Take 1 tablet (500 mg total) by mouth 4 (four) times a day (Patient not taking: Reported on 7/19/2024), Disp: 60 tablet, Rfl: 0    No Known Allergies         Vitals:    07/19/24 1503   BP: 144/82   Pulse: 86       Objective:  Physical exam  General: Awake, Alert, Oriented  Eyes: Pupils equal, round and reactive to light  Heart: regular rate and rhythm  Lungs: No audible wheezing  Abdomen: soft                    Ortho Exam  Left knee:  Well healed anterior incision  No erythema and no ecchymosis  Appropriate swelling of lower limb  Appropriate warmth of knee  Extensor mechanism intact  Extension 10  Flexion 95  Calf compartments soft and supple  Sensation intact  Toes are warm sensate and mobile      Diagnostics, reviewed and taken today if performed as documented:    None performed    Procedures, if performed today:    Procedures    None performed      Portions of the record may have been created with voice recognition software.  Occasional wrong word or \"sound a like\" substitutions may have occurred due to the inherent limitations of voice recognition software.  Read the chart carefully and recognize, using " context, where substitutions have occurred.

## 2024-07-22 ENCOUNTER — APPOINTMENT (OUTPATIENT)
Dept: PHYSICAL THERAPY | Facility: CLINIC | Age: 59
End: 2024-07-22
Payer: COMMERCIAL

## 2024-07-22 DIAGNOSIS — Z47.89 ORTHOPEDIC AFTERCARE: ICD-10-CM

## 2024-07-22 RX ORDER — OXYCODONE HYDROCHLORIDE 5 MG/1
5 TABLET ORAL EVERY 6 HOURS PRN
Qty: 20 TABLET | Refills: 0 | Status: SHIPPED | OUTPATIENT
Start: 2024-07-22

## 2024-07-25 ENCOUNTER — OFFICE VISIT (OUTPATIENT)
Dept: PHYSICAL THERAPY | Facility: CLINIC | Age: 59
End: 2024-07-25
Payer: COMMERCIAL

## 2024-07-25 DIAGNOSIS — M17.12 PRIMARY OSTEOARTHRITIS OF LEFT KNEE: Primary | ICD-10-CM

## 2024-07-25 PROCEDURE — 97140 MANUAL THERAPY 1/> REGIONS: CPT | Performed by: PHYSICAL THERAPIST

## 2024-07-25 PROCEDURE — 97112 NEUROMUSCULAR REEDUCATION: CPT | Performed by: PHYSICAL THERAPIST

## 2024-07-25 PROCEDURE — 97110 THERAPEUTIC EXERCISES: CPT | Performed by: PHYSICAL THERAPIST

## 2024-07-25 NOTE — PROGRESS NOTES
"Daily Note     Today's date: 2024  Patient name: Ralph Abbott  : 1965  MRN: [id number]  Referring provider: Ronnie Dotson,*  Dx:   Encounter Diagnosis     ICD-10-CM    1. Primary osteoarthritis of left knee  M17.12                      Subjective: Patient notes that he is feeling OK.        Objective: See treatment diary below      Assessment: Tolerated treatment well. Patient would benefit from continued PT.  Patient doing better functionally, but still has stiffness / tightness in his knee.  Continuing to heavily focus on ROM.  Patient is no longer walking with his SPC.  Continue to progress as able.        Plan: Progress treatment as tolerated.       Diagnosis:    Precautions:    POC Expires Auth Status Start Date Expiration Date PT Visit Limit     No Auth NA NA NA   Date 7/15 7/25  7/3 7/8   Used 11 12  9 10   Remaining        Manuals        PROM Flex (supine & prone) & Ext  Flex (supine & prone) & Ext   Flex & Ext Flex & Ext    Mobs                                There Ex        Bike 5 min ROM  5 min ROM   5 min ROM 5 min ROM    Heel Slides    x10 x10   Gastroc Stretch    20\" x 4 w/ strap 20\" x 4 w/ strap   Hamstring Stretch    20\" x 4 w/ strap    Seated 20\" x 5 20\" x 4 w/ strap    HS Curls Standing 0# x10  4# x15  Standing x15  OSB x20      Prone Hangs 1' x 2 w/ 4#               ROM Flex: 114  Ext:  -3 Flex:  110  Ext:  Lacking 3   Flex:  114  Ext: -2 Flex:  110  Ext:  1 degrees extension    Neruo Re-Ed        Quad Set    TKE @ wall x10    SAQ/LAQ        SLR        TKE        Leg Press DL 80# 2x10  SL 50# 2x10    TRX Squats x 15  70# DL x20    Retro Walking     [place] 15# x 8    Lateral Tap Down 6\" 2x10 on L     Up and over 6\" 2x10 6\" up and over x10                                                       Gait Training         Hurdles     Leading and Following with L LE 8x ea Leading, following,and reciprocal gait pattern 8x            Modalities        CP PRN                           "

## 2024-07-29 ENCOUNTER — OFFICE VISIT (OUTPATIENT)
Dept: PHYSICAL THERAPY | Facility: CLINIC | Age: 59
End: 2024-07-29
Payer: COMMERCIAL

## 2024-07-29 DIAGNOSIS — M17.12 PRIMARY OSTEOARTHRITIS OF LEFT KNEE: Primary | ICD-10-CM

## 2024-07-29 PROCEDURE — 97110 THERAPEUTIC EXERCISES: CPT | Performed by: PHYSICAL THERAPIST

## 2024-07-29 PROCEDURE — 97112 NEUROMUSCULAR REEDUCATION: CPT | Performed by: PHYSICAL THERAPIST

## 2024-07-29 PROCEDURE — 97140 MANUAL THERAPY 1/> REGIONS: CPT | Performed by: PHYSICAL THERAPIST

## 2024-07-29 NOTE — PROGRESS NOTES
"Daily Note     Today's date: 2024  Patient name: Ralph Abbott  : 1965  MRN: [id number]  Referring provider: Ronnie Dotson,*  Dx:   Encounter Diagnosis     ICD-10-CM    1. Primary osteoarthritis of left knee  M17.12                      Subjective: Patient feels that his pain is his biggest limiting factor.        Objective: See treatment diary below      Assessment: Tolerated treatment well. Patient would benefit from continued PT.  Patient doing better with his flexion today, hitting a new high of 116.  Patient still notes overall pain with PROM stretching.  Still lacking full extension.  Progress as able.        Plan: Progress treatment as tolerated.       Diagnosis:    Precautions:    POC Expires Auth Status Start Date Expiration Date PT Visit Limit     No Auth NA NA NA   Date 7/15 7/25 7/29  7/8   Used 11 12   10   Remaining        Manuals        PROM Flex (supine & prone) & Ext  Flex (supine & prone) & Ext  Flex (supine & Prone) & Ext   Flex & Ext    Mobs                                There Ex        Bike 5 min ROM  5 min ROM  5 min ROM  5 min ROM    Heel Slides   x10  x10   Gastroc Stretch   20\" x 4  20\" x 4 w/ strap   Hamstring Stretch   20\" x 4  20\" x 4 w/ strap    HS Curls Standing 0# x10  4# x15  Standing x15  OSB x20 Supine w/ OSB x20     Prone Hangs 1' x 2 w/ 4#               ROM Flex: 114  Ext:  -3 Flex:  110  Ext:  Lacking 3  Flex:  116  Ext:  Lacking 3   Flex:  110  Ext:  1 degrees extension    Neruo Re-Ed        Quad Set        SAQ/LAQ        SLR        TKE        Leg Press DL 80# 2x10  SL 50# 2x10  SL 65# x20  TRX quats x15   TRX Squats x 15  70# DL x20    Retro Walking         Lateral Tap Down 6\" 2x10 on L     Up and over 6\" 2x10 6\" up and over x10 8\" up and over x10                                                      Gait Training         Hurdles      Leading, following,and reciprocal gait pattern 8x            Modalities        CP PRN                          "

## 2024-08-01 ENCOUNTER — OFFICE VISIT (OUTPATIENT)
Dept: PHYSICAL THERAPY | Facility: CLINIC | Age: 59
End: 2024-08-01
Payer: COMMERCIAL

## 2024-08-01 DIAGNOSIS — M17.12 PRIMARY OSTEOARTHRITIS OF LEFT KNEE: Primary | ICD-10-CM

## 2024-08-01 PROCEDURE — 97112 NEUROMUSCULAR REEDUCATION: CPT | Performed by: PHYSICAL THERAPIST

## 2024-08-01 PROCEDURE — 97110 THERAPEUTIC EXERCISES: CPT | Performed by: PHYSICAL THERAPIST

## 2024-08-01 PROCEDURE — 97140 MANUAL THERAPY 1/> REGIONS: CPT | Performed by: PHYSICAL THERAPIST

## 2024-08-01 NOTE — PROGRESS NOTES
"Daily Note     Today's date: 2024  Patient name: Ralph Abbott  : 1965  MRN: [id number]  Referring provider: Ronnie Dotson,*  Dx:   Encounter Diagnosis     ICD-10-CM    1. Primary osteoarthritis of left knee  M17.12                      Subjective: Patient notes that his pain continues to get better, but his knee still feels stiff.         Objective: See treatment diary below      Assessment: Tolerated treatment well. Patient would benefit from continued PT.  Continuing to push ROM.  Able to tolerate all PREs today with emphasis on strength and stability.  Progress as able.        Plan: Progress treatment as tolerated.       Diagnosis:    Precautions:    POC Expires Auth Status Start Date Expiration Date PT Visit Limit     No Auth NA NA NA   Date 7/15 7/25 7/29 8/1    Used 11 12 13 14    Remaining        Manuals        PROM Flex (supine & prone) & Ext  Flex (supine & prone) & Ext  Flex (supine & Prone) & Ext  Flex (supine & Prone) & Ext     Mobs                                There Ex        Bike 5 min ROM  5 min ROM  5 min ROM 5 min ROM    Heel Slides   x10 X10     Gastroc Stretch   20\" x 4 20\" x 4    Hamstring Stretch   20\" x 4 20\" x 4     HS Curls Standing 0# x10  4# x15  Standing x15  OSB x20 Supine w/ OSB x20     Prone Hangs 1' x 2 w/ 4#               ROM Flex: 114  Ext:  -3 Flex:  110  Ext:  Lacking 3  Flex:  116  Ext:  Lacking 3  Flex:  118  Ext:  Lacking 4    Neruo Re-Ed        Quad Set        SAQ/LAQ        SLR        TKE        Leg Press DL 80# 2x10  SL 50# 2x10  SL 65# x20  TRX quats x15  TRX squats x20    Retro Walking         Lateral Tap Down 6\" 2x10 on L     Up and over 6\" 2x10 6\" up and over x10 8\" up and over x10 8\" up and Over x10    SLS    Rebounder Red MB x20    Resisted Walk Outs    20# fwd x12                                     Gait Training         Hurdles                  Modalities        CP PRN                                                         "

## 2024-08-03 DIAGNOSIS — N52.9 ERECTILE DYSFUNCTION, UNSPECIFIED ERECTILE DYSFUNCTION TYPE: ICD-10-CM

## 2024-08-03 DIAGNOSIS — Z47.89 ORTHOPEDIC AFTERCARE: ICD-10-CM

## 2024-08-04 RX ORDER — SILDENAFIL 100 MG/1
100 TABLET, FILM COATED ORAL DAILY PRN
Qty: 10 TABLET | Refills: 2 | Status: SHIPPED | OUTPATIENT
Start: 2024-08-04

## 2024-08-05 RX ORDER — OXYCODONE HYDROCHLORIDE 5 MG/1
5 TABLET ORAL EVERY 6 HOURS PRN
Qty: 20 TABLET | Refills: 0 | Status: SHIPPED | OUTPATIENT
Start: 2024-08-05

## 2024-08-07 ENCOUNTER — OFFICE VISIT (OUTPATIENT)
Dept: PHYSICAL THERAPY | Facility: CLINIC | Age: 59
End: 2024-08-07
Payer: COMMERCIAL

## 2024-08-07 DIAGNOSIS — M17.12 PRIMARY OSTEOARTHRITIS OF LEFT KNEE: Primary | ICD-10-CM

## 2024-08-07 PROCEDURE — 97140 MANUAL THERAPY 1/> REGIONS: CPT

## 2024-08-07 PROCEDURE — 97110 THERAPEUTIC EXERCISES: CPT

## 2024-08-07 PROCEDURE — 97112 NEUROMUSCULAR REEDUCATION: CPT

## 2024-08-07 NOTE — PROGRESS NOTES
"Daily Note     Today's date: 2024  Patient name: Ralph Abbott  : 1965  MRN: [id number]  Referring provider: Ronnie Dotson,*  Dx:   Encounter Diagnosis     ICD-10-CM    1. Primary osteoarthritis of left knee  M17.12           Start Time: 1530  Stop Time: 1625  Total time in clinic (min): 55 minutes    Subjective: Patient states he was in a lot of pain following last session and he states it took him three days to recover.       Objective: See treatment diary below      Assessment: Continued with outlined program. Patient has noticeable improvement with ROM on the bike to begin. He exhibits good technique with step overs, more challenged with step downs due to quad control. He is able to achieve a deep squat with TRX straps. Patient has noticeable restrictions with patellar ROM which did improve following manuals. Patient is slowly progressing with ROM. Will progress as he is able to tolerate.       Plan: Continue per plan of care.      Diagnosis:    Precautions:    POC Expires Auth Status Start Date Expiration Date PT Visit Limit     No Auth NA NA NA   Date 7/15 7/25 7/29 8/1 8/7   Used 11 12 13 14 15   Remaining        Manuals        PROM Flex (supine & prone) & Ext  Flex (supine & prone) & Ext  Flex (supine & Prone) & Ext  Flex (supine & Prone) & Ext  KK, PTA    Mobs        Patellar ROM     KK, PTA                    There Ex        Bike 5 min ROM  5 min ROM  5 min ROM 5 min ROM 5 min ROM    Heel Slides   x10 X10  X10    Gastroc Stretch   20\" x 4 20\" x 4 20\"x4   Hamstring Stretch   20\" x 4 20\" x 4  20\"x4   HS Curls Standing 0# x10  4# x15  Standing x15  OSB x20 Supine w/ OSB x20     Prone Hangs 1' x 2 w/ 4#               ROM Flex: 114  Ext:  -3 Flex:  110  Ext:  Lacking 3  Flex:  116  Ext:  Lacking 3  Flex:  118  Ext:  Lacking 4 Flex:116*  Ext:lacking 2*    Neruo Re-Ed        Quad Set        SAQ/LAQ        SLR        TKE        Leg Press DL 80# 2x10  SL 50# 2x10  SL 65# x20  TRX quats x15  TRX " "squats x20 TRX squats 20x    Retro Walking         Lateral Tap Down 6\" 2x10 on L     Up and over 6\" 2x10 6\" up and over x10 8\" up and over x10 8\" up and Over x10 8\" up and over 20x    SLS    Rebounder Red MB x20 Rebounder YMB x35   Resisted Walk Outs    20# fwd x12 20# fwd/bkwd x12                                    Gait Training         Hurdles                  Modalities        CP PRN                                                           "

## 2024-08-12 ENCOUNTER — OFFICE VISIT (OUTPATIENT)
Dept: PHYSICAL THERAPY | Facility: CLINIC | Age: 59
End: 2024-08-12
Payer: COMMERCIAL

## 2024-08-12 DIAGNOSIS — Z47.1 AFTERCARE FOLLOWING RIGHT KNEE JOINT REPLACEMENT SURGERY: Primary | ICD-10-CM

## 2024-08-12 DIAGNOSIS — M17.12 PRIMARY OSTEOARTHRITIS OF LEFT KNEE: Primary | ICD-10-CM

## 2024-08-12 DIAGNOSIS — Z96.651 AFTERCARE FOLLOWING RIGHT KNEE JOINT REPLACEMENT SURGERY: Primary | ICD-10-CM

## 2024-08-12 PROCEDURE — 97112 NEUROMUSCULAR REEDUCATION: CPT | Performed by: PHYSICAL THERAPIST

## 2024-08-12 PROCEDURE — 97140 MANUAL THERAPY 1/> REGIONS: CPT | Performed by: PHYSICAL THERAPIST

## 2024-08-12 PROCEDURE — 97110 THERAPEUTIC EXERCISES: CPT | Performed by: PHYSICAL THERAPIST

## 2024-08-12 RX ORDER — TRAMADOL HYDROCHLORIDE 50 MG/1
50 TABLET ORAL EVERY 6 HOURS PRN
Qty: 20 TABLET | Refills: 0 | Status: SHIPPED | OUTPATIENT
Start: 2024-08-12

## 2024-08-12 NOTE — PROGRESS NOTES
"Daily Note     Today's date: 2024  Patient name: Ralph Abbott  : 1965  MRN: [id number]  Referring provider: Ronnie Dotson,*  Dx:   Encounter Diagnosis     ICD-10-CM    1. Primary osteoarthritis of left knee  M17.12                      Subjective: Patient notes that he has been able to go down the stairs with a regular gait pattern.        Objective: See treatment diary below      Assessment: Tolerated treatment well. Patient would benefit from continued PT.  Patient feels that he is improving.  Notes that he has been able to go down to the stairs with a reciprocal gait pattern.  Still notes that he is having discomfort with stretching - educated patient to continue to work on prolonged stretching, especially with extension.  Progress as able.        Plan: Progress treatment as tolerated.       Diagnosis:    Precautions:    POC Expires Auth Status Start Date Expiration Date PT Visit Limit     No Auth NA NA NA   Date    Used 16  13 14 15   Remaining        Manuals        PROM RT   Flex (supine & Prone) & Ext  Flex (supine & Prone) & Ext  KK, PTA    Mobs        Patellar ROM     KK, PTA                    There Ex        Bike 5 min rOM  5 min ROM 5 min ROM 5 min ROM    Heel Slides x10  x10 X10  X10    Gastroc Stretch 20\" x 4  20\" x 4 20\" x 4 20\"x4   Hamstring Stretch 20\" x 4  20\" x 4 20\" x 4  20\"x4   HS Curls   Supine w/ OSB x20     Prone Hangs                ROM Flex:  110 on own  Ext:  Lacking 2   Flex:  116  Ext:  Lacking 3  Flex:  118  Ext:  Lacking 4 Flex:116*  Ext:lacking 2*    Neruo Re-Ed        Quad Set        SAQ/LAQ        SLR        TKE        Leg Press TRX Squats 20x    75# 2x10 SL   SL 65# x20  TRX quats x15  TRX squats x20 TRX squats 20x    Retro Walking         Lateral Tap Down 8\" up and over 20x  8\" up and over x10 8\" up and Over x10 8\" up and over 20x    SLS    Rebounder Red MB x20 Rebounder YMB x35   Resisted Walk Outs    20# fwd x12 20# fwd/bkwd x12              "                       Gait Training         Hurdles                  Modalities        CP PRN

## 2024-08-15 DIAGNOSIS — G43.009 MIGRAINE WITHOUT AURA AND WITHOUT STATUS MIGRAINOSUS, NOT INTRACTABLE: ICD-10-CM

## 2024-08-19 ENCOUNTER — OFFICE VISIT (OUTPATIENT)
Dept: PHYSICAL THERAPY | Facility: CLINIC | Age: 59
End: 2024-08-19
Payer: COMMERCIAL

## 2024-08-19 DIAGNOSIS — M17.12 PRIMARY OSTEOARTHRITIS OF LEFT KNEE: Primary | ICD-10-CM

## 2024-08-19 PROCEDURE — 97140 MANUAL THERAPY 1/> REGIONS: CPT | Performed by: PHYSICAL THERAPIST

## 2024-08-19 PROCEDURE — 97110 THERAPEUTIC EXERCISES: CPT | Performed by: PHYSICAL THERAPIST

## 2024-08-19 PROCEDURE — 97112 NEUROMUSCULAR REEDUCATION: CPT | Performed by: PHYSICAL THERAPIST

## 2024-08-19 NOTE — PROGRESS NOTES
"Daily Note     Today's date: 2024  Patient name: Ralph Abbott  : 1965  MRN: [id number]  Referring provider: Ronnie Dotson,*  Dx:   Encounter Diagnosis     ICD-10-CM    1. Primary osteoarthritis of left knee  M17.12                      Subjective: Patient notes that he was stretching his knee and felt a pop the other day.        Objective: See treatment diary below      Assessment: Tolerated treatment well. Patient would benefit from continued PT.   Patient doing well overall.  Demonstrating better ROM overall.  Patient is doing much better functionally,  Patient will be 12 weeks post-op next week where we will then transition to his HEP full time.        Plan: Progress treatment as tolerated.       Diagnosis:    Precautions:    POC Expires Auth Status Start Date Expiration Date PT Visit Limit     No Auth NA NA NA   Date    Used 16 17  14 15   Remaining        Manuals        PROM RT  RT  Flex (supine & Prone) & Ext  KK, PTA    Mobs        Patellar ROM     KK, PTA                    There Ex        Bike 5 min rOM 5 min ROM  5 min ROM 5 min ROM    Heel Slides x10 x10  X10  X10    Gastroc Stretch 20\" x 4 20\" x 4  20\" x 4 20\"x4   Hamstring Stretch 20\" x 4 20\" x 4  20\" x 4  20\"x4   HS Curls        Prone Hangs                ROM Flex:  110 on own  Ext:  Lacking 2  Flex:  114 on own    Flex:  118  Ext:  Lacking 4 Flex:116*  Ext:lacking 2*    Neruo Re-Ed        Quad Set        SAQ/LAQ        SLR        TKE        Leg Press TRX Squats 20x    75# 2x10 SL  TRX Squats 20x       # 2x10  SL 75# 2x10   TRX squats x20 TRX squats 20x    Retro Walking         Lateral Tap Down 8\" up and over 20x 8\" up and over 20x   8\" up and Over x10 8\" up and over 20x    SLS  Rebounder YMB x20 fwd  YMB x20 lateral  Rebounder Red MB x20 Rebounder YMB x35   Resisted Walk Outs    20# fwd x12 20# fwd/bkwd x12                                    Gait Training         Hurdles                  Modalities      "   CP PRN

## 2024-08-20 RX ORDER — GALCANEZUMAB 120 MG/ML
INJECTION, SOLUTION SUBCUTANEOUS
Qty: 3 ML | Refills: 0 | Status: SHIPPED | OUTPATIENT
Start: 2024-08-20

## 2024-08-22 ENCOUNTER — TELEPHONE (OUTPATIENT)
Age: 59
End: 2024-08-22

## 2024-08-22 NOTE — TELEPHONE ENCOUNTER
Caller: Richard, spouse     Doctor: Nila     Reason for call: Patient is s/p left TKA, sx 6/6/24. She states that the patient's left knee is swelling and warm to the touch. She is requesting lab orders be placed for CRP & ESR. Patient has PO appt 8/30/24  Please call her to confirm orders were placed     Call back#: [phone]

## 2024-08-23 ENCOUNTER — OFFICE VISIT (OUTPATIENT)
Dept: OBGYN CLINIC | Facility: MEDICAL CENTER | Age: 59
End: 2024-08-23

## 2024-08-23 ENCOUNTER — APPOINTMENT (OUTPATIENT)
Dept: RADIOLOGY | Facility: MEDICAL CENTER | Age: 59
End: 2024-08-23
Payer: COMMERCIAL

## 2024-08-23 VITALS
HEIGHT: 72 IN | HEART RATE: 97 BPM | WEIGHT: 221 LBS | SYSTOLIC BLOOD PRESSURE: 146 MMHG | BODY MASS INDEX: 29.93 KG/M2 | DIASTOLIC BLOOD PRESSURE: 93 MMHG

## 2024-08-23 DIAGNOSIS — Z96.652 AFTERCARE FOLLOWING LEFT KNEE JOINT REPLACEMENT SURGERY: ICD-10-CM

## 2024-08-23 DIAGNOSIS — Z47.1 AFTERCARE FOLLOWING LEFT KNEE JOINT REPLACEMENT SURGERY: Primary | ICD-10-CM

## 2024-08-23 DIAGNOSIS — Z47.1 AFTERCARE FOLLOWING LEFT KNEE JOINT REPLACEMENT SURGERY: ICD-10-CM

## 2024-08-23 DIAGNOSIS — Z96.652 AFTERCARE FOLLOWING LEFT KNEE JOINT REPLACEMENT SURGERY: Primary | ICD-10-CM

## 2024-08-23 PROCEDURE — 99024 POSTOP FOLLOW-UP VISIT: CPT | Performed by: ORTHOPAEDIC SURGERY

## 2024-08-23 PROCEDURE — 73560 X-RAY EXAM OF KNEE 1 OR 2: CPT

## 2024-08-23 NOTE — PROGRESS NOTES
Assessment:   Diagnosis ICD-10-CM Associated Orders   1. Aftercare following left knee joint replacement surgery  Z47.1 XR knee 1 or 2 vw left    Z96.652           Plan:  59 y.o. male presents 3 months out after left total knee arthroplasty  Continue physical therapy   Continue weight bearing activities as tolerated   Continue increasing physical activity  Continue OTC pain medications as needed   The patient was educated on prophylactic antibiotic use prior to dental visits for the next 2 years   Follow up in 6 weeks       The above stated was discussed in layman's terms and the patient expressed understanding.  All questions were answered to the patient's satisfaction.     To do next visit:  Return in about 6 weeks (around 10/4/2024).      Subjective:   Ralph Abbott is a 59 y.o. male who presents for 3-month after left total knee arthroplasty.  He reports he has been having some swelling about the knee and some erythema around the knee.  He reports that his physical therapy time is finished, but he is still experiencing some stiffness with flexion and not able to get out to full extension.  He is been able to actively participate in activities daily life without any issues, his pain is under control.  He denies any new trauma to the knee, no numbness or tingling.      Review of systems negative unless otherwise specified in HPI    Past Medical History:   Diagnosis Date    Arthritis     Bronchospasm     last assessed 6/14/2016    Depression     Disc degeneration, lumbar     last assessed 4/7/2016    Erectile dysfunction     Hyperlipidemia     Hypertension     IFG (impaired fasting glucose)     last assessed 7/30/2014    Palpitations     last assessed 4/4/2013    Primary osteoarthritis of knees, bilateral     last assessed 6/27/2017    Right inguinal hernia     last assessed 9/12/2017    Spondylosis of lumbar region without myelopathy or radiculopathy     last assessed 4/7/2016       Past Surgical History:    Procedure Laterality Date    COLONOSCOPY      KNEE ARTHROSCOPY      Right    NC ARTHRP KNE CONDYLE&PLATU MEDIAL&LAT COMPARTMENTS Right 01/21/2019    Procedure: ARTHROPLASTY KNEE TOTAL;  Surgeon: Ronnie Dotson MD;  [facility];  Service: Orthopedics    NC ARTHRP KNE CONDYLE&PLATU MEDIAL&LAT COMPARTMENTS Left 6/6/2024    Procedure: ARTHROPLASTY KNEE TOTAL W ROBOT;  Surgeon: Ronnie Dotson MD;  Location: [facility];  Service: Orthopedics    NC RPR 1ST INGUN HRNA AGE 5 YRS/> REDUCIBLE Right 10/26/2017    Procedure: REPAIR HERNIA INGUINAL;  Surgeon: Ha Hand MD;  Location: [facility];  Service: General    TOOTH EXTRACTION         Family History   Problem Relation Age of Onset    No Known Problems Mother     Diabetes Father     No Known Problems Sister     Coronary artery disease Brother        Social History     Occupational History    Not on file   Tobacco Use    Smoking status: Former     Current packs/day: 0.00     Average packs/day: 0.5 packs/day for 20.0 years (10.0 ttl pk-yrs)     Types: Cigarettes     Start date: 2001     Quit date: 2021     Years since quitting: 3.6    Smokeless tobacco: Never    Tobacco comments:     4-5 cig day x 20 yrs   Vaping Use    Vaping status: Never Used   Substance and Sexual Activity    Alcohol use: Yes     Alcohol/week: 4.0 standard drinks of alcohol     Types: 4 Cans of beer per week     Comment:  occasional    Drug use: Never    Sexual activity: Not on file         Current Outpatient Medications:     acetaminophen (TYLENOL) 650 mg CR tablet, Take 1 tablet (650 mg total) by mouth every 8 (eight) hours as needed for mild pain, Disp: 30 tablet, Rfl: 0    Galcanezumab-gnlm (Emgality) 120 MG/ML SOAJ, INJECT THE CONTENTS OF 1 PEN UNDER THE SKIN EVERY 30 DAYS., Disp: 3 mL, Rfl: 0    meloxicam (MOBIC) 15 mg tablet, Take 1 tablet (15 mg total) by mouth if needed for mild pain or moderate pain, Disp: 90 tablet, Rfl: 1    metoprolol succinate (TOPROL-XL) 50 mg 24  hr tablet, Take 1.5 tablets (75 mg total) by mouth daily, Disp: 135 tablet, Rfl: 1    rimegepant sulfate (Nurtec) 75 mg TBDP, TAKE 1 TABLET BY MOUTH EVERY DAY AS NEEDED FOR MIGRAINE HEADACHE. NO MORE THAN 1 DOSE PER 24 HOURS., Disp: 16 tablet, Rfl: 11    rosuvastatin (CRESTOR) 5 mg tablet, Take 1 tablet (5 mg total) by mouth every other day, Disp: 90 tablet, Rfl: 1    sildenafil (VIAGRA) 100 mg tablet, Take 1 tablet (100 mg total) by mouth daily as needed for erectile dysfunction, Disp: 10 tablet, Rfl: 2    traMADol (Ultram) 50 mg tablet, Take 1 tablet (50 mg total) by mouth every 6 (six) hours as needed for moderate pain, Disp: 20 tablet, Rfl: 0    enoxaparin (LOVENOX) 40 mg/0.4 mL, Inject 0.4 mL (40 mg total) under the skin daily for 28 days To start Post-Op, Disp: 11.2 mL, Rfl: 0    methocarbamol (ROBAXIN) 500 mg tablet, Take 1 tablet (500 mg total) by mouth 3 (three) times a day as needed for muscle spasms (Patient not taking: Reported on 7/19/2024), Disp: 60 tablet, Rfl: 0    methocarbamol (ROBAXIN) 500 mg tablet, Take 1 tablet (500 mg total) by mouth 4 (four) times a day (Patient not taking: Reported on 7/19/2024), Disp: 60 tablet, Rfl: 0    oxyCODONE (Roxicodone) 5 immediate release tablet, Take 1 tablet (5 mg total) by mouth every 6 (six) hours as needed for moderate pain Max Daily Amount: 20 mg (Patient not taking: Reported on 8/23/2024), Disp: 20 tablet, Rfl: 0    No Known Allergies         Vitals:    08/23/24 1258   BP: 146/93   Pulse: 97       Objective:  Physical exam  General: Awake, Alert, Oriented  Eyes: Pupils equal, round and reactive to light  Heart: regular rate and rhythm  Lungs: No audible wheezing  Abdomen: soft          Examination of the left lower extremity shows the knee with a midline well-healed incision, mild effusion present at the knee, range of motion , intact ankle dorsiflexion, sensation intact  Diagnostics, reviewed and taken today if performed as documented:    X-rays  "obtained today show a well bonded implant, well aligned implant, no evidence of failure, no signs of loosening    Procedures, if performed today:    None performed      Portions of the record may have been created with voice recognition software.  Occasional wrong word or \"sound a like\" substitutions may have occurred due to the inherent limitations of voice recognition software.  Read the chart carefully and recognize, using context, where substitutions have occurred.        "

## 2024-08-26 ENCOUNTER — OFFICE VISIT (OUTPATIENT)
Dept: PHYSICAL THERAPY | Facility: CLINIC | Age: 59
End: 2024-08-26
Payer: COMMERCIAL

## 2024-08-26 DIAGNOSIS — Z47.89 ORTHOPEDIC AFTERCARE: Primary | ICD-10-CM

## 2024-08-26 DIAGNOSIS — M17.12 PRIMARY OSTEOARTHRITIS OF LEFT KNEE: Primary | ICD-10-CM

## 2024-08-26 PROCEDURE — 97110 THERAPEUTIC EXERCISES: CPT | Performed by: PHYSICAL THERAPIST

## 2024-08-26 PROCEDURE — 97112 NEUROMUSCULAR REEDUCATION: CPT | Performed by: PHYSICAL THERAPIST

## 2024-08-26 NOTE — PROGRESS NOTES
PT Discharge    Today's date: 2024  Patient name: Ralph Abbott  : 1965  MRN: [id number]  Referring provider: Ronnie Dotson,*  Dx:   Encounter Diagnosis     ICD-10-CM    1. Primary osteoarthritis of left knee  M17.12                          Assessment  Impairments: abnormal muscle firing, abnormal or restricted ROM, activity intolerance, impaired physical strength, lacks appropriate home exercise program, pain with function, poor posture  and poor body mechanics    Assessment details: Ralph Abbott is a 59 y.o. male who has been consistent with attending and participating in skilled PT sessions.  The patient has done very well with physical therapy.  He has been able to demonstrate improved ROM as well as improved strength.  His gait pattern has been able to normalize, but patient can still note some stiffness form time to time.  He has been happy with his progress and will transition to his HEP at this time.  If Lex were to need PT in the future, we would be happy to share in his care.      Understanding of Dx/Px/POC: good     Prognosis: good    Goals  Impairment Goals  - Decrease pain by 50% in 12 weeks - MET  - Increase left flexibility by 50% in 12 weeks - MET  - Increase left lower extremity strength golbally to 4+/5 in 12 weeks - MET    Functional Goals  - Return to Prior Level of Function in 12 weeks - MET  - Patient will be independent with HEP in 12 weeks - MET  - Patient will be able to ambulate with the least restrictive assistive device in 12 weeks - MET  - Patient will be able to ascend/descend stairs with decreased pain in 12 weeks- MET  - Patient will be able to complete ADLs with decreased pain in 12 weeks - MET      Plan  Patient would benefit from: skilled physical therapy  Planned modality interventions: cryotherapy, thermotherapy: hydrocollator packs and TENS    Planned therapy interventions: home exercise program, graded exercise, functional ROM exercises, flexibility, body  "mechanics training, postural training, patient education, therapeutic activities, therapeutic exercise, manual therapy, joint mobilization and neuromuscular re-education    Frequency: 2x week  Duration in weeks: 4  Treatment plan discussed with: patient      Subjective Evaluation    History of Present Illness  Mechanism of injury: RE: Patient presents s/p L knee TKA on 24. Patient did stay over night and went home the next day. He reports that Patient reports high pain, and decreased appetite. He reports that mobility is hard, and he has had a lot of pain. Patient reports that he feels horrible. He notes stairs are hard.     HPI:  Patient notes having left knee pain for ~5 years.  He notes that he pain has gotten worse over time.  He has tried injections with little to no benefit.  He is now scheduled for a left knee replacement on 24.      Pain Location:  L Knee   Occupation:  Heating  Prior Functional Limitations:  AGG:  Working, climbing, crawling, ambulating   Ease:  Sit, rest, elevate   Patient Goals:  \"Want to be able to move\"     Pain  Current pain ratin  At best pain ratin  At worst pain rating: 10  Quality: sharp      Objective     Active Range of Motion   Left Knee   Flexion: 114 degrees   Extension: -3 degrees     Right Knee   Flexion: 122 degrees   Extension: 0 degrees     Strength/Myotome Testing     Left Hip   Planes of Motion   Flexion: 5    Right Hip   Planes of Motion   Flexion: 5    Left Knee   Flexion: 4+  Extension: 4+    Right Knee   Normal strength    Left Ankle/Foot   Dorsiflexion: 5  Great toe extension: 5    Right Ankle/Foot   Dorsiflexion: 5  Great toe extension: 5    Functional Assessment        Comments  TU.64 sec no AD 24               Diagnosis:    Precautions:    POC Expires Auth Status Start Date Expiration Date PT Visit Limit     No Auth NA NA NA   Date    Used 16 17 18  15   Remaining        Manuals        PROM RT  RT   KK, PTA    Mobs    " "    Patellar ROM     KK, PTA                    There Ex        Bike 5 min rOM 5 min ROM 5 min ROM  5 min ROM    Heel Slides x10 x10 x10  X10    Gastroc Stretch 20\" x 4 20\" x 4 20\" x 4  20\"x4   Hamstring Stretch 20\" x 4 20\" x 4 20\" x 4  20\"x4   HS Curls   x20     Prone Hangs        Patient Education    RT - DC      ROM Flex:  110 on own  Ext:  Lacking 2  Flex:  114 on own   Flex:  114 on own  Flex:116*  Ext:lacking 2*    Neruo Re-Ed        Quad Set        SAQ/LAQ        SLR        TKE        Leg Press TRX Squats 20x    75# 2x10 SL  TRX Squats 20x       # 2x10  SL 75# 2x10    TRX squats 20x    Retro Walking         Lateral Tap Down 8\" up and over 20x 8\" up and over 20x  8\" up and over x10  8\" up and over 20x    SLS  Rebounder YMB x20 fwd  YMB x20 lateral   Rebounder YMB x35   Resisted Walk Outs     20# fwd/bkwd x12                                    Gait Training         Hurdles                  Modalities        CP PRN                            "

## 2024-08-28 NOTE — PROGRESS NOTES
PT Evaluation     Today's date: 2024  Patient name: Ralph Abbott  : 1965  MRN: [id number]  Referring provider: Ronnie Dotson,*  Dx:   Encounter Diagnosis     ICD-10-CM    1. Acute pain of left knee  M25.562       2. Orthopedic aftercare  Z47.89 Ambulatory Referral to Physical Therapy      3. S/P TKR (total knee replacement), left  Z96.652                      Assessment  Impairments: abnormal or restricted ROM, activity intolerance, impaired physical strength and pain with function    Assessment details: Lex Abbott is a 59 y.o. male who presents 3 months s/p left TKA on 24 with Dr. Dotson. Pt has been participating in physical therapy at our [place] location but here for a second opinion.  Patient's primary movement impairment is related to his lack of motion at involved knee. Hypomobility present at patellofemoral and tibiofemoral joints. He also has a higher degree of knee irritability, causing lengthy recovery times and prolonged stiffness after previous PT sessions.  Patient currently presents with right knee pain, joint mobility restrictions, flexibility restrictions, reduced knee ROM, and reduced muscle performance of involved LE. Due to these impairments, Patient has difficulty performing age appropriate activities, prolonged ambulation, stairs, squatting, etc. Patient would benefit from skilled physical therapy to address the impairments, improve their level of function, and to improve their overall quality of life.    Primary movement impairments: (knee ROM deficits)    1) Tibiofemoral joint mobility restriction (anterior)  2) Patellofemoral joint mobility restriction (all planes)  3) Excessive early and late pronation of STJ in stance, likely contributing to the kinetic chain and abnormal force at right knee - Can consider orthotics to control this      Understanding of Dx/Px/POC: good     Prognosis: good    Goals  Short Term Goals: to be achieved by 4 weeks  1) Patient to be  independent with basic HEP.  2) Decrease pain to 3/10 at its worst.  3) Increase Knee ROM by 5-10 degrees   4) Increase LE strength by 1/2 MMT grade in all deficient planes.    Long Term Goals: to be achieved by discharge  1) FOTO equal to or greater than expected.  2) Ambulation to improve to maximal level of function while controlling for pronation   3) Stair negotiation will improve to reciprocal.  4) Return to work related activities; ladders, stairs, squatting, etc.       Plan  Patient would benefit from: PT eval and skilled physical therapy    Planned therapy interventions: manual therapy, neuromuscular re-education, therapeutic activities, therapeutic exercise, patient/caregiver education and home exercise program    Frequency: 2x per week for 4-6 weeks.      Subjective Evaluation    History of Present Illness  Mechanism of injury: History of Current Injury: Lex is 3 months s/p left TKA on 6/6 with Dr. Dotson. Pt was attending PT at [facility], was recently discharged,  and wanted a second rehabilitation opinion. Pt feels as his right knee did not improve as he expected. H/O R TKA with normal progression. Pt does report appropriate stability of the knee; denies any buckling or giving way. Admittedly, patient had several days of pain after previous rehab sessions. Reported several days to recover, at times. He was part of our bundled plan. Pt challenged with negotiating stairs.Pt is taking motrin, tylenol, and tramadol for pain control. Sleep is interrupted secondary to pain  Pain location/Descriptors: throbbing discomfort, slightly distal to joint line.   Aggravating factors: Stairs, work related activities, stretching, squatting, loaded knee flexion activities.   Easing factors: Rest, activity modification  24 HR pattern: Movement dependent   Imaging: Most recent XR 8/23:   Left knee joint effusion.     Redemonstrated postop changes of left TKA with patellar resurfacing. Components appear well  seated.    Special Questions: Pt denies any numbness/tingling.   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       Pain  Current pain ratin  At worst pain ratin          Objective     Active Range of Motion   Left Knee   Flexion: 105 degrees   Extensor la degrees     Passive Range of Motion   Left Knee   Flexion: 115 degrees   Extension: 8 degrees     Mobility   Patellar Mobility:   Left Knee   Hypomobile: left medial, left lateral, left superior and left inferior  Tibiofemoral Mobility:   Left knee   Tibiofemoral tendons within functional limits include the posteriorHypomobile in the anterior tibiofemoral tendon(s).   Tibiofibular Mobility:   Left knee Hypomobile in the anterior and posterior tibiofibular tendon(s).     Strength/Myotome Testing     Left Hip   Planes of Motion   Flexion: 5  Extension: 4+  Abduction: 4+  External rotation: 4+    Right Hip   Normal muscle strength    Left Knee   Flexion: 4+  Extension: 4+  Quadriceps contraction: good    Right Knee   Normal strength    Tests     Additional Tests Details  *Poor patellar mobility  *Poor tib femoral anterior mobility (appropriate posterior mobility)   *Poor proximal fibular mobility.   *immediate STJ pronation with medial arch collapse in SLS on the L (not present on R)   -likely related to weakness in foot instrinics    *Moderate postural sway in SLS on L.   *Moderate restriction in hamstrings/severe quad stretching (L)   *Moderate swelling will loss of patellar dimples on the L   *Atrophy present of gastrocnemius (medial) on left     Ambulation     Observational Gait     Additional Observational Gait Details  Shod gait: considerable early and excessive pronation throughout stance, including terminal stance on the left (minimal re-supination). Decreased great toe push at terminal stance to pre swing. Decreased knee flexion from pre swing to swing  phase on the left. Large step length L vs R.              Precautions: L TKA on 6/6, H/O R TKA      Manuals             PFJ mobs             Anterior Tibial glide with knee flexion             Knee extension mobilization over bolster             Knee flexion MWM              Neuro Re-Ed             TKE @ [facility]             Short foot                                                                              Ther Ex             Hamstring stretch              Quad stretch              Nustep for ROM                                                                               Ther Activity             SL HR             Step up with runner climb              Gait Training                                       Modalities

## 2024-08-29 ENCOUNTER — EVALUATION (OUTPATIENT)
Dept: PHYSICAL THERAPY | Facility: CLINIC | Age: 59
End: 2024-08-29
Payer: COMMERCIAL

## 2024-08-29 DIAGNOSIS — Z96.652 S/P TKR (TOTAL KNEE REPLACEMENT), LEFT: ICD-10-CM

## 2024-08-29 DIAGNOSIS — Z47.89 ORTHOPEDIC AFTERCARE: ICD-10-CM

## 2024-08-29 DIAGNOSIS — M25.562 ACUTE PAIN OF LEFT KNEE: Primary | ICD-10-CM

## 2024-08-29 PROCEDURE — 97162 PT EVAL MOD COMPLEX 30 MIN: CPT | Performed by: PHYSICAL THERAPIST

## 2024-09-03 ENCOUNTER — OFFICE VISIT (OUTPATIENT)
Dept: PHYSICAL THERAPY | Facility: CLINIC | Age: 59
End: 2024-09-03
Payer: COMMERCIAL

## 2024-09-03 DIAGNOSIS — Z96.652 S/P TKR (TOTAL KNEE REPLACEMENT), LEFT: ICD-10-CM

## 2024-09-03 DIAGNOSIS — Z47.89 ORTHOPEDIC AFTERCARE: Primary | ICD-10-CM

## 2024-09-03 DIAGNOSIS — M17.12 PRIMARY OSTEOARTHRITIS OF LEFT KNEE: ICD-10-CM

## 2024-09-03 DIAGNOSIS — M25.562 ACUTE PAIN OF LEFT KNEE: ICD-10-CM

## 2024-09-03 PROCEDURE — 97140 MANUAL THERAPY 1/> REGIONS: CPT | Performed by: PHYSICAL THERAPIST

## 2024-09-03 PROCEDURE — 97530 THERAPEUTIC ACTIVITIES: CPT | Performed by: PHYSICAL THERAPIST

## 2024-09-03 PROCEDURE — 97110 THERAPEUTIC EXERCISES: CPT | Performed by: PHYSICAL THERAPIST

## 2024-09-03 NOTE — PROGRESS NOTES
"Daily Note     Today's date: 9/3/2024  Patient name: Ralph Abbott  : 1965  MRN: [id number]  Referring provider: Ronnie Dotson,*  Dx:   Encounter Diagnosis     ICD-10-CM    1. Orthopedic aftercare  Z47.89       2. Acute pain of left knee  M25.562       3. Primary osteoarthritis of left knee  M17.12       4. S/P TKR (total knee replacement), left  Z96.652                      Subjective: Pt reports feeling sore today. Admittedly, he was on his feet doing this today but nothing to challenging.       Objective: See treatment diary below  Step up: Vaulting present (R assisting L) during L side step up- cueing required  Knee flexion: 120 passive.    Assessment: Tolerated treatment well. No adverse discomfort with knee mobilizations and knee ROM exercises. Knee flexion measured at 120 after tib-femoral mobilizations. Patient exhibited good technique with therapeutic exercises and would benefit from continued PT.      Plan: Continued with plan of care.      Precautions: L TKA on , H/O R TKA      Manuals 9/3            PFJ mobs All planes gr III            Anterior Tibial glide with knee flexion JK grIII 5'            Tibfemoral rotation at 90 of flexion Gr III            Knee extension mobilization over bolster Slight distraction gr III            Knee flexion MWM              Neuro Re-Ed             TKE @ [place] 30x\" 20#            Short foot                                                                              Ther Ex             Hamstring stretch              Quad stretch              Nustep for ROM  8' lvl 4             Knee flexion on PB with strap  10x10\"                                                                Ther Activity             SL HR 2x15 B            Step up with runner climb  10x L 6\" with cueing             Gait Training                                       Modalities                                       1:1 with PT from 415-5pm      "

## 2024-09-06 ENCOUNTER — OFFICE VISIT (OUTPATIENT)
Dept: PHYSICAL THERAPY | Facility: CLINIC | Age: 59
End: 2024-09-06
Payer: COMMERCIAL

## 2024-09-06 ENCOUNTER — TELEPHONE (OUTPATIENT)
Age: 59
End: 2024-09-06

## 2024-09-06 DIAGNOSIS — M25.562 ACUTE PAIN OF LEFT KNEE: ICD-10-CM

## 2024-09-06 DIAGNOSIS — Z47.89 ORTHOPEDIC AFTERCARE: Primary | ICD-10-CM

## 2024-09-06 DIAGNOSIS — M17.12 PRIMARY OSTEOARTHRITIS OF LEFT KNEE: ICD-10-CM

## 2024-09-06 DIAGNOSIS — Z96.652 S/P TKR (TOTAL KNEE REPLACEMENT), LEFT: ICD-10-CM

## 2024-09-06 PROCEDURE — 97530 THERAPEUTIC ACTIVITIES: CPT | Performed by: PHYSICAL THERAPIST

## 2024-09-06 PROCEDURE — 97140 MANUAL THERAPY 1/> REGIONS: CPT | Performed by: PHYSICAL THERAPIST

## 2024-09-06 PROCEDURE — 97110 THERAPEUTIC EXERCISES: CPT | Performed by: PHYSICAL THERAPIST

## 2024-09-06 NOTE — TELEPHONE ENCOUNTER
Per dentist and orth requires pretreatment with antibiotic 30 prior to dental work due to knee replacement  Patient needs a tooth  extraction. No date set for extraction as of today Please advise

## 2024-09-06 NOTE — PROGRESS NOTES
"Daily Note     Today's date: 2024  Patient name: Ralph Abbott  : 1965  MRN: [id number]  Referring provider: Ronnie Dotson,*  Dx:   Encounter Diagnosis     ICD-10-CM    1. Orthopedic aftercare  Z47.89       2. Acute pain of left knee  M25.562       3. Primary osteoarthritis of left knee  M17.12       4. S/P TKR (total knee replacement), left  Z96.652                      Subjective: Pt reports feeling less discomfort at night since our last treatment session.      Objective: See treatment diary below      Assessment: Continued to focus on mobility of knee and PFJ. Tolerated treatment well. Implemented tibial translation during step up. Improved tolerance with step up with less anterior knee discomfort. Patient would benefit from continued PT      Plan: Continue per plan of care.      Precautions: L TKA on , H/O R TKA      Manuals 9/3 9/6           PFJ mobs All planes gr III All planes gr III           Anterior Tibial glide with knee flexion JK grIII 5' JK Gr III 5'           Tibfemoral rotation at 90 of flexion Gr III Gr III           Knee extension mobilization over bolster Slight distraction gr III Slight distraction gr III           Knee flexion MWM              Neuro Re-Ed             TKE @ [place] 30x\" 20# 30x 20#           Short foot                                                                              Ther Ex             Hamstring stretch              Quad stretch              Nustep for ROM  8' lvl 4  8' lvl 5           Knee flexion on PB with strap  10x10\" 10x10\"           SL leg press  50# 2x10                                                   Ther Activity             SL HR 2x15 B 2x15 B           Step up with runner climb  10x L 6\" with cueing  2 x10 on L (one anterior tib translation and one lateral)           Gait Training                                       Modalities                                       1:1 with PT from [phone]       "

## 2024-09-09 ENCOUNTER — OFFICE VISIT (OUTPATIENT)
Dept: PHYSICAL THERAPY | Facility: CLINIC | Age: 59
End: 2024-09-09
Payer: COMMERCIAL

## 2024-09-09 DIAGNOSIS — Z47.89 ORTHOPEDIC AFTERCARE: Primary | ICD-10-CM

## 2024-09-09 DIAGNOSIS — M25.562 ACUTE PAIN OF LEFT KNEE: ICD-10-CM

## 2024-09-09 DIAGNOSIS — Z96.652 S/P TKR (TOTAL KNEE REPLACEMENT), LEFT: ICD-10-CM

## 2024-09-09 DIAGNOSIS — M17.12 PRIMARY OSTEOARTHRITIS OF LEFT KNEE: ICD-10-CM

## 2024-09-09 PROCEDURE — 97112 NEUROMUSCULAR REEDUCATION: CPT | Performed by: PHYSICAL THERAPIST

## 2024-09-09 PROCEDURE — 97140 MANUAL THERAPY 1/> REGIONS: CPT | Performed by: PHYSICAL THERAPIST

## 2024-09-09 PROCEDURE — 97110 THERAPEUTIC EXERCISES: CPT | Performed by: PHYSICAL THERAPIST

## 2024-09-09 NOTE — PROGRESS NOTES
"Daily Note     Today's date: 2024  Patient name: Ralph Abbott  : 1965  MRN: [id number]  Referring provider: Ronnie Dotson,*  Dx:   Encounter Diagnosis     ICD-10-CM    1. Orthopedic aftercare  Z47.89       2. Acute pain of left knee  M25.562       3. Primary osteoarthritis of left knee  M17.12       4. S/P TKR (total knee replacement), left  Z96.652                      Subjective: Pt reports feeling stiffness this afternoon.       Objective: See treatment diary below      Assessment: Tolerated treatment well. Focus treatment on mobility of knee and PFJ. Pt able to flex to 120 of flexion today after mobilizations and stretching. WB knee flexion activities continue to challenge patient. Preformed lateral glide during step ups to help transition motion. Patient would benefit from continued PT      Plan: Continue per plan of care.      Precautions: L TKA on , H/O R TKA      Manuals 9/3 9/6 9/9          PFJ mobs All planes gr III All planes gr III All planes gr III          Anterior Tibial glide with knee flexion JK grIII 5' JK Gr III 5' JK Gr III 5'          Tibfemoral rotation at 90 of flexion Gr III Gr III Gr III          Knee extension mobilization over bolster Slight distraction gr III Slight distraction gr III Slight distraction gr III          Knee flexion MWM              Neuro Re-Ed             TKE @ [place] 30x\" 20# 30x 20#           Short foot                                                                              Ther Ex             Hamstring stretch              Quad stretch              Nustep for ROM  8' lvl 4  8' lvl 5 8' lvl 5          Knee flexion on PB with strap  10x10\" 10x10\" 10x10\"          SL leg press  50# 2x10  70# 2x10                                                 Ther Activity             SL HR 2x15 B 2x15 B 20x          Step up with runner climb  10x L 6\" with cueing  2 x10 on L (one anterior tib translation and one lateral) 2x10 8\" with mobilization strap        "   Gait Training                                       Modalities                                       1:1 with PT from 335-415pm

## 2024-09-12 ENCOUNTER — OFFICE VISIT (OUTPATIENT)
Dept: PHYSICAL THERAPY | Facility: CLINIC | Age: 59
End: 2024-09-12
Payer: COMMERCIAL

## 2024-09-12 DIAGNOSIS — Z96.652 S/P TKR (TOTAL KNEE REPLACEMENT), LEFT: ICD-10-CM

## 2024-09-12 DIAGNOSIS — Z47.89 ORTHOPEDIC AFTERCARE: Primary | ICD-10-CM

## 2024-09-12 DIAGNOSIS — M17.12 PRIMARY OSTEOARTHRITIS OF LEFT KNEE: ICD-10-CM

## 2024-09-12 DIAGNOSIS — M25.562 ACUTE PAIN OF LEFT KNEE: ICD-10-CM

## 2024-09-12 PROCEDURE — 97530 THERAPEUTIC ACTIVITIES: CPT | Performed by: PHYSICAL THERAPIST

## 2024-09-12 PROCEDURE — 97110 THERAPEUTIC EXERCISES: CPT | Performed by: PHYSICAL THERAPIST

## 2024-09-12 PROCEDURE — 97140 MANUAL THERAPY 1/> REGIONS: CPT | Performed by: PHYSICAL THERAPIST

## 2024-09-12 NOTE — PROGRESS NOTES
"Daily Note     Today's date: 2024  Patient name: Ralph Abbott  : 1965  MRN: [id number]  Referring provider: Ronnie Dotson,*  Dx:   Encounter Diagnosis     ICD-10-CM    1. Orthopedic aftercare  Z47.89       2. Acute pain of left knee  M25.562       3. Primary osteoarthritis of left knee  M17.12       4. S/P TKR (total knee replacement), left  Z96.652                      Subjective: Pt reports feeling improvement with transferring into and out of car. Admits to having soreness today but appropriate for activity level.      Objective: See treatment diary below      Assessment: Tolerated treatment well. Stiffness and mobility continues. Within session changes present with knee motion after MT.  Pt challenged with knee strengthening exercises. Implemented modified lunge for range of motion and knee strength.  Patient exhibited good technique with therapeutic exercises and would benefit from continued PT.       Plan: Continue per plan of care.      Precautions: L TKA on , H/O R TKA      Manuals 9/3 9/6 9/9 9/12         PFJ mobs All planes gr III All planes gr III All planes gr III All planes gr III         Anterior Tibial glide with knee flexion JK grIII 5' JK Gr III 5' JK Gr III 5' JK Gr III 5'         Tibfemoral rotation at 90 of flexion Gr III Gr III Gr III Gr III         Knee extension mobilization over bolster Slight distraction gr III Slight distraction gr III Slight distraction gr III Slight distraction gr III         Knee flexion MWM              Neuro Re-Ed             TKE @ [place] 30x\" 20# 30x 20#           Short foot                                                                              Ther Ex             Hamstring stretch              Quad stretch              Nustep for ROM  8' lvl 4  8' lvl 5 8' lvl 5 11' lvl 5         Knee flexion on PB with strap  10x10\" 10x10\" 10x10\"          SL leg press  50# 2x10  70# 2x10 70# 2x12                                                 Ther " "Activity             SL HR 2x15 B 2x15 B 20x 2x10         Modified lunge on step    20x 8\"         Step up with runner climb  10x L 6\" with cueing  2 x10 on L (one anterior tib translation and one lateral) 2x10 8\" with mobilization strap 2x10 8\" with mobilization strap         Gait Training                                       Modalities                                       1:1 with PT from 415-455pm           "

## 2024-09-16 ENCOUNTER — OFFICE VISIT (OUTPATIENT)
Dept: PHYSICAL THERAPY | Facility: CLINIC | Age: 59
End: 2024-09-16
Payer: COMMERCIAL

## 2024-09-16 DIAGNOSIS — M17.12 PRIMARY OSTEOARTHRITIS OF LEFT KNEE: ICD-10-CM

## 2024-09-16 DIAGNOSIS — M25.562 ACUTE PAIN OF LEFT KNEE: ICD-10-CM

## 2024-09-16 DIAGNOSIS — Z47.89 ORTHOPEDIC AFTERCARE: Primary | ICD-10-CM

## 2024-09-16 DIAGNOSIS — Z96.652 S/P TKR (TOTAL KNEE REPLACEMENT), LEFT: ICD-10-CM

## 2024-09-16 PROCEDURE — 97110 THERAPEUTIC EXERCISES: CPT | Performed by: PHYSICAL THERAPIST

## 2024-09-16 PROCEDURE — 97140 MANUAL THERAPY 1/> REGIONS: CPT | Performed by: PHYSICAL THERAPIST

## 2024-09-16 PROCEDURE — 97530 THERAPEUTIC ACTIVITIES: CPT | Performed by: PHYSICAL THERAPIST

## 2024-09-16 NOTE — PROGRESS NOTES
"Daily Note     Today's date: 2024  Patient name: Ralph Abbott  : 1965  MRN: [id number]  Referring provider: Ronnie Dotson,*  Dx:   Encounter Diagnosis     ICD-10-CM    1. Orthopedic aftercare  Z47.89       2. Acute pain of left knee  M25.562       3. S/P TKR (total knee replacement), left  Z96.652       4. Primary osteoarthritis of left knee  M17.12           Start Time: 1615  Stop Time: 1700  Total time in clinic (min): 45 minutes    Subjective: Pt reports feeling excellent on Friday. He completed outdoor work, including sealing driveway, but felt the knee tighten back up. Overall, pt notes improvement.       Objective: See treatment diary below      Assessment: Mobility at knee and patellofemoral joint mobility gradually improving. Motion still limited in both planes. Tolerated treatment well. Pt challenged appropriately with WB exercises. Patient exhibited good technique with therapeutic exercises and would benefit from continued PT.       Plan: Continue per plan of care.      Precautions: L TKA on , H/O R TKA      Manuals 9/3 9/6 9/9 9/12 9/16        PFJ mobs All planes gr III All planes gr III All planes gr III All planes gr III All planes gr III        Anterior Tibial glide with knee flexion JK grIII 5' JK Gr III 5' JK Gr III 5' JK Gr III 5' JK Gr III 5'        Tibfemoral rotation at 90 of flexion Gr III Gr III Gr III Gr III Gr III        Knee extension mobilization over bolster Slight distraction gr III Slight distraction gr III Slight distraction gr III Slight distraction gr III Slight distraction gr III        Knee flexion MWM              Neuro Re-Ed             TKE @ [place] 30x\" 20# 30x 20#           Short foot                                                                              Ther Ex             Hamstring stretch              Quad stretch              Nustep for ROM  8' lvl 4  8' lvl 5 8' lvl 5 11' lvl 5 8' lvl 5        Knee flexion on PB with strap  10x10\" 10x10\" 10x10\" " " 10x10\"        SL leg press  50# 2x10  70# 2x10 70# 2x12                                                 Ther Activity             SL HR 2x15 B 2x15 B 20x 2x10 2x10        Modified lunge on step    20x 8\"         Step up with runner climb  10x L 6\" with cueing  2 x10 on L (one anterior tib translation and one lateral) 2x10 8\" with mobilization strap 2x10 8\" with mobilization strap 2x10 8\" with mobilization strap        Gait Training                                       Modalities                                       1:1 with PT from 415-5pm             "

## 2024-09-17 ENCOUNTER — OFFICE VISIT (OUTPATIENT)
Dept: FAMILY MEDICINE CLINIC | Facility: CLINIC | Age: 59
End: 2024-09-17
Payer: COMMERCIAL

## 2024-09-17 VITALS
TEMPERATURE: 98.1 F | OXYGEN SATURATION: 98 % | HEART RATE: 77 BPM | WEIGHT: 220.4 LBS | BODY MASS INDEX: 29.85 KG/M2 | HEIGHT: 72 IN | DIASTOLIC BLOOD PRESSURE: 88 MMHG | SYSTOLIC BLOOD PRESSURE: 138 MMHG

## 2024-09-17 DIAGNOSIS — Z23 ENCOUNTER FOR IMMUNIZATION: ICD-10-CM

## 2024-09-17 DIAGNOSIS — E78.5 DYSLIPIDEMIA: ICD-10-CM

## 2024-09-17 DIAGNOSIS — Z79.2 NEED FOR ANTIBIOTIC PROPHYLAXIS FOR DENTAL PROCEDURE: ICD-10-CM

## 2024-09-17 DIAGNOSIS — Z12.12 ENCOUNTER FOR COLORECTAL CANCER SCREENING: ICD-10-CM

## 2024-09-17 DIAGNOSIS — I10 ESSENTIAL HYPERTENSION: ICD-10-CM

## 2024-09-17 DIAGNOSIS — Z12.11 ENCOUNTER FOR COLORECTAL CANCER SCREENING: ICD-10-CM

## 2024-09-17 DIAGNOSIS — Z00.00 ANNUAL PHYSICAL EXAM: Primary | ICD-10-CM

## 2024-09-17 PROCEDURE — 90715 TDAP VACCINE 7 YRS/> IM: CPT

## 2024-09-17 PROCEDURE — 99396 PREV VISIT EST AGE 40-64: CPT | Performed by: FAMILY MEDICINE

## 2024-09-17 PROCEDURE — 90471 IMMUNIZATION ADMIN: CPT

## 2024-09-17 RX ORDER — AMOXICILLIN 500 MG/1
2000 CAPSULE ORAL ONCE
Qty: 4 CAPSULE | Refills: 0 | Status: SHIPPED | OUTPATIENT
Start: 2024-09-17 | End: 2024-09-17

## 2024-09-17 NOTE — PATIENT INSTRUCTIONS
"Patient Education     Routine physical for adults   The Basics   Written by the doctors and editors at [facility]   What is a physical? -- A physical is a routine visit, or \"check-up,\" with your doctor. You might also hear it called a \"wellness visit\" or \"preventive visit.\"  During each visit, the doctor will:   Ask about your physical and mental health   Ask about your habits, behaviors, and lifestyle   Do an exam   Give you vaccines if needed   Talk to you about any medicines you take   Give advice about your health   Answer your questions  Getting regular check-ups is an important part of taking care of your health. It can help your doctor find and treat any problems you have. But it's also important for preventing health problems.  A routine physical is different from a \"sick visit.\" A sick visit is when you see a doctor because of a health concern or problem. Since physicals are scheduled ahead of time, you can think about what you want to ask the doctor.  How often should I get a physical? -- It depends on your age and health. In general, for people age 21 years and older:   If you are younger than 50 years, you might be able to get a physical every 3 years.   If you are 50 years or older, your doctor might recommend a physical every year.  If you have an ongoing health condition, like diabetes or high blood pressure, your doctor will probably want to see you more often.  What happens during a physical? -- In general, each visit will include:   Physical exam - The doctor or nurse will check your height, weight, heart rate, and blood pressure. They will also look at your eyes and ears. They will ask about how you are feeling and whether you have any symptoms that bother you.   Medicines - It's a good idea to bring a list of all the medicines you take to each doctor visit. Your doctor will talk to you about your medicines and answer any questions. Tell them if you are having any side effects that bother you. You " "should also tell them if you are having trouble paying for any of your medicines.   Habits and behaviors - This includes:   Your diet   Your exercise habits   Whether you smoke, drink alcohol, or use drugs   Whether you are sexually active   Whether you feel safe at home  Your doctor will talk to you about things you can do to improve your health and lower your risk of health problems. They will also offer help and support. For example, if you want to quit smoking, they can give you advice and might prescribe medicines. If you want to improve your diet or get more physical activity, they can help you with this, too.   Lab tests, if needed - The tests you get will depend on your age and situation. For example, your doctor might want to check your:   Cholesterol   Blood sugar   Iron level   Vaccines - The recommended vaccines will depend on your age, health, and what vaccines you already had. Vaccines are very important because they can prevent certain serious or deadly infections.   Discussion of screening - \"Screening\" means checking for diseases or other health problems before they cause symptoms. Your doctor can recommend screening based on your age, risk, and preferences. This might include tests to check for:   Cancer, such as breast, prostate, cervical, ovarian, colorectal, prostate, lung, or skin cancer   Sexually transmitted infections, such as chlamydia and gonorrhea   Mental health conditions like depression and anxiety  Your doctor will talk to you about the different types of screening tests. They can help you decide which screenings to have. They can also explain what the results might mean.   Answering questions - The physical is a good time to ask the doctor or nurse questions about your health. If needed, they can refer you to other doctors or specialists, too.  Adults older than 65 years often need other care, too. As you get older, your doctor will talk to you about:   How to prevent falling at " home   Hearing or vision tests   Memory testing   How to take your medicines safely   Making sure that you have the help and support you need at home  All topics are updated as new evidence becomes available and our peer review process is complete.  This topic retrieved from [employer] on: May 02, 2024.  Topic 311951 Version 1.0  Release: 32.4.3 - C32.122  © 2024 UpToDate, Inc. and/or its affiliates. All rights reserved.  Consumer Information Use and Disclaimer   Disclaimer: This generalized information is a limited summary of diagnosis, treatment, and/or medication information. It is not meant to be comprehensive and should be used as a tool to help the user understand and/or assess potential diagnostic and treatment options. It does NOT include all information about conditions, treatments, medications, side effects, or risks that may apply to a specific patient. It is not intended to be medical advice or a substitute for the medical advice, diagnosis, or treatment of a health care provider based on the health care provider's examination and assessment of a patient's specific and unique circumstances. Patients must speak with a health care provider for complete information about their health, medical questions, and treatment options, including any risks or benefits regarding use of medications. This information does not endorse any treatments or medications as safe, effective, or approved for treating a specific patient. UpToDate, Inc. and its affiliates disclaim any warranty or liability relating to this information or the use thereof.The use of this information is governed by the Terms of Use, available at https://www.[employer].com/en/know/clinical-effectiveness-terms. 2024© UpToDate, Inc. and its affiliates and/or licensors. All rights reserved.  Copyright   © 2024 UpToDate, Inc. and/or its affiliates. All rights reserved.

## 2024-09-17 NOTE — PROGRESS NOTES
Adult Annual Physical  Name: Ralph Abbott      : 1965      MRN: [id number]  Encounter Provider: Roland Macedo MD  Encounter Date: 2024   Encounter department: [facility]    Assessment & Plan  Annual physical exam         Need for antibiotic prophylaxis for dental procedure    Orders:    amoxicillin (AMOXIL) 500 mg capsule; Take 4 capsules (2,000 mg total) by mouth 1 (one) time for 1 dose Take 30-60 minutes before the procedure    Essential hypertension         Dyslipidemia         Encounter for colorectal cancer screening    Orders:    Ambulatory Referral to Gastroenterology; Future    Immunizations and preventive care screenings were discussed with patient today. Appropriate education was printed on patient's after visit summary.    Discussed risks and benefits of prostate cancer screening. We discussed the controversial history of PSA screening for prostate cancer in the [place] as well as the risk of over detection and over treatment of prostate cancer by way of PSA screening.  The patient understands that PSA blood testing is an imperfect way to screen for prostate cancer and that elevated PSA levels in the blood may also be caused by infection, inflammation, prostatic trauma or manipulation, urological procedures, or by benign prostatic enlargement.    The role of the digital rectal examination in prostate cancer screening was also discussed and I discussed with him that there is large interobserver variability in the findings of digital rectal examination.    Counseling:  Alcohol/drug use: discussed moderation in alcohol intake, the recommendations for healthy alcohol use, and avoidance of illicit drug use.  Dental Health: discussed importance of regular tooth brushing, flossing, and dental visits.  Injury prevention: discussed safety/seat belts, safety helmets, smoke detectors, carbon dioxide detectors, and smoking near bedding or upholstery.  Sexual health: discussed  sexually transmitted diseases, partner selection, use of condoms, avoidance of unintended pregnancy, and contraceptive alternatives.  Exercise: the importance of regular exercise/physical activity was discussed. Recommend exercise 3-5 times per week for at least 30 minutes.          History of Present Illness       Adult Annual Physical:  Patient presents for annual physical. Lex is here for his annual physical exam.  He had his left knee replaced approximately 3 months ago on 6/6/2024.  Patient continues to have some pain as well as limitation in extension with his left knee.  Pt anticipate up coming dental extraction   Pt due to .     Diet and Physical Activity:  - Diet/Nutrition: well balanced diet. actively trying to lose weight  - Exercise: no formal exercise. PT for the knee every other day    Depression Screening:  - PHQ-2 Score: 0    General Health:  - Sleep:. due to pain  - Hearing: normal hearing bilateral ears.  - Vision: no vision problems.  - Dental: brushes teeth twice daily and regular dental visits.     Health:    - Urinary symptoms: none.       Review of Systems   Constitutional:  Negative for chills and fever.   HENT:  Negative for congestion, rhinorrhea and sore throat.    Respiratory:  Negative for chest tightness and shortness of breath.    Cardiovascular:  Negative for chest pain.   Gastrointestinal:  Negative for abdominal pain, constipation, diarrhea, nausea and vomiting.   Musculoskeletal:  Positive for arthralgias and gait problem.   Neurological:  Positive for headaches (migraines). Negative for dizziness and light-headedness.        Sees neurology         Objective   /88 (BP Location: Right arm, Patient Position: Sitting, Cuff Size: Large)   Pulse 77   Temp 98.1 °F (36.7 °C) (Temporal)   Ht 6' (1.829 m)   Wt 100 kg (220 lb 6.4 oz)   SpO2 98%   BMI 29.89 kg/m²     Physical Exam  Vitals reviewed.   Constitutional:       Appearance: Normal appearance.   Cardiovascular:       "Rate and Rhythm: Normal rate and regular rhythm.      Pulses: Normal pulses.      Heart sounds: Normal heart sounds.   Pulmonary:      Effort: Pulmonary effort is normal. No respiratory distress.      Breath sounds: Normal breath sounds.   Abdominal:      General: Abdomen is flat. Bowel sounds are normal. There is no distension.      Palpations: Abdomen is soft.   Musculoskeletal:         General: No swelling, tenderness, deformity or signs of injury.      Comments: There is slight limitation in extension and flexion involving the left knee   Skin:     General: Skin is warm and dry.      Capillary Refill: Capillary refill takes less than 2 seconds.      Coloration: Skin is not jaundiced.   Neurological:      General: No focal deficit present.      Mental Status: He is alert and oriented to person, place, and time.   Psychiatric:         Mood and Affect: Mood normal.         Roland Macedo M.D.  Family Medicine    Please excuse any \"sound-alike\" errors that may have ocurred during the process of dictation. Parts of this note have been dictated and there may be errors present in the transcription process. Thank you.      "

## 2024-09-19 ENCOUNTER — OFFICE VISIT (OUTPATIENT)
Dept: PHYSICAL THERAPY | Facility: CLINIC | Age: 59
End: 2024-09-19
Payer: COMMERCIAL

## 2024-09-19 DIAGNOSIS — M25.562 ACUTE PAIN OF LEFT KNEE: ICD-10-CM

## 2024-09-19 DIAGNOSIS — M17.12 PRIMARY OSTEOARTHRITIS OF LEFT KNEE: ICD-10-CM

## 2024-09-19 DIAGNOSIS — Z47.89 ORTHOPEDIC AFTERCARE: Primary | ICD-10-CM

## 2024-09-19 DIAGNOSIS — Z96.652 S/P TKR (TOTAL KNEE REPLACEMENT), LEFT: ICD-10-CM

## 2024-09-19 PROCEDURE — 97110 THERAPEUTIC EXERCISES: CPT | Performed by: PHYSICAL THERAPIST

## 2024-09-19 PROCEDURE — 97530 THERAPEUTIC ACTIVITIES: CPT | Performed by: PHYSICAL THERAPIST

## 2024-09-19 PROCEDURE — 97140 MANUAL THERAPY 1/> REGIONS: CPT | Performed by: PHYSICAL THERAPIST

## 2024-09-19 NOTE — PROGRESS NOTES
"Daily Note     Today's date: 2024  Patient name: Ralph Abbott  : 1965  MRN: [id number]  Referring provider: Ronnie Dotson,*  Dx:   Encounter Diagnosis     ICD-10-CM    1. Orthopedic aftercare  Z47.89       2. Acute pain of left knee  M25.562       3. S/P TKR (total knee replacement), left  Z96.652       4. Primary osteoarthritis of left knee  M17.12                      Subjective: Pt overall doing well. Pt able to do more around his house without as much discomfort. Still notices warmth at anterior knee and tightness/restrictions.       Objective: See treatment diary below    Knee flexion: 120 of passive  Knee extension: -10 (natural state) to -5 passive with tolerable OP/     Assessment: Tolerated treatment well. Pt challenged with foot intrinsic stabilization c/ SLS. Increased SL leg press to 4 sets. Initiated lunge into half kneeling on bolster. Pt required excessive RUE support for correct performance. Patient demonstrated fatigue post treatment and would benefit from continued PT.       Plan: Continue per plan of care.      Precautions: L TKA on , H/O R TKA      Manuals 9/3 9/6 9/9 9/12 9/16 9/19       PFJ mobs All planes gr III All planes gr III All planes gr III All planes gr III All planes gr III All planes gr III       Anterior Tibial glide with knee flexion JK grIII 5' JK Gr III 5' JK Gr III 5' JK Gr III 5' JK Gr III 5' JK Gr III 5'       Tibfemoral rotation at 90 of flexion Gr III Gr III Gr III Gr III Gr III Gr III       Knee extension mobilization over bolster Slight distraction gr III Slight distraction gr III Slight distraction gr III Slight distraction gr III Slight distraction gr III Slight distraction gr III       Knee flexion MWM              Neuro Re-Ed             TKE @ [place] 30x\" 20# 30x 20#           Short foot      In SLS 10x10\"                                                                        Ther Ex             Hamstring stretch              Quad stretch        " "      Nustep for ROM  8' lvl 4  8' lvl 5 8' lvl 5 11' lvl 5 8' lvl 5 8' lvl 5       Knee flexion on PB with strap  10x10\" 10x10\" 10x10\"  10x10\" 10x10\"       SL leg press  50# 2x10  70# 2x10 70# 2x12   70# 4x12                                               Ther Activity             SL HR 2x15 B 2x15 B 20x 2x10 2x10 2x10        Modified lunge on step    20x 8\"  Perfomed with bolster 2x10        Step up with runner climb  10x L 6\" with cueing  2 x10 on L (one anterior tib translation and one lateral) 2x10 8\" with mobilization strap 2x10 8\" with mobilization strap 2x10 8\" with mobilization strap        Gait Training                                       Modalities                                       1:1 with PT from 1103-1141PM               "

## 2024-09-23 ENCOUNTER — EVALUATION (OUTPATIENT)
Dept: PHYSICAL THERAPY | Facility: CLINIC | Age: 59
End: 2024-09-23
Payer: COMMERCIAL

## 2024-09-23 DIAGNOSIS — M17.12 PRIMARY OSTEOARTHRITIS OF LEFT KNEE: ICD-10-CM

## 2024-09-23 DIAGNOSIS — Z96.652 S/P TKR (TOTAL KNEE REPLACEMENT), LEFT: ICD-10-CM

## 2024-09-23 DIAGNOSIS — M25.562 ACUTE PAIN OF LEFT KNEE: ICD-10-CM

## 2024-09-23 DIAGNOSIS — Z47.89 ORTHOPEDIC AFTERCARE: Primary | ICD-10-CM

## 2024-09-23 PROCEDURE — 97110 THERAPEUTIC EXERCISES: CPT | Performed by: PHYSICAL THERAPIST

## 2024-09-23 PROCEDURE — 97140 MANUAL THERAPY 1/> REGIONS: CPT | Performed by: PHYSICAL THERAPIST

## 2024-09-23 PROCEDURE — 97530 THERAPEUTIC ACTIVITIES: CPT | Performed by: PHYSICAL THERAPIST

## 2024-09-23 NOTE — PROGRESS NOTES
[id number]    Today's date: 2024  Patient name: Ralph Abbott  : 1965  MRN: [id number]  Referring provider: Ronnie Dotson,*  Dx:   Encounter Diagnosis     ICD-10-CM    1. Orthopedic aftercare  Z47.89       2. Acute pain of left knee  M25.562       3. Primary osteoarthritis of left knee  M17.12       4. S/P TKR (total knee replacement), left  Z96.652           Start Time: 1530  Stop Time: 1623  Total time in clinic (min): 53 minutes    Assessment  Impairments: abnormal or restricted ROM, activity intolerance, impaired physical strength and pain with function    Assessment details: Lex Abbott is a 59 y.o. male who presents 16 weeks s/p left TKA on 24 with Dr. Dotson. Pt has attended PT with me for nearly 1 month. He has demonstrated gradual improvement in knee motion and primary movements impairments, all which involved restricted joint mobility (tibiofemoral & patellofemoral). His knee flexion has improved passively to 125 degrees flexion and -5 degrees of knee extension. Active motion also improved to 120 of knee flexion and 6 of knee extension. Knee strength, specifically quad strength, is improving in a weight bearing position. However, patient still has difficulty with stairs, squats, and lunges due to weakness and restricted knee mobility. He continues to present with significant gastroc/soleus atrophy and excessive early and late pronation at the STJ during shod gait. The vigor of our treatment sessions have allowed Lex to continue to participate in ADLs at his desirable levels. He denies any significant pain after rehab sessions and continues to gradually improve. Pt continues to have limitations with knee mobility and quad strength. He will benefit from additional PT at this time to maximize function.     Primary movement impairments: (knee ROM deficits)    1) Tibiofemoral joint mobility restriction (anterior)- Improving  2) Patellofemoral joint mobility restriction (all  planes)- Improving  3) Excessive early and late pronation of STJ in stance, likely contributing to the kinetic chain and abnormal force at left knee - Can consider orthotics to control this      Understanding of Dx/Px/POC: good     Prognosis: good    Goals  Short Term Goals: to be achieved by 4 weeks  1) Patient to be independent with basic HEP.-MET  2) Decrease pain to 3/10 at its worst.-Not met  3) Increase Knee ROM by 5-10 degrees -Partially met  4) Increase LE strength by 1/2 MMT grade in all deficient planes.-Partially met    Long Term Goals: to be achieved by discharge  1) FOTO equal to or greater than expected.-Partially met  2) Ambulation to improve to maximal level of function while controlling for pronation -Not met  3) Stair negotiation will improve to reciprocal.-Partially met  4) Return to work related activities; ladders, stairs, squatting, etc. -Partially met      Plan  Patient would benefit from: PT eval and skilled physical therapy    Planned therapy interventions: manual therapy, neuromuscular re-education, therapeutic activities, therapeutic exercise, patient/caregiver education and home exercise program    Frequency: 2x per week for 4-6 weeks.      Subjective Evaluation    History of Present Illness  Mechanism of injury: History of Current Injury: Lex is 3 months s/p left TKA on 6/6 with Dr. Dotson. Pt was attending PT at [place], was recently discharged,  and wanted a second rehabilitation opinion. Pt feels as his right knee did not improve as he expected. H/O R TKA with normal progression. Pt does report appropriate stability of the knee; denies any buckling or giving way. Admittedly, patient had several days of pain after previous rehab sessions. Reported several days to recover, at times. He was part of our bundled plan. Pt challenged with negotiating stairs.Pt is taking motrin, tylenol, and tramadol for pain control. Sleep is interrupted secondary to pain  Pain location/Descriptors:  throbbing discomfort, slightly distal to joint line.   Aggravating factors: Stairs, work related activities, stretching, squatting, loaded knee flexion activities.   Easing factors: Rest, activity modification  24 HR pattern: Movement dependent   Imaging: Most recent XR :   Left knee joint effusion.     Redemonstrated postop changes of left TKA with patellar resurfacing. Components appear well seated.    Special Questions: Pt denies any numbness/tingling.   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       Pain  Current pain rating: 3  At worst pain ratin          Objective     Active Range of Motion   Left Knee   Flexion: 120 degrees   Extensor la degrees     Passive Range of Motion   Left Knee   Flexion: 125 degrees   Extension la degrees     Mobility   Patellar Mobility:   Left Knee   Hypomobile: left medial, left lateral, left superior and left inferior  Tibiofemoral Mobility:   Left knee   Tibiofemoral tendons within functional limits include the posteriorHypomobile in the anterior tibiofemoral tendon(s).   Tibiofibular Mobility:   Left knee Hypomobile in the anterior and posterior tibiofibular tendon(s).     Strength/Myotome Testing     Left Hip   Planes of Motion   Flexion: 5  Extension: 4+  Abduction: 4+  External rotation: 4+    Right Hip   Normal muscle strength    Left Knee   Flexion:5  Extension: 4+  Quadriceps contraction: good    Right Knee   Normal strength    Tests     Additional Tests Details  *Poor patellar mobility- Improving  *Poor tib femoral anterior mobility (appropriate posterior mobility) - Improving  *Poor proximal fibular mobility. = Improving  *immediate STJ pronation with medial arch collapse in SLS on the L (not present on R) - Continues  -likely related to weakness in foot instrinics    *Moderate postural sway in SLS on L. -Continues  *Moderate restriction in hamstrings/severe  "quad stretching (L) - Improving  *Moderate swelling will loss of patellar dimples on the L - Improving  *Atrophy present of gastrocnemius (medial) on left - Continues     Ambulation     Observational Gait     Additional Observational Gait Details  Shod gait: considerable early and excessive pronation throughout stance, including terminal stance on the left (minimal re-supination). Decreased great toe push at terminal stance to pre swing. Decreased knee flexion from pre swing to swing phase on the left. Large step length L vs R. -Continues    Step up: Increased vaulting with LLE  & requires UE support with 8\" step   Lateral step down: minimal eccentric control of the quad during step down (6\")            Precautions: L TKA on 6/6, H/O R TKA      Manuals 9/3 9/6 9/9 9/12 9/16 9/19 9/23       PFJ mobs All planes gr III All planes gr III All planes gr III All planes gr III All planes gr III All planes gr III All planes gr III      Anterior Tibial glide with knee flexion JK grIII 5' JK Gr III 5' JK Gr III 5' JK Gr III 5' JK Gr III 5' JK Gr III 5' JK Gr III 5'      Tibfemoral rotation at 90 of flexion Gr III Gr III Gr III Gr III Gr III Gr III Gr III      Knee extension mobilization over bolster Slight distraction gr III Slight distraction gr III Slight distraction gr III Slight distraction gr III Slight distraction gr III Slight distraction gr III Slight distraction gr III      Knee flexion MWM              Neuro Re-Ed             TKE @ [place] 30x\" 20# 30x 20#           Short foot      In SLS 10x10\"                                                                        Ther Ex             Hamstring stretch              Quad stretch              Nustep for ROM  8' lvl 4  8' lvl 5 8' lvl 5 11' lvl 5 8' lvl 5 8' lvl 5 8' lvl 5      Knee flexion on PB with strap  10x10\" 10x10\" 10x10\"  10x10\" 10x10\" 10x10\"      SL leg press  50# 2x10  70# 2x10 70# 2x12   70# 4x12  70# 4x12                                              Ther " "Activity             SL HR 2x15 B 2x15 B 20x 2x10 2x10 2x10  2x10      Modified lunge on step    20x 8\"  Perfomed with bolster 2x10        Step up with runner climb  10x L 6\" with cueing  2 x10 on L (one anterior tib translation and one lateral) 2x10 8\" with mobilization strap 2x10 8\" with mobilization strap 2x10 8\" with mobilization strap  2x10 with 8\" step       Step down       10x with 6\" step      Gait Training                                       Modalities                                       1:1 with PT from 330-415pm    Pt was re-evaluated x 15 minutes              "

## 2024-09-27 ENCOUNTER — OFFICE VISIT (OUTPATIENT)
Dept: PHYSICAL THERAPY | Facility: CLINIC | Age: 59
End: 2024-09-27
Payer: COMMERCIAL

## 2024-09-27 DIAGNOSIS — M25.562 ACUTE PAIN OF LEFT KNEE: ICD-10-CM

## 2024-09-27 DIAGNOSIS — M17.12 PRIMARY OSTEOARTHRITIS OF LEFT KNEE: ICD-10-CM

## 2024-09-27 DIAGNOSIS — Z47.89 ORTHOPEDIC AFTERCARE: Primary | ICD-10-CM

## 2024-09-27 PROCEDURE — 97530 THERAPEUTIC ACTIVITIES: CPT | Performed by: PHYSICAL THERAPIST

## 2024-09-27 PROCEDURE — 97140 MANUAL THERAPY 1/> REGIONS: CPT | Performed by: PHYSICAL THERAPIST

## 2024-09-27 PROCEDURE — 97110 THERAPEUTIC EXERCISES: CPT | Performed by: PHYSICAL THERAPIST

## 2024-09-27 NOTE — PROGRESS NOTES
Daily Note     Today's date: 2024  Patient name: Ralph Abbott  : 1965  MRN: [id number]  Referring provider: Ronnie Dotson,*  Dx:   Encounter Diagnosis     ICD-10-CM    1. Orthopedic aftercare  Z47.89       2. Acute pain of left knee  M25.562       3. Primary osteoarthritis of left knee  M17.12                      Subjective: Pt denies any new discomfort or changes to the involved knee.       Objective: See treatment diary below      Assessment: Tolerated treatment well. Pt tolerating joint mobility and passive motion well without significant symptom irritability. He continues to have a considerable strength deficits at involved quadriceps muscle. This is noted in both open chain and closed chain positions (single leg knee extension & step up/step down). Although flexion and extension restrictions continue, we will focus on improving LE strength and endurance over the next month. I plan to initiate blood flow restriction treatment to maximize quad muscle performance while reducing stress through the knee joint itself. Pt is agreeable to plan of care change next week.        Plan: Continue per plan of care. Focus on LE strength and endurance over the next month while monitoring ROM and joint mobility.      Precautions: L TKA on , H/O R TKA      Manuals 9/3 9/6 9/9 9/12 9/16 9/19 9/23  9/27     PFJ mobs All planes gr III All planes gr III All planes gr III All planes gr III All planes gr III All planes gr III All planes gr III All planes gr III     Anterior Tibial glide with knee flexion JK grIII 5' JK Gr III 5' JK Gr III 5' JK Gr III 5' JK Gr III 5' JK Gr III 5' JK Gr III 5' JK Gr III 5'     Tibfemoral rotation at 90 of flexion Gr III Gr III Gr III Gr III Gr III Gr III Gr III Gr III     Knee extension mobilization over bolster Slight distraction gr III Slight distraction gr III Slight distraction gr III Slight distraction gr III Slight distraction gr III Slight distraction gr III Slight  "distraction gr III Slight distraction gr III     Knee flexion MWM              Neuro Re-Ed             TKE @ [place] 30x\" 20# 30x 20#           Short foot      In SLS 10x10\"  In SLS 10x10\" c/ FT support                   SL knee extension 1RM        44# left + 55#R (able to perform more on R)      SL knee extension repetition max        33# x 15 on L and 30+ on R     BFR SL knee extension         Next visit                              Ther Ex             Hamstring stretch              Quad stretch              Nustep for ROM  8' lvl 4  8' lvl 5 8' lvl 5 11' lvl 5 8' lvl 5 8' lvl 5 8' lvl 5 8' lvl 5     Knee flexion on PB with strap  10x10\" 10x10\" 10x10\"  10x10\" 10x10\" 10x10\"      SL leg press  50# 2x10  70# 2x10 70# 2x12   70# 4x12  70# 4x12  5x10 70#                                            Ther Activity             SL HR 2x15 B 2x15 B 20x 2x10 2x10 2x10  2x10 20x     Modified lunge on step    20x 8\"  Perfomed with bolster 2x10        Step up with runner climb  10x L 6\" with cueing  2 x10 on L (one anterior tib translation and one lateral) 2x10 8\" with mobilization strap 2x10 8\" with mobilization strap 2x10 8\" with mobilization strap  2x10 with 8\" step  3x10 with 8\" step  BFR next session with 6\"    Step down       10x with 6\" step 10x with 6\" step     Gait Training                                       Modalities                                       1:1 with PT from [id number]               "

## 2024-10-01 ENCOUNTER — OFFICE VISIT (OUTPATIENT)
Dept: PHYSICAL THERAPY | Facility: CLINIC | Age: 59
End: 2024-10-01
Payer: COMMERCIAL

## 2024-10-01 DIAGNOSIS — M25.562 ACUTE PAIN OF LEFT KNEE: ICD-10-CM

## 2024-10-01 DIAGNOSIS — M17.12 PRIMARY OSTEOARTHRITIS OF LEFT KNEE: ICD-10-CM

## 2024-10-01 DIAGNOSIS — Z96.652 S/P TKR (TOTAL KNEE REPLACEMENT), LEFT: ICD-10-CM

## 2024-10-01 DIAGNOSIS — Z47.89 ORTHOPEDIC AFTERCARE: Primary | ICD-10-CM

## 2024-10-01 PROCEDURE — 97530 THERAPEUTIC ACTIVITIES: CPT | Performed by: PHYSICAL THERAPIST

## 2024-10-01 PROCEDURE — 97110 THERAPEUTIC EXERCISES: CPT | Performed by: PHYSICAL THERAPIST

## 2024-10-01 PROCEDURE — 97140 MANUAL THERAPY 1/> REGIONS: CPT | Performed by: PHYSICAL THERAPIST

## 2024-10-01 NOTE — PROGRESS NOTES
Daily Note     Today's date: 10/1/2024  Patient name: Ralph Abbott  : 1965  MRN: [id number]  Referring provider: Ronnie Dotson,*  Dx:   Encounter Diagnosis     ICD-10-CM    1. Orthopedic aftercare  Z47.89       2. Primary osteoarthritis of left knee  M17.12       3. Acute pain of left knee  M25.562       4. S/P TKR (total knee replacement), left  Z96.652                      Subjective: Pt's daughter, who is a PT attempted dry needling patient's knee over the weekend. Pt states that he wasn't a big fan of dry needling. Otherwise, he is doing well this afternoon without adverse effects of treatment.       Objective: See treatment diary below      Assessment:   During the visit, the PT determined that the patient was an appropriate candidate for use of blood flow restriction training targeting the left LE. The patient was educated on the technique, the equipment, and the risks. The patient agreed to the treatment. Treatment parameters are documented in the flowsheet. The patient tolerated intervention well today. LOP measured at 191 mmhg. Pt challenged with strengthening with BFR. Tolerated treatment well. Patient exhibited good technique with therapeutic exercises and would benefit from continued PT.           Plan: Continue per plan of care.      Precautions: L TKA on , H/O R TKA      Manuals 9/3 9/6 9/9 9/12 9/16 9/19 9/23  9/27 10/1    PFJ mobs All planes gr III All planes gr III All planes gr III All planes gr III All planes gr III All planes gr III All planes gr III All planes gr III All planes gr III    Anterior Tibial glide with knee flexion JK grIII 5' JK Gr III 5' JK Gr III 5' JK Gr III 5' JK Gr III 5' JK Gr III 5' JK Gr III 5' JK Gr III 5' JK Gr III 5'    Tibfemoral rotation at 90 of flexion Gr III Gr III Gr III Gr III Gr III Gr III Gr III Gr III     Knee extension mobilization over bolster Slight distraction gr III Slight distraction gr III Slight distraction gr III Slight distraction gr  "III Slight distraction gr III Slight distraction gr III Slight distraction gr III Slight distraction gr III Slight distraction gr III    Knee flexion MWM              Neuro Re-Ed             TKE @ [place] 30x\" 20# 30x 20#           Short foot      In SLS 10x10\"  In SLS 10x10\" c/ FT support                   SL knee extension 1RM        44# left + 55#R (able to perform more on R)      SL knee extension repetition max        33# x 15 on L and 30+ on R     BFR SL knee extension         191mmhg LOP 10,15,15 5# c/30\" rest                               Ther Ex             Hamstring stretch              Quad stretch              Nustep for ROM  8' lvl 4  8' lvl 5 8' lvl 5 11' lvl 5 8' lvl 5 8' lvl 5 8' lvl 5 8' lvl 5 9' lvl 5    Knee flexion on PB with strap  10x10\" 10x10\" 10x10\"  10x10\" 10x10\" 10x10\"      SL leg press  50# 2x10  70# 2x10 70# 2x12   70# 4x12  70# 4x12  5x10 70# 5x10 70#                                           Ther Activity             SL HR 2x15 B 2x15 B 20x 2x10 2x10 2x10  2x10 20x 20x    Modified lunge on step    20x 8\"  Perfomed with bolster 2x10        Step up with runner climb  10x L 6\" with cueing  2 x10 on L (one anterior tib translation and one lateral) 2x10 8\" with mobilization strap 2x10 8\" with mobilization strap 2x10 8\" with mobilization strap  2x10 with 8\" step  3x10 with 8\" step   mmhg LOP 3x15 30\"     Step down       10x with 6\" step 10x with 6\" step     Gait Training                                       Modalities                                       1:1 with PT from 238-316pm                 "

## 2024-10-02 ENCOUNTER — TELEPHONE (OUTPATIENT)
Dept: NEUROLOGY | Facility: CLINIC | Age: 59
End: 2024-10-02

## 2024-10-02 ENCOUNTER — OFFICE VISIT (OUTPATIENT)
Dept: NEUROLOGY | Facility: CLINIC | Age: 59
End: 2024-10-02
Payer: COMMERCIAL

## 2024-10-02 VITALS
DIASTOLIC BLOOD PRESSURE: 90 MMHG | HEART RATE: 82 BPM | WEIGHT: 223 LBS | SYSTOLIC BLOOD PRESSURE: 158 MMHG | OXYGEN SATURATION: 98 % | HEIGHT: 72 IN | TEMPERATURE: 98 F | BODY MASS INDEX: 30.2 KG/M2

## 2024-10-02 DIAGNOSIS — G44.229 CHRONIC TENSION-TYPE HEADACHE, NOT INTRACTABLE: ICD-10-CM

## 2024-10-02 DIAGNOSIS — G43.009 MIGRAINE WITHOUT AURA AND WITHOUT STATUS MIGRAINOSUS, NOT INTRACTABLE: Primary | ICD-10-CM

## 2024-10-02 DIAGNOSIS — M79.18 MYOFASCIAL MUSCLE PAIN: ICD-10-CM

## 2024-10-02 PROCEDURE — 99213 OFFICE O/P EST LOW 20 MIN: CPT | Performed by: PHYSICIAN ASSISTANT

## 2024-10-02 RX ORDER — RIMEGEPANT SULFATE 75 MG/75MG
TABLET, ORALLY DISINTEGRATING ORAL
Qty: 16 TABLET | Refills: 11 | Status: SHIPPED | OUTPATIENT
Start: 2024-10-02

## 2024-10-02 RX ORDER — AMITRIPTYLINE HYDROCHLORIDE 10 MG/1
TABLET ORAL
Qty: 60 TABLET | Refills: 2 | Status: SHIPPED | OUTPATIENT
Start: 2024-10-02

## 2024-10-02 NOTE — PROGRESS NOTES
Patient ID: Ralph Abbott is a 59 y.o. male.    Assessment/Plan:       Diagnoses and all orders for this visit:    Migraine without aura and without status migrainosus, not intractable  -     amitriptyline (ELAVIL) 10 mg tablet; 1 tab qhs x 1 week, then Increase to 20 mg qhs if tolerated.  -     rimegepant sulfate (Nurtec) 75 mg TBDP; 1 tab at migraine onset. Max 1 tab per 24 hours.    Myofascial muscle pain  -     amitriptyline (ELAVIL) 10 mg tablet; 1 tab qhs x 1 week, then Increase to 20 mg qhs if tolerated.    Chronic tension-type headache, not intractable  -     amitriptyline (ELAVIL) 10 mg tablet; 1 tab qhs x 1 week, then Increase to 20 mg qhs if tolerated.       The patient is still getting 2-3 migraine headaches per week which can reach a 9-10 out of 10.  In order to further prevent he was agreeable to start amitriptyline.  Continue Emgality 1 injection/month, and Nurtec as needed.  Side effects of all medications above reviewed.  He is going to try to keep a migraine journal.  Continue to limit over-the-counter analgesics, no more than 2 to 3 doses/week to prevent medication overuse headache.    Typically blood pressure is not elevated, patient will continue to check this at home.  He also continues metoprolol daily.    Consider baclofen if needed in the future, cervicogenic headache?    For a light headache he can add a little bit of caffeine in the form of coffee, soda, etc. but continue to limit caffeine as much as possible.     The patient should not hesitate to call me prior to his follow up with any questions or concerns.      Subjective:    HPI    Mr. Lex Abbott is here for follow up for headaches.    He continues Emgality monthly and Nurtec as needed.  If Nurtec does not work he will take ibuprofen later on and this typically works.  He does not take ibuprofen daily or frequently at all.  Headaches are the same character and without any focal deficits.  Headaches can be a 9 or 10 out of 10 in severity  at times, typically 2 to 3 days a week.  He noticed a reduction of headaches after stopping work for his knee replacement.  He is still out of work for this and may or may not go back to work in the future.  The headaches are still on the left occipital region and throbbing in quality.    He did not benefit from the trigger point injections as noted at the last visit, but they did not make him worse or cause any side effects.     Prior documentation:   He still gets about 15/month.  They are typically left-sided, and he points to a large circular area in the left occipital/left parietal region, no neck pain in particular and pain does not radiate from the neck.  This is the same as prior visits.  There is still no light or sound sensitivity, no vomiting or nausea.  He still cannot ID any triggers. Wondering if will be better when he retires, and wondering if computer work/ light makes worse.     He does get relief with Nurtec, and he tries to use this before Advil.  He gets significant relief with either Advil or Nurtec and he has been trying to limit the use of Advil.     He denies any changes in character of the headache.  No focal deficits.  The headache is still in the left occipital and parietal regions, dull, aching.  Not coming from the neck.  No significant neck pain, no difficulty with sleep, no snoring and he wakes up feeling refreshed.     He does not want to go back on Lexapro.  This caused some side effects but it did reduce headaches significantly.     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  The patient is wondering if his headaches will get better when he retires.     Head CT 11/11/2019 is unremarkable.  A1c 6% on 8/4/2023.      The following portions of the patient's history were reviewed and updated as appropriate: He  has a past medical  history of Arthritis, Bronchospasm, Depression, Disc degeneration, lumbar, Erectile dysfunction, Hyperlipidemia, Hypertension, IFG (impaired fasting glucose), Palpitations, Primary osteoarthritis of knees, bilateral, Right inguinal hernia, and Spondylosis of lumbar region without myelopathy or radiculopathy.  He   Patient Active Problem List    Diagnosis Date Noted    Pain and swelling of left knee 06/06/2024    Aftercare following left knee joint replacement surgery 06/06/2024    Class 1 obesity due to excess calories without serious comorbidity with body mass index (BMI) of 32.0 to 32.9 in adult 05/06/2024    Chronic tension-type headache, not intractable 04/01/2024    Myofascial muscle pain 09/20/2023    Right arm pain 12/01/2022    Hyperlipidemia 11/20/2022    Chest pain 11/19/2022    Migraine without aura and without status migrainosus, not intractable 07/13/2022    Essential hypertension 01/06/2020    Aftercare following right knee joint replacement surgery 01/29/2019    Status post right knee replacement 01/23/2019    Chronic pain of left knee 05/01/2018    Chronic pain of right knee 05/01/2018    Primary osteoarthritis of right knee 01/30/2018    Primary osteoarthritis of left knee 01/30/2018     He  has a past surgical history that includes pr rpr 1st ingun hrna age 3 yrs/> reducible (Right, 10/26/2017); Tooth extraction; pr arthrp kne condyle&platu medial&lat compartments (Right, 01/21/2019); Knee arthroscopy; Colonoscopy; pr arthrp kne condyle&platu medial&lat compartments (Left, 06/06/2024); and Knee Arthroplasty (Left, 06/06/2024).  His family history includes Coronary artery disease in his brother; Diabetes in his father; No Known Problems in his mother and sister.  He  reports that he quit smoking about 3 years ago. His smoking use included cigarettes. He started smoking about 23 years ago. He has a 10 pack-year smoking history. He has never used smokeless tobacco. He reports current alcohol use of  about 4.0 standard drinks of alcohol per week. He reports that he does not use drugs.  Current Outpatient Medications   Medication Sig Dispense Refill    acetaminophen (TYLENOL) 650 mg CR tablet Take 1 tablet (650 mg total) by mouth every 8 (eight) hours as needed for mild pain 30 tablet 0    amitriptyline (ELAVIL) 10 mg tablet 1 tab qhs x 1 week, then Increase to 20 mg qhs if tolerated. 60 tablet 2    Galcanezumab-gnlm (Emgality) 120 MG/ML SOAJ INJECT THE CONTENTS OF 1 PEN UNDER THE SKIN EVERY 30 DAYS. 3 mL 0    meloxicam (MOBIC) 15 mg tablet Take 1 tablet (15 mg total) by mouth if needed for mild pain or moderate pain 90 tablet 1    metoprolol succinate (TOPROL-XL) 50 mg 24 hr tablet Take 1.5 tablets (75 mg total) by mouth daily 135 tablet 1    rimegepant sulfate (Nurtec) 75 mg TBDP 1 tab at migraine onset. Max 1 tab per 24 hours. 16 tablet 11    rosuvastatin (CRESTOR) 5 mg tablet Take 1 tablet (5 mg total) by mouth every other day 90 tablet 1    sildenafil (VIAGRA) 100 mg tablet Take 1 tablet (100 mg total) by mouth daily as needed for erectile dysfunction 10 tablet 2     No current facility-administered medications for this visit.     He has No Known Allergies..         Objective:    Blood pressure 158/90, pulse 82, temperature 98 °F (36.7 °C), temperature source Temporal, height 6' (1.829 m), weight 101 kg (223 lb), SpO2 98%.  Body mass index is 30.24 kg/m².    Physical Exam    Neurological Exam  On neurologic exam, the patient is alert and oriented to time and place. Speech is fluent and articulate, and the patient follows commands appropriately. Judgment and affect appear normal. Pupils are equally round and reactive to light and extraocular muscles are intact without nystagmus. Face is symmetric, and tongue, uvula, and palate are midline. Hearing is intact. Motor examination reveals intact strength throughout. Normal gait is slightly antalgic after the left knee replacement.      ROS:    Review of Systems    Constitutional:  Negative for chills and fever.   HENT:  Negative for ear pain and sore throat.    Eyes:  Negative for pain and visual disturbance.   Respiratory:  Negative for cough and shortness of breath.    Cardiovascular:  Negative for chest pain and palpitations.   Gastrointestinal:  Negative for abdominal pain and vomiting.   Genitourinary:  Negative for dysuria and hematuria.   Musculoskeletal:  Negative for arthralgias and back pain.   Skin:  Negative for color change and rash.   Neurological:  Positive for headaches (migraines 2-3 times a week). Negative for seizures and syncope.   All other systems reviewed and are negative.    The following portions of the patient's history were reviewed and updated as appropriate: allergies, current medications/ medication history, past family history, past medical history, past social history, past surgical history and problem list.    Review of systems was reviewed and otherwise unremarkable from a neurological perspective.

## 2024-10-04 ENCOUNTER — OFFICE VISIT (OUTPATIENT)
Dept: OBGYN CLINIC | Facility: MEDICAL CENTER | Age: 59
End: 2024-10-04
Payer: COMMERCIAL

## 2024-10-04 VITALS
HEART RATE: 69 BPM | WEIGHT: 223 LBS | DIASTOLIC BLOOD PRESSURE: 71 MMHG | HEIGHT: 72 IN | SYSTOLIC BLOOD PRESSURE: 135 MMHG | BODY MASS INDEX: 30.2 KG/M2

## 2024-10-04 DIAGNOSIS — Z47.1 AFTERCARE FOLLOWING LEFT KNEE JOINT REPLACEMENT SURGERY: Primary | ICD-10-CM

## 2024-10-04 DIAGNOSIS — Z96.652 AFTERCARE FOLLOWING LEFT KNEE JOINT REPLACEMENT SURGERY: Primary | ICD-10-CM

## 2024-10-04 PROCEDURE — 99213 OFFICE O/P EST LOW 20 MIN: CPT | Performed by: ORTHOPAEDIC SURGERY

## 2024-10-04 NOTE — PROGRESS NOTES
"Assessment:   Diagnosis ICD-10-CM Associated Orders   1. Aftercare following left knee joint replacement surgery  Z47.1     Z96.652           Plan:  59 y.o. male 4 months s/p left total knee arthroplasty   Continue physical therapy   Continue weight bearing activities as tolerated   Continue increasing physical activity  Continue OTC pain medications as needed   Follow up in 2 months for 6 month post-op appointment with repeat x-rays      The above stated was discussed in layman's terms and the patient expressed understanding.  All questions were answered to the patient's satisfaction.     To do next visit:  Return in about 2 months (around 12/4/2024) for 6 month post-op appointment.      Subjective:   Ralph Abbott is a 59 y.o. male who presents 4 months s/p left total knee arthroplasty.  He continues to describe difficulties with range of motion and mobility of the left knee.  Since last visit, patient explains changing therapists which has \"made everything easier\".  He continues to express concern regarding warmth of the left knee.                  Review of systems negative unless otherwise specified in HPI    Past Medical History:   Diagnosis Date    Arthritis     Bronchospasm     last assessed 6/14/2016    Depression     Disc degeneration, lumbar     last assessed 4/7/2016    Erectile dysfunction     Hyperlipidemia     Hypertension     IFG (impaired fasting glucose)     last assessed 7/30/2014    Palpitations     last assessed 4/4/2013    Primary osteoarthritis of knees, bilateral     last assessed 6/27/2017    Right inguinal hernia     last assessed 9/12/2017    Spondylosis of lumbar region without myelopathy or radiculopathy     last assessed 4/7/2016       Past Surgical History:   Procedure Laterality Date    COLONOSCOPY      KNEE ARTHROPLASTY Left 06/06/2024    KNEE ARTHROSCOPY      Right    VT ARTHRP KNE CONDYLE&PLATU MEDIAL&LAT COMPARTMENTS Right 01/21/2019    Procedure: ARTHROPLASTY KNEE TOTAL;  Surgeon: " Ronnie Dotson MD;  Location: [facility];  Service: Orthopedics    [place] ARTHRP KNE CONDYLE&PLATU MEDIAL&LAT COMPARTMENTS Left 06/06/2024    Procedure: ARTHROPLASTY KNEE TOTAL W ROBOT;  Surgeon: Ronnie Dotson MD;  Location: [facility];  Service: Orthopedics    N[place] RPR 1ST INGUN HRNA AGE 3 YRS/> REDUCIBLE Right 10/26/2017    Procedure: REPAIR HERNIA INGUINAL;  Surgeon: Ha Hand MD;  Location: [facility];  Service: General    TOOTH EXTRACTION         Family History   Problem Relation Age of Onset    No Known Problems Mother     Diabetes Father     No Known Problems Sister     Coronary artery disease Brother        Social History     Occupational History    Not on file   Tobacco Use    Smoking status: Former     Current packs/day: 0.00     Average packs/day: 0.5 packs/day for 20.0 years (10.0 ttl pk-yrs)     Types: Cigarettes     Start date: 2001     Quit date: 2021     Years since quitting: 3.7    Smokeless tobacco: Never    Tobacco comments:     4-5 cig day x 20 yrs   Vaping Use    Vaping status: Never Used   Substance and Sexual Activity    Alcohol use: Yes     Alcohol/week: 4.0 standard drinks of alcohol     Types: 4 Cans of beer per week     Comment:  occasional    Drug use: Never    Sexual activity: Not on file         Current Outpatient Medications:     acetaminophen (TYLENOL) 650 mg CR tablet, Take 1 tablet (650 mg total) by mouth every 8 (eight) hours as needed for mild pain, Disp: 30 tablet, Rfl: 0    amitriptyline (ELAVIL) 10 mg tablet, 1 tab qhs x 1 week, then Increase to 20 mg qhs if tolerated., Disp: 60 tablet, Rfl: 2    Galcanezumab-gnlm (Emgality) 120 MG/ML SOAJ, INJECT THE CONTENTS OF 1 PEN UNDER THE SKIN EVERY 30 DAYS., Disp: 3 mL, Rfl: 0    meloxicam (MOBIC) 15 mg tablet, Take 1 tablet (15 mg total) by mouth if needed for mild pain or moderate pain, Disp: 90 tablet, Rfl: 1    metoprolol succinate (TOPROL-XL) 50 mg 24 hr tablet, Take 1.5 tablets (75 mg total) by mouth daily, Disp: 135  "tablet, Rfl: 1    rimegepant sulfate (Nurtec) 75 mg TBDP, 1 tab at migraine onset. Max 1 tab per 24 hours., Disp: 16 tablet, Rfl: 11    rosuvastatin (CRESTOR) 5 mg tablet, Take 1 tablet (5 mg total) by mouth every other day, Disp: 90 tablet, Rfl: 1    sildenafil (VIAGRA) 100 mg tablet, Take 1 tablet (100 mg total) by mouth daily as needed for erectile dysfunction, Disp: 10 tablet, Rfl: 2    No Known Allergies         Vitals:    10/04/24 1324   BP: 135/71   Pulse: 69       Objective:  Physical exam  General: Awake, Alert, Oriented  Eyes: Pupils equal, round and reactive to light  Heart: regular rate and rhythm  Lungs: No audible wheezing  Abdomen: soft                    Ortho Exam  Left knee:   Well healed anterior incision  No erythema and no ecchymosis  Stable to varus and valgus stress  Extensor mechanism intact  Extension 5  Flexion 120  Calf compartments soft and supple  Sensation intact  Toes are warm sensate and mobile     Diagnostics, reviewed and taken today if performed as documented:    None performed        Procedures, if performed today:    None performed      Portions of the record may have been created with voice recognition software.  Occasional wrong word or \"sound a like\" substitutions may have occurred due to the inherent limitations of voice recognition software.  Read the chart carefully and recognize, using context, where substitutions have occurred.      "

## 2024-10-07 ENCOUNTER — OFFICE VISIT (OUTPATIENT)
Dept: PHYSICAL THERAPY | Facility: CLINIC | Age: 59
End: 2024-10-07
Payer: COMMERCIAL

## 2024-10-07 DIAGNOSIS — Z47.89 ORTHOPEDIC AFTERCARE: Primary | ICD-10-CM

## 2024-10-07 DIAGNOSIS — M25.562 ACUTE PAIN OF LEFT KNEE: ICD-10-CM

## 2024-10-07 DIAGNOSIS — Z96.652 S/P TKR (TOTAL KNEE REPLACEMENT), LEFT: ICD-10-CM

## 2024-10-07 DIAGNOSIS — M17.12 PRIMARY OSTEOARTHRITIS OF LEFT KNEE: ICD-10-CM

## 2024-10-07 PROCEDURE — 97110 THERAPEUTIC EXERCISES: CPT | Performed by: PHYSICAL THERAPIST

## 2024-10-07 PROCEDURE — 97530 THERAPEUTIC ACTIVITIES: CPT | Performed by: PHYSICAL THERAPIST

## 2024-10-07 PROCEDURE — 97140 MANUAL THERAPY 1/> REGIONS: CPT | Performed by: PHYSICAL THERAPIST

## 2024-10-07 NOTE — PROGRESS NOTES
"Daily Note     Today's date: 10/7/2024  Patient name: Ralph Abbott  : 1965  MRN: [id number]  Referring provider: Ronnie Dotson,*  Dx:   Encounter Diagnosis     ICD-10-CM    1. Orthopedic aftercare  Z47.89       2. Acute pain of left knee  M25.562       3. S/P TKR (total knee replacement), left  Z96.652       4. Primary osteoarthritis of left knee  M17.12           Start Time: 1615  Stop Time: 1700  Total time in clinic (min): 45 minutes    Subjective: Pt reports soreness after BFR last treatment. [place] like he was walking funny on Saturday. Pt followed up with Dr. Dotson who was pleased with current outcome.       Objective: See treatment diary below      Assessment: Tolerated treatment well. Improved tolerance present with BFR exercises. Step ups much improved. Pt feels as though his knee is really coming around. Patient exhibited good technique with therapeutic exercises and would benefit from continued PT.      Plan: Continue per plan of care.      Precautions: L TKA on , H/O R TKA      Manuals 9/3 9/6 9/9 9/12 9/16 9/19 9/23  9/27 10/1 10/7   PFJ mobs All planes gr III All planes gr III All planes gr III All planes gr III All planes gr III All planes gr III All planes gr III All planes gr III All planes gr III All planes gr III   Anterior Tibial glide with knee flexion JK grIII 5' JK Gr III 5' JK Gr III 5' JK Gr III 5' JK Gr III 5' JK Gr III 5' JK Gr III 5' JK Gr III 5' JK Gr III 5' JK Gr III 5'   Tibfemoral rotation at 90 of flexion Gr III Gr III Gr III Gr III Gr III Gr III Gr III Gr III     Knee extension mobilization over bolster Slight distraction gr III Slight distraction gr III Slight distraction gr III Slight distraction gr III Slight distraction gr III Slight distraction gr III Slight distraction gr III Slight distraction gr III Slight distraction gr III Slight distraction gr III   Knee flexion MWM              Neuro Re-Ed             TKE @ Lizeth 30x\" 20# 30x 20#           Short foot   " "   In SLS 10x10\"  In SLS 10x10\" c/ FT support                   SL knee extension 1RM        44# left + 55#R (able to perform more on R)      SL knee extension repetition max        33# x 15 on L and 30+ on R     BFR SL knee extension         191mmhg LOP 10,15,15 5# c/30\" rest  193 mmhg10, 10, 10 5# c/ 30\" rest                             Ther Ex             Hamstring stretch              Quad stretch              Nustep for ROM  8' lvl 4  8' lvl 5 8' lvl 5 11' lvl 5 8' lvl 5 8' lvl 5 8' lvl 5 8' lvl 5 9' lvl 5    Knee flexion on PB with strap  10x10\" 10x10\" 10x10\"  10x10\" 10x10\" 10x10\"      SL leg press  50# 2x10  70# 2x10 70# 2x12   70# 4x12  70# 4x12  5x10 70# 5x10 70# 5x10 75#   Bike          8' L5 seat 10                              Ther Activity             SL HR 2x15 B 2x15 B 20x 2x10 2x10 2x10  2x10 20x 20x 3x15   Modified lunge on step    20x 8\"  Perfomed with bolster 2x10        Step up with runner climb  10x L 6\" with cueing  2 x10 on L (one anterior tib translation and one lateral) 2x10 8\" with mobilization strap 2x10 8\" with mobilization strap 2x10 8\" with mobilization strap  2x10 with 8\" step  3x10 with 8\" step   mmhg LOP 3x15 30\"   mmhg LOP 3x15 30\"    Step down       10x with 6\" step 10x with 6\" step     Gait Training                                       Modalities                                       1:1 with PT from 415-5pm                   "

## 2024-10-10 ENCOUNTER — OFFICE VISIT (OUTPATIENT)
Dept: PHYSICAL THERAPY | Facility: CLINIC | Age: 59
End: 2024-10-10
Payer: COMMERCIAL

## 2024-10-10 DIAGNOSIS — Z96.652 S/P TKR (TOTAL KNEE REPLACEMENT), LEFT: ICD-10-CM

## 2024-10-10 DIAGNOSIS — M25.562 ACUTE PAIN OF LEFT KNEE: ICD-10-CM

## 2024-10-10 DIAGNOSIS — M17.12 PRIMARY OSTEOARTHRITIS OF LEFT KNEE: ICD-10-CM

## 2024-10-10 DIAGNOSIS — Z47.89 ORTHOPEDIC AFTERCARE: Primary | ICD-10-CM

## 2024-10-10 PROCEDURE — 97110 THERAPEUTIC EXERCISES: CPT | Performed by: PHYSICAL THERAPIST

## 2024-10-10 PROCEDURE — 97112 NEUROMUSCULAR REEDUCATION: CPT | Performed by: PHYSICAL THERAPIST

## 2024-10-10 PROCEDURE — 97140 MANUAL THERAPY 1/> REGIONS: CPT | Performed by: PHYSICAL THERAPIST

## 2024-10-10 NOTE — PROGRESS NOTES
"Daily Note     Today's date: 10/10/2024  Patient name: Ralph Abbott  : 1965  MRN: [id number]  Referring provider: Ronnie Dotson,*  Dx:   Encounter Diagnosis     ICD-10-CM    1. Orthopedic aftercare  Z47.89       2. Acute pain of left knee  M25.562       3. S/P TKR (total knee replacement), left  Z96.652       4. Primary osteoarthritis of left knee  M17.12                      Subjective: Pt reports having more knee soreness and pain. Prefers to defer BFR training this session.       Objective: See treatment diary below      Assessment: Tolerated treatment well. Deferred BFR training secondary to discomfort at involved knee and surrounding musculature. Continued with mobility and LE strength/endurance. Patient would benefit from continued PT. Plan to resume BFR next session.       Plan: Continue per plan of care.  Resume BFR training next session.      Precautions: L TKA on , H/O R TKA      Manuals 10/10   9/12 9/16 9/19 9/23  9/27 10/1 10/7   PFJ mobs All planes gr III   All planes gr III All planes gr III All planes gr III All planes gr III All planes gr III All planes gr III All planes gr III   Anterior Tibial glide with knee flexion JK Gr III 5'   JK Gr III 5' JK Gr III 5' JK Gr III 5' JK Gr III 5' JK Gr III 5' JK Gr III 5' JK Gr III 5'   Tibfemoral rotation at 90 of flexion    Gr III Gr III Gr III Gr III Gr III     Knee extension mobilization over bolster Slight distraction gr III   Slight distraction gr III Slight distraction gr III Slight distraction gr III Slight distraction gr III Slight distraction gr III Slight distraction gr III Slight distraction gr III   Knee flexion MWM              Neuro Re-Ed             TKE @ Lizeth [place] CC 40# x50            Short foot      In SLS 10x10\"  In SLS 10x10\" c/ FT support                   SL knee extension 1RM        44# left + 55#R (able to perform more on R)      SL knee extension repetition max        33# x 15 on L and 30+ on R     BFR SL knee " "extension         191mmhg LOP 10,15,15 5# c/30\" rest  193 mmhg10, 10, 10 5# c/ 30\" rest                             Ther Ex             Hamstring stretch              Quad stretch              Nustep for ROM     11' lvl 5 8' lvl 5 8' lvl 5 8' lvl 5 8' lvl 5 9' lvl 5    Knee flexion on PB with strap      10x10\" 10x10\" 10x10\"      SL leg press 5x10 75#   70# 2x12   70# 4x12  70# 4x12  5x10 70# 5x10 70# 5x10 75#   Bike 8' L5 seat 10          8' L5 seat 10                              Ther Activity             SL HR 4x10    2x10 2x10 2x10  2x10 20x 20x 3x15   Modified lunge on step    20x 8\"  Perfomed with bolster 2x10        Step up with runner climb     2x10 8\" with mobilization strap 2x10 8\" with mobilization strap  2x10 with 8\" step  3x10 with 8\" step   mmhg LOP 3x15 30\"   mmhg LOP 3x15 30\"    Step down       10x with 6\" step 10x with 6\" step     Gait Training                                       Modalities                                       1:1 with PT from 415-455pm                     "

## 2024-10-17 ENCOUNTER — OFFICE VISIT (OUTPATIENT)
Dept: PHYSICAL THERAPY | Facility: CLINIC | Age: 59
End: 2024-10-17
Payer: COMMERCIAL

## 2024-10-17 DIAGNOSIS — Z47.89 ORTHOPEDIC AFTERCARE: Primary | ICD-10-CM

## 2024-10-17 DIAGNOSIS — Z96.652 S/P TKR (TOTAL KNEE REPLACEMENT), LEFT: ICD-10-CM

## 2024-10-17 DIAGNOSIS — M25.562 ACUTE PAIN OF LEFT KNEE: ICD-10-CM

## 2024-10-17 DIAGNOSIS — M17.12 PRIMARY OSTEOARTHRITIS OF LEFT KNEE: ICD-10-CM

## 2024-10-17 PROCEDURE — 97530 THERAPEUTIC ACTIVITIES: CPT | Performed by: PHYSICAL THERAPIST

## 2024-10-17 PROCEDURE — 97140 MANUAL THERAPY 1/> REGIONS: CPT | Performed by: PHYSICAL THERAPIST

## 2024-10-17 PROCEDURE — 97110 THERAPEUTIC EXERCISES: CPT | Performed by: PHYSICAL THERAPIST

## 2024-10-17 NOTE — PROGRESS NOTES
"Daily Note     Today's date: 10/17/2024  Patient name: Ralph Abbott  : 1965  MRN: [id number]  Referring provider: Ronnie Dotson,*  Dx:   Encounter Diagnosis     ICD-10-CM    1. Orthopedic aftercare  Z47.89       2. Acute pain of left knee  M25.562       3. S/P TKR (total knee replacement), left  Z96.652       4. Primary osteoarthritis of left knee  M17.12           Start Time: 1701  Stop Time: 1745  Total time in clinic (min): 44 minutes    Subjective: Pt reports knee overall feeling good.       Objective: See treatment diary below      Assessment: Resumed BFR in today's treatment. LOP measured at 218 mmhg. Knee ROM only mildly restricted with flexion and extension at this time. Tolerated treatment well with appropriate levels of fatigue, especially using BFR. Patient would benefit from continued PT.       Plan: Continue per plan of care.      Precautions: L TKA on , H/O R TKA      Manuals 10/10 10/17    9/19 9/23  9/27 10/1 10/7   PFJ mobs All planes gr III All planes gr III    All planes gr III All planes gr III All planes gr III All planes gr III All planes gr III   Anterior Tibial glide with knee flexion JK Gr III 5' JK Gr III 5'    JK Gr III 5' JK Gr III 5' JK Gr III 5' JK Gr III 5' JK Gr III 5'   Tibfemoral rotation at 90 of flexion      Gr III Gr III Gr III     Knee extension mobilization over bolster Slight distraction gr III Slight distraction gr III    Slight distraction gr III Slight distraction gr III Slight distraction gr III Slight distraction gr III Slight distraction gr III   Knee flexion MWM              Neuro Re-Ed             TKE @ Lizeth [place] CC 40# x50            Short foot      In SLS 10x10\"  In SLS 10x10\" c/ FT support                   SL knee extension 1RM        44# left + 55#R (able to perform more on R)      SL knee extension repetition max        33# x 15 on L and 30+ on R     BFR SL knee extension  218 mmhg10, 10, 10 5# c/ 30\" rest       191mmhg LOP 10,15,15 5# " "c/30\" rest  193 mmhg10, 10, 10 5# c/ 30\" rest                             Ther Ex             Hamstring stretch              Quad stretch              Nustep for ROM       8' lvl 5 8' lvl 5 8' lvl 5 9' lvl 5    Knee flexion on PB with strap       10x10\" 10x10\"      SL leg press 5x10 75# 3x10 75#    70# 4x12  70# 4x12  5x10 70# 5x10 70# 5x10 75#   Bike 8' L5 seat 10  8' L5 seat 10         8' L5 seat 10                              Ther Activity             SL HR 4x10  4x10    2x10  2x10 20x 20x 3x15   Modified lunge on step      Perfomed with bolster 2x10        Step up with runner climb   3x10 c/ 20\" interset rest 6\"  mmhg      2x10 with 8\" step  3x10 with 8\" step   mmhg LOP 3x15 30\"   mmhg LOP 3x15 30\"    Step down       10x with 6\" step 10x with 6\" step     Gait Training                                       Modalities                                       1:1 with PT from 501-545pm                       "

## 2024-10-21 ENCOUNTER — OFFICE VISIT (OUTPATIENT)
Dept: PHYSICAL THERAPY | Facility: CLINIC | Age: 59
End: 2024-10-21
Payer: COMMERCIAL

## 2024-10-21 DIAGNOSIS — M17.12 PRIMARY OSTEOARTHRITIS OF LEFT KNEE: ICD-10-CM

## 2024-10-21 DIAGNOSIS — Z96.652 S/P TKR (TOTAL KNEE REPLACEMENT), LEFT: ICD-10-CM

## 2024-10-21 DIAGNOSIS — M25.562 ACUTE PAIN OF LEFT KNEE: ICD-10-CM

## 2024-10-21 DIAGNOSIS — Z47.89 ORTHOPEDIC AFTERCARE: Primary | ICD-10-CM

## 2024-10-21 PROCEDURE — 97110 THERAPEUTIC EXERCISES: CPT | Performed by: PHYSICAL THERAPIST

## 2024-10-21 PROCEDURE — 97140 MANUAL THERAPY 1/> REGIONS: CPT | Performed by: PHYSICAL THERAPIST

## 2024-10-21 PROCEDURE — 97530 THERAPEUTIC ACTIVITIES: CPT | Performed by: PHYSICAL THERAPIST

## 2024-10-21 NOTE — PROGRESS NOTES
"Daily Note     Today's date: 10/21/2024  Patient name: Ralph Abbott  : 1965  MRN: [id number]  Referring provider: Ronnie Dotson,*  Dx:   Encounter Diagnosis     ICD-10-CM    1. Orthopedic aftercare  Z47.89       2. Acute pain of left knee  M25.562       3. S/P TKR (total knee replacement), left  Z96.652       4. Primary osteoarthritis of left knee  M17.12           Start Time: 1619  Stop Time: 1700  Total time in clinic (min): 41 minutes    Subjective: Pt reports having 2 days of soreness after BFR last session. 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 He would like to do BFR today as he is going to a show on Friday and doesn't want to be sore after Thursday's session.       Objective: See treatment diary below      Assessment: Tolerated treatment well. Resumed BFR with LOP measure at 211 mmhg. Patient demonstrated fatigue post treatment and would benefit from continued PT.      Plan: Continue per plan of care. Pt has 3 more sessions and then he will likely be discharged to a [facility].      Precautions: L TKA on , H/O R TKA      Manuals 10/10 10/17 10/21   9/19 9/23  9/27 10/1 10/7   PFJ mobs All planes gr III All planes gr III    All planes gr III All planes gr III All planes gr III All planes gr III All planes gr III   Anterior Tibial glide with knee flexion JK Gr III 5' JK Gr III 5' JK Gr III 5'   JK Gr III 5' JK Gr III 5' JK Gr III 5' JK Gr III 5' JK Gr III 5'   Tibfemoral rotation at 90 of flexion   Gr III   Gr III Gr III Gr III     Knee extension mobilization over bolster Slight distraction gr III Slight distraction gr III Slight distraction gr III   Slight distraction gr III Slight distraction gr III Slight distraction gr III Slight distraction gr III Slight distraction gr III   Knee flexion MWM              Neuro Re-Ed             TKE @ [employer] [place] [employer] 40# x50            Short foot      In SLS 10x10\"  In SLS 10x10\" c/ FT support                 " "  SL knee extension 1RM        44# left + 55#R (able to perform more on R)      SL knee extension repetition max        33# x 15 on L and 30+ on R     BFR SL knee extension  218 mmhg10, 10, 10 5# c/ 30\" rest 211 mmhg10, 10, 10 5# c/ 30\" rest      191mmhg LOP 10,15,15 5# c/30\" rest  193 mmhg10, 10, 10 5# c/ 30\" rest                             Ther Ex             Hamstring stretch              Quad stretch              Nustep for ROM       8' lvl 5 8' lvl 5 8' lvl 5 9' lvl 5    Knee flexion on PB with strap       10x10\" 10x10\"      SL leg press 5x10 75# 3x10 75# 5x10 75#   70# 4x12  70# 4x12  5x10 70# 5x10 70# 5x10 75#   Bike 8' L5 seat 10  8' L5 seat 10  8' L5 seat 10        8' L5 seat 10                              Ther Activity             SL HR 4x10  4x10 4x10    2x10  2x10 20x 20x 3x15   Modified lunge on step      Perfomed with bolster 2x10        Step up with runner climb   3x10 c/ 20\" interset rest 6\"  mmhg  3x10 c/ 20\" interset rest 6\"  mmhg     2x10 with 8\" step  3x10 with 8\" step   mmhg LOP 3x15 30\"   mmhg LOP 3x15 30\"    Step down       10x with 6\" step 10x with 6\" step     Gait Training                                       Modalities                                       1:1 with PT from 419-5pm                         "

## 2024-10-24 ENCOUNTER — OFFICE VISIT (OUTPATIENT)
Dept: PHYSICAL THERAPY | Facility: CLINIC | Age: 59
End: 2024-10-24
Payer: COMMERCIAL

## 2024-10-24 DIAGNOSIS — Z96.652 S/P TKR (TOTAL KNEE REPLACEMENT), LEFT: ICD-10-CM

## 2024-10-24 DIAGNOSIS — M25.562 ACUTE PAIN OF LEFT KNEE: ICD-10-CM

## 2024-10-24 DIAGNOSIS — Z47.89 ORTHOPEDIC AFTERCARE: Primary | ICD-10-CM

## 2024-10-24 DIAGNOSIS — M17.12 PRIMARY OSTEOARTHRITIS OF LEFT KNEE: ICD-10-CM

## 2024-10-24 PROCEDURE — 97140 MANUAL THERAPY 1/> REGIONS: CPT | Performed by: PHYSICAL THERAPIST

## 2024-10-24 PROCEDURE — 97110 THERAPEUTIC EXERCISES: CPT | Performed by: PHYSICAL THERAPIST

## 2024-10-24 PROCEDURE — 97530 THERAPEUTIC ACTIVITIES: CPT | Performed by: PHYSICAL THERAPIST

## 2024-10-24 NOTE — PROGRESS NOTES
"Daily Note     Today's date: 10/24/2024  Patient name: Ralph Abbott  : 1965  MRN: [id number]  Referring provider: Ronnie Dotson,*  Dx:   Encounter Diagnosis     ICD-10-CM    1. Orthopedic aftercare  Z47.89       2. Acute pain of left knee  M25.562       3. S/P TKR (total knee replacement), left  Z96.652       4. Primary osteoarthritis of left knee  M17.12                      Subjective: Pt reports relatively no pain at this time in his knee. Recovered quicker from BFR this time around.       Objective: See treatment diary below      Assessment: Minimal fascial restrictions or PFJ restrictions at involve knee at this time. Pt still has weakness at involved gastroc due to muscle atrophy. Tolerated treatment well. Mild weakness at left knee compared to right, noticeable during reverse lunges with valside. Implemented SLS with trampoline toss to enhance stability and balance of LE. Patient exhibited good technique with therapeutic exercises and would benefit from continued PT.       Plan: Continue per plan of care.      Precautions: L TKA on , H/O R TKA      Manuals 10/10 10/17 10/21 10/24  9/19 9/23  9/27 10/1 10/7   PFJ mobs All planes gr III All planes gr III  Gr III  All planes gr III All planes gr III All planes gr III All planes gr III All planes gr III   Anterior Tibial glide with knee flexion JK Gr III 5' JK Gr III 5' JK Gr III 5' JK Gr III 5'  JK Gr III 5' JK Gr III 5' JK Gr III 5' JK Gr III 5' JK Gr III 5'   Tibfemoral rotation at 90 of flexion   Gr III Gr III  Gr III Gr III Gr III     Knee extension mobilization over bolster Slight distraction gr III Slight distraction gr III Slight distraction gr III Slight distraction gr III  Slight distraction gr III Slight distraction gr III Slight distraction gr III Slight distraction gr III Slight distraction gr III   Knee flexion MWM              Neuro Re-Ed             TKE @ [employer] [place] [employer] 40# x50            Short foot      In SLS 10x10\"  In " "SLS 10x10\" c/ FT support                   SL knee extension 1RM        44# left + 55#R (able to perform more on R)      SL knee extension repetition max        33# x 15 on L and 30+ on R     BFR SL knee extension  218 mmhg10, 10, 10 5# c/ 30\" rest 211 mmhg10, 10, 10 5# c/ 30\" rest      191mmhg LOP 10,15,15 5# c/30\" rest  193 mmhg10, 10, 10 5# c/ 30\" rest   SLS with trampoline toss    2x30 on ea RMB                       Ther Ex             Hamstring stretch              Quad stretch              Nustep for ROM       8' lvl 5 8' lvl 5 8' lvl 5 9' lvl 5    Knee flexion on PB with strap       10x10\" 10x10\"      SL leg press 5x10 75# 3x10 75# 5x10 75# 5x10 75#  70# 4x12  70# 4x12  5x10 70# 5x10 70# 5x10 75#   Bike 8' L5 seat 10  8' L5 seat 10  8' L5 seat 10  8' L5 seat 10       8' L5 seat 10                              Ther Activity             SL HR 4x10  4x10 4x10  4x10   2x10  2x10 20x 20x 3x15   Reverse lunge with valslide    15x on ea         Modified lunge on step      Perfomed with bolster 2x10        Step up with runner climb   3x10 c/ 20\" interset rest 6\"  mmhg  3x10 c/ 20\" interset rest 6\"  mmhg     2x10 with 8\" step  3x10 with 8\" step   mmhg LOP 3x15 30\"   mmhg LOP 3x15 30\"    Step down       10x with 6\" step 10x with 6\" step     Gait Training                                       Modalities                                       1:1 with PT from 415-455pm                           "

## 2024-10-28 ENCOUNTER — OFFICE VISIT (OUTPATIENT)
Dept: PHYSICAL THERAPY | Facility: CLINIC | Age: 59
End: 2024-10-28
Payer: COMMERCIAL

## 2024-10-28 DIAGNOSIS — Z47.89 ORTHOPEDIC AFTERCARE: Primary | ICD-10-CM

## 2024-10-28 DIAGNOSIS — Z96.652 S/P TKR (TOTAL KNEE REPLACEMENT), LEFT: ICD-10-CM

## 2024-10-28 DIAGNOSIS — M25.562 ACUTE PAIN OF LEFT KNEE: ICD-10-CM

## 2024-10-28 DIAGNOSIS — M17.12 PRIMARY OSTEOARTHRITIS OF LEFT KNEE: ICD-10-CM

## 2024-10-28 PROCEDURE — 97110 THERAPEUTIC EXERCISES: CPT | Performed by: PHYSICAL THERAPIST

## 2024-10-28 PROCEDURE — 97140 MANUAL THERAPY 1/> REGIONS: CPT | Performed by: PHYSICAL THERAPIST

## 2024-10-28 PROCEDURE — 97530 THERAPEUTIC ACTIVITIES: CPT | Performed by: PHYSICAL THERAPIST

## 2024-10-28 NOTE — PROGRESS NOTES
"Daily Note     Today's date: 10/28/2024  Patient name: Ralph Abbott  : 1965  MRN: [id number]  Referring provider: Ronnie Dotson,*  Dx:   Encounter Diagnosis     ICD-10-CM    1. Orthopedic aftercare  Z47.89       2. Acute pain of left knee  M25.562       3. S/P TKR (total knee replacement), left  Z96.652       4. Primary osteoarthritis of left knee  M17.12                      Subjective: Overall patient is doing well. No new complaints at involved knee. Pt has consultation with Fernando at [facility] for orthotics on Wednesday.       Objective: See treatment diary below      Assessment: Tolerated treatment well. Continued with BFR for strength and conditioning. LOP measured at 293 mmhg. Increased step height to 8\" during BFR step up. Implemented SL heel lowering with BFR into POC for gastroc strength. Patient demonstrated fatigue post treatment, exhibited good technique with therapeutic exercises, and would benefit from continued PT.      Plan: Continue per plan of care. Re-evaluate and planned discharge after next session.      Precautions: L TKA on , H/O R TKA      Manuals 10/10 10/17 10/21 10/24 10/28    10/1 10/7   PFJ mobs All planes gr III All planes gr III  Gr III Gr III    All planes gr III All planes gr III   Anterior Tibial glide with knee flexion JK Gr III 5' JK Gr III 5' JK Gr III 5' JK Gr III 5' JK Gr III 5'    JK Gr III 5' JK Gr III 5'   Tibfemoral rotation at 90 of flexion   Gr III Gr III Gr III        Knee extension mobilization over bolster Slight distraction gr III Slight distraction gr III Slight distraction gr III Slight distraction gr III Slight distraction gr III    Slight distraction gr III Slight distraction gr III   Knee flexion MWM              Neuro Re-Ed             TKE @ [place] [employer] 40# x50            Short foot                          SL knee extension 1RM             SL knee extension repetition max             BFR SL knee extension  218 mmhg10, 10, 10 5# c/ 30\" rest " "211 mmhg10, 10, 10 5# c/ 30\" rest  3x15 with 30\" rest 293 mmhga    191mmhg LOP 10,15,15 5# c/30\" rest  193 mmhg10, 10, 10 5# c/ 30\" rest   SLS with trampoline toss    2x30 on ea RMB                       Ther Ex             Hamstring stretch              Quad stretch              Nustep for ROM          9' lvl 5    Knee flexion on PB with strap              SL leg press 5x10 75# 3x10 75# 5x10 75# 5x10 75#     5x10 70# 5x10 75#   Bike 8' L5 seat 10  8' L5 seat 10  8' L5 seat 10  8' L5 seat 10  8' L5      8' L5 seat 10                              Ther Activity             SL HR 4x10  4x10 4x10  4x10  Eccentric heel lowering BFR 3x10 20\" rest     20x 3x15   Reverse lunge with valslide    15x on ea         Modified lunge on step             Step up with runner climb   3x10 c/ 20\" interset rest 6\"  mmhg  3x10 c/ 20\" interset rest 6\"  mmhg   3x10 c/ 20\" interset rest 8\"  mmhg     mmhg LOP 3x15 30\"   mmhg LOP 3x15 30\"    Step down             Gait Training                                       Modalities                                       1:1 with PT from 415-5pm                             "

## 2024-10-30 ENCOUNTER — OFFICE VISIT (OUTPATIENT)
Dept: PHYSICAL THERAPY | Age: 59
End: 2024-10-30
Payer: COMMERCIAL

## 2024-10-30 DIAGNOSIS — M21.42 PES PLANUS OF LEFT FOOT: Primary | ICD-10-CM

## 2024-10-30 PROCEDURE — 97760 ORTHOTIC MGMT&TRAING 1ST ENC: CPT | Performed by: PHYSICAL THERAPIST

## 2024-10-31 ENCOUNTER — EVALUATION (OUTPATIENT)
Dept: PHYSICAL THERAPY | Facility: CLINIC | Age: 59
End: 2024-10-31
Payer: COMMERCIAL

## 2024-10-31 DIAGNOSIS — Z96.652 S/P TKR (TOTAL KNEE REPLACEMENT), LEFT: ICD-10-CM

## 2024-10-31 DIAGNOSIS — Z47.89 ORTHOPEDIC AFTERCARE: Primary | ICD-10-CM

## 2024-10-31 DIAGNOSIS — M25.562 ACUTE PAIN OF LEFT KNEE: ICD-10-CM

## 2024-10-31 PROCEDURE — 97110 THERAPEUTIC EXERCISES: CPT | Performed by: PHYSICAL THERAPIST

## 2024-10-31 PROCEDURE — 97112 NEUROMUSCULAR REEDUCATION: CPT | Performed by: PHYSICAL THERAPIST

## 2024-10-31 PROCEDURE — 97140 MANUAL THERAPY 1/> REGIONS: CPT | Performed by: PHYSICAL THERAPIST

## 2024-10-31 NOTE — PROGRESS NOTES
Orthotic Evaluation    Today's date: 10/31/24  Patient name: Ralph Abbott  : 1965  MRN: [id number]  Referring provider: Phu Carr PT  Dx:   Encounter Diagnosis     ICD-10-CM    1. Pes planus of left foot  M21.42                       Subjective:    Lex presents today for orthotic evaluation. he complains of decreased joint mobility, ambulatory dysfunction, and postural dysfunction with functional activities including ambulation and work. The patient plans to use his orthotic for walking, standing, and work. The orthotic will be placed in a wide, size 11 wide.    Objective:    Age: 59 y.o.  Weight: 223    Foot Posture Index Score    Right Foot  Talar dome - +1  Talonavicular bulge - 0  Medial longitudinal arch - 0  Malleolar curve - +1   Calcaneal eversion - 0  Too many toes - +1  Total Score R = +3    Left Foot  Talar dome - +1  Talonavicular bulge - +1  Medial longitudinal arch - +1  Malleolar curve - +1   Calcaneal eversion - +1  Too many toes - +2  Total Score L = +7    Reference Values  Normal =    0 to +5  Pronated =  +6 to +9 Highly Pronated =  10+  Supinated =   -1 to -4 Highly Supinated = -5 to -12    Objective    Assessment:    Patient requires custom foot orthosis with medial arch support and a R heel lift to correct altered gait mechanics. Patient is not currently controlled by his current shoe wear. Patient was educated on the design of the custom orthotic and it's ability to offload his current pathology. Patient was also educated on potential shoe wear changes with device, break-in period, and skin checks to avoid potential skin break down.     Orthotic goals:  1) Patient will have custom foot orthoses fitted to his shoe.   2) Patient will be compliant with break-in period of custom foot orthoses as prescribed by PT.   3) Patient will be compliant with custom foot orthoses use as prescribed by PT.     Plan:    Planned therapy interventions: orthotic fitting/training  Duration in  visits: 2

## 2024-10-31 NOTE — PROGRESS NOTES
PT-Discharge    Today's date: 10/31/2024  Patient name: Ralph Abbott  : 1965  MRN: [id number]  Referring provider: Ronnie Dotson,*  Dx:   Encounter Diagnosis     ICD-10-CM    1. Orthopedic aftercare  Z47.89       2. Acute pain of left knee  M25.562       3. S/P TKR (total knee replacement), left  Z96.652                          Assessment:  Lex Abbott is a 59 y.o. male who presents 5 months s/p left TKA on 24 with Dr. Dotson. Pt has demonstrated significant improvement with PT. His ROM has improved with both extension and flexion to 0-130 degrees. His pain is minimal at this time. His strength is full in LE. He has integrated functional activities into his day without much difficulty. Pt can walk for desired distance and negotiates stairs in a reciprocal pattern. Overall, patient has achieved all goals and is ready for discharge. Thank you for this referral.     Primary movement impairments: (knee ROM deficits)    1) Tibiofemoral joint mobility restriction (anterior)- Improving  2) Patellofemoral joint mobility restriction (all planes)- Improving  3) Excessive early and late pronation of STJ in stance, likely contributing to the kinetic chain and abnormal force at left knee - Receiving orthotic for this      Understanding of Dx/Px/POC: good     Prognosis: good    Goals  Short Term Goals: to be achieved by 4 weeks  1) Patient to be independent with basic HEP.-MET  2) Decrease pain to 3/10 at its worst.-MET  3) Increase Knee ROM by 5-10 degrees -MET  4) Increase LE strength by 1/2 MMT grade in all deficient planes.-MET    Long Term Goals: to be achieved by discharge  1) FOTO equal to or greater than expected.-MET  2) Ambulation to improve to maximal level of function while controlling for pronation -Not met  3) Stair negotiation will improve to reciprocal.-MET  4) Return to work related activities; ladders, stairs, squatting, etc. -MET      Plan  D/C to [facility]      Subjective Evaluation    History of  Present Illness  Mechanism of injury: History of Current Injury: Lex is 3 months s/p left TKA on  with Dr. Dotson. Pt was attending PT at [facility], was recently discharged,  and wanted a second rehabilitation opinion. Pt feels as his right knee did not improve as he expected. H/O R TKA with normal progression. Pt does report appropriate stability of the knee; denies any buckling or giving way. Admittedly, patient had several days of pain after previous rehab sessions. Reported several days to recover, at times. He was part of our bundled plan. Pt challenged with negotiating stairs.Pt is taking motrin, tylenol, and tramadol for pain control. Sleep is interrupted secondary to pain  Pain location/Descriptors: throbbing discomfort, slightly distal to joint line.   Aggravating factors: Stairs, work related activities, stretching, squatting, loaded knee flexion activities.   Easing factors: Rest, activity modification  24 HR pattern: Movement dependent   Imaging: Most recent XR :   Left knee joint effusion.     Redemonstrated postop changes of left TKA with patellar resurfacing. Components appear well seated.    Special Questions: Pt denies any numbness/tingling.   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       Pain  Current pain ratin  At worst pain ratin          Objective     Active Range of Motion   Left Knee   Flexion: 125 degrees   Extensor la degrees     Passive Range of Motion   Left Knee   Flexion: 130 degrees   Extension la degrees     Mobility   Patellar Mobility:   Left Knee   WNL: left medial, left lateral, left superior and left inferior  Tibiofemoral Mobility:   Left knee   WNL    Strength/Myotome Testing     Left Hip   Planes of Motion   Flexion: 5  Extension: 5  Abduction: 5  External rotation: 5    Right Hip   Normal muscle strength    Left Knee   Flexion:5  Extension: 5  Quadriceps  "contraction: good    Right Knee   Normal strength    Tests     Additional Tests Details  *Poor patellar mobility- Improving  *Poor tib femoral anterior mobility (appropriate posterior mobility) - Improving  *Poor proximal fibular mobility. = Improving  *immediate STJ pronation with medial arch collapse in SLS on the L (not present on R) - Continues  -likely related to weakness in foot instrinics    *Moderate postural sway in SLS on L. -Continues  *Moderate restriction in hamstrings/severe quad stretching (L) - Improving  *Moderate swelling will loss of patellar dimples on the L - Improving  *Atrophy present of gastrocnemius (medial) on left - Continues     Ambulation     Observational Gait     Additional Observational Gait Details  Shod gait: considerable early and excessive pronation throughout stance, including terminal stance on the left (minimal re-supination). Decreased great toe push at terminal stance to pre swing. Decreased knee flexion from pre swing to swing phase on the left. Large step length L vs R. -Continues    Step up: Increased vaulting with LLE  & requires UE support with 8\" step   Lateral step down: minimal eccentric control of the quad during step down (6\")     For Orthotic assessment:   Leg length in supine (ASIS to medial malleolus):  Left: 94 cm (knee flexion at 5 deg)   Right: 93.5 cm    Left: 95 cm (knee forced into full ext)   Right: 93.5 cm        Precautions: L TKA on 6/6, H/O R TKA      Manuals 10/10 10/17 10/21 10/24 10/28 10/31   10/1 10/7   PFJ mobs All planes gr III All planes gr III  Gr III Gr III    All planes gr III All planes gr III   Anterior Tibial glide with knee flexion JK Gr III 5' JK Gr III 5' JK Gr III 5' JK Gr III 5' JK Gr III 5'    JK Gr III 5' JK Gr III 5'   Tibfemoral rotation at 90 of flexion   Gr III Gr III Gr III        Knee extension mobilization over bolster Slight distraction gr III Slight distraction gr III Slight distraction gr III Slight distraction gr III " "Slight distraction gr III    Slight distraction gr III Slight distraction gr III   Knee flexion MWM              Neuro Re-Ed             TKE @ [employer] [place] [employer] 40# x50            Short foot                          SL knee extension 1RM             SL knee extension repetition max             BFR SL knee extension  218 mmhg10, 10, 10 5# c/ 30\" rest 211 mmhg10, 10, 10 5# c/ 30\" rest  3x15 with 30\" rest 293 mmhga 3x15 with 30\" rest 286 mmhga   191mmhg LOP 10,15,15 5# c/30\" rest  193 mmhg10, 10, 10 5# c/ 30\" rest   SLS with trampoline toss    2x30 on ea RMB                       Ther Ex             Hamstring stretch              Quad stretch              Nustep for ROM          9' lvl 5    Knee flexion on PB with strap              SL leg press 5x10 75# 3x10 75# 5x10 75# 5x10 75#     5x10 70# 5x10 75#   Bike 8' L5 seat 10  8' L5 seat 10  8' L5 seat 10  8' L5 seat 10  8' L5  8' L5    8' L5 seat 10                              Ther Activity             SL HR 4x10  4x10 4x10  4x10  Eccentric heel lowering BFR 3x10 20\" rest     20x 3x15   Reverse lunge with valslide    15x on ea         Modified lunge on step             Step up with runner climb   3x10 c/ 20\" interset rest 6\"  mmhg  3x10 c/ 20\" interset rest 6\"  mmhg   3x10 c/ 20\" interset rest 8\"  mmhg 3x10 c/ 20\" interset rest 8\"  mmhg    mmhg LOP 3x15 30\"   mmhg LOP 3x15 30\"    Step down             Gait Training                                       Modalities                                       1:1 with PT from  1130-1210pm    Pt was re-evaluated x 15 minutes                          "

## 2024-11-05 DIAGNOSIS — E78.5 DYSLIPIDEMIA: ICD-10-CM

## 2024-11-05 DIAGNOSIS — I10 ESSENTIAL HYPERTENSION: ICD-10-CM

## 2024-11-06 RX ORDER — ROSUVASTATIN CALCIUM 5 MG/1
5 TABLET, COATED ORAL EVERY OTHER DAY
Qty: 45 TABLET | Refills: 1 | Status: SHIPPED | OUTPATIENT
Start: 2024-11-06

## 2024-11-06 RX ORDER — METOPROLOL SUCCINATE 50 MG/1
75 TABLET, EXTENDED RELEASE ORAL DAILY
Qty: 135 TABLET | Refills: 1 | Status: SHIPPED | OUTPATIENT
Start: 2024-11-06

## 2024-11-21 ENCOUNTER — OFFICE VISIT (OUTPATIENT)
Dept: PHYSICAL THERAPY | Age: 59
End: 2024-11-21
Payer: COMMERCIAL

## 2024-11-21 DIAGNOSIS — M21.42 PES PLANUS OF LEFT FOOT: Primary | ICD-10-CM

## 2024-11-21 PROCEDURE — L3010 FOOT LONGITUDINAL ARCH SUPPO: HCPCS | Performed by: PHYSICAL THERAPIST

## 2024-11-30 DIAGNOSIS — G43.009 MIGRAINE WITHOUT AURA AND WITHOUT STATUS MIGRAINOSUS, NOT INTRACTABLE: ICD-10-CM

## 2024-12-03 RX ORDER — GALCANEZUMAB 120 MG/ML
INJECTION, SOLUTION SUBCUTANEOUS
Qty: 3 ML | Refills: 0 | Status: SHIPPED | OUTPATIENT
Start: 2024-12-03

## 2024-12-06 ENCOUNTER — APPOINTMENT (OUTPATIENT)
Dept: RADIOLOGY | Facility: MEDICAL CENTER | Age: 59
End: 2024-12-06
Payer: COMMERCIAL

## 2024-12-06 ENCOUNTER — OFFICE VISIT (OUTPATIENT)
Dept: OBGYN CLINIC | Facility: MEDICAL CENTER | Age: 59
End: 2024-12-06
Payer: COMMERCIAL

## 2024-12-06 VITALS
SYSTOLIC BLOOD PRESSURE: 130 MMHG | HEIGHT: 72 IN | BODY MASS INDEX: 30.2 KG/M2 | WEIGHT: 223 LBS | DIASTOLIC BLOOD PRESSURE: 73 MMHG | HEART RATE: 77 BPM

## 2024-12-06 DIAGNOSIS — Z96.652 AFTERCARE FOLLOWING LEFT KNEE JOINT REPLACEMENT SURGERY: ICD-10-CM

## 2024-12-06 DIAGNOSIS — Z96.652 AFTERCARE FOLLOWING LEFT KNEE JOINT REPLACEMENT SURGERY: Primary | ICD-10-CM

## 2024-12-06 DIAGNOSIS — Z47.1 AFTERCARE FOLLOWING LEFT KNEE JOINT REPLACEMENT SURGERY: ICD-10-CM

## 2024-12-06 DIAGNOSIS — Z47.1 AFTERCARE FOLLOWING LEFT KNEE JOINT REPLACEMENT SURGERY: Primary | ICD-10-CM

## 2024-12-06 PROCEDURE — 73560 X-RAY EXAM OF KNEE 1 OR 2: CPT

## 2024-12-06 PROCEDURE — 99213 OFFICE O/P EST LOW 20 MIN: CPT | Performed by: ORTHOPAEDIC SURGERY

## 2024-12-06 NOTE — LETTER
December 6, 2024     Patient: Ralph Abbott  YOB: 1965  Date of Visit: 12/6/2024      To Whom it May Concern:    Ralph Abbott is under my professional care. Ralph was seen in my office on 12/6/2024. Ralph is able to work however is to avoid kneeling and crawling and to limit his time standing.    If you have any questions or concerns, please don't hesitate to call.         Sincerely,          Ronnie Dotson MD        CC: No Recipients

## 2024-12-06 NOTE — PROGRESS NOTES
Assessment:   Diagnosis ICD-10-CM Associated Orders   1. Aftercare following left knee joint replacement surgery  Z47.1 XR knee 1 or 2 vw left    Z96.652           Plan:  X-rays taken and reviewed, physical exam performed.  Doing very well 6 months status post left total knee arthroplasty.  Total knee precautions with antibiotics as discussed until the 2-year postop renetta.  Weightbearing and activity as tolerated.  Work note provided today at his request (no kneeling, crawling, and to limit standing)    To do next visit:  Return in about 6 months (around 6/6/2025) for re-check with x-rays upon arrival left knee.    The above stated was discussed in layman's terms and the patient expressed understanding.  All questions were answered to the patient's satisfaction.       Scribe Attestation      I,:  Royal Alvarez am acting as a scribe while in the presence of the attending physician.:       I,:  Ronnie Dotson MD personally performed the services described in this documentation    as scribed in my presence.:               Subjective:   Ralph Abbott is a 59 y.o. male who presents today for repeat evaluation 6-month status post left total knee arthroplasty.  Overall he is doing very well.  He is pleased with the outcome of his surgery thus far.  He denies any calf or thigh pain.  Denies any fevers or chills.  Denies any slips, trips or falls.    Doing well following right total knee arthroplasty January 2019    Plans on retiring in March. Requesting a work note to avoid knee, crawling and to limit his standing.     Review of systems negative unless otherwise specified in HPI  Review of Systems    Past Medical History:   Diagnosis Date    Arthritis     Bronchospasm     last assessed 6/14/2016    Depression     Disc degeneration, lumbar     last assessed 4/7/2016    Erectile dysfunction     Hyperlipidemia     Hypertension     IFG (impaired fasting glucose)     last assessed 7/30/2014    Palpitations     last assessed  4/4/2013    Primary osteoarthritis of knees, bilateral     last assessed 6/27/2017    Right inguinal hernia     last assessed 9/12/2017    Spondylosis of lumbar region without myelopathy or radiculopathy     last assessed 4/7/2016       Past Surgical History:   Procedure Laterality Date    COLONOSCOPY      KNEE ARTHROPLASTY Left 06/06/2024    KNEE ARTHROSCOPY      Right    AR ARTHRP KNE CONDYLE&PLATU MEDIAL&LAT COMPARTMENTS Right 01/21/2019    Procedure: ARTHROPLASTY KNEE TOTAL;  Surgeon: Ronnie Dotson MD;  [facility];  Service: Orthopedics    AR ARTHRP KNE CONDYLE&PLATU MEDIAL&LAT COMPARTMENTS Left 06/06/2024    Procedure: ARTHROPLASTY KNEE TOTAL W ROBOT;  Surgeon: Ronnie Dotson MD;  [facility];  Service: Orthopedics    AR RPR 1ST INGUN HRNA AGE 5 YRS/> REDUCIBLE Right 10/26/2017    Procedure: REPAIR HERNIA INGUINAL;  Surgeon: Ha Hand MD;  [facility];  Service: General    TOOTH EXTRACTION         Family History   Problem Relation Age of Onset    No Known Problems Mother     Diabetes Father     No Known Problems Sister     Coronary artery disease Brother        Social History     Occupational History    Not on file   Tobacco Use    Smoking status: Former     Current packs/day: 0.00     Average packs/day: 0.5 packs/day for 20.0 years (10.0 ttl pk-yrs)     Types: Cigarettes     Start date: 2001     Quit date: 2021     Years since quitting: 3.9    Smokeless tobacco: Never    Tobacco comments:     4-5 cig day x 20 yrs   Vaping Use    Vaping status: Never Used   Substance and Sexual Activity    Alcohol use: Yes     Alcohol/week: 4.0 standard drinks of alcohol     Types: 4 Cans of beer per week     Comment:  occasional    Drug use: Never    Sexual activity: Not on file         Current Outpatient Medications:     acetaminophen (TYLENOL) 650 mg CR tablet, Take 1 tablet (650 mg total) by mouth every 8 (eight) hours as needed for mild pain, Disp: 30 tablet, Rfl: 0     amitriptyline (ELAVIL) 10 mg tablet, 1 tab qhs x 1 week, then Increase to 20 mg qhs if tolerated., Disp: 60 tablet, Rfl: 2    Galcanezumab-gnlm (Emgality) 120 MG/ML SOAJ, INJECT THE CONTENTS OF 1 PEN UNDER THE SKIN EVERY 30 DAYS., Disp: 3 mL, Rfl: 0    meloxicam (MOBIC) 15 mg tablet, Take 1 tablet (15 mg total) by mouth if needed for mild pain or moderate pain, Disp: 90 tablet, Rfl: 1    metoprolol succinate (TOPROL-XL) 50 mg 24 hr tablet, Take 1.5 tablets (75 mg total) by mouth daily, Disp: 135 tablet, Rfl: 1    rimegepant sulfate (Nurtec) 75 mg TBDP, 1 tab at migraine onset. Max 1 tab per 24 hours., Disp: 16 tablet, Rfl: 11    rosuvastatin (CRESTOR) 5 mg tablet, Take 1 tablet (5 mg total) by mouth every other day, Disp: 45 tablet, Rfl: 1    sildenafil (VIAGRA) 100 mg tablet, Take 1 tablet (100 mg total) by mouth daily as needed for erectile dysfunction, Disp: 10 tablet, Rfl: 2    No Known Allergies         Vitals:    12/06/24 1447   BP: 130/73   Pulse: 77       Body mass index is 30.24 kg/m².  Wt Readings from Last 3 Encounters:   12/06/24 101 kg (223 lb)   10/04/24 101 kg (223 lb)   10/02/24 101 kg (223 lb)       Objective:                    Left Knee Exam     Muscle Strength   The patient has normal left knee strength.    Tenderness   The patient is experiencing no tenderness.     Range of Motion   Extension:  0   Flexion:  120 (Patella tracks well)     Other   Erythema: absent  Sensation: normal  Swelling: none  Effusion: no effusion present    Comments:    Well-healed anterior incision with an intact extensor mechanism.  No warmth.  No gross signs of infection.  Stable to varus and valgus stressing of both extension and mid flexion.  Calf and thigh are soft and nontender no signs DVT.  Nonantalgic gait            Diagnostics, reviewed and taken today if performed as documented:    The attending physician has personally reviewed the pertinent films in PACS and interpretation is as follows:    Left knee x-rays  "taken and reviewed in the office today and show: Well aligned, position, bonded total knee prosthesis without signs of loosening or stress shielding      Procedures, if performed today:    Procedures    None performed      Portions of the record may have been created with voice recognition software.  Occasional wrong word or \"sound a like\" substitutions may have occurred due to the inherent limitations of voice recognition software.  Read the chart carefully and recognize, using context, where substitutions have occurred.  "

## 2024-12-22 DIAGNOSIS — N52.9 ERECTILE DYSFUNCTION, UNSPECIFIED ERECTILE DYSFUNCTION TYPE: ICD-10-CM

## 2024-12-23 RX ORDER — SILDENAFIL 100 MG/1
100 TABLET, FILM COATED ORAL DAILY PRN
Qty: 30 TABLET | Refills: 0 | Status: SHIPPED | OUTPATIENT
Start: 2024-12-23

## 2025-01-05 ENCOUNTER — TELEPHONE (OUTPATIENT)
Age: 60
End: 2025-01-05

## 2025-01-10 NOTE — TELEPHONE ENCOUNTER
Nurtec has been approved through 1/5/2026. Called [facility] and left a message for the pharmacist making them aware of the approval. Sent pt a message through [employer].

## 2025-01-15 ENCOUNTER — OFFICE VISIT (OUTPATIENT)
Dept: NEUROLOGY | Facility: CLINIC | Age: 60
End: 2025-01-15
Payer: COMMERCIAL

## 2025-01-15 VITALS
BODY MASS INDEX: 31.42 KG/M2 | SYSTOLIC BLOOD PRESSURE: 172 MMHG | WEIGHT: 232 LBS | HEART RATE: 81 BPM | DIASTOLIC BLOOD PRESSURE: 100 MMHG | HEIGHT: 72 IN

## 2025-01-15 DIAGNOSIS — G44.229 CHRONIC TENSION-TYPE HEADACHE, NOT INTRACTABLE: ICD-10-CM

## 2025-01-15 DIAGNOSIS — I10 ESSENTIAL HYPERTENSION: ICD-10-CM

## 2025-01-15 DIAGNOSIS — M79.18 MYOFASCIAL MUSCLE PAIN: ICD-10-CM

## 2025-01-15 DIAGNOSIS — G43.009 MIGRAINE WITHOUT AURA AND WITHOUT STATUS MIGRAINOSUS, NOT INTRACTABLE: Primary | ICD-10-CM

## 2025-01-15 PROCEDURE — 99214 OFFICE O/P EST MOD 30 MIN: CPT | Performed by: PHYSICIAN ASSISTANT

## 2025-01-15 RX ORDER — AMITRIPTYLINE HYDROCHLORIDE 10 MG/1
TABLET ORAL
Qty: 180 TABLET | Refills: 1 | Status: SHIPPED | OUTPATIENT
Start: 2025-01-15

## 2025-01-15 RX ORDER — ATOGEPANT 30 MG/1
TABLET ORAL
Qty: 90 TABLET | Refills: 1 | Status: SHIPPED | OUTPATIENT
Start: 2025-01-15

## 2025-01-15 NOTE — ASSESSMENT & PLAN NOTE
The patient was instructed to contact pcp re: persistent HTN despite toprol.  Repeat blood pressure was a bit lower 160/95, with the manual device.

## 2025-01-15 NOTE — ASSESSMENT & PLAN NOTE
Continue amitriptyline 20 mg, helps sleep function as well.  Hold emgality, pt does not think it is helping. Next inj 2/3, so hold that.  Trial Qulipta instead.  Okay to use nurtec prn, but qulipta will hopefully enable less use of nurtec and ibuprofen.  S/e reviewed.    Orders:    Atogepant (Qulipta) 30 MG TABS; 1 tab qhs    amitriptyline (ELAVIL) 10 mg tablet; 20 mg qhs

## 2025-01-15 NOTE — PROGRESS NOTES
Name: Ralph Abbott      : 1965      MRN: [id number]  Encounter Provider: Ivana Alcazar PA-C  Encounter Date: 1/15/2025   Encounter department: NEUROLOGY [facility]  :  Assessment & Plan  Migraine without aura and without status migrainosus, not intractable    Continue amitriptyline 20 mg, helps sleep function as well.  Hold emgality, pt does not think it is helping. Next inj 2/3, so hold that.  Trial Qulipta instead.  Okay to use nurtec prn, but qulipta will hopefully enable less use of nurtec and ibuprofen.  S/e reviewed.    Orders:    Atogepant (Qulipta) 30 MG TABS; 1 tab qhs    amitriptyline (ELAVIL) 10 mg tablet; 20 mg qhs    Myofascial muscle pain    Orders:    amitriptyline (ELAVIL) 10 mg tablet; 20 mg qhs    Chronic tension-type headache, not intractable    Orders:    amitriptyline (ELAVIL) 10 mg tablet; 20 mg qhs    Essential hypertension  The patient was instructed to contact pcp re: persistent HTN despite toprol.  Repeat blood pressure was a bit lower 160/95, with the manual device.           The patient should not hesitate to call me prior to his follow up with any questions or concerns.    There are no Patient Instructions on file for this visit.     History of Present Illness   HPI     Mr. Ralph Abbott is here for follow-up.    Migraine follow up. Says he has them everyday and is taking Nurtec, it does help but does also need Advil.  He takes Nurtec at about 11 AM when the headache starts or after the headache starts, and it gets him through the day until about the afternoon then he has to take ibuprofen for residual headache that comes on or gets worse.  He is happy with the amitriptyline, no side effects and he feels that his sleep function is much better, he feels better when he wakes up.  Continues the Emgality injection, but he is not sure if it is working.  No change in character to the headaches compared to last visit, just a bit more frequent.  He thinks that straining  with his job may be causing a headache.  He the cold is irritating him.  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  He states this can cause a headache.  He is planning on decreasing his hours to only a few times per week.      Review of Systems   Constitutional:  Negative for appetite change, fatigue and fever.   HENT: Negative.  Negative for hearing loss, tinnitus, trouble swallowing and voice change.    Eyes: Negative.  Negative for photophobia, pain and visual disturbance.   Respiratory: Negative.  Negative for shortness of breath.    Cardiovascular: Negative.  Negative for palpitations.   Gastrointestinal: Negative.  Negative for nausea and vomiting.   Endocrine: Negative.  Negative for cold intolerance.   Genitourinary: Negative.  Negative for dysuria, frequency and urgency.   Musculoskeletal:  Negative for back pain, gait problem, myalgias, neck pain and neck stiffness.   Skin: Negative.  Negative for rash.   Allergic/Immunologic: Negative.    Neurological:  Positive for headaches. Negative for dizziness, tremors, seizures, syncope, facial asymmetry, speech difficulty, weakness, light-headedness and numbness.   Hematological: Negative.  Does not bruise/bleed easily.   Psychiatric/Behavioral: Negative.  Negative for confusion, hallucinations and sleep disturbance.     I have personally reviewed the MA's review of systems and made changes as necessary.    Pertinent Medical History         Medical History Reviewed by provider this encounter:     .  Past Medical History   Past Medical History:   Diagnosis Date    Arthritis     Bronchospasm     last assessed 6/14/2016    Depression     Disc degeneration, lumbar     last assessed 4/7/2016    Erectile dysfunction     Hyperlipidemia     Hypertension     IFG (impaired fasting glucose)     last assessed 7/30/2014    Palpitations     last assessed 4/4/2013    Primary osteoarthritis of knees, bilateral     last assessed  6/27/2017    Right inguinal hernia     last assessed 9/12/2017    Spondylosis of lumbar region without myelopathy or radiculopathy     last assessed 4/7/2016     Past Surgical History:   Procedure Laterality Date    COLONOSCOPY      KNEE ARTHROPLASTY Left 06/06/2024    KNEE ARTHROSCOPY      Right    [place] ARTHRP KNE CONDYLE&PLATU MEDIAL&LAT COMPARTMENTS Right 01/21/2019    Procedure: ARTHROPLASTY KNEE TOTAL;  Surgeon: Ronnie Dotson MD;  [facility];  Service: Orthopedics    [place] ARTHRP KNE CONDYLE&PLATU MEDIAL&LAT COMPARTMENTS Left 06/06/2024    Procedure: ARTHROPLASTY KNEE TOTAL W ROBOT;  Surgeon: Ronnie Dotson MD;  [facility];  Service: Orthopedics    WV RPR 1ST INGUN HRNA AGE 5 YRS/> REDUCIBLE Right 10/26/2017    Procedure: REPAIR HERNIA INGUINAL;  Surgeon: Ha Hand MD;  [facility];  Service: General    TOOTH EXTRACTION       Family History   Problem Relation Age of Onset    No Known Problems Mother     Diabetes Father     No Known Problems Sister     Coronary artery disease Brother       reports that he quit smoking about 4 years ago. His smoking use included cigarettes. He started smoking about 24 years ago. He has a 10 pack-year smoking history. He has never used smokeless tobacco. He reports current alcohol use of about 4.0 standard drinks of alcohol per week. He reports that he does not use drugs.  Current Outpatient Medications on File Prior to Visit   Medication Sig Dispense Refill    acetaminophen (TYLENOL) 650 mg CR tablet Take 1 tablet (650 mg total) by mouth every 8 (eight) hours as needed for mild pain 30 tablet 0    Galcanezumab-gnlm (Emgality) 120 MG/ML SOAJ INJECT THE CONTENTS OF 1 PEN UNDER THE SKIN EVERY 30 DAYS. 3 mL 0    meloxicam (MOBIC) 15 mg tablet Take 1 tablet (15 mg total) by mouth if needed for mild pain or moderate pain 90 tablet 1    metoprolol succinate (TOPROL-XL) 50 mg 24 hr tablet Take 1.5 tablets (75 mg total) by mouth daily 135 tablet 1     rimegepant sulfate (Nurtec) 75 mg TBDP 1 tab at migraine onset. Max 1 tab per 24 hours. 16 tablet 11    rosuvastatin (CRESTOR) 5 mg tablet Take 1 tablet (5 mg total) by mouth every other day 45 tablet 1    sildenafil (VIAGRA) 100 mg tablet Take 1 tablet (100 mg total) by mouth daily as needed for erectile dysfunction 30 tablet 0     No current facility-administered medications on file prior to visit.   No Known Allergies   Current Outpatient Medications on File Prior to Visit   Medication Sig Dispense Refill    acetaminophen (TYLENOL) 650 mg CR tablet Take 1 tablet (650 mg total) by mouth every 8 (eight) hours as needed for mild pain 30 tablet 0    Galcanezumab-gnlm (Emgality) 120 MG/ML SOAJ INJECT THE CONTENTS OF 1 PEN UNDER THE SKIN EVERY 30 DAYS. 3 mL 0    meloxicam (MOBIC) 15 mg tablet Take 1 tablet (15 mg total) by mouth if needed for mild pain or moderate pain 90 tablet 1    metoprolol succinate (TOPROL-XL) 50 mg 24 hr tablet Take 1.5 tablets (75 mg total) by mouth daily 135 tablet 1    rimegepant sulfate (Nurtec) 75 mg TBDP 1 tab at migraine onset. Max 1 tab per 24 hours. 16 tablet 11    rosuvastatin (CRESTOR) 5 mg tablet Take 1 tablet (5 mg total) by mouth every other day 45 tablet 1    sildenafil (VIAGRA) 100 mg tablet Take 1 tablet (100 mg total) by mouth daily as needed for erectile dysfunction 30 tablet 0     No current facility-administered medications on file prior to visit.      Social History     Tobacco Use    Smoking status: Former     Current packs/day: 0.00     Average packs/day: 0.5 packs/day for 20.0 years (10.0 ttl pk-yrs)     Types: Cigarettes     Start date:      Quit date:      Years since quittin.0    Smokeless tobacco: Never    Tobacco comments:     4-5 cig day x 20 yrs   Vaping Use    Vaping status: Never Used   Substance and Sexual Activity    Alcohol use: Yes     Alcohol/week: 4.0 standard drinks of alcohol     Types: 4 Cans of beer per week     Comment:  occasional     Drug use: Never    Sexual activity: Not on file        Objective   BP (!) 172/100 (BP Location: Left arm, Patient Position: Sitting, Cuff Size: Standard)   Pulse 81   Ht 6' (1.829 m)   Wt 105 kg (232 lb)   BMI 31.46 kg/m²     Physical Exam  Neurological Exam  On neurologic exam, the patient is alert and oriented to time and place. Speech is fluent and articulate, and the patient follows commands appropriately. Judgment and affect appear normal. Pupils are equally round and reactive to light and extraocular muscles are intact without nystagmus. Face is symmetric, and tongue, uvula, and palate are midline. Hearing is intact. Motor examination reveals intact strength throughout.  Normal gait is steady.      Radiology Results Review : No pertinent imaging studies reviewed.    Administrative Statements   I have spent a total time of 30 minutes in caring for this patient on the day of the visit/encounter.

## 2025-01-29 ENCOUNTER — TELEPHONE (OUTPATIENT)
Dept: NEUROLOGY | Facility: CLINIC | Age: 60
End: 2025-01-29

## 2025-02-04 NOTE — TELEPHONE ENCOUNTER
Qulipta approved through 7/29/25    Called [facility] pharmacy and left a message on their answering machine making them aware of the approval and for a call back if any questions.

## 2025-02-07 ENCOUNTER — APPOINTMENT (OUTPATIENT)
Dept: LAB | Facility: MEDICAL CENTER | Age: 60
End: 2025-02-07
Payer: COMMERCIAL

## 2025-02-07 ENCOUNTER — RESULTS FOLLOW-UP (OUTPATIENT)
Dept: FAMILY MEDICINE CLINIC | Facility: CLINIC | Age: 60
End: 2025-02-07

## 2025-02-07 ENCOUNTER — OFFICE VISIT (OUTPATIENT)
Dept: FAMILY MEDICINE CLINIC | Facility: CLINIC | Age: 60
End: 2025-02-07
Payer: COMMERCIAL

## 2025-02-07 VITALS
BODY MASS INDEX: 31.15 KG/M2 | TEMPERATURE: 97.6 F | DIASTOLIC BLOOD PRESSURE: 90 MMHG | HEIGHT: 72 IN | SYSTOLIC BLOOD PRESSURE: 152 MMHG | OXYGEN SATURATION: 98 % | WEIGHT: 230 LBS | HEART RATE: 88 BPM

## 2025-02-07 DIAGNOSIS — I10 ESSENTIAL HYPERTENSION: Primary | ICD-10-CM

## 2025-02-07 DIAGNOSIS — G43.009 MIGRAINE WITHOUT AURA AND WITHOUT STATUS MIGRAINOSUS, NOT INTRACTABLE: ICD-10-CM

## 2025-02-07 DIAGNOSIS — I10 ESSENTIAL HYPERTENSION: ICD-10-CM

## 2025-02-07 LAB
ALBUMIN SERPL BCG-MCNC: 4.5 G/DL (ref 3.5–5)
ALP SERPL-CCNC: 139 U/L (ref 34–104)
ALT SERPL W P-5'-P-CCNC: 25 U/L (ref 7–52)
ANION GAP SERPL CALCULATED.3IONS-SCNC: 9 MMOL/L (ref 4–13)
AST SERPL W P-5'-P-CCNC: 24 U/L (ref 13–39)
BILIRUB SERPL-MCNC: 0.45 MG/DL (ref 0.2–1)
BUN SERPL-MCNC: 15 MG/DL (ref 5–25)
CALCIUM SERPL-MCNC: 9.3 MG/DL (ref 8.4–10.2)
CHLORIDE SERPL-SCNC: 107 MMOL/L (ref 96–108)
CO2 SERPL-SCNC: 26 MMOL/L (ref 21–32)
CREAT SERPL-MCNC: 0.96 MG/DL (ref 0.6–1.3)
GFR SERPL CREATININE-BSD FRML MDRD: 84 ML/MIN/1.73SQ M
GLUCOSE P FAST SERPL-MCNC: 99 MG/DL (ref 65–99)
POTASSIUM SERPL-SCNC: 4.2 MMOL/L (ref 3.5–5.3)
PROT SERPL-MCNC: 7 G/DL (ref 6.4–8.4)
SODIUM SERPL-SCNC: 142 MMOL/L (ref 135–147)
T4 FREE SERPL-MCNC: 0.79 NG/DL (ref 0.61–1.12)
TSH SERPL DL<=0.05 MIU/L-ACNC: 0.81 UIU/ML (ref 0.45–4.5)

## 2025-02-07 PROCEDURE — 84439 ASSAY OF FREE THYROXINE: CPT

## 2025-02-07 PROCEDURE — 36415 COLL VENOUS BLD VENIPUNCTURE: CPT

## 2025-02-07 PROCEDURE — 84443 ASSAY THYROID STIM HORMONE: CPT

## 2025-02-07 PROCEDURE — 99214 OFFICE O/P EST MOD 30 MIN: CPT | Performed by: FAMILY MEDICINE

## 2025-02-07 PROCEDURE — 80053 COMPREHEN METABOLIC PANEL: CPT

## 2025-02-07 RX ORDER — AMLODIPINE BESYLATE 10 MG/1
10 TABLET ORAL DAILY
Qty: 30 TABLET | Refills: 0 | Status: SHIPPED | OUTPATIENT
Start: 2025-02-07

## 2025-02-07 RX ORDER — METOPROLOL SUCCINATE 50 MG/1
75 TABLET, EXTENDED RELEASE ORAL DAILY
Qty: 135 TABLET | Refills: 1 | Status: SHIPPED | OUTPATIENT
Start: 2025-02-07

## 2025-02-07 NOTE — ASSESSMENT & PLAN NOTE
Orders:    amLODIPine (NORVASC) 10 mg tablet; Take 1 tablet (10 mg total) by mouth daily    metoprolol succinate (TOPROL-XL) 50 mg 24 hr tablet; Take 1.5 tablets (75 mg total) by mouth daily    TSH + Free T4; Future    Comprehensive metabolic panel; Future      Refer to gastroenterology placed from September 2024, would like to wait for his blood pressure to be better controlled for his colonoscopy.

## 2025-02-07 NOTE — ASSESSMENT & PLAN NOTE
Blood pressure is 158/102, repeat blood pressure is 150/90  Will have patient continue metoprolol 75 mg in the evening and start amlodipine 5 mg in the morning, do not take together with Viagra.  If patient's blood pressure is still above 152/90, take 10 mg of amlodipine  Obtain a blood pressure cuff, record your blood pressure once a day  Return in 2 weeks

## 2025-02-07 NOTE — PROGRESS NOTES
Name: Ralph Abbott      : 1965      MRN: [id number]  Encounter Provider: Roland Macedo MD  Encounter Date: 2025   Encounter department: [facility]    Assessment & Plan  Essential hypertension  Blood pressure is 158/102, repeat blood pressure is 150/90  Will have patient continue metoprolol 75 mg in the evening and start amlodipine 5 mg in the morning, do not take together with Viagra.  If patient's blood pressure is still above 152/90, take 10 mg of amlodipine  Obtain a blood pressure cuff, record your blood pressure once a day  Return in 2 weeks    Migraine without aura and without status migrainosus, not intractable  No episode of migraine today, patient has new medication, qulipta may be helping       Essential hypertension    Orders:    amLODIPine (NORVASC) 10 mg tablet; Take 1 tablet (10 mg total) by mouth daily    metoprolol succinate (TOPROL-XL) 50 mg 24 hr tablet; Take 1.5 tablets (75 mg total) by mouth daily    TSH + Free T4; Future    Comprehensive metabolic panel; Future      Refer to gastroenterology placed from 2024, would like to wait for his blood pressure to be better controlled for his colonoscopy.       History of Present Illness       Headache  Hypertension  Associated symptoms include headaches (no headaches today) and palpitations (once in a while). Pertinent negatives include no chest pain or shortness of breath.       Is a 59-year-old male patient presents for average blood pressure and headache.  Patient has been seeing a neurologist at once found to have significantly elevated blood pressure at the neurologist office.  The only blood pressure medication he takes right now is metoprolol 75 mg extended release around dinnertime.  Blood pressure 158/102 in the office.  Denies any headaches at this time.  Patient recently started on Qulipta which seems to be helping.     Review of Systems   Constitutional:  Negative for chills and fever.   HENT:   Negative for congestion, rhinorrhea and sore throat.    Respiratory:  Negative for chest tightness and shortness of breath.    Cardiovascular:  Positive for palpitations (once in a while). Negative for chest pain.   Gastrointestinal:  Negative for abdominal pain, constipation, diarrhea, nausea and vomiting.   Neurological:  Positive for headaches (no headaches today).   Psychiatric/Behavioral:  Negative for sleep disturbance.        Past Medical History:   Diagnosis Date    Arthritis     Bronchospasm     last assessed 6/14/2016    Depression     Disc degeneration, lumbar     last assessed 4/7/2016    Erectile dysfunction     Hyperlipidemia     Hypertension     IFG (impaired fasting glucose)     last assessed 7/30/2014    Palpitations     last assessed 4/4/2013    Primary osteoarthritis of knees, bilateral     last assessed 6/27/2017    Right inguinal hernia     last assessed 9/12/2017    Spondylosis of lumbar region without myelopathy or radiculopathy     last assessed 4/7/2016     Past Surgical History:   Procedure Laterality Date    COLONOSCOPY      JOINT REPLACEMENT  1/19/2019    right knee    KNEE ARTHROPLASTY Left 06/06/2024    KNEE ARTHROSCOPY      Right    WA ARTHRP KNE CONDYLE&PLATU MEDIAL&LAT COMPARTMENTS Right 01/21/2019    Procedure: ARTHROPLASTY KNEE TOTAL;  Surgeon: Ronnie Dotson MD;  [facility];  Service: Orthopedics    WA ARTHRP KNE CONDYLE&PLATU MEDIAL&LAT COMPARTMENTS Left 06/06/2024    Procedure: ARTHROPLASTY KNEE TOTAL W ROBOT;  Surgeon: Ronnie Dotson MD;  [facility]: [facility];  Service: Orthopedics    WA RPR 1ST INGUN HRNA AGE 5 YRS/> REDUCIBLE Right 10/26/2017    Procedure: REPAIR HERNIA INGUINAL;  Surgeon: Ha Hand MD;  [facility]: [facility];  Service: General    TOOTH EXTRACTION       Family History   Problem Relation Age of Onset    No Known Problems Mother     Diabetes Father     No Known Problems Sister     Coronary artery disease Brother      Social History      Tobacco Use    Smoking status: Former     Current packs/day: 0.00     Average packs/day: 0.5 packs/day for 20.0 years (10.0 ttl pk-yrs)     Types: Cigarettes     Start date: 2001     Quit date:      Years since quittin.1    Smokeless tobacco: Never    Tobacco comments:     4-5 cig day x 20 yrs   Vaping Use    Vaping status: Never Used   Substance and Sexual Activity    Alcohol use: Yes     Alcohol/week: 6.0 standard drinks of alcohol     Types: 6 Cans of beer per week     Comment:  occasional    Drug use: No    Sexual activity: Yes     Partners: Female     Birth control/protection: None     Current Outpatient Medications on File Prior to Visit   Medication Sig    acetaminophen (TYLENOL) 650 mg CR tablet Take 1 tablet (650 mg total) by mouth every 8 (eight) hours as needed for mild pain    amitriptyline (ELAVIL) 10 mg tablet 20 mg qhs    Atogepant (Qulipta) 30 MG TABS 1 tab qhs    Galcanezumab-gnlm (Emgality) 120 MG/ML SOAJ INJECT THE CONTENTS OF 1 PEN UNDER THE SKIN EVERY 30 DAYS.    rimegepant sulfate (Nurtec) 75 mg TBDP 1 tab at migraine onset. Max 1 tab per 24 hours. (Patient taking differently: if needed 1 tab at migraine onset. Max 1 tab per 24 hours.)    rosuvastatin (CRESTOR) 5 mg tablet Take 1 tablet (5 mg total) by mouth every other day    sildenafil (VIAGRA) 100 mg tablet Take 1 tablet (100 mg total) by mouth daily as needed for erectile dysfunction    [DISCONTINUED] metoprolol succinate (TOPROL-XL) 50 mg 24 hr tablet Take 1.5 tablets (75 mg total) by mouth daily    meloxicam (MOBIC) 15 mg tablet Take 1 tablet (15 mg total) by mouth if needed for mild pain or moderate pain (Patient not taking: Reported on 2025)     No Known Allergies  Immunization History   Administered Date(s) Administered    COVID-19 PFIZER VACCINE 0.3 ML IM 2021, 2021    COVID-19 Pfizer Vac BIVALENT Ke-sucrose 12 Yr+ IM 2023    COVID-19 Pfizer vac (Ke-sucrose, gray cap) 12 yr+ IM 2022     "INFLUENZA 11/06/2018, 11/06/2019, 11/08/2020, 11/07/2021    Influenza Quadrivalent Recombinant Preservative Free IM 11/06/2018, 11/06/2019, 11/08/2020, 10/24/2022    Tdap 09/17/2024     Objective   /90 (BP Location: Left arm, Patient Position: Sitting, Cuff Size: Large)   Pulse 88   Temp 97.6 °F (36.4 °C) (Temporal)   Ht 6' (1.829 m)   Wt 104 kg (230 lb)   SpO2 98%   BMI 31.19 kg/m²     Physical Exam  Vitals reviewed.   Constitutional:       General: He is not in acute distress.     Appearance: Normal appearance. He is obese. He is not ill-appearing, toxic-appearing or diaphoretic.   Cardiovascular:      Rate and Rhythm: Normal rate and regular rhythm.      Pulses: Normal pulses.      Heart sounds: Normal heart sounds.   Pulmonary:      Effort: Pulmonary effort is normal. No respiratory distress.      Breath sounds: Normal breath sounds.   Abdominal:      General: Abdomen is flat. Bowel sounds are normal. There is no distension.      Palpations: Abdomen is soft.   Musculoskeletal:         General: No swelling, tenderness, deformity or signs of injury. Normal range of motion.   Skin:     General: Skin is warm and dry.      Capillary Refill: Capillary refill takes less than 2 seconds.      Coloration: Skin is not jaundiced.   Neurological:      General: No focal deficit present.      Mental Status: He is alert and oriented to person, place, and time. Mental status is at baseline.      Cranial Nerves: No cranial nerve deficit.      Motor: No weakness.   Psychiatric:         Mood and Affect: Mood normal.             Roland Macedo M.D.  Family Medicine    Please excuse any \"sound-alike\" errors that may have ocurred during the process of dictation. Parts of this note have been dictated and there may be errors present in the transcription process. Thank you.    "

## 2025-02-24 ENCOUNTER — OFFICE VISIT (OUTPATIENT)
Dept: FAMILY MEDICINE CLINIC | Facility: CLINIC | Age: 60
End: 2025-02-24
Payer: COMMERCIAL

## 2025-02-24 VITALS
TEMPERATURE: 97.6 F | DIASTOLIC BLOOD PRESSURE: 80 MMHG | HEIGHT: 72 IN | BODY MASS INDEX: 31.29 KG/M2 | OXYGEN SATURATION: 97 % | SYSTOLIC BLOOD PRESSURE: 122 MMHG | HEART RATE: 78 BPM | WEIGHT: 231 LBS

## 2025-02-24 DIAGNOSIS — M79.601 RIGHT ARM PAIN: ICD-10-CM

## 2025-02-24 DIAGNOSIS — I10 ESSENTIAL HYPERTENSION: Primary | ICD-10-CM

## 2025-02-24 PROCEDURE — 99214 OFFICE O/P EST MOD 30 MIN: CPT | Performed by: FAMILY MEDICINE

## 2025-02-24 RX ORDER — AMLODIPINE BESYLATE 10 MG/1
10 TABLET ORAL DAILY
Qty: 90 TABLET | Refills: 1 | Status: SHIPPED | OUTPATIENT
Start: 2025-02-24

## 2025-02-24 NOTE — ASSESSMENT & PLAN NOTE
Blood pressure seems to be better with a new medication, amlodipine, will continue this medication at this time.  Monitor blood pressure once a day, record blood pressure, if it is high record if you are experiencing any other symptoms  If blood pressures persistently elevated, greater than 150 over 93 days in a row please call office for evaluation    Orders:    amLODIPine (NORVASC) 10 mg tablet; Take 1 tablet (10 mg total) by mouth daily

## 2025-02-24 NOTE — PROGRESS NOTES
Name: Ralph Abbott      : 1965      MRN: [id number]  Encounter Provider: Roland Macedo MD  Encounter Date: 2025   Encounter department: [facility]    Assessment & Plan  Essential hypertension    Blood pressure seems to be better with a new medication, amlodipine, will continue this medication at this time.  Monitor blood pressure once a day, record blood pressure, if it is high record if you are experiencing any other symptoms  If blood pressures persistently elevated, greater than 150 over 93 days in a row please call office for evaluation    Orders:    amLODIPine (NORVASC) 10 mg tablet; Take 1 tablet (10 mg total) by mouth daily    Right arm pain  Symptom consistent with ulnar nerve entrapment or other nerve impingement,  Will refer patient to sports medicine for evaluation    Orders:    Ambulatory Referral to Orthopedic Surgery; Future         History of Present Illness       Hypertension  Pertinent negatives include no chest pain, headaches or shortness of breath.       Lex is a 59-year-old male patient presents for follow-up regarding his blood pressure, patient's blood pressure is elevated previously, was started on amlodipine 10 mg daily.  Patient has been consistent with medication, denies any similar side effects such as lightheadedness dizziness, leg swelling.  He has recorded several episode of blood pressure 152/105, 139/88, 137/115, 124/87.  Patient denies any significant stressors however he has been experiencing some pain involving the right forearm.  Some numbness and tingling coming from the elbow down to the dorsum of the hand  Symptoms happen only when lying down    Review of Systems   Constitutional:  Negative for chills and fever.   HENT:  Negative for congestion.    Respiratory:  Negative for chest tightness and shortness of breath.    Cardiovascular:  Negative for chest pain.   Gastrointestinal:  Negative for abdominal pain, constipation, diarrhea, nausea  and vomiting.   Neurological:  Positive for numbness. Negative for dizziness, weakness, light-headedness and headaches.   Psychiatric/Behavioral:  Negative for sleep disturbance.        Past Medical History:   Diagnosis Date    Arthritis     Bronchospasm     last assessed 2016    Depression     Disc degeneration, lumbar     last assessed 2016    Erectile dysfunction     Hyperlipidemia     Hypertension     IFG (impaired fasting glucose)     last assessed 2014    Palpitations     last assessed 2013    Primary osteoarthritis of knees, bilateral     last assessed 2017    Right inguinal hernia     last assessed 2017    Spondylosis of lumbar region without myelopathy or radiculopathy     last assessed 2016     Past Surgical History:   Procedure Laterality Date    COLONOSCOPY      JOINT REPLACEMENT  2019    right knee    KNEE ARTHROPLASTY Left 2024    KNEE ARTHROSCOPY      Right    DC ARTHRP KNE CONDYLE&PLATU MEDIAL&LAT COMPARTMENTS Right 2019    Procedure: ARTHROPLASTY KNEE TOTAL;  Surgeon: Ronnie Dotson MD;  [facility];  Service: Orthopedics    DC ARTHRP KNE CONDYLE&PLATU MEDIAL&LAT COMPARTMENTS Left 2024    Procedure: ARTHROPLASTY KNEE TOTAL W ROBOT;  Surgeon: Ronnie Dotson MD;  [facility];  Service: Orthopedics    DC RPR 1ST INGUN HRNA AGE 5 YRS/> REDUCIBLE Right 10/26/2017    Procedure: REPAIR HERNIA INGUINAL;  Surgeon: Ha Hand MD;  [facility];  Service: General    TOOTH EXTRACTION       Family History   Problem Relation Age of Onset    No Known Problems Mother     Diabetes Father     No Known Problems Sister     Coronary artery disease Brother      Social History     Tobacco Use    Smoking status: Former     Current packs/day: 0.00     Average packs/day: 0.5 packs/day for 20.0 years (10.0 ttl pk-yrs)     Types: Cigarettes     Start date: 2001     Quit date:      Years since quittin.1    Smokeless  tobacco: Never    Tobacco comments:     4-5 cig day x 20 yrs   Vaping Use    Vaping status: Never Used   Substance and Sexual Activity    Alcohol use: Yes     Alcohol/week: 6.0 standard drinks of alcohol     Types: 6 Cans of beer per week     Comment:  occasional    Drug use: No    Sexual activity: Yes     Partners: Female     Birth control/protection: None     Current Outpatient Medications on File Prior to Visit   Medication Sig    acetaminophen (TYLENOL) 650 mg CR tablet Take 1 tablet (650 mg total) by mouth every 8 (eight) hours as needed for mild pain    amitriptyline (ELAVIL) 10 mg tablet 20 mg qhs    Atogepant (Qulipta) 30 MG TABS 1 tab qhs    Galcanezumab-gnlm (Emgality) 120 MG/ML SOAJ INJECT THE CONTENTS OF 1 PEN UNDER THE SKIN EVERY 30 DAYS.    metoprolol succinate (TOPROL-XL) 50 mg 24 hr tablet Take 1.5 tablets (75 mg total) by mouth daily    rimegepant sulfate (Nurtec) 75 mg TBDP 1 tab at migraine onset. Max 1 tab per 24 hours. (Patient taking differently: if needed 1 tab at migraine onset. Max 1 tab per 24 hours.)    rosuvastatin (CRESTOR) 5 mg tablet Take 1 tablet (5 mg total) by mouth every other day    sildenafil (VIAGRA) 100 mg tablet Take 1 tablet (100 mg total) by mouth daily as needed for erectile dysfunction    [DISCONTINUED] amLODIPine (NORVASC) 10 mg tablet Take 1 tablet (10 mg total) by mouth daily    meloxicam (MOBIC) 15 mg tablet Take 1 tablet (15 mg total) by mouth if needed for mild pain or moderate pain (Patient not taking: Reported on 2/7/2025)     No Known Allergies  Immunization History   Administered Date(s) Administered    COVID-19 PFIZER VACCINE 0.3 ML IM 03/28/2021, 04/18/2021    COVID-19 Pfizer Vac BIVALENT Ke-sucrose 12 Yr+ IM 01/06/2023    COVID-19 Pfizer vac (Ke-sucrose, gray cap) 12 yr+ IM 06/18/2022    INFLUENZA 11/06/2018, 11/06/2019, 11/08/2020, 11/07/2021, 10/29/2023    Influenza Quadrivalent Recombinant Preservative Free IM 11/06/2018, 11/06/2019, 11/08/2020,  "10/24/2022    Influenza Recombinant Preservative Free Im 10/28/2024    Tdap 09/17/2024    Zoster Vaccine Recombinant 01/06/2023, 05/13/2023     Objective   /80 (BP Location: Left arm, Patient Position: Sitting, Cuff Size: Standard)   Pulse 78   Temp 97.6 °F (36.4 °C)   Ht 6' (1.829 m)   Wt 105 kg (231 lb)   SpO2 97%   BMI 31.33 kg/m²     Physical Exam  Vitals reviewed.   Constitutional:       General: He is not in acute distress.     Appearance: Normal appearance. He is obese. He is not ill-appearing, toxic-appearing or diaphoretic.   Cardiovascular:      Rate and Rhythm: Normal rate.      Pulses: Normal pulses.   Pulmonary:      Effort: Pulmonary effort is normal. No respiratory distress.      Breath sounds: Normal breath sounds.   Abdominal:      General: Abdomen is flat.   Musculoskeletal:         General: No swelling or deformity.      Comments: Negative Tinel's sign, negative Phalen sign, negative Neer sign on the right  Pronation of the right forearm does not cause any pain over the brachial radialis muscle  Flexion and extension strength intact   Skin:     General: Skin is warm and dry.      Capillary Refill: Capillary refill takes less than 2 seconds.      Coloration: Skin is not jaundiced.   Neurological:      General: No focal deficit present.      Mental Status: He is alert.           Roland Macedo M.D.  Family Medicine    Please excuse any \"sound-alike\" errors that may have ocurred during the process of dictation. Parts of this note have been dictated and there may be errors present in the transcription process. Thank you.    "

## 2025-02-24 NOTE — ASSESSMENT & PLAN NOTE
Symptom consistent with ulnar nerve entrapment or other nerve impingement,  Will refer patient to sports medicine for evaluation    Orders:    Ambulatory Referral to Orthopedic Surgery; Future

## 2025-04-21 ENCOUNTER — OFFICE VISIT (OUTPATIENT)
Dept: NEUROLOGY | Facility: CLINIC | Age: 60
End: 2025-04-21
Payer: COMMERCIAL

## 2025-04-21 VITALS
WEIGHT: 233 LBS | SYSTOLIC BLOOD PRESSURE: 135 MMHG | HEIGHT: 72 IN | BODY MASS INDEX: 31.56 KG/M2 | DIASTOLIC BLOOD PRESSURE: 82 MMHG | HEART RATE: 69 BPM

## 2025-04-21 DIAGNOSIS — I10 ESSENTIAL HYPERTENSION: ICD-10-CM

## 2025-04-21 DIAGNOSIS — G44.229 CHRONIC TENSION-TYPE HEADACHE, NOT INTRACTABLE: ICD-10-CM

## 2025-04-21 DIAGNOSIS — M79.18 MYOFASCIAL MUSCLE PAIN: ICD-10-CM

## 2025-04-21 DIAGNOSIS — G43.009 MIGRAINE WITHOUT AURA AND WITHOUT STATUS MIGRAINOSUS, NOT INTRACTABLE: Primary | ICD-10-CM

## 2025-04-21 PROCEDURE — 99214 OFFICE O/P EST MOD 30 MIN: CPT | Performed by: PHYSICIAN ASSISTANT

## 2025-04-21 RX ORDER — ATOGEPANT 30 MG/1
TABLET ORAL
Qty: 90 TABLET | Refills: 2 | Status: SHIPPED | OUTPATIENT
Start: 2025-04-21

## 2025-04-21 RX ORDER — AMITRIPTYLINE HYDROCHLORIDE 10 MG/1
TABLET ORAL
Qty: 180 TABLET | Refills: 2 | Status: SHIPPED | OUTPATIENT
Start: 2025-04-21

## 2025-04-21 NOTE — PROGRESS NOTES
Name: Ralph Abbott      : 1965      MRN: [id number]  Encounter Provider: Ivana Alcazar PA-C  Encounter Date: 2025   Encounter department: NEUROLOGY [facility]  :  Assessment & Plan  Migraine without aura and without status migrainosus, not intractable  He reports significant reduction of migraine headaches with the addition of Qulipta 30 mg and denies side effects.  He also would like to continue the amitriptyline 20 mg nightly which reduce his headaches and also assist with sleep/insomnia.  Continue ibuprofen or Tylenol as needed, Nurtec if that fails.  He has not needed to use Nurtec at all since I last saw him.  He is going to [place] in May, I refilled medications before he leaves.    Orders:    amitriptyline (ELAVIL) 10 mg tablet; 20 mg qhs    Atogepant (Qulipta) 30 MG TABS; 1 tab qhs    Myofascial muscle pain    Orders:    amitriptyline (ELAVIL) 10 mg tablet; 20 mg qhs    Chronic tension-type headache, not intractable    Orders:    amitriptyline (ELAVIL) 10 mg tablet; 20 mg qhs    Essential hypertension         There are no Patient Instructions on file for this visit.     The patient should not hesitate to call me prior to his follow up with any questions or concerns.      History of Present Illness   HPI migraine follow up, says he maybe gets one migraine a week and says medications are working.  Since starting qulipta 30 mg he reports a significant reduction of migraines from almost daily to once per week.  He also likes the amitriptyline for sleep function and migraine prevention.  He takes Nurtec or Advil as needed.  No changes in the character of the headaches.  He denies side effects to the medications above.  He also reports that since partial senior living the migraine headaches have improved as well, but Qulipta made the biggest difference.  His headaches were still frequent during partial senior living.    Prior documentation:  He takes Nurtec at about 11 AM when the headache  starts or after the headache starts, and it gets him through the day until about the afternoon then he has to take ibuprofen for residual headache that comes on or gets worse.  He is happy with the amitriptyline, no side effects and he feels that his sleep function is much better, he feels better when he wakes up.  Continues the Emgality injection, but he is not sure if it is working.  No change in character to the headaches compared to last visit, just a bit more frequent.  He thinks that straining with his job may be causing a headache.  He the cold is irritating him.  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  He states this can cause a headache.  He is planning on decreasing his hours to only a few times per week.  Review of Systems   Constitutional:  Negative for appetite change, fatigue and fever.   HENT: Negative.  Negative for hearing loss, tinnitus, trouble swallowing and voice change.    Eyes: Negative.  Negative for photophobia, pain and visual disturbance.   Respiratory: Negative.  Negative for shortness of breath.    Cardiovascular: Negative.  Negative for palpitations.   Gastrointestinal: Negative.  Negative for nausea and vomiting.   Endocrine: Negative.  Negative for cold intolerance.   Genitourinary: Negative.  Negative for dysuria, frequency and urgency.   Musculoskeletal:  Negative for back pain, gait problem, myalgias, neck pain and neck stiffness.   Skin: Negative.  Negative for rash.   Allergic/Immunologic: Negative.    Neurological:  Positive for headaches. Negative for dizziness, tremors, seizures, syncope, facial asymmetry, speech difficulty, weakness, light-headedness and numbness.   Hematological: Negative.  Does not bruise/bleed easily.   Psychiatric/Behavioral: Negative.  Negative for confusion, hallucinations and sleep disturbance.     I have personally reviewed the MA's review of systems and made changes as necessary.    Pertinent Medical  History           Medical History Reviewed by provider this encounter:     .  Past Medical History   Past Medical History:   Diagnosis Date    Arthritis     Bronchospasm     last assessed 6/14/2016    Depression     Disc degeneration, lumbar     last assessed 4/7/2016    Erectile dysfunction     Hyperlipidemia     Hypertension     IFG (impaired fasting glucose)     last assessed 7/30/2014    Palpitations     last assessed 4/4/2013    Primary osteoarthritis of knees, bilateral     last assessed 6/27/2017    Right inguinal hernia     last assessed 9/12/2017    Spondylosis of lumbar region without myelopathy or radiculopathy     last assessed 4/7/2016     Past Surgical History:   Procedure Laterality Date    COLONOSCOPY      JOINT REPLACEMENT  1/19/2019    right knee    KNEE ARTHROPLASTY Left 06/06/2024    KNEE ARTHROSCOPY      Right    [place] ARTHRP KNE CONDYLE&PLATU MEDIAL&LAT COMPARTMENTS Right 01/21/2019    Procedure: ARTHROPLASTY KNEE TOTAL;  Surgeon: Ronnie Dotson MD;  [facility];  Service: Orthopedics    [place] ARTHRP KNE CONDYLE&PLATU MEDIAL&LAT COMPARTMENTS Left 06/06/2024    Procedure: ARTHROPLASTY KNEE TOTAL W ROBOT;  Surgeon: Ronnie Dotson MD;  [facility];  Service: Orthopedics    WV RPR 1ST INGUN HRNA AGE 5 YRS/> REDUCIBLE Right 10/26/2017    Procedure: REPAIR HERNIA INGUINAL;  Surgeon: Ha Hand MD;  [facility];  Service: General    TOOTH EXTRACTION       Family History   Problem Relation Age of Onset    No Known Problems Mother     Diabetes Father     No Known Problems Sister     Coronary artery disease Brother       reports that he quit smoking about 4 years ago. His smoking use included cigarettes. He started smoking about 24 years ago. He has a 10 pack-year smoking history. He has never used smokeless tobacco. He reports current alcohol use of about 6.0 standard drinks of alcohol per week. He reports that he does not use drugs.  Current Outpatient Medications    Medication Instructions    acetaminophen (TYLENOL) 650 mg, Oral, Every 8 hours PRN    amitriptyline (ELAVIL) 10 mg tablet 20 mg qhs    amLODIPine (NORVASC) 10 mg, Oral, Daily    Atogepant (Qulipta) 30 MG TABS 1 tab qhs    meloxicam (MOBIC) 15 mg, Oral, As needed    metoprolol succinate (TOPROL-XL) 75 mg, Oral, Daily    rimegepant sulfate (Nurtec) 75 mg TBDP 1 tab at migraine onset. Max 1 tab per 24 hours.    rosuvastatin (CRESTOR) 5 mg, Oral, Every other day    sildenafil (VIAGRA) 100 mg, Oral, Daily PRN   No Known Allergies   Current Outpatient Medications on File Prior to Visit   Medication Sig Dispense Refill    acetaminophen (TYLENOL) 650 mg CR tablet Take 1 tablet (650 mg total) by mouth every 8 (eight) hours as needed for mild pain 30 tablet 0    amLODIPine (NORVASC) 10 mg tablet Take 1 tablet (10 mg total) by mouth daily 90 tablet 1    meloxicam (MOBIC) 15 mg tablet Take 1 tablet (15 mg total) by mouth if needed for mild pain or moderate pain 90 tablet 1    metoprolol succinate (TOPROL-XL) 50 mg 24 hr tablet Take 1.5 tablets (75 mg total) by mouth daily 135 tablet 1    rimegepant sulfate (Nurtec) 75 mg TBDP 1 tab at migraine onset. Max 1 tab per 24 hours. 16 tablet 11    rosuvastatin (CRESTOR) 5 mg tablet Take 1 tablet (5 mg total) by mouth every other day 45 tablet 1    sildenafil (VIAGRA) 100 mg tablet Take 1 tablet (100 mg total) by mouth daily as needed for erectile dysfunction 30 tablet 0    [DISCONTINUED] amitriptyline (ELAVIL) 10 mg tablet 20 mg qhs 180 tablet 1    [DISCONTINUED] Atogepant (Qulipta) 30 MG TABS 1 tab qhs 90 tablet 1    [DISCONTINUED] Galcanezumab-gnlm (Emgality) 120 MG/ML SOAJ INJECT THE CONTENTS OF 1 PEN UNDER THE SKIN EVERY 30 DAYS. 3 mL 0     No current facility-administered medications on file prior to visit.      Social History     Tobacco Use    Smoking status: Former     Current packs/day: 0.00     Average packs/day: 0.5 packs/day for 20.0 years (10.0 ttl pk-yrs)     Types:  Cigarettes     Start date: 2001     Quit date:      Years since quittin.3    Smokeless tobacco: Never    Tobacco comments:     4-5 cig day x 20 yrs   Vaping Use    Vaping status: Never Used   Substance and Sexual Activity    Alcohol use: Yes     Alcohol/week: 6.0 standard drinks of alcohol     Types: 6 Cans of beer per week     Comment:  occasional    Drug use: No    Sexual activity: Yes     Partners: Female     Birth control/protection: None        Objective   /82 (BP Location: Left arm, Patient Position: Sitting, Cuff Size: Standard)   Pulse 69   Ht 6' (1.829 m)   Wt 106 kg (233 lb)   BMI 31.60 kg/m²     Physical Exam  Neurological Exam  On neurologic exam, the patient is alert and oriented to time and place. Speech is fluent and articulate, and the patient follows commands appropriately. Judgment and affect appear normal. Pupils are equally round and reactive to light and extraocular muscles are intact without nystagmus. Face is symmetric, and tongue, uvula, and palate are midline. Hearing is intact. Motor examination reveals intact strength throughout. Neck flexors 5/5, no TTP. Normal gait is steady.    Radiology Results Review : No pertinent imaging studies reviewed.    Administrative Statements   I have spent a total time of 20 minutes in caring for this patient on the day of the visit/encounter including Prognosis, Risks and benefits of tx options, Instructions for management, Patient and family education, Importance of tx compliance, Risk factor reductions, Impressions, Counseling / Coordination of care, Documenting in the medical record, Reviewing/placing orders in the medical record (including tests, medications, and/or procedures), and Obtaining or reviewing history  .

## 2025-04-21 NOTE — ASSESSMENT & PLAN NOTE
He reports significant reduction of migraine headaches with the addition of Qulipta 30 mg and denies side effects.  He also would like to continue the amitriptyline 20 mg nightly which reduce his headaches and also assist with sleep/insomnia.  Continue ibuprofen or Tylenol as needed, Nurtec if that fails.  He has not needed to use Nurtec at all since I last saw him.  He is going to [place] in May, I refilled medications before he leaves.    Orders:    amitriptyline (ELAVIL) 10 mg tablet; 20 mg qhs    Atogepant (Qulipta) 30 MG TABS; 1 tab qhs

## 2025-05-02 ENCOUNTER — APPOINTMENT (OUTPATIENT)
Dept: LAB | Facility: MEDICAL CENTER | Age: 60
End: 2025-05-02

## 2025-05-02 DIAGNOSIS — Z00.8 HEALTH EXAMINATION IN POPULATION SURVEYS: ICD-10-CM

## 2025-05-02 LAB
CHOLEST SERPL-MCNC: 177 MG/DL (ref ?–200)
EST. AVERAGE GLUCOSE BLD GHB EST-MCNC: 123 MG/DL
HBA1C MFR BLD: 5.9 %
HDLC SERPL-MCNC: 54 MG/DL
LDLC SERPL CALC-MCNC: 109 MG/DL (ref 0–100)
NONHDLC SERPL-MCNC: 123 MG/DL
TRIGL SERPL-MCNC: 68 MG/DL (ref ?–150)

## 2025-05-02 PROCEDURE — 80061 LIPID PANEL: CPT

## 2025-05-02 PROCEDURE — 36415 COLL VENOUS BLD VENIPUNCTURE: CPT

## 2025-05-02 PROCEDURE — 83036 HEMOGLOBIN GLYCOSYLATED A1C: CPT

## 2025-05-03 DIAGNOSIS — I10 ESSENTIAL HYPERTENSION: Primary | ICD-10-CM

## 2025-05-04 RX ORDER — AMLODIPINE BESYLATE 10 MG/1
10 TABLET ORAL DAILY
Qty: 90 TABLET | Refills: 0 | Status: SHIPPED | OUTPATIENT
Start: 2025-05-04

## 2025-05-04 RX ORDER — METOPROLOL SUCCINATE 50 MG/1
75 TABLET, EXTENDED RELEASE ORAL DAILY
Qty: 135 TABLET | Refills: 0 | Status: SHIPPED | OUTPATIENT
Start: 2025-05-04

## 2025-06-06 ENCOUNTER — APPOINTMENT (OUTPATIENT)
Dept: RADIOLOGY | Facility: MEDICAL CENTER | Age: 60
End: 2025-06-06
Payer: COMMERCIAL

## 2025-06-06 VITALS — BODY MASS INDEX: 30.85 KG/M2 | HEIGHT: 72 IN | WEIGHT: 227.8 LBS

## 2025-06-06 DIAGNOSIS — Z96.652 AFTERCARE FOLLOWING LEFT KNEE JOINT REPLACEMENT SURGERY: ICD-10-CM

## 2025-06-06 DIAGNOSIS — Z47.1 AFTERCARE FOLLOWING LEFT KNEE JOINT REPLACEMENT SURGERY: ICD-10-CM

## 2025-06-06 DIAGNOSIS — Z96.652 AFTERCARE FOLLOWING LEFT KNEE JOINT REPLACEMENT SURGERY: Primary | ICD-10-CM

## 2025-06-06 DIAGNOSIS — Z47.1 AFTERCARE FOLLOWING LEFT KNEE JOINT REPLACEMENT SURGERY: Primary | ICD-10-CM

## 2025-06-06 PROCEDURE — 99213 OFFICE O/P EST LOW 20 MIN: CPT

## 2025-06-06 PROCEDURE — 73560 X-RAY EXAM OF KNEE 1 OR 2: CPT

## 2025-06-06 NOTE — ASSESSMENT & PLAN NOTE
12 months s/p left TKA with robot, 6/6/2024.  Continue weight bearing activities as tolerated   Continue increasing physical activity; able to be as active as patient want to be  The patient was reminded about prophylactic antibiotic use prior to dental visits for the next year   No further follow up necessary regarding operative leg   The patient is welcome to return at any point with any new or old issue

## 2025-06-06 NOTE — PROGRESS NOTES
"Encounter Provider: Valery Joiner PA-C   Encounter Date: 06/06/25  Encounter department: [facility] [place]         ASSESSMENT & PLAN:  Assessment & Plan  Aftercare following left knee joint replacement surgery   12 months s/p left TKA with robot, 6/6/2024.  Continue weight bearing activities as tolerated   Continue increasing physical activity; able to be as active as patient want to be  The patient was reminded about prophylactic antibiotic use prior to dental visits for the next year   No further follow up necessary regarding operative leg   The patient is welcome to return at any point with any new or old issue                To do next visit:  Return if symptoms worsen or fail to improve.      ____________________________________________________  CHIEF COMPLAINT:  Chief Complaint   Patient presents with   • Left Knee - Follow-up     1 yr P.O          SUBJECTIVE:  Ralph Abbott is a 60 y.o. male who presents 12 months s/p left TKA with robot, 6/6/2024.  He is doing well.  Today he has no left knee pain complaints.  He is active without repercussion.  He denies medications for this issue.  He denies any issues, concerns, or complaints regarding his knee.  Overall he admits to improvement in quality of life since surgery.         PAST MEDICAL HISTORY:  Past Medical History[1]    PAST SURGICAL HISTORY:  Past Surgical History[2]    FAMILY HISTORY:  Family History[3]    SOCIAL HISTORY:  Social History[4]    MEDICATIONS:  Current Medications[5]    ALLERGIES:  Allergies[6]    LABS:  HgA1c:   Lab Results   Component Value Date    HGBA1C 5.9 (H) 05/02/2025     BMP:   Lab Results   Component Value Date    GLUCOSE 113 07/19/2014    CALCIUM 9.3 02/07/2025     07/19/2014    K 4.2 02/07/2025    CO2 26 02/07/2025     02/07/2025    BUN 15 02/07/2025    CREATININE 0.96 02/07/2025     CBC: No components found for: \"CBC\"    _____________________________________________________  PHYSICAL " "EXAMINATION:  Vital signs: Ht 6' (1.829 m)   Wt 103 kg (227 lb 12.8 oz)   BMI 30.90 kg/m²   General: No acute distress, awake and alert  Psychiatric: Mood and affect appear appropriate  HEENT: Trachea Midline, No torticollis, no apparent facial trauma  Cardiovascular: No audible murmurs; Extremities appear perfused  Pulmonary: No audible wheezing or stridor  Skin: No open lesions; see further details (if any) below    Ortho Exam:  Left knee:  Well healed anterior incision  No erythema and no ecchymosis  Stable to varus and valgus stress  Extensor mechanism intact  Extension 0  Flexion 120  Calf compartments soft and supple  Sensation intact  Toes are warm sensate and mobile          _____________________________________________________  STUDIES REVIEWED:    I have personally reviewed the pertinent films in [facility] and interpretation is as follows:  Left knee x-ray:  Well aligned prosthesis with no acute changes.      PROCEDURES PERFORMED:      None Preformed       Portions of the record may have been created with voice recognition software.  Occasional wrong word or \"sound a like\" substitutions may have occurred due to the inherent limitations of voice recognition software.  Read the chart carefully and recognize, using context, where substitutions have occurred.               [1]  Past Medical History:  Diagnosis Date   • Arthritis    • Bronchospasm     last assessed 6/14/2016   • Depression    • Disc degeneration, lumbar     last assessed 4/7/2016   • Erectile dysfunction    • Hyperlipidemia    • Hypertension    • IFG (impaired fasting glucose)     last assessed 7/30/2014   • Palpitations     last assessed 4/4/2013   • Primary osteoarthritis of knees, bilateral     last assessed 6/27/2017   • Right inguinal hernia     last assessed 9/12/2017   • Spondylosis of lumbar region without myelopathy or radiculopathy     last assessed 4/7/2016   [2]  Past Surgical History:  Procedure Laterality Date   • COLONOSCOPY     • " JOINT REPLACEMENT  2019    right knee   • KNEE ARTHROPLASTY Left 2024   • KNEE ARTHROSCOPY      Right   • [place] ARTHRP KNE CONDYLE&PLATU MEDIAL&LAT COMPARTMENTS Right 2019    Procedure: ARTHROPLASTY KNEE TOTAL;  Surgeon: Ronnie Dotson MD;  [facility];  Service: Orthopedics   • [place] ARTHRP KNE CONDYLE&PLATU MEDIAL&LAT COMPARTMENTS Left 2024    Procedure: ARTHROPLASTY KNEE TOTAL W ROBOT;  Surgeon: Ronnie Dotson MD;  [facility];  Service: Orthopedics   • ND RPR 1ST INGUN HRNA AGE 5 YRS/> REDUCIBLE Right 10/26/2017    Procedure: REPAIR HERNIA INGUINAL;  Surgeon: Ha Hand MD;  [facility];  Service: General   • TOOTH EXTRACTION     [3]  Family History  Problem Relation Name Age of Onset   • No Known Problems Mother     • Diabetes Father Les Abbott    • No Known Problems Sister     • Coronary artery disease Brother     [4]  Social History  Tobacco Use   • Smoking status: Former     Current packs/day: 0.00     Average packs/day: 0.5 packs/day for 20.0 years (10.0 ttl pk-yrs)     Types: Cigarettes     Start date: 2001     Quit date:      Years since quittin.4   • Smokeless tobacco: Never   • Tobacco comments:     4-5 cig day x 20 yrs   Vaping Use   • Vaping status: Never Used   Substance Use Topics   • Alcohol use: Yes     Alcohol/week: 6.0 standard drinks of alcohol     Types: 6 Cans of beer per week     Comment:  occasional   • Drug use: No   [5]    Current Outpatient Medications:   •  acetaminophen (TYLENOL) 650 mg CR tablet, Take 1 tablet (650 mg total) by mouth every 8 (eight) hours as needed for mild pain, Disp: 30 tablet, Rfl: 0  •  amitriptyline (ELAVIL) 10 mg tablet, 20 mg qhs, Disp: 180 tablet, Rfl: 2  •  amLODIPine (NORVASC) 10 mg tablet, Take 1 tablet (10 mg total) by mouth daily, Disp: 90 tablet, Rfl: 0  •  Atogepant (Qulipta) 30 MG TABS, 1 tab qhs, Disp: 90 tablet, Rfl: 2  •  meloxicam (MOBIC) 15 mg tablet, Take 1 tablet (15 mg  total) by mouth if needed for mild pain or moderate pain, Disp: 90 tablet, Rfl: 1  •  metoprolol succinate (TOPROL-XL) 50 mg 24 hr tablet, Take 1.5 tablets (75 mg total) by mouth daily, Disp: 135 tablet, Rfl: 0  •  rimegepant sulfate (Nurtec) 75 mg TBDP, 1 tab at migraine onset. Max 1 tab per 24 hours., Disp: 16 tablet, Rfl: 11  •  rosuvastatin (CRESTOR) 5 mg tablet, Take 1 tablet (5 mg total) by mouth every other day, Disp: 45 tablet, Rfl: 1  •  sildenafil (VIAGRA) 100 mg tablet, Take 1 tablet (100 mg total) by mouth daily as needed for erectile dysfunction, Disp: 30 tablet, Rfl: 0[6]  No Known Allergies

## 2025-07-18 ENCOUNTER — TELEPHONE (OUTPATIENT)
Dept: NEUROLOGY | Facility: CLINIC | Age: 60
End: 2025-07-18

## 2025-07-18 NOTE — TELEPHONE ENCOUNTER
Received via [employer] request for prior auth for Qulipta.  Request scanned into Media Manager.

## 2025-07-23 NOTE — TELEPHONE ENCOUNTER
PA for Qulipta 30 MG TABS SUBMITTED to [facility]    via    []CMM-KEY:   [x]Surescripts-Case ID # [id number]   []Availity-Auth ID # NDC #   []Faxed to plan   []Other website   []Phone call Case ID #     []PA sent as URGENT    All office notes, labs and other pertaining documents and studies sent. Clinical questions answered. Awaiting determination from insurance company.     Turnaround time for your insurance to make a decision on your Prior Authorization can take 7-21 business days.

## 2025-07-29 NOTE — TELEPHONE ENCOUNTER
PA for QULIPTA APPROVED     Date(s) approved July 30, 2025 to July 30, 2026     Case #    Patient advised by          []MyChart Message  []Phone call   [x]LMOM  []L/M to call office as no active Communication consent on file  []Unable to leave detailed message as VM not approved on Communication consent       Pharmacy advised by    [x]Fax  []Phone call  []Secure Chat    Specialty Pharmacy    []     Approval letter scanned into Media Yes

## (undated) DEVICE — GLOVE SRG BIOGEL 8

## (undated) DEVICE — SURGICAL CLIPPER BLADE GENERAL USE

## (undated) DEVICE — DRAPE SHEET THREE QUARTER

## (undated) DEVICE — ADHESIVE SKN CLSR HISTOACRYL FLEX 0.5ML LF

## (undated) DEVICE — DRAPE SHEET X-LG

## (undated) DEVICE — NEEDLE 25G X 1 1/2

## (undated) DEVICE — ACE WRAP 6 IN STERILE

## (undated) DEVICE — DUAL CUT SAGITTAL BLADE

## (undated) DEVICE — VELYS ROBOT-ASSISTED SOLUTION ARRAY DRILL PIN 125 MM X 4 MM: Brand: VELYS

## (undated) DEVICE — ABDOMINAL PAD: Brand: DERMACEA

## (undated) DEVICE — CAPIT KNEE ATTUNE FB W/DOM AOX

## (undated) DEVICE — VELYS ROBOT DRAPE

## (undated) DEVICE — VELYS ROBOTIC-ASSISTED SOLUTION ARRAY SET - KNEE: Brand: VELYS

## (undated) DEVICE — PADDING CAST 4 IN  COTTON STRL

## (undated) DEVICE — HOOD: Brand: FLYTE, SURGICOOL

## (undated) DEVICE — BETHLEHEM UNIV TOTAL KNEE, KIT: Brand: CARDINAL HEALTH

## (undated) DEVICE — STIRRUP STRAP ADULT DISP

## (undated) DEVICE — GLOVE INDICATOR PI UNDERGLOVE SZ 7 BLUE

## (undated) DEVICE — GLOVE INDICATOR PI UNDERGLOVE SZ 8.5 BLUE

## (undated) DEVICE — ACE WRAP 6 IN UNSTERILE

## (undated) DEVICE — ROSEBUD DISSECTORS: Brand: DEROYAL

## (undated) DEVICE — INTENDED FOR TISSUE SEPARATION, AND OTHER PROCEDURES THAT REQUIRE A SHARP SURGICAL BLADE TO PUNCTURE OR CUT.: Brand: BARD-PARKER SAFETY BLADES SIZE 15, STERILE

## (undated) DEVICE — STRL UNIVERSAL MINOR GENERAL: Brand: CARDINAL HEALTH

## (undated) DEVICE — SCD SEQUENTIAL COMPRESSION COMFORT SLEEVE MEDIUM KNEE LENGTH: Brand: KENDALL SCD

## (undated) DEVICE — REM POLYHESIVE ADULT PATIENT RETURN ELECTRODE: Brand: VALLEYLAB

## (undated) DEVICE — NO-SCRATCH ™ SMALL WHITNEY CURETTE ™ IS A SINGLE-USE, PLASTIC CURETTE FOR QUICKLY APPLYING, MANIPULATING AND REMOVING BONE CEMENT DURING HIP AND KNEE REPLACEMENT SURGERY. THE PLASTIC IS SOFTER THAN STEEL INSTRUMENTS, REDUCING THE RISK OF DAMAGING THE PROSTHESIS WITH METAL INSTRUMENTS.  THE CURETTE’S 6MM TIP REMOVES EXCESS CEMENT FROM REPLACEMENT HIPS AND KNEES. EASY-TO-MANEUVER, THE SMALL BLUE CURETTE LETS YOU REMOVE CEMENT FROM ALL EDGES OF THE PROSTHESIS.NO-SCRATCH WHITNEY SMALL CURETTE FEATURES:SAFER THAN STEEL- MADE OF PLASTIC - STURDY YET SOFTER THAN SURGICAL STEEL.HANDIER- EACH TOOL HAS A MOLDED-IN THUMB INDENTATION INSTANTLY ORIENTING THE TOOL.- EASIER TO MANEUVER IN HARD TO SEE PLACES.- COLOR-CODED FOR EASY IDENTIFICATION.FASTER- COMES INDIVIDUALLY PACKAGED IN STERILE, PEEL OPEN POUCH, READY TO GO.- APPLIES, MANIPULATES, OR REMOVES CEMENT WITH FINGERTIP PRECISION.ECONOMICAL- THE COST OF A SINGLE REVISION DWARFS THE COST OF A SINGLE-USE CURETTE. - DISPOSABLE – THERE’S NO NEED TO WASTE TIME REMOVING HARDENED CEMENT OR RE-STERILIZING TOOLS.- LESS EXPENSIVE TO BUY AND INVENTORY - ORDER ONLY THE TOOL YOU USE.- PACKAGED 25 INDIVIDUALLY WRAPPED TOOLS TO A CARTON FOR CONVENIENT SHELF STORAGE.: Brand: WHITNEY NO-SCRATCH CURETTE (SMALL)

## (undated) DEVICE — ASTOUND STANDARD SURGICAL GOWN, XL: Brand: CONVERTORS

## (undated) DEVICE — SUT VICRYL 2-0 SHB 27 IN JB417

## (undated) DEVICE — 3M™ IOBAN™ 2 ANTIMICROBIAL INCISE DRAPE 6650EZ: Brand: IOBAN™ 2

## (undated) DEVICE — JP 3-SPRING RES W/10FR PVC DRAIN/TR: Brand: CARDINAL HEALTH

## (undated) DEVICE — SPONGE GAUZE 4 X 9

## (undated) DEVICE — 3 BONE CEMENT MIXER: Brand: MIXEVAC

## (undated) DEVICE — SUT VICRYL PLUS 2-0 CTB-1 27 IN VCPB259H

## (undated) DEVICE — HANDPIECE SET WITH RETRACTABLE COAXIAL FAN SPRAY TIP AND SUCTION TUBE: Brand: INTERPULSE

## (undated) DEVICE — THE SIMPULSE SOLO SYSTEM WITH ULTREX RETRACTABLE SPLASH SHIELD TIP: Brand: SIMPULSE SOLO

## (undated) DEVICE — GAUZE SPONGES,16 PLY: Brand: CURITY

## (undated) DEVICE — COOL TEMP PAD

## (undated) DEVICE — SAW BLADE OSCILLATING BRAZOL 167

## (undated) DEVICE — GLOVE SRG BIOGEL 6.5

## (undated) DEVICE — GLOVE INDICATOR PI UNDERGLOVE SZ 8 BLUE

## (undated) DEVICE — GLOVE INDICATOR PI UNDERGLOVE SZ 7.5 BLUE

## (undated) DEVICE — HOOD WITH PEEL AWAY FACE SHIELD: Brand: T7PLUS

## (undated) DEVICE — SILVER-COATED ANTIMICROBIAL BARRIER DRESSING: Brand: ACTICOAT   4" X 8"

## (undated) DEVICE — CUFF TOURNIQUET 34 X 4 IN QUICK CONNECT DISP 1BLA

## (undated) DEVICE — GLOVE SRG BIOGEL ORTHOPEDIC 7.5

## (undated) DEVICE — SAW BLADE RECIPROCATING 179

## (undated) DEVICE — HOOD: Brand: T7PLUS

## (undated) DEVICE — PLUMEPEN PRO 10FT

## (undated) DEVICE — DRAPE EQUIPMENT RF WAND

## (undated) DEVICE — PAD CAST 6 IN COTTON NON STERILE

## (undated) DEVICE — WET SKIN PREP TRAY: Brand: MEDLINE INDUSTRIES, INC.

## (undated) DEVICE — SPINNING CEMENT MIXING BOWL

## (undated) DEVICE — CUFF TOURNIQUET 30 X 4 IN QUICK CONNECT DISP 1BLA

## (undated) DEVICE — VIOLET BRAIDED (POLYGLACTIN 910), SYNTHETIC ABSORBABLE SUTURE: Brand: COATED VICRYL

## (undated) DEVICE — BAG POLY CLEAR 12 X 8 X 30

## (undated) DEVICE — STOCKINETTE REGULAR

## (undated) DEVICE — CAPIT KNEE ATTUNE FB CEMENT - DEPUY

## (undated) DEVICE — TRAY FOLEY 16FR URIMETER SURESTEP

## (undated) DEVICE — CHLORAPREP HI-LITE 26ML ORANGE

## (undated) DEVICE — SPONGE PVP SCRUB WING STERILE

## (undated) DEVICE — VELYS SATEL DRAPE

## (undated) DEVICE — VELYS BLADE SAW OSC 85 X 19 X 2MM

## (undated) DEVICE — GLOVE SRG BIOGEL 7.5

## (undated) DEVICE — RECIPROCATING BLADE, DOUBLE SIDED (70.0 X 0.96 X 12.5MM)

## (undated) DEVICE — SUT MONOCRYL 4-0 PS-2 27 IN Y426H

## (undated) DEVICE — INTENDED FOR TISSUE SEPARATION, AND OTHER PROCEDURES THAT REQUIRE A SHARP SURGICAL BLADE TO PUNCTURE OR CUT.: Brand: BARD-PARKER SAFETY BLADES SIZE 10, STERILE

## (undated) DEVICE — PENROSE DRAIN, 18 X 3 8: Brand: CARDINAL HEALTH

## (undated) DEVICE — 4-PORT MANIFOLD: Brand: NEPTUNE 2

## (undated) DEVICE — SUT VICRYL PLUS 1 CTB-1 36 IN VCPB947H